# Patient Record
Sex: FEMALE | Race: WHITE | NOT HISPANIC OR LATINO | Employment: OTHER | URBAN - METROPOLITAN AREA
[De-identification: names, ages, dates, MRNs, and addresses within clinical notes are randomized per-mention and may not be internally consistent; named-entity substitution may affect disease eponyms.]

---

## 2017-01-16 ENCOUNTER — OFFICE VISIT (OUTPATIENT)
Dept: FAMILY MEDICINE | Facility: CLINIC | Age: 72
End: 2017-01-16
Payer: COMMERCIAL

## 2017-01-16 VITALS
TEMPERATURE: 98.2 F | SYSTOLIC BLOOD PRESSURE: 136 MMHG | HEART RATE: 66 BPM | WEIGHT: 140 LBS | RESPIRATION RATE: 18 BRPM | DIASTOLIC BLOOD PRESSURE: 77 MMHG | BODY MASS INDEX: 24.02 KG/M2

## 2017-01-16 DIAGNOSIS — R13.14 PHARYNGOESOPHAGEAL DYSPHAGIA: Primary | ICD-10-CM

## 2017-01-16 DIAGNOSIS — R33.9 INCOMPLETE BLADDER EMPTYING: ICD-10-CM

## 2017-01-16 PROCEDURE — 99214 OFFICE O/P EST MOD 30 MIN: CPT | Performed by: FAMILY MEDICINE

## 2017-01-16 NOTE — MR AVS SNAPSHOT
After Visit Summary   1/16/2017    Mariah Jacinto    MRN: [id number]           Patient Information     Date Of Birth          1945        Visit Information        Provider Department      1/16/2017 9:00 AM Eli Sommer MD [facility]        Today's Diagnoses     Pharyngoesophageal dysphagia    -  1     Incomplete bladder emptying           Care Instructions          Thank you for choosing [facility].  You may be receiving a survey in the mail from [facility] regarding your visit today.  Please take a few minutes to complete and return the survey to let us know how we are doing.      Our Clinic hours are:  Mondays    7:20 am - 7 pm  Tues -  Fri  7:20 am - 5 pm    Clinic Phone: [phone]    The clinic lab opens at 7:30 am Mon - Fri and appointments are required.    [facility]. [phone]  Monday-Thursday 8 am - 7pm  Tues/Wed/Fri 8 am - 5:30 pm               Follow-ups after your visit        Additional Services     GASTROENTEROLOGY ADULT REFERRAL +/- PROCEDURE       Your provider has referred you to Gastroenterology Services.    English    Procedure/Referral: PROCEDURE ONLY - UPPER GI ENDOSCOPY (EGD) - Reason for procedure: Dysphagia  FMG: [facility] [place] [phone]   [email or web address]      Please be aware that coverage of these services is subject to the terms and limitations of your health insurance plan.  Call member services at your health plan with any benefit or coverage questions.  Any procedures must be performed at a [facility] facility OR coordinated by your clinic's referral office.    Please bring the following with you to your appointment:    (1) Any X-Rays, CTs or MRIs which have been performed.  Contact the facility where they were done to arrange for  prior to your scheduled appointment.    (2) List of current medications   (3) This referral request   (4) Any documents/labs  given to you for this referral            UROLOGY ADULT REFERRAL       Your provider has referred you to: FM[facility]: [facility] [phone]   [email or web address]    Please be aware that coverage of these services is subject to the terms and limitations of your health insurance plan.  Call member services at your health plan with any benefit or coverage questions.      Please bring the following with you to your appointment:    (1) Any X-Rays, CTs or MRIs which have been performed.  Contact the facility where they were done to arrange for  prior to your scheduled appointment.    (2) List of current medications  (3) This referral request   (4) Any documents/labs given to you for this referral                  Your next 10 appointments already scheduled     May 16, 2017 11:00 AM   Return Visit with Chapito Franco MD   [facility]    [address] [address] [phone]              Who to contact     If you have questions or need follow up information about today's clinic visit or your schedule please contact [facility] directly at [phone].  Normal or non-critical lab and imaging results will be communicated to you by MyChart, letter or phone within 4 business days after the clinic has received the results. If you do not hear from us within 7 days, please contact the clinic through MyChart or phone. If you have a critical or abnormal lab result, we will notify you by phone as soon as possible.  Submit refill requests through [employer] or call your pharmacy and they will forward the refill request to us. Please allow 3 business days for your refill to be completed.          Additional Information About Your Visit        NowSpots Information     Now[employer] lets you send messages to your doctor, view your test results, renew your prescriptions, schedule appointments and  "more. To sign up, go to [email or web address] . Click on \"Log in\" on the left side of the screen, which will take you to the Welcome page. Then click on \"Sign up Now\" on the right side of the page.     You will be asked to enter the access code listed below, as well as some personal information. Please follow the directions to create your username and password.     Your access code is: [email or web address]  Expires: 2017  9:31 AM     Your access code will  in 90 days. If you need help or a new code, please call your [place] clinic or [phone].        Care EveryWhere ID     This is your Care EveryWhere ID. This could be used by other organizations to access your [place] medical records  [id number]        Your Vitals Were     Pulse Temperature Respirations             66 98.2  F (36.8  C) 18          Blood Pressure from Last 3 Encounters:   17 136/77   16 134/82   16 145/78    Weight from Last 3 Encounters:   17 140 lb (63.504 kg)   16 144 lb (65.318 kg)   10/19/16 144 lb 6.4 oz (65.499 kg)              We Performed the Following     GASTROENTEROLOGY ADULT REFERRAL +/- PROCEDURE     UROLOGY ADULT REFERRAL        Primary Care Provider Office Phone # Fax #    Eli Sommer [phone]       [facility] [address] [address] [address]        Thank you!     Thank you for choosing [facility]  for your care. Our goal is always to provide you with excellent care. Hearing back from our patients is one way we can continue to improve our services. Please take a few minutes to complete the written survey that you may receive in the mail after your visit with us. Thank you!             Your Updated Medication List - Protect others around you: Learn how to safely use, store and throw away your medicines at www.disposemymeds.org.          This list is accurate as of: 17  9:31 AM.  Always use your most recent med list.                "    Brand Name Dispense Instructions for use    acetaminophen 325 MG tablet    TYLENOL     Take 325-650 mg by mouth every 6 hours as needed for mild pain       acidophilus Caps      Take 1 tablet by mouth daily       aspirin 81 MG tablet      1 TABLET DAILY       Calcium + D3 600-200 MG-UNIT Tabs      Take 1 tablet by mouth daily       glucosamine-chondroitin 500-400 MG Caps per capsule      Take 1 capsule by mouth daily       melatonin 5 MG Caps     1 capsule    Take 5 mg by mouth At Bedtime       Multi-vitamin Tabs tablet   Generic drug:  multivitamin, therapeutic with minerals      1 TABLET DAILY       OMEGA-3 FISH OIL PO      Take 1 capsule by mouth daily       omeprazole 40 MG capsule    priLOSEC    90 capsule    Take 1 capsule (40 mg) by mouth daily       simvastatin 20 MG tablet    ZOCOR    90 tablet    Take 1 tablet (20 mg) by mouth At Bedtime       STOOL SOFTENER PO      Take  by mouth. 1 daily       VITAMIN C ER PO      Take 1 tablet by mouth daily       VITAMIN D PO      1 DAILY

## 2017-01-16 NOTE — PATIENT INSTRUCTIONS
Thank you for choosing [facility].  You may be receiving a survey in the mail from [facility] regarding your visit today.  Please take a few minutes to complete and return the survey to let us know how we are doing.      Our Clinic hours are:  Mondays    7:20 am - 7 pm  Tues -  Fri  7:20 am - 5 pm    Clinic Phone: [phone]    The clinic lab opens at 7:30 am Mon - Fri and appointments are required.    [facility]. [phone]  Monday-Thursday 8 am - 7pm  Tues/Wed/Fri 8 am - 5:30 pm

## 2017-01-16 NOTE — PROGRESS NOTES
SUBJECTIVE:                                                    Mariah Jacinto is a 71 year old female who presents to clinic today for the following health issues:      GERD/Heartburn     Onset: x 2 mo      Description:     Burning in chest: YES    Intensity: mild, moderate    Progression of Symptoms: worsening    Accompanying Signs & Symptoms:  Does it feel like food gets stuck: YES  Nausea: YES  Vomiting (bloody?): no   Abdominal Pain: no   Black-Tarry stools: no:  Bloody stools: no   History:   Previous ulcers: YES    Precipitating factors:   Caffeine use: no  Alcohol use: no  NSAID/Aspirin use: yes Tobacco use: no  Worse with no particular food or drink.    Alleviating factors:  none         Therapies Tried and outcome:OTC H2 blocker: and prescription H2 blocker:  No relief     She's also on omeprazole        Continues to feel bladder fullness after voiding.  Low level UTI treated a few weeks ago, she didn't feel this helped.  Doesn't feel her bladder is emptying completely.  We talked about urology referral.  She's wondering about a side effect of the simvastatin.  It appears that it can cause interstitial cystitis.  I'd be hesitant to take her off without confirming this with urology.  Likely needs a post void bladder scan, etc.      Problem list and histories reviewed & adjusted, as indicated.  Additional history: as documented      /77 mmHg  Pulse 66  Temp(Src) 98.2  F (36.8  C)  Resp 18  Wt 140 lb (63.504 kg)  EXAM: GENERAL APPEARANCE: Alert, no acute distress  NECK: No adenopathy,masses or thyromegaly  RESP: lungs clear to auscultation   CV: normal rate, regular rhythm, no murmur or gallop  ABDOMEN: soft, no organomegaly, masses or tenderness      ASSESSMENT/PLAN:      ICD-10-CM    1. Pharyngoesophageal dysphagia R13.14 GASTROENTEROLOGY ADULT REFERRAL +/- PROCEDURE   2. Incomplete bladder emptying R33.9 UROLOGY ADULT REFERRAL     Needs upper endoscopy to r/o stricture/mass.   If negative  consider GI referral.    Urology consultation for the incomplete bladder emptying.      Eli Sommer M.D.      Patient Instructions         Thank you for choosing [facility].  You may be receiving a [facility] today.  Please take a few minutes to complete and return the survey to let us know how we are doing.      Our Clinic hours are:  Mondays    7:20 am - 7 pm  Tues -  Fri  7:20 am - 5 pm    Clinic Phone: [phone]    The clinic lab opens at 7:30 am Mon - Fri and appointments are required.    [facility]. [phone]  Monday-Thursday 8 am - 7pm  Tues/Wed/Fri 8 am - 5:30 pm

## 2017-01-16 NOTE — NURSING NOTE
"Initial /77 mmHg  Pulse 66  Temp(Src) 98.2  F (36.8  C)  Resp 18  Wt 140 lb (63.504 kg) Estimated body mass index is 24.02 kg/(m^2) as calculated from the following:    Height as of 12/8/16: 5' 4\" (1.626 m).    Weight as of this encounter: 140 lb (63.504 kg). .    Chief Complaint   Patient presents with     Gastrointestinal Problem     Venita Wolf CMA    "

## 2017-01-30 ENCOUNTER — ANESTHESIA EVENT (OUTPATIENT)
Dept: GASTROENTEROLOGY | Facility: CLINIC | Age: 72
End: 2017-01-30
Payer: COMMERCIAL

## 2017-01-30 NOTE — ANESTHESIA PREPROCEDURE EVALUATION
Anesthesia Evaluation     . Pt has had prior anesthetic.     No history of anesthetic complications     ROS/MED HX    ENT/Pulmonary:  - neg pulmonary ROS     Neurologic:  - neg neurologic ROS     Cardiovascular:     (+) Dyslipidemia, ----. : . . . :. .       METS/Exercise Tolerance:  >4 METS   Hematologic:  - neg hematologic  ROS       Musculoskeletal:  - neg musculoskeletal ROS       GI/Hepatic:     (+) GERD Symptomatic, Other GI/Hepatic dysphagia, nausea      Renal/Genitourinary:  - ROS Renal section negative       Endo:  - neg endo ROS       Psychiatric:     (+) psychiatric history depression      Infectious Disease:  - neg infectious disease ROS       Malignancy:   (+) Malignancy History of Skin          Other: Comment: Trigeminal and intercostal neuralgia              Physical Exam  Normal systems: cardiovascular, pulmonary and dental    Airway   Mallampati: I  TM distance: >3 FB  Neck ROM: full    Dental     Cardiovascular       Pulmonary                     Anesthesia Plan      History & Physical Review  History and physical reviewed and following examination; no interval change.    ASA Status:  3 .    NPO Status:  > 8 hours    Plan for MAC and General with Propofol induction. Reason for MAC:  Chronic cardiopulmonary disease (G9)  PONV prophylaxis:  Ondansetron (or other 5HT-3) and Dexamethasone or Solumedrol       Postoperative Care  Postoperative pain management:  IV analgesics and Oral pain medications.      Consents  Anesthetic plan, risks, benefits and alternatives discussed with:  Patient..                          .

## 2017-01-31 ENCOUNTER — ANESTHESIA (OUTPATIENT)
Dept: GASTROENTEROLOGY | Facility: CLINIC | Age: 72
End: 2017-01-31
Payer: COMMERCIAL

## 2017-01-31 ENCOUNTER — HOSPITAL ENCOUNTER (OUTPATIENT)
Facility: CLINIC | Age: 72
Discharge: HOME | End: 2017-01-31
Attending: SURGERY | Admitting: SURGERY
Payer: COMMERCIAL

## 2017-01-31 ENCOUNTER — SURGERY (OUTPATIENT)
Age: 72
End: 2017-01-31

## 2017-01-31 VITALS
TEMPERATURE: 97.8 F | HEART RATE: 76 BPM | OXYGEN SATURATION: 98 % | BODY MASS INDEX: 23.9 KG/M2 | RESPIRATION RATE: 16 BRPM | SYSTOLIC BLOOD PRESSURE: 141 MMHG | DIASTOLIC BLOOD PRESSURE: 83 MMHG | WEIGHT: 140 LBS | HEIGHT: 64 IN

## 2017-01-31 LAB — UPPER GI ENDOSCOPY: NORMAL

## 2017-01-31 PROCEDURE — 25000125 ZZHC RX 250: Performed by: NURSE ANESTHETIST, CERTIFIED REGISTERED

## 2017-01-31 PROCEDURE — 25800025 ZZH RX 258: Performed by: SURGERY

## 2017-01-31 PROCEDURE — 25000125 ZZHC RX 250: Performed by: SURGERY

## 2017-01-31 PROCEDURE — 25000132 ZZH RX MED GY IP 250 OP 250 PS 637: Performed by: SURGERY

## 2017-01-31 PROCEDURE — 88305 TISSUE EXAM BY PATHOLOGIST: CPT | Mod: 26 | Performed by: SURGERY

## 2017-01-31 PROCEDURE — 88305 TISSUE EXAM BY PATHOLOGIST: CPT | Performed by: SURGERY

## 2017-01-31 PROCEDURE — 43239 EGD BIOPSY SINGLE/MULTIPLE: CPT | Performed by: SURGERY

## 2017-01-31 PROCEDURE — 37000008 ZZH ANESTHESIA TECHNICAL FEE, 1ST 30 MIN: Performed by: SURGERY

## 2017-01-31 RX ORDER — PROPOFOL 10 MG/ML
INJECTION, EMULSION INTRAVENOUS CONTINUOUS PRN
Status: DISCONTINUED | OUTPATIENT
Start: 2017-01-31 | End: 2017-01-31

## 2017-01-31 RX ORDER — LIDOCAINE HYDROCHLORIDE 10 MG/ML
INJECTION, SOLUTION INFILTRATION; PERINEURAL PRN
Status: DISCONTINUED | OUTPATIENT
Start: 2017-01-31 | End: 2017-01-31

## 2017-01-31 RX ORDER — ONDANSETRON 2 MG/ML
4 INJECTION INTRAMUSCULAR; INTRAVENOUS
Status: DISCONTINUED | OUTPATIENT
Start: 2017-01-31 | End: 2017-01-31 | Stop reason: HOSPADM

## 2017-01-31 RX ORDER — SODIUM CHLORIDE, SODIUM LACTATE, POTASSIUM CHLORIDE, CALCIUM CHLORIDE 600; 310; 30; 20 MG/100ML; MG/100ML; MG/100ML; MG/100ML
INJECTION, SOLUTION INTRAVENOUS CONTINUOUS
Status: DISCONTINUED | OUTPATIENT
Start: 2017-01-31 | End: 2017-01-31 | Stop reason: HOSPADM

## 2017-01-31 RX ORDER — LIDOCAINE 40 MG/G
CREAM TOPICAL
Status: DISCONTINUED | OUTPATIENT
Start: 2017-01-31 | End: 2017-01-31 | Stop reason: HOSPADM

## 2017-01-31 RX ORDER — PROPOFOL 10 MG/ML
INJECTION, EMULSION INTRAVENOUS PRN
Status: DISCONTINUED | OUTPATIENT
Start: 2017-01-31 | End: 2017-01-31

## 2017-01-31 RX ADMIN — PROPOFOL 100 MG: 10 INJECTION, EMULSION INTRAVENOUS at 10:22

## 2017-01-31 RX ADMIN — LIDOCAINE HYDROCHLORIDE 1 ML: 10 INJECTION, SOLUTION INFILTRATION; PERINEURAL at 09:59

## 2017-01-31 RX ADMIN — SODIUM CHLORIDE, POTASSIUM CHLORIDE, SODIUM LACTATE AND CALCIUM CHLORIDE: 600; 310; 30; 20 INJECTION, SOLUTION INTRAVENOUS at 09:59

## 2017-01-31 RX ADMIN — BENZOCAINE 3 SPRAY: 220 SPRAY, METERED PERIODONTAL at 10:21

## 2017-01-31 RX ADMIN — PROPOFOL 75 MCG/KG/MIN: 10 INJECTION, EMULSION INTRAVENOUS at 10:25

## 2017-01-31 RX ADMIN — LIDOCAINE HYDROCHLORIDE 20 MG: 10 INJECTION, SOLUTION INFILTRATION; PERINEURAL at 10:22

## 2017-01-31 NOTE — BRIEF OP NOTE
[facility]   Brief Operative Note    Pre-operative diagnosis: pharyngoesophageal dysphagia   Post-operative diagnosis small esophageal diverticulum, otherwise normal   Procedure: Procedure(s):  Gastroscopy - Wound Class: II-Clean Contaminated   Surgeon(s): Surgeon(s) and Role:     * Qasim Bowie MD - Primary   Estimated blood loss: * No values recorded between 1/31/2017 12:00 AM and 1/31/2017 10:30 AM *    Specimens:   ID Type Source Tests Collected by Time Destination   A : for h pylori                     lmk Tissue Stomach, Antrum SURGICAL PATHOLOGY EXAM Qasim Bowie MD 1/31/2017 10:29 AM       Findings: 1. Small diverticulum of esophagus at 30 cm  2.  Esophagus otherwise normal  3.  Stomach unremarkable - biopsied for h.pylori  4.  Duodenum normal

## 2017-01-31 NOTE — H&P
71 year old year old female here for upper endoscopy for dysphagia.        Patient Active Problem List   Diagnosis     Disorder of bone and cartilage     MENOPAUSE --ERT     Enthesopathy of hip region     Sensorineural hearing loss     Right hip pain     Trigeminal Neuralgia right      Hyperlipidemia LDL goal <160     Advanced directives, counseling/discussion     Intercostal neuralgia     Health Care Home     Trochanteric bursitis     Subjective tinnitus, bilateral     Gastroesophageal reflux disease without esophagitis       Past Medical History   Diagnosis Date     OSTEOPENIA      Adhesive capsulitis of shoulder      Right shoulder tendonitis     Displacement of cervical intervertebral disc without myelopathy      MENOPAUSE --ERT      Esophageal reflux      Basal cell carcinoma      Major depression in complete remission (H) 6/22/2009     celexa caused spinning side effect      Squamous cell carcinoma (H)        Past Surgical History   Procedure Laterality Date     Surgical history of -   2009     right retromastoid craniectomy and decompression trigeminal nerve     Hysterectomy, vaginal  1988     Hysterectomy, oophorectomy     Tonsillectomy & adenoidectomy  child     T&A      Hysterectomy, pap no longer indicated       has both ovaries out also      C anesth,lower arm surgery  1999     ulnar nerve decompression - right     Ablate vein varicose radio frequency without phlebectomy multiple stab  3/28/2013     Procedure: ABLATE VEIN VARICOSE RADIO FREQUENCY WITHOUT PHLEBECTOMY MULTIPLE STAB;  Bilateral ablation of varicose veins legs-to ultrasound at 0930  ;  Surgeon: Noam Soliman MD;  Location: [facility]     Colonoscopy  8/20/2013     Procedure: COLONOSCOPY;  Colonoscopy;  Surgeon: Yuliya Nathan MD;  Location: [facility] GI     Esophagoscopy, gastroscopy, duodenoscopy (egd), combined N/A 5/12/2015     Procedure: COMBINED ESOPHAGOSCOPY, GASTROSCOPY, DUODENOSCOPY (EGD), BIOPSY SINGLE OR MULTIPLE;   Surgeon: Yuliya Nathan MD;  Location: [facility] GI       Family History   Problem Relation Age of Onset     Alzheimer Disease Mother       at age 85     OSTEOPOROSIS Mother      Arthritis Mother      Alcohol/Drug Father      Respiratory Father      Eye Disorder Maternal Grandfather      Macular degneration     C.A.D. Brother      Tripple bypass age 57     Cardiovascular Brother      CANCER Brother      leukemia (ALL)     Cardiovascular Brother      Cardiovascular Brother      Neurologic Disorder Son      HEART DISEASE Son        No current outpatient prescriptions on file.    Allergies   Allergen Reactions     Biaxin [Clarithromycin]      per pt       Celebrex [Celecoxib]      per pt     Cipro [Ciprofloxacin]      per pt     Darvocet [Propoxyphene N-Apap]      Fleet Phospho Soda [Sodium Phosphate/Biphosphate]      nausea per pt     Morphine      Neurontin [Gabapentin]      per pt     Nortriptyline      per pt     Percocet [Oxycodone-Acetaminophen]      Serzone [Nefazodone Hydrochloride]      per pt     Tegretol [Carbamazepine]      Vicodin [Acetaminophen]      per pt-dizziness     Vioxx      per pt     Vivactil [Protriptyline Hcl]      per pt     Wellbutrin [Bupropion Hcl]      Zithromax [Azithromycin Dihydrate]        Pt reports that she has never smoked. She has never used smokeless tobacco. She reports that she drinks alcohol. She reports that she does not use illicit drugs.    Exam:    Awake, Alert OX3  Lungs - CTA bilaterally  CV - RRR, no murmurs, distal pulses intact  Abd - soft, non-distended, non-tender, +BS  Extr - No cyanosis or edema    A/P 71 year old year old female in need of upper endoscopy for dysphagia. Risks, benefits, alternatives, and complications were discussed including the possibility of perforation and the patient agreed to proceed.    Qasim Bowie MD

## 2017-01-31 NOTE — ANESTHESIA POSTPROCEDURE EVALUATION
Patient: Mariah Jacinto    COMBINED ESOPHAGOSCOPY, GASTROSCOPY, DUODENOSCOPY (EGD), BIOPSY SINGLE OR MULTIPLE (N/A Esophagus)  Additional InformationProcedure(s):  Gastroscopy - Wound Class: II-Clean Contaminated    Diagnosis:pharyngoesophageal dysphagia  Diagnosis Additional Information: No value filed.    Anesthesia Type:  No value filed.    Note:  Anesthesia Post Evaluation    Patient location during evaluation: Bedside  Patient participation: Able to fully participate in evaluation  Level of consciousness: awake  Pain management: adequate  Airway patency: patent  Cardiovascular status: stable  Respiratory status: nasal cannula  Hydration status: stable  PONV: none     Anesthetic complications: None          Last vitals:  Filed Vitals:    01/31/17 0942   BP: 147/75   Pulse: 70   Temp: 36.6  C (97.8  F)   Resp: 18   SpO2: 98%       Electronically Signed By: VICKY Martinez CRNA  January 31, 2017  10:33 AM

## 2017-01-31 NOTE — ADDENDUM NOTE
Addendum  created 01/31/17 1034 by Tameka Martel APRN CRNA    Modules edited: Anesthesia Review and Sign Navigator Section

## 2017-01-31 NOTE — ANESTHESIA CARE TRANSFER NOTE
Patient: Mariah Jacinto    COMBINED ESOPHAGOSCOPY, GASTROSCOPY, DUODENOSCOPY (EGD) (N/A Esophagus)  Additional InformationProcedure(s):  Gastroscopy - Wound Class: II-Clean Contaminated    Diagnosis: pharyngoesophageal dysphagia  Diagnosis Additional Information: No value filed.    Anesthesia Type:   No value filed.     Note:  Airway :Nasal Cannula  Patient transferred to:Phase II        Vitals: (Last set prior to Anesthesia Care Transfer)              Electronically Signed By: VICKY Martinez CRNA  January 31, 2017  10:33 AM

## 2017-02-01 LAB — COPATH REPORT: NORMAL

## 2017-02-25 ENCOUNTER — TRANSFERRED RECORDS (OUTPATIENT)
Dept: HEALTH INFORMATION MANAGEMENT | Facility: CLINIC | Age: 72
End: 2017-02-25

## 2017-02-27 ENCOUNTER — OFFICE VISIT (OUTPATIENT)
Dept: UROLOGY | Facility: CLINIC | Age: 72
End: 2017-02-27
Payer: COMMERCIAL

## 2017-02-27 VITALS — RESPIRATION RATE: 16 BRPM | HEART RATE: 77 BPM | DIASTOLIC BLOOD PRESSURE: 78 MMHG | SYSTOLIC BLOOD PRESSURE: 133 MMHG

## 2017-02-27 DIAGNOSIS — Z87.440 PERSONAL HISTORY OF URINARY TRACT INFECTION: Primary | ICD-10-CM

## 2017-02-27 LAB
ALBUMIN UR-MCNC: NEGATIVE MG/DL
APPEARANCE UR: CLEAR
BILIRUB UR QL STRIP: NEGATIVE
COLOR UR AUTO: YELLOW
GLUCOSE UR STRIP-MCNC: NEGATIVE MG/DL
HGB UR QL STRIP: NEGATIVE
KETONES UR STRIP-MCNC: NEGATIVE MG/DL
LEUKOCYTE ESTERASE UR QL STRIP: NEGATIVE
NITRATE UR QL: NEGATIVE
PH UR STRIP: 6 PH (ref 5–7)
SP GR UR STRIP: >1.03 (ref 1–1.03)
URN SPEC COLLECT METH UR: NORMAL
UROBILINOGEN UR STRIP-ACNC: 0.2 EU/DL (ref 0.2–1)

## 2017-02-27 PROCEDURE — 87086 URINE CULTURE/COLONY COUNT: CPT | Performed by: UROLOGY

## 2017-02-27 PROCEDURE — 81003 URINALYSIS AUTO W/O SCOPE: CPT | Performed by: UROLOGY

## 2017-02-27 PROCEDURE — 51798 US URINE CAPACITY MEASURE: CPT | Performed by: UROLOGY

## 2017-02-27 PROCEDURE — 99213 OFFICE O/P EST LOW 20 MIN: CPT | Mod: 25 | Performed by: UROLOGY

## 2017-02-27 NOTE — NURSING NOTE
"Chief Complaint   Patient presents with     Consult     Imcomplete Bladder Emptying        Initial /78 (BP Location: Right arm, Patient Position: Chair, Cuff Size: Adult Regular)  Pulse 77  Resp 16 Estimated body mass index is 24.03 kg/(m^2) as calculated from the following:    Height as of 1/31/17: 5' 4\" (1.626 m).    Weight as of 1/31/17: 140 lb (63.5 kg).  BP completed using cuff size: regular      Shalini Ramirez CMA     "

## 2017-02-27 NOTE — NURSING NOTE
Active order to obtain bladder scan? Yes   Name of ordering provider:  Dr Burciaga  Bladder scan preformed post void Yes.  Bladder scan reveled 21ML  Provider notified?  Yes    Shalini Ramirez CMA

## 2017-02-27 NOTE — MR AVS SNAPSHOT
"              After Visit Summary   2/27/2017    Mariah Jacinto    MRN: [id number]           Patient Information     Date Of Birth          1945        Visit Information        Provider Department      2/27/2017 2:00 PM EDDA Burciaga MD [facility]        Today's Diagnoses     Personal history of urinary tract infection    -  1      Care Instructions    Per Physician's instructions          Follow-ups after your visit        Your next 10 appointments already scheduled     May 16, 2017 11:00 AM CDT   Return Visit with Chapito Franco MD   [facility]    [address], [place] [address]   [phone]              Who to contact     If you have questions or need follow up information about today's clinic visit or your schedule please contact [facility] directly at [phone].  Normal or non-critical lab and imaging results will be communicated to you by MyChart, letter or phone within 4 business days after the clinic has received the results. If you do not hear from us within 7 days, please contact the clinic through MyChart or phone. If you have a critical or abnormal lab result, we will notify you by phone as soon as possible.  Submit refill requests through [employer] or call your pharmacy and they will forward the refill request to us. Please allow 3 business days for your refill to be completed.          Additional Information About Your Visit        MyChart Information     [employer] lets you send messages to your doctor, view your test results, renew your prescriptions, schedule appointments and more. To sign up, go to [email or web address] . Click on \"Log in\" on the left side of the screen, which will take you to the Welcome page. Then click on \"Sign up Now\" on the right side of the page.     You will be asked to enter the access code listed below, as well as some personal information. Please follow the directions " to create your username and password.     Your access code is: [email or web address]  Expires: 2017  2:44 PM     Your access code will  in 90 days. If you need help or a new code, please call your [place] clinic or [phone].        Care EveryWhere ID     This is your Care EveryWhere ID. This could be used by other organizations to access your [place] medical records  [id number]        Your Vitals Were     Pulse Respirations                77 16           Blood Pressure from Last 3 Encounters:   17 133/78   17 141/83   17 136/77    Weight from Last 3 Encounters:   17 140 lb (63.5 kg)   17 140 lb (63.5 kg)   16 144 lb (65.3 kg)              We Performed the Following     *UA reflex to Microscopic     MEASURE POST-VOID RESIDUAL URINE/BLADDER CAPACITY, US NON-IMAGING     Urine Culture Aerobic Bacterial        Primary Care Provider Office Phone # Fax #    Eli Sommer [phone]       [facility] [address] [address]        Thank you!     Thank you for choosing [facility]  for your care. Our goal is always to provide you with excellent care. Hearing back from our patients is one way we can continue to improve our services. Please take a few minutes to complete the written survey that you may receive in the mail after your visit with us. Thank you!             Your Updated Medication List - Protect others around you: Learn how to safely use, store and throw away your medicines at www.disposemymeds.org.          This list is accurate as of: 17  2:44 PM.  Always use your most recent med list.                   Brand Name Dispense Instructions for use    acetaminophen 325 MG tablet    TYLENOL     Take 325-650 mg by mouth every 6 hours as needed for mild pain       acidophilus Caps      Take 1 tablet by mouth daily       aspirin 81 MG tablet      1 TABLET DAILY       Calcium + D3 600-200 MG-UNIT Tabs      Take 1  tablet by mouth daily       glucosamine-chondroitin 500-400 MG Caps per capsule      Take 1 capsule by mouth daily       melatonin 5 MG Caps     1 capsule    Take 5 mg by mouth At Bedtime       Multi-vitamin Tabs tablet   Generic drug:  multivitamin, therapeutic with minerals      1 TABLET DAILY       OMEGA-3 FISH OIL PO      Take 1 capsule by mouth daily       omeprazole 40 MG capsule    priLOSEC    90 capsule    Take 1 capsule (40 mg) by mouth daily       simvastatin 20 MG tablet    ZOCOR    90 tablet    Take 1 tablet (20 mg) by mouth At Bedtime       STOOL SOFTENER PO      Take  by mouth. 1 daily       VITAMIN C ER PO      Take 1 tablet by mouth daily       VITAMIN D PO      1 DAILY

## 2017-02-27 NOTE — PROGRESS NOTES
Appointment source: New Patient  Patient name: Mariah Jacinto  Urology Staff: Juan F Burciaga MD    Subjective: This is a 71 year old year old female complaining of a sense of needing to urinate without apparent bladder fullness.    Objective:  Recently was treated for a E coli UTI at the time of the onset of these bladder symptoms. Antibiotic therapy did not relieve her symptoms.    Denies urinary incontinence.    No history of urinary surgery.    No symptoms suggestive of urolithiasis.     Recent exam normal.    Assessment:  Mild symptoms of urgency. History of recent UTI. Possible recurrence or persistence of UTI.    Plan:  Will repeat the urine culture today and get back to her about further follow up.    Doubt significant issue.    Total time 20 minutes, counseling 15 minutes

## 2017-03-01 LAB
BACTERIA SPEC CULT: NORMAL
MICRO REPORT STATUS: NORMAL
SPECIMEN SOURCE: NORMAL

## 2017-03-13 ENCOUNTER — TELEPHONE (OUTPATIENT)
Dept: FAMILY MEDICINE | Facility: CLINIC | Age: 72
End: 2017-03-13

## 2017-03-13 NOTE — TELEPHONE ENCOUNTER
Please advise - thank you.     Pt is not going to take her simvastatin - states this is the second type of medication that she has tried to help with her cholesterol that has made her feel this way.   Feet not getting better - the pain is getting worse   Pain rated at 4 out of 10- the pain comes and goes.   Her feet fall a sleep all the time, sore to the touch   Other parts of her body ache also -back, fingers, hands.       simvastatin (ZOCOR) 20 MG tablet 90 tablet 3 9/26/2016  --   Sig: Take 1 tablet (20 mg) by mouth At Bedtime     Recent Labs   Lab Test  04/04/16   0842  09/08/15   0743  09/09/14   0652   CHOL  182  247*  235*   HDL  60  60  61   LDL  103*  164*  145*   TRIG  94  117  143   CHOLHDLRATIO   --   4.1  3.9        Denies:     allergic reactions like skin rash, itching or hives, swelling of the face, lips, or tongue    confusion    dark yellow or brown urine    depression    fever    loss of memory    redness, blistering, peeling or loosening of the skin, including inside the mouth    trouble passing urine or change in the amount of urine    unusually weak or tired    yellowing of the skin or eyes    constipation    gas    headache    heartburn    indigestion    stomach pain

## 2017-03-13 NOTE — TELEPHONE ENCOUNTER
Notify patient Dr. Sommer is not in today, so Dr. Paulson got her message, and took the simvastatin off her med list.    I noted that the statin did lower her total cholesterol and LDL (bad cholesterol), but she has always had a high HDL (good cholesterol) which is beneficial.  I calculated her risk for heart disease or stroke over the next ten years using the higher cholesterol she was running before starting on the statins, and the risk came out to about 12.8% over ten years, or 1.3% per year. The statins reduce that by about 25%, so taking the meds cut her risk to around 9% over the next decade, and I don't think that small reduction is probably worth her feeling this miserable.  So OK to just stop the statin.    That said, if she does not notice resolution of her aches and pains within two weeks of going off the drug, then the drug may not have been to blame and she should see Dr. Sommer to discuss other possible causes.    Rachel Paulson md

## 2017-03-13 NOTE — TELEPHONE ENCOUNTER
Reason for Call:  Other FYI    Detailed comments: She just wants to let Dr. Sommer know that she is going to stop taking the Simvastatin.  She is having pain in her feet and the pain is traveling around.      Phone Number Patient can be reached at: Home number on file [phone] (home)    Best Time: any    Can we leave a detailed message on this number? YES    Call taken on 3/13/2017 at 2:41 PM by Kristy Grier

## 2017-03-14 NOTE — TELEPHONE ENCOUNTER
I have attempted to contact this patient by phone with the following results: left message to return my call on answering machine.    Angeles Rodriguez RN

## 2017-03-14 NOTE — TELEPHONE ENCOUNTER
Unable to reach any nurse so I gave patient the info from Dr. Paulson and patient is very happy with this.  She states she will give it 2 weeks and then follow up with Dr. Sommer.

## 2017-04-12 ENCOUNTER — TELEPHONE (OUTPATIENT)
Dept: NURSING | Facility: CLINIC | Age: 72
End: 2017-04-12

## 2017-04-12 NOTE — TELEPHONE ENCOUNTER
Call Type: Triage Call    Presenting Problem: Mariah states she has poison ivy on both arms,  from elbow to wrist. Started yesterday.  Triage Note:  Guideline Title: Poison Ivy, Oak, or Sumac Exposure  Recommended Disposition: Provide Home/Self Care  Original Inclination: Wanted to speak with a nurse  Override Disposition:  Intended Action: Follow Selfcare / Homecare  Physician Contacted: No  Itching, burning skin ?  YES  Frail elderly or others with thinning skin and involvement of 10% or more of body  surface area ? NO  Involves eyes, mouth, or genitals. ? NO  Symptoms are not improving or are worsening after 3 days of home treatment ? NO  Involvement of 25% or more of body surface area ? NO  Symptoms have not improved in 5 days or have continued for more than 2 weeks ? NO  Any new OR worsening signs and symptoms of soft tissue infection ? NO  Physician Instructions:  Care Advice: POISON IVY, OAK, AND SUMAC:  * Call provider if you develop  -  an open oozing, painful rash,  - signs of secondary infection (e.g.  tenderness in the rash area, yellowish drainage, or soft yellow scabs),  -  fever of 101.5 F (38.6 C) or greater,  - increasing involvement of  surrounding skin, - or severe discomfort.   * Regardless of temperature,  immunocompromised patients (such as diabetes, HIV/AIDS, chemotherapy, organ  transplant, or chronic steroid use) must be evaluated by provider.  For symptom relief, consider nonprescription antihistamines (such as  Allerest, Allegra, Claritin, Zyrtec, Chlor-Trimetron, Benadryl, etc.) as  directed on label or by pharmacist. Drowsiness may result, especially in  geriatric patients. Non-sedating antihistamines are available without a  prescription.  Change into clean clothes as soon as possible. Launder all clothes you were  wearing before reuse to remove the oil from the plant. Wash anything that  may have the plant oil on its surface with warm, soapy water. If the oil  touches your skin, you  can get the rash.  Itching Relief: - Avoid scratching or rubbing irritated area  may cause further irritation and secondary infection - Take cool showers or  baths several times a day to relieve itching  use non-drying soap. Pat dry with a towel  rubbing can increase irritation. - If cool water alone does not relieve  itching, try adding 1/2 to 1 cup baking soda to bath water - Follow with  application of a bland lotion such as calamine (do not apply to the eyes or  genitals).

## 2017-05-16 ENCOUNTER — TELEPHONE (OUTPATIENT)
Dept: FAMILY MEDICINE | Facility: CLINIC | Age: 72
End: 2017-05-16

## 2017-05-16 ENCOUNTER — OFFICE VISIT (OUTPATIENT)
Dept: DERMATOLOGY | Facility: CLINIC | Age: 72
End: 2017-05-16
Payer: COMMERCIAL

## 2017-05-16 ENCOUNTER — TELEPHONE (OUTPATIENT)
Dept: NURSING | Facility: CLINIC | Age: 72
End: 2017-05-16

## 2017-05-16 VITALS — OXYGEN SATURATION: 99 % | HEART RATE: 64 BPM | DIASTOLIC BLOOD PRESSURE: 71 MMHG | SYSTOLIC BLOOD PRESSURE: 131 MMHG

## 2017-05-16 DIAGNOSIS — N39.0 URINARY TRACT INFECTION WITHOUT HEMATURIA, SITE UNSPECIFIED: ICD-10-CM

## 2017-05-16 DIAGNOSIS — R30.0 DYSURIA: Primary | ICD-10-CM

## 2017-05-16 DIAGNOSIS — Z85.828 HISTORY OF SKIN CANCER: Primary | ICD-10-CM

## 2017-05-16 DIAGNOSIS — L81.4 LENTIGO: ICD-10-CM

## 2017-05-16 DIAGNOSIS — L82.1 SK (SEBORRHEIC KERATOSIS): ICD-10-CM

## 2017-05-16 PROCEDURE — 99213 OFFICE O/P EST LOW 20 MIN: CPT | Performed by: DERMATOLOGY

## 2017-05-16 NOTE — PROGRESS NOTES
Mariah Jacinto is a 71 year old year old female patient here today for f/u hx of non-melanoma skin cancer.  She has no new or concerning skin  lesions today.   .  Patient reports the following modifying factors none.  Associated symptoms: none.  Patient has no other skin complaints today.  Remainder of the HPI, Meds, PMH, Allergies, FH, and SH was reviewed in chart.    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    celexa caused spinning side effect      MENOPAUSE --ERT      OSTEOPENIA      Squamous cell carcinoma (H)        Past Surgical History:   Procedure Laterality Date     ABLATE VEIN VARICOSE RADIO FREQUENCY WITHOUT PHLEBECTOMY MULTIPLE STAB  3/28/2013    Procedure: ABLATE VEIN VARICOSE RADIO FREQUENCY WITHOUT PHLEBECTOMY MULTIPLE STAB;  Bilateral ablation of varicose veins legs-to ultrasound at 0930  ;  Surgeon: Noam Soliman MD;  Location: [facility]      ANESTH,LOWER ARM SURGERY  1999    ulnar nerve decompression - right     COLONOSCOPY  8/20/2013    Procedure: COLONOSCOPY;  Colonoscopy;  Surgeon: Yuliya Nathan MD;  Location: [facility]     ESOPHAGOSCOPY, GASTROSCOPY, DUODENOSCOPY (EGD), COMBINED N/A 5/12/2015    Procedure: COMBINED ESOPHAGOSCOPY, GASTROSCOPY, DUODENOSCOPY (EGD), BIOPSY SINGLE OR MULTIPLE;  Surgeon: Yuliya Nathan MD;  Location: [facility]     ESOPHAGOSCOPY, GASTROSCOPY, DUODENOSCOPY (EGD), COMBINED N/A 1/31/2017    Procedure: COMBINED ESOPHAGOSCOPY, GASTROSCOPY, DUODENOSCOPY (EGD), BIOPSY SINGLE OR MULTIPLE;  Surgeon: Qasim Bowie MD;  Location: [facility]     HYSTERECTOMY, PAP NO LONGER INDICATED      has both ovaries out also      HYSTERECTOMY, VAGINAL  1988    Hysterectomy, oophorectomy      SURGICAL HISTORY OF -       right retromastoid craniectomy and decompression trigeminal nerve     TONSILLECTOMY & ADENOIDECTOMY  child    T&A         Family History   Problem Relation Age of Onset     Alzheimer Disease Mother       at age 85     OSTEOPOROSIS Mother      Arthritis Mother      Alcohol/Drug Father      Respiratory Father      Eye Disorder Maternal Grandfather      Macular degneration     C.A.D. Brother      Tripple bypass age 57     Cardiovascular Brother      CANCER Brother      leukemia (ALL)     Cardiovascular Brother      Cardiovascular Brother      Neurologic Disorder Son      HEART DISEASE Son        Social History     Social History     Marital status:      Spouse name: N/A     Number of children: N/A     Years of education: N/A     Occupational History     Not on file.     Social History Main Topics     Smoking status: Never Smoker     Smokeless tobacco: Never Used     Alcohol use No     Drug use: No     Sexual activity: Yes     Birth control/ protection: Surgical      Comment: complete hysterectomy      Other Topics Concern     Parent/Sibling W/ Cabg, Mi Or Angioplasty Before 65f 55m? No     Social History Narrative       Outpatient Encounter Prescriptions as of 2017   Medication Sig Dispense Refill     Calcium Carb-Cholecalciferol (CALCIUM + D3) 600-200 MG-UNIT TABS Take 1 tablet by mouth daily       Omega-3 Fatty Acids (OMEGA-3 FISH OIL PO) Take 1 capsule by mouth daily       melatonin 5 MG CAPS Take 5 mg by mouth At Bedtime 1 capsule 0     glucosamine-chondroitin 500-400 MG CAPS Take 1 capsule by mouth daily       Lactobacillus (ACIDOPHILUS) CAPS Take 1 tablet by mouth daily       Ascorbic Acid (VITAMIN C ER PO) Take 1 tablet by mouth daily        Docusate Calcium (STOOL SOFTENER PO) Take  by mouth. 1 daily       MULTI-VITAMIN OR TABS 1 TABLET DAILY       ASPIRIN 81 MG OR TABS 1 TABLET DAILY       VITAMIN D OR 1 DAILY       omeprazole (PRILOSEC) 40 MG capsule  Take 1 capsule (40 mg) by mouth daily (Patient not taking: Reported on 5/16/2017) 90 capsule 3     acetaminophen (TYLENOL) 325 MG tablet Take 325-650 mg by mouth every 6 hours as needed for mild pain Reported on 5/16/2017       No facility-administered encounter medications on file as of 5/16/2017.              Review Of Systems  Skin: As above  Eyes: negative  Ears/Nose/Throat: negative  Respiratory: No shortness of breath, dyspnea on exertion, cough, or hemoptysis  Cardiovascular: negative  Gastrointestinal: negative  Genitourinary: negative  Musculoskeletal: negative  Neurologic: negative  Psychiatric: negative  Hematologic/Lymphatic/Immunologic: negative  Endocrine: negative      O:   NAD, WDWN, Alert & Oriented, Mood & Affect wnl, Vitals stable   Here today alone   /71 (BP Location: Left arm, Patient Position: Chair, Cuff Size: Adult Regular)  Pulse 64  SpO2 99%   General appearance normal   Vitals stable   Alert, oriented and in no acute distress      Following lymph nodes palpated: Occipital, Cervical, Supraclavicular no lad   Stuck on papules and brown macules on trunk and ext           The remainder of the full exam was unremarkable; the following areas were examined:  conjunctiva/lids, oral mucosa, neck, peripheral vascular system, abdomen, lymph nodes, digits/nails, eccrine and apocrine glands, scalp/hair, face, neck, chest, abdomen, buttocks, back, RUE, LUE, RLE, LLE       Eyes: Conjunctivae/lids:Normal     ENT: Lips, buccal mucosa, tongue: normal    MSK:Normal    Cardiovascular: peripheral edema none    Pulm: Breathing Normal    Lymph Nodes: No Head and Neck Lymphadenopathy     Neuro/Psych: Orientation:Normal; Mood/Affect:Normal      A/P:  1. Hx of non-melanoma skin cancer, seborrheic keratosis, lentigo  BENIGN LESIONS DISCUSSED WITH PATIENT:  I discussed the specifics of tumor, prognosis, and genetics of benign lesions.  I explained that treatment of these lesions would be purely cosmetic and  not medically neccessary.  I discussed with patient different removal options including excision, cautery and /or laser.      Nature and genetics of benign skin lesions dicussed with patient.  Signs and Symptoms of skin cancer discussed with patient.  ABCDEs of melanoma reviewed with patient.  Patient encouraged to perform monthly skin exams.  UV precautions reviewed with patient.  Skin care regimen reviewed with patient: Eliminate harsh soaps, i.e. Dial, zest, irsih spring; Mild soaps such as Cetaphil or Dove sensitive skin, avoid hot or cold showers, aggressive use of emollients including vanicream, cetaphil or cerave discussed with patient.    Risks of non-melanoma skin cancer discussed with patient   Return to clinic 12 months

## 2017-05-16 NOTE — TELEPHONE ENCOUNTER
Pt states she has urinary urgency, frequency and Pressure.  Refuses appt today or tomorrow, refuses to give urine spec.  Requesting Bactrim like she had 12/15/16.  Informed pt  would see msg in AM.  Advise.  Freddy

## 2017-05-16 NOTE — TELEPHONE ENCOUNTER
"Clinic Action Needed:  Yes, callback  [facility]NA Triage Call  Presenting Problem:    Mariah is having pressure when she has to urinate.  Today Mariah is having \" painful urination and urgency and frequency\".   Mariah is not wanting to come into clinic.  Mariah is requesting to speak with MD Eli Sommer.   [facility] Triage/Urinary Symptoms/disposition is to be seen within 24 hours.  Guideline Used: Urinary Symtoms  Patient Recommendations/Teaching: Seen within 24 hours  Routed to: MAREN Maloney RN/FNA        "

## 2017-05-16 NOTE — MR AVS SNAPSHOT
After Visit Summary   5/16/2017    Mariah Jacinto    MRN: [id number]           Patient Information     Date Of Birth          1945        Visit Information        Provider Department      5/16/2017 11:00 AM Chapito Franco MD [facility]        Today's Diagnoses     History of skin cancer    -  1    Lentigo        SK (seborrheic keratosis)           Follow-ups after your visit        Your next 10 appointments already scheduled     Jun 01, 2017  8:20 AM CDT   Office Visit with Eli Sommer MD   [facility]    [address]  [facility] [id number] [phone]           Bring a current list of meds and any records pertaining to this visit.  For Physicals, please bring immunization records and any forms needing to be filled out.  Please arrive 10 minutes early to complete paperwork.            May 14, 2018 11:00 AM CDT   Return Visit with Chapito Franco MD   [facility] ([facility]    [address] [facility] [id number] [phone]              Who to contact     If you have questions or need follow up information about today's clinic visit or your schedule please contact [facility] directly at [phone].  Normal or non-critical lab and imaging results will be communicated to you by [email or web address], letter or phone within 4 business days after the clinic has received the results. If you do not hear from us within 7 days, please contact the clinic through [email or web address] or phone. If you have a critical or abnormal lab result, we will notify you by phone as soon as possible.  Submit refill requests through Appetise or call your pharmacy and they will forward the refill request to us. Please allow 3 business days for your refill to be completed.          Additional Information About Your Visit        Appetise Information     Appetise lets you send messages to your  "doctor, view your test results, renew your prescriptions, schedule appointments and more. To sign up, go to [email or web address] . Click on \"Log in\" on the left side of the screen, which will take you to the Welcome page. Then click on \"Sign up Now\" on the right side of the page.     You will be asked to enter the access code listed below, as well as some personal information. Please follow the directions to create your username and password.     Your access code is: [email or web address]  Expires: 2017  3:44 PM     Your access code will  in 90 days. If you need help or a new code, please call your [place] clinic or [phone].        Care EveryWhere ID     This is your Care EveryWhere ID. This could be used by other organizations to access your [place] medical records  [id number]        Your Vitals Were     Pulse Pulse Oximetry                64 99%           Blood Pressure from Last 3 Encounters:   17 131/71   17 133/78   17 141/83    Weight from Last 3 Encounters:   17 63.5 kg (140 lb)   17 63.5 kg (140 lb)   16 65.3 kg (144 lb)              Today, you had the following     No orders found for display       Primary Care Provider Office Phone # Fax #    Eli Sommer [phone]       [address] [address]        Thank you!     Thank you for choosing [facility]  for your care. Our goal is always to provide you with excellent care. Hearing back from our patients is one way we can continue to improve our services. Please take a few minutes to complete the written survey that you may receive in the mail after your visit with us. Thank you!             Your Updated Medication List - Protect others around you: Learn how to safely use, store and throw away your medicines at www.disposemymeds.org.          This list is accurate as of: 17 11:23 AM.  Always use your most recent med list.                "    Brand Name Dispense Instructions for use    acetaminophen 325 MG tablet    TYLENOL     Take 325-650 mg by mouth every 6 hours as needed for mild pain Reported on 5/16/2017       acidophilus Caps      Take 1 tablet by mouth daily       aspirin 81 MG tablet      1 TABLET DAILY       Calcium + D3 600-200 MG-UNIT Tabs      Take 1 tablet by mouth daily       glucosamine-chondroitin 500-400 MG Caps per capsule      Take 1 capsule by mouth daily       melatonin 5 MG Caps     1 capsule    Take 5 mg by mouth At Bedtime       Multi-vitamin Tabs tablet   Generic drug:  multivitamin, therapeutic with minerals      1 TABLET DAILY       OMEGA-3 FISH OIL PO      Take 1 capsule by mouth daily       omeprazole 40 MG capsule    priLOSEC    90 capsule    Take 1 capsule (40 mg) by mouth daily       STOOL SOFTENER PO      Take  by mouth. 1 daily       VITAMIN C ER PO      Take 1 tablet by mouth daily       VITAMIN D PO      1 DAILY

## 2017-05-16 NOTE — TELEPHONE ENCOUNTER
"Call Type: Triage Call    Presenting Problem: Mariah is having pressure when she has to  urinate.  Today Mariah is having \" painful urination and urgency  and frequency\".   Mariah is not wanting to come into clinic.  Mariah is requesting to speak with MD Eli Sommer.  [facility]  Triage/Urinary Symptoms/disposition is to be seen within 24 hours.  Triage Note:  Guideline Title: Urinary Symptoms - Female  Recommended Disposition: See Provider within 24 hours  Original Inclination: Wanted to speak with a nurse  Override Disposition:  Intended Action: Call PCP/HCP  Physician Contacted: No  Has one or more urinary tract symptoms AND has not been previously evaluated ?  YES  Blood in urine ? NO  Abnormal vaginal discharge (such as increased quantity, abnormal color,  consistency, odor) ? NO  Flank pain ? NO  New or worsening signs and symptoms that may indicate shock ? NO  Any temperature elevation in a frail elderly, immunocompromised or pregnant person  ? NO  Unbearable abdominal/pelvic pain ? NO  Current or recent urinary tract instrumentation AND urinary tract symptoms OR no  urine flow ? NO  Urinary tract symptoms AND any flank or low back pain ? NO  Urinary tract symptoms AND fever 101.5 F (38.6 C) or higher or vomiting ? NO  No urination for 12 or more hours ? NO  Any other unexpected urinary symptoms following urinary tract or abdominal surgery  within timeframe specified by provider ? NO  Physician Instructions:  Care Advice: Tell provider medical history of renal disease  especially if have only one kidney.  Call provider if you develop flank or low back pain, fever, generally feel  sick.  CAUTIONS  SYMPTOM / CONDITION MANAGEMENT  Call provider if urine is pink, red, smoky or cola colored.  Systemic Inflammatory Response Syndrome (SIRS):   Watch for signs of a  generalized, whole body infection. Occurs within days of a localized  infection, especially of the urinary, GI, respiratory or nervous systems  or after a " traumatic injury or invasive procedure.   - Call  if  symptoms have worsened, such as increasing confusion or unusual drowsiness  cold and clammy skin  no urine output  rapid respiration (>30/min.) or slow respiration (<10/min.)  struggling to breathe.   - Go to the ED immediately for early symptoms of  rapid pulse >90/min. or rapid breathing >20/min. at rest  chills  oral temperature >100.4 F (38 C) or <96.8 F (36 C) when associated with  conditions noted.  Limit carbonated, alcoholic, and caffeinated beverages such as coffee, tea  and soda.  Avoid nonprescription cold and allergy medications that contain  caffeine.  Limit intake of tomatoes, fruit juices (except for unsweetened  cranberry juice), dairy products, spicy foods, sugar, and artificial  sweeteners (aspartame or saccharine).  Stop or decrease smoking.  Reducing  exposure to bladder irritants may help lessen urgency.  Increase intake of fluids. Try to drink 8 oz. (.2 liter) every hour when  awake, unless on restricted fluids for other medical reasons. Include at  least two 8 oz. (.2 liter) glasses of unsweetened cranberry juice each day.  Take sips of fluid or eat ice chips if nauseated or vomiting.  Tell your provider if you are taking warfarin, Coumadin, Pradaxa, Xarelto,  or any other blood thinner and drinking cranberry juice or taking cranberry  capsules.  Analgesic/Antipyretic Advice - NSAIDs: Consider aspirin, ibuprofen,  naproxen or ketoprofen for pain or fever as directed on label or by  pharmacist/provider. PRECAUTIONS: - You should not take this medicine for  more than 10 days unless recommended by your provider. EXCEPTIONS: - Should  not be used if taking blood thinners or have bleeding problems. - Do not  use if have history of sensitivity/allergy to any of these medications  or history of cardiovascular, ulcer, kidney, liver disease or diabetes  unless approved by provider. - Do not exceed recommended dose or  frequency.  Analgesic/Antipyretic Advice - Acetaminophen: Consider acetaminophen as  directed on label or by pharmacist/provider for pain or fever. PRECAUTIONS:  - Use if there is no history of liver disease, alcoholism, or intake of  three or more alcohol drinks per day - Only if approved by provider during  pregnancy or when breastfeeding - Do not exceed recommended dose or  frequency. Do not take more than 3000 milligrams (mg) in 24 hours. Do not  take this medicine for more than 10 days unless recommended by your  provider. - During pregnancy, acetaminophen should not be taken more than 3  consecutive days without telling provider - To make sure you don't take too  much, check other medicines you take to see if they also contain  acetaminophen.

## 2017-05-17 DIAGNOSIS — R82.90 NONSPECIFIC FINDING ON EXAMINATION OF URINE: Primary | ICD-10-CM

## 2017-05-17 DIAGNOSIS — R30.0 DYSURIA: ICD-10-CM

## 2017-05-17 LAB
ALBUMIN UR-MCNC: NEGATIVE MG/DL
APPEARANCE UR: CLEAR
BACTERIA #/AREA URNS HPF: ABNORMAL /HPF
BILIRUB UR QL STRIP: NEGATIVE
COLOR UR AUTO: YELLOW
GLUCOSE UR STRIP-MCNC: NEGATIVE MG/DL
HGB UR QL STRIP: NEGATIVE
KETONES UR STRIP-MCNC: NEGATIVE MG/DL
LEUKOCYTE ESTERASE UR QL STRIP: ABNORMAL
NITRATE UR QL: POSITIVE
PH UR STRIP: 6.5 PH (ref 5–7)
RBC #/AREA URNS AUTO: ABNORMAL /HPF (ref 0–2)
SP GR UR STRIP: 1.01 (ref 1–1.03)
URN SPEC COLLECT METH UR: ABNORMAL
UROBILINOGEN UR STRIP-ACNC: 0.2 EU/DL (ref 0.2–1)
WBC #/AREA URNS AUTO: ABNORMAL /HPF (ref 0–2)

## 2017-05-17 PROCEDURE — 87086 URINE CULTURE/COLONY COUNT: CPT | Performed by: FAMILY MEDICINE

## 2017-05-17 PROCEDURE — 87088 URINE BACTERIA CULTURE: CPT | Performed by: FAMILY MEDICINE

## 2017-05-17 PROCEDURE — 87186 SC STD MICRODIL/AGAR DIL: CPT | Performed by: FAMILY MEDICINE

## 2017-05-17 PROCEDURE — 81001 URINALYSIS AUTO W/SCOPE: CPT | Performed by: FAMILY MEDICINE

## 2017-05-17 RX ORDER — SULFAMETHOXAZOLE/TRIMETHOPRIM 800-160 MG
1 TABLET ORAL 2 TIMES DAILY
Qty: 10 TABLET | Refills: 0 | Status: SHIPPED | OUTPATIENT
Start: 2017-05-17 | End: 2017-06-01

## 2017-05-17 NOTE — TELEPHONE ENCOUNTER
Patient advised and will stop at [place] this afternoon to leave a sample. I placed the orders in epic.    Angeles Rodriguez RN

## 2017-05-17 NOTE — TELEPHONE ENCOUNTER
No, needs to at least leave a sample.    She can leave a sample or she can be seen at another clinic if this isn't convenient.  We need to have a urine culture to make sure we're treating it appropriately.        Eli Sommer M.D.

## 2017-05-20 LAB
BACTERIA SPEC CULT: ABNORMAL
MICRO REPORT STATUS: ABNORMAL
MICROORGANISM SPEC CULT: ABNORMAL
SPECIMEN SOURCE: ABNORMAL

## 2017-06-01 ENCOUNTER — OFFICE VISIT (OUTPATIENT)
Dept: FAMILY MEDICINE | Facility: CLINIC | Age: 72
End: 2017-06-01
Payer: COMMERCIAL

## 2017-06-01 VITALS
BODY MASS INDEX: 22.49 KG/M2 | DIASTOLIC BLOOD PRESSURE: 62 MMHG | WEIGHT: 135 LBS | HEIGHT: 65 IN | SYSTOLIC BLOOD PRESSURE: 132 MMHG | HEART RATE: 54 BPM | RESPIRATION RATE: 12 BRPM | TEMPERATURE: 96.8 F

## 2017-06-01 DIAGNOSIS — E78.5 HYPERLIPIDEMIA LDL GOAL <160: ICD-10-CM

## 2017-06-01 DIAGNOSIS — K59.09 CHRONIC CONSTIPATION: ICD-10-CM

## 2017-06-01 DIAGNOSIS — M77.42 METATARSALGIA OF BOTH FEET: Primary | ICD-10-CM

## 2017-06-01 DIAGNOSIS — M77.41 METATARSALGIA OF BOTH FEET: Primary | ICD-10-CM

## 2017-06-01 DIAGNOSIS — M65.311 TRIGGER FINGER OF RIGHT THUMB: ICD-10-CM

## 2017-06-01 LAB
CHOLEST SERPL-MCNC: 287 MG/DL
HDLC SERPL-MCNC: 68 MG/DL
LDLC SERPL CALC-MCNC: 197 MG/DL
NONHDLC SERPL-MCNC: 219 MG/DL
TRIGL SERPL-MCNC: 109 MG/DL

## 2017-06-01 PROCEDURE — 36415 COLL VENOUS BLD VENIPUNCTURE: CPT | Performed by: FAMILY MEDICINE

## 2017-06-01 PROCEDURE — 99214 OFFICE O/P EST MOD 30 MIN: CPT | Performed by: FAMILY MEDICINE

## 2017-06-01 PROCEDURE — 80061 LIPID PANEL: CPT | Performed by: FAMILY MEDICINE

## 2017-06-01 RX ORDER — SIMVASTATIN 20 MG
20 TABLET ORAL AT BEDTIME
Qty: 90 TABLET | Refills: 3 | Status: SHIPPED | OUTPATIENT
Start: 2017-06-01 | End: 2017-09-27

## 2017-06-01 NOTE — PROGRESS NOTES
"  SUBJECTIVE:                                                    Mariah Jacinto is a 71 year old female who presents to clinic today for the following health issues:    Foot pain   -pain intermittent ; numbness, \"sleeping\" and tingling is constant; Ball of foot into toes;   Ongoing for over a year; pt has seen Neurology in the past- June/July 2016;   Pain is now constant.  This pain is different from the paresthesias she has previously been evaluated for with EMG, lab work, etc.  Now the pain hurts more when she's been standing/walking a lot.      Thumb Pain;    -x1 month/6 weeks; right thumb; no Injury; radiated into hand; weakness in right hand   Seems to trigger at times.  Has been keeping a bandaid on her thumb to keep her from fully bending it.      Pt is fasting this morning - wants to recheck lipids as she's been off the statin for several months, thinking that the statin could be a cause of the polyneuropathy.  This hasn't changed the neuropathy so we decided that if her lipids are high again, going back on the statin will probably be in her best interest.      /62  Pulse 54  Temp 96.8  F (36  C) (Tympanic)  Resp 12  Ht 5' 4.5\" (1.638 m)  Wt 135 lb (61.2 kg)  BMI 22.81 kg/m2  EXAM: GENERAL APPEARANCE: Alert, no acute distress  RESP: lungs clear to auscultation   CV: normal rate, regular rhythm, no murmur or gallop  ABDOMEN: soft, no organomegaly, masses or tenderness  MS: hand exam Normal hand appearance and range of motion, non-tender, triggering of the right thumb  foot exam normal DP and PT pulses, no trophic changes or ulcerative lesions and tender on plantar aspect of the metatarsal heads.     ASSESSMENT/PLAN:      ICD-10-CM    1. 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 Trigger finger of right thumb M65.311 ORTHO  REFERRAL   3. Hyperlipidemia LDL goal <160 E78.5 Lipid panel reflex to direct LDL   4. Chronic constipation K59.09      Patient also complains of the ongoing " "concern of pelvic \"fullness\" and had evaluation with urology that ruled out urinary retention.  No urinary urgency.  Could be due to constipation, she admits that she only has small \"pebble\" like stools.      Discussed with her the cause of this including direct trauma and repetitive trauma or activity such as gripping/grasping.  Treatment consists of avoiding the offending activity.  Using padded gloves for gripping/grasping activity.  Splint finger at night.  If this is not working a corticosteroid injection could be considered. Handout given.      Patient Instructions   Wear shoes in the house.  If you're having ongoing problems with pain in your feet, we'll consider a referral to Dr. Duran at [facility]    I did do a referral to the hand specialist for your trigger thumb    Add Miralax (Polyethyelene Glycol) mix 17 gm (cap to measure) into 8 oz of fluid once a day.  You can titrate to effect.  Goal is a moderate sized formed bowel movement once a day.    Consider doing this three times a day for three days to help with the initial constipation.  Once a day is more of a maintenance dose.      Eli Sommer M.D.              "

## 2017-06-01 NOTE — MR AVS SNAPSHOT
After Visit Summary   6/1/2017    Mariah Jacinto    MRN: [id number]           Patient Information     Date Of Birth          1945        Visit Information        Provider Department      6/1/2017 8:20 AM Eli Sommer MD [facility]        Today's Diagnoses     Metatarsalgia of both feet    -  1    Trigger finger of right thumb        Hyperlipidemia LDL goal <160          Care Instructions    Wear shoes in the house.  If you're having ongoing problems with pain in your feet, we'll consider a referral to Dr. Duran at [facility]    I did do a referral to the hand specialist for your trigger thumb    Add Miralax (Polyethyelene Glycol) mix 17 gm (cap to measure) into 8 oz of fluid once a day.  You can titrate to effect.  Goal is a moderate sized formed bowel movement once a day.    Consider doing this three times a day for three days to help with the initial constipation.  Once a day is more of a maintenance dose.                Follow-ups after your visit        Additional Services     ORTHO  REFERRAL       [facility] is referring you to the Orthopedic  Services at [facility].       The  Representative will assist you in the coordination of your Orthopedic and Musculoskeletal Care as prescribed by your physician.    The  Representative will call you within 1 business day to help schedule your appointment, or you may contact the  Representative at:    All areas ~ [phone]     Type of Referral : Surgical / Specialist - hand      Timeframe requested: Routine    Coverage of these services is subject to the terms and limitations of your health insurance plan.  Please call member services at your health plan with any benefit or coverage questions.      If X-rays, CT or MRI's have been performed, please contact the facility where they were done to arrange for , prior to your  "scheduled appointment.  Please bring this referral request to your appointment and present it to your specialist.                  Your next 10 appointments already scheduled     May 14, 2018 11:00 AM CDT   Return Visit with Chapito Franco MD   [facility]    [address]  [facility] [address]   [phone]              Who to contact     If you have questions or need follow up information about today's clinic visit or your schedule please contact [facility] directly at [phone].  Normal or non-critical lab and imaging results will be communicated to you by [employer], letter or phone within 4 business days after the clinic has received the results. If you do not hear from us within 7 days, please contact the clinic through [employer] or phone. If you have a critical or abnormal lab result, we will notify you by phone as soon as possible.  Submit refill requests through [employer] or call your pharmacy and they will forward the refill request to us. Please allow 3 business days for your refill to be completed.          Additional Information About Your Visit        MyChart Information     [employer] lets you send messages to your doctor, view your test results, renew your prescriptions, schedule appointments and more. To sign up, go to [email or web address] . Click on \"Log in\" on the left side of the screen, which will take you to the Welcome page. Then click on \"Sign up Now\" on the right side of the page.     You will be asked to enter the access code listed below, as well as some personal information. Please follow the directions to create your username and password.     Your access code is: [email or web address]  Expires: 2017  9:01 AM     Your access code will  in 90 days. If you need help or a new code, please call your [facility] or [phone].        Care EveryWhere ID     This is your Care EveryWhere ID. This could be used by " "other organizations to access your [place] medical records  [id number]        Your Vitals Were     Pulse Temperature Respirations Height BMI (Body Mass Index)       54 96.8  F (36  C) (Tympanic) 12 5' 4.5\" (1.638 m) 22.81 kg/m2        Blood Pressure from Last 3 Encounters:   06/01/17 132/62   05/16/17 131/71   02/27/17 133/78    Weight from Last 3 Encounters:   06/01/17 135 lb (61.2 kg)   01/31/17 140 lb (63.5 kg)   01/16/17 140 lb (63.5 kg)              We Performed the Following     Lipid panel reflex to direct LDL     ORTHO  REFERRAL        Primary Care Provider Office Phone # Fax #    Eli Sommer [phone]       [facility] [address] [facility] [address]        Thank you!     Thank you for choosing [facility]  for your care. Our goal is always to provide you with excellent care. Hearing back from our patients is one way we can continue to improve our services. Please take a few minutes to complete the written survey that you may receive in the mail after your visit with us. Thank you!             Your Updated Medication List - Protect others around you: Learn how to safely use, store and throw away your medicines at www.disposemymeds.org.          This list is accurate as of: 6/1/17  9:01 AM.  Always use your most recent med list.                   Brand Name Dispense Instructions for use    acetaminophen 325 MG tablet    TYLENOL     Take 325-650 mg by mouth every 6 hours as needed for mild pain Reported on 5/16/2017       acidophilus Caps      Take 1 tablet by mouth daily       aspirin 81 MG tablet      1 TABLET DAILY       Calcium + D3 600-200 MG-UNIT Tabs      Take 1 tablet by mouth daily       glucosamine-chondroitin 500-400 MG Caps per capsule      Take 1 capsule by mouth daily       melatonin 5 MG Caps     1 capsule    Take 5 mg by mouth At Bedtime       Multi-vitamin Tabs tablet   Generic drug:  multivitamin, therapeutic with minerals    "   1 TABLET DAILY       OMEGA-3 FISH OIL PO      Take 1 capsule by mouth daily       omeprazole 40 MG capsule    priLOSEC    90 capsule    Take 1 capsule (40 mg) by mouth daily       STOOL SOFTENER PO      Take  by mouth. 1 daily       VITAMIN C ER PO      Take 1 tablet by mouth daily       VITAMIN D PO      1 DAILY

## 2017-06-01 NOTE — PATIENT INSTRUCTIONS
Wear shoes in the house.  If you're having ongoing problems with pain in your feet, we'll consider a referral to Dr. Duran at [facility]    I did do a referral to the hand specialist for your trigger thumb    Add Miralax (Polyethyelene Glycol) mix 17 gm (cap to measure) into 8 oz of fluid once a day.  You can titrate to effect.  Goal is a moderate sized formed bowel movement once a day.    Consider doing this three times a day for three days to help with the initial constipation.  Once a day is more of a maintenance dose.

## 2017-06-01 NOTE — NURSING NOTE
"Chief Complaint   Patient presents with     Foot Burn       Initial /62  Pulse 54  Temp 96.8  F (36  C) (Tympanic)  Resp 12  Ht 5' 4.5\" (1.638 m)  Wt 135 lb (61.2 kg)  BMI 22.81 kg/m2 Estimated body mass index is 22.81 kg/(m^2) as calculated from the following:    Height as of this encounter: 5' 4.5\" (1.638 m).    Weight as of this encounter: 135 lb (61.2 kg).  Medication Reconciliation: complete    "

## 2017-06-23 ENCOUNTER — HOSPITAL ENCOUNTER (OUTPATIENT)
Facility: CLINIC | Age: 72
Discharge: HOME | End: 2017-06-23
Attending: ORTHOPAEDIC SURGERY | Admitting: ORTHOPAEDIC SURGERY
Payer: COMMERCIAL

## 2017-06-23 VITALS
SYSTOLIC BLOOD PRESSURE: 142 MMHG | WEIGHT: 135 LBS | RESPIRATION RATE: 16 BRPM | BODY MASS INDEX: 23.05 KG/M2 | OXYGEN SATURATION: 99 % | TEMPERATURE: 97.6 F | HEIGHT: 64 IN | DIASTOLIC BLOOD PRESSURE: 71 MMHG | HEART RATE: 77 BPM

## 2017-06-23 DIAGNOSIS — M65.311 TRIGGER FINGER OF RIGHT THUMB: Primary | ICD-10-CM

## 2017-06-23 PROCEDURE — 27210794 ZZH OR GENERAL SUPPLY STERILE: Performed by: ORTHOPAEDIC SURGERY

## 2017-06-23 PROCEDURE — 40000305 ZZH STATISTIC PRE PROC ASSESS I: Performed by: ORTHOPAEDIC SURGERY

## 2017-06-23 PROCEDURE — 25000125 ZZHC RX 250: Performed by: ORTHOPAEDIC SURGERY

## 2017-06-23 PROCEDURE — 36000052 ZZH SURGERY LEVEL 2 EA 15 ADDTL MIN: Performed by: ORTHOPAEDIC SURGERY

## 2017-06-23 PROCEDURE — 71000027 ZZH RECOVERY PHASE 2 EACH 15 MINS: Performed by: ORTHOPAEDIC SURGERY

## 2017-06-23 PROCEDURE — 36000050 ZZH SURGERY LEVEL 2 1ST 30 MIN: Performed by: ORTHOPAEDIC SURGERY

## 2017-06-23 RX ORDER — LIDOCAINE HYDROCHLORIDE AND EPINEPHRINE 10; 10 MG/ML; UG/ML
INJECTION, SOLUTION INFILTRATION; PERINEURAL PRN
Status: DISCONTINUED | OUTPATIENT
Start: 2017-06-23 | End: 2017-06-23 | Stop reason: HOSPADM

## 2017-06-23 RX ORDER — HYDROCODONE BITARTRATE AND ACETAMINOPHEN 5; 325 MG/1; MG/1
1-2 TABLET ORAL EVERY 4 HOURS PRN
Qty: 10 TABLET | Refills: 0
Start: 2017-06-23 | End: 2017-08-17

## 2017-06-23 NOTE — IP AVS SNAPSHOT
[facility] PreOP/Phase II    [address] [facility] [address]    Phone:  [phone]    Fax:  [phone]                                       After Visit Summary   6/23/2017    Mariah Jacinto    MRN: [id number]           After Visit Summary Signature Page     I have received my discharge instructions, and my questions have been answered. I have discussed any challenges I see with this plan with the nurse or doctor.    ..........................................................................................................................................  Patient/Patient Representative Signature      ..........................................................................................................................................  Patient Representative Print Name and Relationship to Patient    ..................................................               ................................................  Date                                            Time    ..........................................................................................................................................  Reviewed by Signature/Title    ...................................................              ..............................................  Date                                                            Time

## 2017-06-23 NOTE — IP AVS SNAPSHOT
MRN:[id number]                      After Visit Summary   6/23/2017    Mariah Jacinto    MRN: [id number]           Thank you!     Thank you for choosing [facility] for your care. Our goal is always to provide you with excellent care. Hearing back from our patients is one way we can continue to improve our services. Please take a few minutes to complete the written survey that you may receive in the mail after you visit with us. Thank you!        Patient Information     Date Of Birth          1945        About your hospital stay     You were admitted on:  June 23, 2017 You last received care in the:  [facility] PreOP/Phase II    You were discharged on:  June 23, 2017       Who to Call     For medical emergencies, please call 911.  For non-urgent questions about your medical care, please call your primary care provider or clinic, [phone]  For questions related to your surgery, please call your surgery clinic        Attending Provider     Provider Specialty    Jake Viveros MD Hand Surgery       Primary Care Provider Office Phone # Fax #    Eli Sommer [phone]      After Care Instructions      Diet as Tolerated       Return to diet before surgery, unless instructed otherwise.            Discharge Instructions       Review outpatient procedure discharge instructions with patient as directed by Provider            Dressing Change        Remove dressing POD#5.  Keep clean.  No ointments.            Notify Provider       For signs and symptoms of infection: Fever greater than 101, redness, swelling, heat at site, drainage, pus.            Return to clinic       Return to clinic 7-14days.            Shower        Cover dressing if dressing is not going to be changed today            Wound care       Do not immerse wound in water until sutures removed                  Your next 10 appointments already scheduled     May 14, 2018 11:00 AM CDT   [facility] with  Chapito Franco MD   [facility]    [address]  [facility] [address] [phone]              Further instructions from your care team                           Same Day Surgery Discharge Instructions  Special Precautions After Surgery - Adult    1. It is not unusual to feel lightheaded or faint, up to 24 hours after surgery or while taking pain medication.  If you have these symptoms; sit for a few minutes before standing and have someone assist you when getting up.  2. You should rest and relax for the next 24 hours and must have someone stay with you for at least 24 hours after your discharge.  3. DO NOT DRIVE any vehicle or operate mechanical equipment for 24 hours following the end of your surgery.  DO NOT DRIVE while taking narcotic pain medications that have been prescribed by your physician.  If you had a limb operated on, you must be able to use it fully to drive.  4. DO NOT drink alcoholic beverages for 24 hours following surgery or while taking prescription pain medication.  5. Drink clear liquids (apple juice, ginger ale, broth, 7-Up, etc.).  Progress to your regular diet as you feel able.  6. Any questions call your physician and do not make important decisions for 24 hours.  7.     ACTIVITY  ? Up as tolerated  ?      INCISIONAL CARE  ? Do not get wet.  Elevate Elevate Elevate!  ?      Call for an appointment to return to the clinic       Medications:  ? Follow the instructions on the bottle. Start at anytime.  ?   ?      Additional discharge instructions: watch for signs of infection, any redness growing up your arm, any foul drainage coming through the drainage, any fever greater than 101.0        __________________________________________________________________________________________________________________________________  IMPORTANT NUMBERS:    [facility] [place]:  [phone], [phone]  Pharmacy:  [phone]  Same Day  "Surgery:  [phone], Monday - Friday until 8:30 p.m.  Urgent Care:  [phone]  Emergency Room:  [phone]      [facility]:  [phone]                                                                             [facility]:  [phone] option 1  [facility]:  [phone]     [facility] Clinic:  [phone]   Surgery Specialty Clinic:  [phone]   Home Medical Equipment: [phone]  [facility]:  [phone]        Pending Results     No orders found from 2017 to 2017.            Admission Information     Date & Time Provider Department Dept. Phone    2017 Jake Viveros MD [facility] PreOP/Phase [phone]      Your Vitals Were     Blood Pressure Pulse Temperature Respirations Height Weight    142/71 77 97.6  F (36.4  C) (Oral) 16 1.626 m (5' 4\") 61.2 kg (135 lb)    Pulse Oximetry BMI (Body Mass Index)                99% 23.17 kg/m2          MyChart Information     [employer] lets you send messages to your doctor, view your test results, renew your prescriptions, schedule appointments and more. To sign up, go to [email or web address] . Click on \"Log in\" on the left side of the screen, which will take you to the Welcome page. Then click on \"Sign up Now\" on the right side of the page.     You will be asked to enter the access code listed below, as well as some personal information. Please follow the directions to create your username and password.     Your access code is: [email or web address]  Expires: 2017  9:01 AM     Your access code will  in 90 days. If you need help or a new code, please call your [place] clinic or [phone].        Care EveryWhere ID     This is your Care EveryWhere ID. This could be used by other organizations to access your [place] medical records  [id number]        Equal Access to Services     CORNELL DU : chely Syedha, darin rivas " sommer mckeonvamshi staspipe delatorre'aan ah. So [facility] [phone].    ATENCIÓN: Si katie andre, tiene a anderson disposición servicios gratuitos de asistencia lingüística. Jaiden al [phone].    We comply with applicable federal civil rights laws and [place] laws. We do not discriminate on the basis of race, color, national origin, age, disability sex, sexual orientation or gender identity.               Review of your medicines      START taking        Dose / Directions    HYDROcodone-acetaminophen 5-325 MG per tablet   Commonly known as:  NORCO   Used for:  Trigger finger of right thumb        Dose:  1-2 tablet   Take 1-2 tablets by mouth every 4 hours as needed for other (Moderate to Severe Pain)   Quantity:  10 tablet   Refills:  0         CONTINUE these medicines which have NOT CHANGED        Dose / Directions    acetaminophen 325 MG tablet   Commonly known as:  TYLENOL        Dose:  325-650 mg   Take 325-650 mg by mouth every 6 hours as needed for mild pain Reported on 5/16/2017   Refills:  0       acidophilus Caps        Dose:  1 tablet   Take 1 tablet by mouth daily   Refills:  0       aspirin 81 MG tablet        1 TABLET DAILY   Refills:  0       Calcium + D3 600-200 MG-UNIT Tabs        Dose:  1 tablet   Take 1 tablet by mouth daily   Refills:  0       glucosamine-chondroitin 500-400 MG Caps per capsule        Dose:  1 capsule   Take 1 capsule by mouth daily   Refills:  0       melatonin 5 MG Caps        Dose:  5 mg   Take 5 mg by mouth At Bedtime   Quantity:  1 capsule   Refills:  0       Multi-vitamin Tabs tablet   Generic drug:  multivitamin, therapeutic with minerals        1 TABLET DAILY   Refills:  0       OMEGA-3 FISH OIL PO        Dose:  1 capsule   Take 1 capsule by mouth daily   Refills:  0       omeprazole 40 MG capsule   Commonly known as:  priLOSEC   Used for:  Gastroesophageal reflux disease without esophagitis        Dose:  40 mg   Take 1 capsule (40 mg) by mouth daily   Quantity:  90 capsule    Refills:  3       simvastatin 20 MG tablet   Commonly known as:  ZOCOR   Used for:  Hyperlipidemia LDL goal <160        Dose:  20 mg   Take 1 tablet (20 mg) by mouth At Bedtime   Quantity:  90 tablet   Refills:  3       STOOL SOFTENER PO        Take  by mouth. 1 daily   Refills:  0       VITAMIN C ER PO        Dose:  1 tablet   Take 1 tablet by mouth daily   Refills:  0       VITAMIN D PO        1 DAILY   Refills:  0            Where to get your medicines      Some of these will need a paper prescription and others can be bought over the counter. Ask your nurse if you have questions.     You don't need a prescription for these medications     HYDROcodone-acetaminophen 5-325 MG per tablet                Protect others around you: Learn how to safely use, store and throw away your medicines at www.disposemymeds.org.             Medication List: This is a list of all your medications and when to take them. Check marks below indicate your daily home schedule. Keep this list as a reference.      Medications           Morning Afternoon Evening Bedtime As Needed    acetaminophen 325 MG tablet   Commonly known as:  TYLENOL   Take 325-650 mg by mouth every 6 hours as needed for mild pain Reported on 5/16/2017                                acidophilus Caps   Take 1 tablet by mouth daily                                aspirin 81 MG tablet   1 TABLET DAILY                                Calcium + D3 600-200 MG-UNIT Tabs   Take 1 tablet by mouth daily                                glucosamine-chondroitin 500-400 MG Caps per capsule   Take 1 capsule by mouth daily                                HYDROcodone-acetaminophen 5-325 MG per tablet   Commonly known as:  NORCO   Take 1-2 tablets by mouth every 4 hours as needed for other (Moderate to Severe Pain)                                melatonin 5 MG Caps   Take 5 mg by mouth At Bedtime                                Multi-vitamin Tabs tablet   1 TABLET DAILY   Generic drug:   multivitamin, therapeutic with minerals                                OMEGA-3 FISH OIL PO   Take 1 capsule by mouth daily                                omeprazole 40 MG capsule   Commonly known as:  priLOSEC   Take 1 capsule (40 mg) by mouth daily                                simvastatin 20 MG tablet   Commonly known as:  ZOCOR   Take 1 tablet (20 mg) by mouth At Bedtime                                STOOL SOFTENER PO   Take  by mouth. 1 daily                                VITAMIN C ER PO   Take 1 tablet by mouth daily                                VITAMIN D PO   1 DAILY

## 2017-06-23 NOTE — DISCHARGE INSTRUCTIONS
Same Day Surgery Discharge Instructions  Special Precautions After Surgery - Adult    1. It is not unusual to feel lightheaded or faint, up to 24 hours after surgery or while taking pain medication.  If you have these symptoms; sit for a few minutes before standing and have someone assist you when getting up.  2. You should rest and relax for the next 24 hours and must have someone stay with you for at least 24 hours after your discharge.  3. DO NOT DRIVE any vehicle or operate mechanical equipment for 24 hours following the end of your surgery.  DO NOT DRIVE while taking narcotic pain medications that have been prescribed by your physician.  If you had a limb operated on, you must be able to use it fully to drive.  4. DO NOT drink alcoholic beverages for 24 hours following surgery or while taking prescription pain medication.  5. Drink clear liquids (apple juice, ginger ale, broth, 7-Up, etc.).  Progress to your regular diet as you feel able.  6. Any questions call your physician and do not make important decisions for 24 hours.  7.     ACTIVITY  ? Up as tolerated  ?      INCISIONAL CARE  ? Do not get wet.  Elevate Elevate Elevate!  ?      Call for an appointment to return to the clinic       Medications:  ? Follow the instructions on the bottle. Start at anytime.  ?   ?      Additional discharge instructions: watch for signs of infection, any redness growing up your arm, any foul drainage coming through the drainage, any fever greater than 101.0        __________________________________________________________________________________________________________________________________  IMPORTANT NUMBERS:    [facility] Main Number:  [phone], [phone]  Pharmacy:  [phone]  Same Day Surgery:  [phone], Monday - Friday until 8:30 p.m.  Urgent Care:  [phone]  Emergency Room:  [phone]      [facility]:  [phone]                                                                              [facility]:  [phone] option 1  [facility]:  [phone]     [facility]:  [phone]   [facility]:  [phone]   Home Medical Equipment: [phone]  [facility]:  [phone]

## 2017-06-23 NOTE — BRIEF OP NOTE
Orthopedic Brief Operative Note    Pre-operative diagnosis: Right trigger thumb   Post-operative diagnosis: Same   Procedure: Right thumb A1 pulley release   Surgeon: Jake Viveros   Assistant(s): Urmila Padilla PA-C   Anesthesia: Local anesthesia   Estimated blood loss: None   Drains: None   Specimens: None   Implants: (See full op note)   Complications: None   Condition: Stable

## 2017-06-24 NOTE — OP NOTE
DATE OF PROCEDURE:  2017      PREOPERATIVE DIAGNOSIS:  Right trigger thumb.      POSTOPERATIVE DIAGNOSIS:  Right trigger thumb.      PROCEDURE:  Right thumb A1 pulley release.      ANESTHESIA:  Local.      ASSISTANT:  Urmila Padilla PA-C      DESCRIPTION OF PROCEDURE:  Yelena Jacinto was taken to the main operating suite.  Once there, she was transferred over to the operating table in supine position.  Right upper extremity was prepped and draped in the usual sterile fashion.  Local anesthesia was achieved with 1% lidocaine with 1:100,000 epinephrine.  After adequate anesthesia was obtained, a transverse incision was made in the MCP flexion crease of the thumb.  Both neurovascular bundles were carefully protected throughout the course of the case.  The proximal edge of the A1 pulley was identified and it was incised.  It was noted to be markedly thickened however, the tendon itself had a relatively normal appearance.  The division of the pulley was carried out distally to the level of the oblique pulley which was not violated.  Full active range of motion on the part of the patient demonstrated no triggering and full active range of motion within the confines of her passive range of motion.  The wound was irrigated and closed with 4-0 nylon in a horizontal mattress fashion.  Dressing consisting of Adaptic gauze, 4 x 4 fluffs, sterile Webril and an Ace bandage was then applied.  The patient was transferred to the transfer cart, and taken to the recovery room in stable condition, breathing spontaneously.         CORRIE CIFUENTES MD             D: 2017 15:09   T: 2017 20:04   MT: [id number]      Name:     YELENA JACINTO   MRN:      [id number]        Account:        [id number]   :      1945           Procedure Date: 2017      Document: [id number]       cc: [facility]

## 2017-08-17 ENCOUNTER — OFFICE VISIT (OUTPATIENT)
Dept: NEUROLOGY | Facility: CLINIC | Age: 72
End: 2017-08-17
Payer: COMMERCIAL

## 2017-08-17 VITALS
SYSTOLIC BLOOD PRESSURE: 124 MMHG | HEART RATE: 64 BPM | OXYGEN SATURATION: 98 % | BODY MASS INDEX: 23.28 KG/M2 | DIASTOLIC BLOOD PRESSURE: 68 MMHG | WEIGHT: 135.6 LBS

## 2017-08-17 DIAGNOSIS — R52 PAINFUL PARESTHESIA: Primary | ICD-10-CM

## 2017-08-17 DIAGNOSIS — R20.2 PAINFUL PARESTHESIA: Primary | ICD-10-CM

## 2017-08-17 PROCEDURE — 99214 OFFICE O/P EST MOD 30 MIN: CPT | Performed by: PSYCHIATRY & NEUROLOGY

## 2017-08-17 ASSESSMENT — PAIN SCALES - GENERAL: PAINLEVEL: MODERATE PAIN (4)

## 2017-08-17 NOTE — PROGRESS NOTES
ESTABLISHED PATIENT NEUROLOGY NOTE    DATE OF VISIT: 8/17/2017  MRN: [id number]  PATIENT NAME: Mariah Jacinto  YOB: 1945    Chief Complaint   Patient presents with     RECHECK     Paresthesias of feet.     SUBJECTIVE:                                                      HISTORY OF PRESENT ILLNESS:  Mariah is here for follow up regarding paresthesias in the feet. At our previous visit (over one year ago) the patient also complained of similar symptoms in the hands. Work-up for peripheral neuropathy has been negative. We have discussed the possibility of small fiber neuropathy in the past. The patient has previously tried several nerve pain medications and was hesitant about my suggestion we try Lyrica when we met last.     The patient tells me that currently the feet are most bothersome. The feet are numb mid-foot up, and then there is pain that is sharp and she is hypersensitive on the plantar surfaces. She is back on simvastatin because stopping the medication did not change her foot discomfort, which is something that we also discussed in the past. She clarifies for me that she did not have an allergic reaction to the gabapentin, she just didn't tolerate it well.     She does not feel that the symptoms are worse, just constant. No weakness she has occasional numbness in the hands, but no pain as felt in the feet. She denies balance problems.     She wants me to note that she is not interested in doing any additional electromyograms in the future.    CURRENT MEDICATIONS:     Current Outpatient Prescriptions on File Prior to Visit:  simvastatin (ZOCOR) 20 MG tablet Take 1 tablet (20 mg) by mouth At Bedtime   omeprazole (PRILOSEC) 40 MG capsule Take 1 capsule (40 mg) by mouth daily   acetaminophen (TYLENOL) 325 MG tablet Take 325-650 mg by mouth every 6 hours as needed for mild pain Reported on 5/16/2017   Calcium Carb-Cholecalciferol (CALCIUM + D3) 600-200 MG-UNIT TABS Take 1 tablet by mouth daily    Omega-3 Fatty Acids (OMEGA-3 FISH OIL PO) Take 1 capsule by mouth daily   melatonin 5 MG CAPS Take 5 mg by mouth At Bedtime   glucosamine-chondroitin 500-400 MG CAPS Take 1 capsule by mouth daily   Lactobacillus (ACIDOPHILUS) CAPS Take 1 tablet by mouth daily   Ascorbic Acid (VITAMIN C ER PO) Take 1 tablet by mouth daily    MULTI-VITAMIN OR TABS 1 TABLET DAILY   ASPIRIN 81 MG OR TABS 1 TABLET DAILY   VITAMIN D OR 1 DAILY     No current facility-administered medications on file prior to visit.     RECENT DIAGNOSTIC STUDIES:   Labs:   Results for orders placed or performed in visit on 06/01/17   Lipid panel reflex to direct LDL   Result Value Ref Range    Cholesterol 287 (H) <200 mg/dL    Triglycerides 109 <150 mg/dL    HDL Cholesterol 68 >49 mg/dL    LDL Cholesterol Calculated 197 (H) <100 mg/dL    Non HDL Cholesterol 219 (H) <130 mg/dL       REVIEW OF SYSTEMS:                                                      Some pain around scar from TN surgery and Rt hip pain. Otherwise 10-point review of systems is negative except as mentioned above .     EXAM:                                                      Physical Exam:   Vitals: /68 (BP Location: Right arm, Patient Position: Chair, Cuff Size: Adult Regular)  Pulse 64  Wt 135 lb 9.6 oz (61.5 kg)  SpO2 98%  BMI 23.28 kg/m2  BMI= Body mass index is 23.28 kg/(m^2).  ENERAL: NAD.   Neurologic:  MENTAL STATUS: Alert, attentive. Speech is fluent. Normal comprehension. Normal concentration. Adequate fund of knowledge.    CRANIAL NERVES: Visual fields intact to confrontation. Pupils equally, round and reactive to light. Facial sensation and movement normal. EOM full. Hearing intact to conversation. Trapezius strength intact. Palate moves symmetrically. Tongue midline.  MOTOR: 5/5 in proximal and distal muscle groups of upper and lower extremities. Tone and bulk normal.    DTRs: Intact and symmetric. Ankle jerks absent. Babinski down-going bilaterally.     SENSATION: Normal light touch and pinprick in hands/UEs. Slight hypersensitivity in distal half of each foot (Plantar). Intact proprioception. Vibration: Slightly diminished at both ankles.    COORDINATION: Normal finger nose finger. Knee heel shin normal.  STATION AND GAIT: Romberg negative. Tandem normal.  CV: RRR. S1, S2.    NECK: No bruits.       ASSESSMENT and PLAN:                                                      Assessment and Plan:    ICD-10-CM    1. Painful paresthesia R20.2 ketamine, KETALAR, 5%-gabapentin, NEURONTIN, 8%-lidocaine 2.5% 5%-8% GEL topical PLO cream       Ms. Jacinto is a pleasant 72 yo woman with history of Rt trigeminal neuralgia (s/p surgery), HLD and painful paresthesias in the feet. Her peripheral neuropathy work-up was normal last year. She is not interested in retesting. Stopping her statin did not provide symptom relief. We again discussed symptomatic treatments for possible small fiber neuropathy. Patient would prefer a topical instead of an oral medication, so I will prescribe one. We also discussed alternative treatments such as acupuncture and soothing essential oils.    Patient Instructions:  Gabapentin compound cream.   Return to clinic in 3 months.     Total Time: 25 minutes were spent with the patient. More than 50% of the time spent on counseling (as described above in Assessment and Plan) /coordinating the care.    Oralia Arias MD  Neurology

## 2017-08-17 NOTE — NURSING NOTE
"Chief Complaint   Patient presents with     RECHECK     Paresthesias of feet.       Initial /68 (BP Location: Right arm, Patient Position: Chair, Cuff Size: Adult Regular)  Pulse 64  Wt 135 lb 9.6 oz (61.5 kg)  SpO2 98%  BMI 23.28 kg/m2 Estimated body mass index is 23.28 kg/(m^2) as calculated from the following:    Height as of 6/23/17: 5' 4\" (1.626 m).    Weight as of this encounter: 135 lb 9.6 oz (61.5 kg).  Medication Reconciliation: complete    Patient prefers to be contacted: [phone] and letter  Okay to leave detailed message on voicemail: yes    Liana MATIAS    "

## 2017-08-17 NOTE — MR AVS SNAPSHOT
"              After Visit Summary   8/17/2017    Mariah Jacinto    MRN: [id number]           Patient Information     Date Of Birth          1945        Visit Information        Provider Department      8/17/2017 8:45 AM Oralia Arias MD [facility]        Today's Diagnoses     Painful paresthesia    -  1      Care Instructions    Plan:    Gabapentin compound cream.   Return to clinic in 3 months.           Follow-ups after your visit        Follow-up notes from your care team     Return in about 3 months (around 11/17/2017).      Your next 10 appointments already scheduled     May 14, 2018 11:00 AM CDT   Return Visit with Chapito Franco MD   [facility] ([facility]    [address] [address] [phone]              Who to contact     If you have questions or need follow up information about today's clinic visit or your schedule please contact [facility] directly at [phone].  Normal or non-critical lab and imaging results will be communicated to you by "[employer]"hart, letter or phone within 4 business days after the clinic has received the results. If you do not hear from us within 7 days, please contact the clinic through "[employer]"hart or phone. If you have a critical or abnormal lab result, we will notify you by phone as soon as possible.  Submit refill requests through [employer] or call your pharmacy and they will forward the refill request to us. Please allow 3 business days for your refill to be completed.          Additional Information About Your Visit        "[employer]"hart Information     [employer] lets you send messages to your doctor, view your test results, renew your prescriptions, schedule appointments and more. To sign up, go to [email or web address] . Click on \"Log in\" on the left side of the screen, which will take you to the Welcome page. Then click on \"Sign up Now\" on the right side of the page.     You will be " asked to enter the access code listed below, as well as some personal information. Please follow the directions to create your username and password.     Your access code is: [email or web address]  Expires: 2017  9:01 AM     Your access code will  in 90 days. If you need help or a new code, please call your [place] clinic or [phone].        Care EveryWhere ID     This is your Care EveryWhere ID. This could be used by other organizations to access your [place] medical records  [id number]        Your Vitals Were     Pulse Pulse Oximetry BMI (Body Mass Index)             64 98% 23.28 kg/m2          Blood Pressure from Last 3 Encounters:   17 124/68   17 (P) 132/60   17 132/62    Weight from Last 3 Encounters:   17 135 lb 9.6 oz (61.5 kg)   17 135 lb (61.2 kg)   17 135 lb (61.2 kg)              Today, you had the following     No orders found for display         Today's Medication Changes          These changes are accurate as of: 17  9:25 AM.  If you have any questions, ask your nurse or doctor.               Start taking these medicines.        Dose/Directions    ketamine (KETALAR) 5%-gabapentin (NEURONTIN) 8%-lidocaine 2.5% 5%-8% Gel topical PLO cream   Used for:  Painful paresthesia   Started by:  Oralia Arias MD        Dose:  1 g   Apply 1 g topically 2 times daily   Quantity:  30 g   Refills:  2            Where to get your medicines      Some of these will need a paper prescription and others can be bought over the counter.  Ask your nurse if you have questions.     Bring a paper prescription for each of these medications     ketamine (KETALAR) 5%-gabapentin (NEURONTIN) 8%-lidocaine 2.5% 5%-8% Gel topical PLO cream                Primary Care Provider Office Phone # Fax #    Eli Sommer [phone] [address] [address] [address]        Equal Access to Services     [facility] ANA LAURA AH: chely Syed  isaiah isidorotalat amarodarin maurer. So [facility] [phone].    ATENCIÓN: Si katie andre, tiene a anderson disposición servicios gratuitos de asistencia lingüística. Jaiden al [phone].    We comply with applicable federal civil rights laws and [place] laws. We do not discriminate on the basis of race, color, national origin, age, disability sex, sexual orientation or gender identity.            Thank you!     Thank you for choosing [facility]  for your care. Our goal is always to provide you with excellent care. Hearing back from our patients is one way we can continue to improve our services. Please take a few minutes to complete the written survey that you may receive in the mail after your visit with us. Thank you!             Your Updated Medication List - Protect others around you: Learn how to safely use, store and throw away your medicines at www.disposemymeds.org.          This list is accurate as of: 8/17/17  9:25 AM.  Always use your most recent med list.                   Brand Name Dispense Instructions for use Diagnosis    acetaminophen 325 MG tablet    TYLENOL     Take 325-650 mg by mouth every 6 hours as needed for mild pain Reported on 5/16/2017        acidophilus Caps      Take 1 tablet by mouth daily        aspirin 81 MG tablet      1 TABLET DAILY        Calcium + D3 600-200 MG-UNIT Tabs      Take 1 tablet by mouth daily        glucosamine-chondroitin 500-400 MG Caps per capsule      Take 1 capsule by mouth daily        ketamine (KETALAR) 5%-gabapentin (NEURONTIN) 8%-lidocaine 2.5% 5%-8% Gel topical PLO cream     30 g    Apply 1 g topically 2 times daily    Painful paresthesia       melatonin 5 MG Caps     1 capsule    Take 5 mg by mouth At Bedtime        Multi-vitamin Tabs tablet   Generic drug:  multivitamin, therapeutic with minerals      1 TABLET DAILY        OMEGA-3 FISH OIL PO      Take 1 capsule by mouth daily        omeprazole 40 MG  capsule    priLOSEC    90 capsule    Take 1 capsule (40 mg) by mouth daily    Gastroesophageal reflux disease without esophagitis       simvastatin 20 MG tablet    ZOCOR    90 tablet    Take 1 tablet (20 mg) by mouth At Bedtime    Hyperlipidemia LDL goal <160       VITAMIN C ER PO      Take 1 tablet by mouth daily        VITAMIN D PO      1 DAILY

## 2017-08-18 ENCOUNTER — TELEPHONE (OUTPATIENT)
Dept: NEUROLOGY | Facility: CLINIC | Age: 72
End: 2017-08-18

## 2017-08-18 NOTE — TELEPHONE ENCOUNTER
Reason for Call:  Other prescription    Detailed comments: [facility] calling stating they received rx for compound that has gabapentin in it. The pt is allergic to gabapentin, would like to know if you'd like to try something else     Phone Number Patient can be reached at: Other phone number:  [phone]  compounding pharmacy     Best Time: any     Can we leave a detailed message on this number? YES    Call taken on 8/18/2017 at 9:06 AM by Cindy Paula

## 2017-08-18 NOTE — TELEPHONE ENCOUNTER
The patient is not allergic, but rather did not like the systemic gabapentin and is willing to try the cream.   As mentioned in my note:  She clarifies for me that she did not have an allergic reaction to the gabapentin, she just didn't tolerate it well.     Thanks,  CY

## 2017-08-24 ENCOUNTER — TELEPHONE (OUTPATIENT)
Dept: NEUROLOGY | Facility: CLINIC | Age: 72
End: 2017-08-24

## 2017-08-24 DIAGNOSIS — R52 PAINFUL PARESTHESIA: Primary | ICD-10-CM

## 2017-08-24 DIAGNOSIS — R20.2 PAINFUL PARESTHESIA: Primary | ICD-10-CM

## 2017-08-24 NOTE — LETTER
[facility]  [address] [address]  [phone]        October 2, 2017        Medical Management  [facility]  Re: Hilary Jacinto  Policy Number: [id number]  Case ID: [id number]        Dear Appeal Representative,    I would like to request an appeal for ketamine/ lidocaine/ gabapentin/ salt stable cream for Ms. Vazquez. The patient has significant neuropathic pain of her feet, which likely represents small fiber neuropathy.      Ms. Vazquez has a history of intolerance to oral gabapentin.  She has an allergy to carbamazepine, nortriptyline and had dizziness with Tegretol.  Given these reactions, the patient is resistant to try more oral medications, such as Lyrica.  I would agree with this hesitance on her part.  This topical compound is the best alternative for her.      Thank you for your reconsideration.  We will be waiting for your response.          Oralia Arias MD  Neurology

## 2017-08-24 NOTE — LETTER
[facility]  [address] [address]  [phone]        September 15, 2016        Medical Management  [facility]  Re: Kiko Jacinto  Policy Number: [id number]  Case ID: [id number]        Dear Appeal Representative,    I would like to request an appeal for ketamine/ lidocaine/ gabapentin/ salt stable cream for Ms. Vazquez. The patient has significant neuropathic pain of her feet, which likely represents small fiber neuropathy.      Ms. Vazquez has a history of intolerance to oral gabapentin.  She has an allergy to carbamazepine, nortriptyline and had dizziness with Tegretol.  Given these reactions, the patient is resistant to try more oral medications, such as Lyrica.  I would agree with this hesitance on her part.  This topical compound is the best alternative for her.      Thank you for your reconsideration.  We will be waiting for your response.      Oralia Arias MD  Neurology

## 2017-08-24 NOTE — TELEPHONE ENCOUNTER
Forms faxed to [employer] [phone].  Transmission confirmed via Right Fax.  Will await decision.

## 2017-08-24 NOTE — TELEPHONE ENCOUNTER
A prior authorization is needed for the following medication prescribed.  Please complete a prior authorization with the information included below.    Medication:Ketamine 5% Lido 2.5% Edwige 8% PLO Crm (Compound)    Ketamine HCL Powd [address]-55  Lidocaine HCL Powd [address]-38  Gabapentin Powd [address]-96  Salt Stable LS Advanced Cream [address]-66      RX #: [id number]  Reason for Rejection: Product not covered-plan/benefit Exclusion. Compounds not covered     Pharmacy Insurance plan:[employer]   BIN #:[id number]   ID #:[id number]  PCN #:  Phone #:[phone] or [phone]       Pharmacy NPI:[id number]      Please advise the pharmacy when the prior authorization is approved or denied.     Thank you for your time.     [facility]  [phone]

## 2017-08-29 NOTE — TELEPHONE ENCOUNTER
Appeal letter cued up.  The reason for the denial was that it's listed on their Excluded Drugs of the Chapter 6 CMS Prescription Drug Manual, so it may be unlikely that we can win an appeal.

## 2017-08-30 NOTE — TELEPHONE ENCOUNTER
Appeal letter and documentation mailed to [facility] Appeals.

## 2017-09-05 RX ORDER — CAPSAICIN 0.75 MG/G
CREAM TOPICAL 3 TIMES DAILY
Qty: 56 G | Refills: 3 | Status: SHIPPED | OUTPATIENT
Start: 2017-09-05 | End: 2017-09-27

## 2017-09-05 NOTE — TELEPHONE ENCOUNTER
Left detailed message on patient's identified voicemail, per her request (at last Neurology visit)-- that the gabapentin cream was denied and capsaicin cream has been called to [facility] in [place].  Liana SAMUEL-CMA

## 2017-09-05 NOTE — TELEPHONE ENCOUNTER
Patient prefers topical. Should we try capsaicin cream? I imagine this would be cheaper and thus more easily approved by insurance.   Order is in.    CY

## 2017-09-05 NOTE — TELEPHONE ENCOUNTER
Received notice from [facility] that the appeal for the compounded gabapentin cream has been denied.      Dr. Arias, do you have an alternative you'd like to suggest for her?

## 2017-09-22 ENCOUNTER — HOSPITAL ENCOUNTER (OUTPATIENT)
Dept: MAMMOGRAPHY | Facility: CLINIC | Age: 72
Discharge: HOME | End: 2017-09-22
Attending: FAMILY MEDICINE | Admitting: FAMILY MEDICINE
Payer: COMMERCIAL

## 2017-09-22 DIAGNOSIS — Z12.31 VISIT FOR SCREENING MAMMOGRAM: ICD-10-CM

## 2017-09-22 PROCEDURE — G0202 SCR MAMMO BI INCL CAD: HCPCS

## 2017-09-27 ENCOUNTER — OFFICE VISIT (OUTPATIENT)
Dept: FAMILY MEDICINE | Facility: CLINIC | Age: 72
End: 2017-09-27
Payer: COMMERCIAL

## 2017-09-27 VITALS
RESPIRATION RATE: 18 BRPM | HEIGHT: 64 IN | BODY MASS INDEX: 23.39 KG/M2 | SYSTOLIC BLOOD PRESSURE: 117 MMHG | DIASTOLIC BLOOD PRESSURE: 66 MMHG | HEART RATE: 67 BPM | WEIGHT: 137 LBS

## 2017-09-27 DIAGNOSIS — Z23 NEED FOR PROPHYLACTIC VACCINATION AND INOCULATION AGAINST INFLUENZA: ICD-10-CM

## 2017-09-27 DIAGNOSIS — R92.30 DENSE BREAST TISSUE ON MAMMOGRAM: ICD-10-CM

## 2017-09-27 DIAGNOSIS — Z00.00 MEDICARE ANNUAL WELLNESS VISIT, SUBSEQUENT: Primary | ICD-10-CM

## 2017-09-27 DIAGNOSIS — K21.9 GASTROESOPHAGEAL REFLUX DISEASE WITHOUT ESOPHAGITIS: ICD-10-CM

## 2017-09-27 DIAGNOSIS — E78.5 HYPERLIPIDEMIA LDL GOAL <160: ICD-10-CM

## 2017-09-27 PROCEDURE — 90662 IIV NO PRSV INCREASED AG IM: CPT | Performed by: FAMILY MEDICINE

## 2017-09-27 PROCEDURE — 99397 PER PM REEVAL EST PAT 65+ YR: CPT | Mod: 25 | Performed by: FAMILY MEDICINE

## 2017-09-27 PROCEDURE — G0008 ADMIN INFLUENZA VIRUS VAC: HCPCS | Performed by: FAMILY MEDICINE

## 2017-09-27 RX ORDER — OMEPRAZOLE 40 MG/1
40 CAPSULE, DELAYED RELEASE ORAL DAILY
Qty: 90 CAPSULE | Refills: 3 | Status: SHIPPED | OUTPATIENT
Start: 2017-09-27 | End: 2018-09-05

## 2017-09-27 RX ORDER — SIMVASTATIN 20 MG
20 TABLET ORAL AT BEDTIME
Qty: 90 TABLET | Refills: 3 | Status: SHIPPED | OUTPATIENT
Start: 2017-09-27 | End: 2018-09-05

## 2017-09-27 NOTE — PATIENT INSTRUCTIONS
Thank you for choosing [facility]  You may be receiving a survey in the mail from Rob Franco regarding your visit today.  Please take a few minutes to complete and return the survey to let us know how we are doing.      Our Clinic hours are:  Mondays    7:20 am - 7 pm  Tues - Fri  7:20 am - 5 pm    Clinic Phone: [phone]    The clinic lab opens at 7:30 am Mon - Fri and appointments are required.    [facility]. [phone]  Monday-Thursday 8 am - 7pm  Tues/Wed/Fri 8 am - 5:30 pm         Preventive Health Recommendations    Female Ages 65 +    Yearly exam:     See your health care provider every year in order to  o Review health changes.   o Discuss preventive care.    o Review your medicines if your doctor has prescribed any.      You no longer need a yearly Pap test unless you've had an abnormal Pap test in the past 10 years. If you have vaginal symptoms, such as bleeding or discharge, be sure to talk with your provider about a Pap test.      Every 1 to 2 years, have a mammogram.  If you are over 69, talk with your health care provider about whether or not you want to continue having screening mammograms.      Every 10 years, have a colonoscopy. Or, have a yearly FIT test (stool test). These exams will check for colon cancer.       Have a cholesterol test every 5 years, or more often if your doctor advises it.       Have a diabetes test (fasting glucose) every three years. If you are at risk for diabetes, you should have this test more often.       At age 65, have a bone density scan (DEXA) to check for osteoporosis (brittle bone disease).    Shots:    Get a flu shot each year.    Get a tetanus shot every 10 years.    Talk to your doctor about your pneumonia vaccines. There are now two you should receive - Pneumovax (PPSV 23) and Prevnar (PCV 13).    Talk to your doctor about the shingles vaccine.    Talk to your doctor about the hepatitis B vaccine.    Nutrition:     Eat  at least 5 servings of fruits and vegetables each day.      Eat whole-grain bread, whole-wheat pasta and brown rice instead of white grains and rice.      Talk to your provider about Calcium and Vitamin D.     Lifestyle    Exercise at least 150 minutes a week (30 minutes a day, 5 days a week). This will help you control your weight and prevent disease.      Limit alcohol to one drink per day.      No smoking.       Wear sunscreen to prevent skin cancer.       See your dentist twice a year for an exam and cleaning.      See your eye doctor every 1 to 2 years to screen for conditions such as glaucoma, macular degeneration and cataracts.

## 2017-09-27 NOTE — PROGRESS NOTES
SUBJECTIVE:   Mariah Jacinto is a 71 year old female who presents for Preventive Visit.      Are you in the first 12 months of your Medicare Part B coverage?  No    Healthy Habits:    Do you get at least three servings of calcium containing foods daily (dairy, green leafy vegetables, etc.)? yes    Amount of exercise or daily activities, outside of work: 3-4 day(s) per week    Problems taking medications regularly No    Medication side effects: No    Have you had an eye exam in the past two years? yes    Do you see a dentist twice per year? yes    Do you have sleep apnea, excessive snoring or daytime drowsiness?no    COGNITIVE SCREEN  1) Repeat 3 items (Banana, Sunrise, Chair)    2) Clock draw: NORMAL  3) 3 item recall: Recalls 3 objects  Results: 3 items recalled: COGNITIVE IMPAIRMENT LESS LIKELY    Mini-CogTM Copyright S Iraida. Licensed by the author for use in [facility] Lettuce Eat; reprinted with permission (baron@[facility]). All rights reserved.                Hyperlipidemia Follow-Up      Rate your low fat/cholesterol diet?: good    Taking statin?  Yes, no muscle aches from statin    Other lipid medications/supplements?:  none          Reviewed and updated as needed this visit by clinical staffTobacco  Med Hx  Surg Hx  Fam Hx  Soc Hx        Reviewed and updated as needed this visit by Provider        Social History   Substance Use Topics     Smoking status: Never Smoker     Smokeless tobacco: Never Used     Alcohol use No       The patient does not drink >3 drinks per day nor >7 drinks per week.    Today's PHQ-2 Score:   PHQ-2 ( 1999 Pfizer) 9/27/2017 1/16/2017   Q1: Little interest or pleasure in doing things 0 0   Q2: Feeling down, depressed or hopeless 0 0   PHQ-2 Score 0 0         Do you feel safe in your environment - Yes    Do you have a Health Care Directive?: Yes: Advance Directive has been received and scanned.    Current providers sharing in care for this patient include: Patient Care  "Team:  Eli Sommer MD as PCP - General (Family Practice)  Iliana Cisse, RN as Nurse Coordinator (Neurosurgery)      Hearing impairment: Yes, wears hearing aids    Ability to successfully perform activities of daily living: Yes, no assistance needed     Fall risk:         Home safety:  none identified      The following health maintenance items are reviewed in Epic and correct as of today:Health Maintenance   Topic Date Due     INFLUENZA VACCINE (SYSTEM ASSIGNED)  09/01/2017     FALL RISK ASSESSMENT  09/26/2017     ADVANCE DIRECTIVE PLANNING Q5 YRS  10/04/2017     TETANUS IMMUNIZATION (SYSTEM ASSIGNED)  10/11/2017     MAMMO SCREEN Q2 YR (SYSTEM ASSIGNED)  09/22/2019     LIPID SCREEN Q5 YR FEMALE (SYSTEM ASSIGNED)  06/01/2022     COLON CANCER SCREEN (SYSTEM ASSIGNED)  08/20/2023     DEXA SCAN SCREENING (SYSTEM ASSIGNED)  Completed     PNEUMOCOCCAL  Completed     Labs reviewed in EPIC  BP Readings from Last 3 Encounters:   09/27/17 117/66   08/17/17 124/68   06/23/17 (P) 132/60    Wt Readings from Last 3 Encounters:   09/27/17 137 lb (62.1 kg)   08/17/17 135 lb 9.6 oz (61.5 kg)   06/23/17 135 lb (61.2 kg)               Pneumonia Vaccine: UTD  Mammogram Screening: UTD    ROS:  Constitutional, HEENT, cardiovascular, pulmonary, gi and gu systems are negative, except as otherwise noted.      OBJECTIVE:   Resp 18  Ht 5' 4\" (1.626 m)  Wt 137 lb (62.1 kg)  Breastfeeding? No  BMI 23.52 kg/m2 Estimated body mass index is 23.52 kg/(m^2) as calculated from the following:    Height as of this encounter: 5' 4\" (1.626 m).    Weight as of this encounter: 137 lb (62.1 kg).     EXAM:   GENERAL: healthy, alert and no distress  EYES: Eyes grossly normal to inspection, PERRL and conjunctivae and sclerae normal  HENT: ear canals and TM's normal, nose and mouth without ulcers or lesions  NECK: no adenopathy, no asymmetry, masses, or scars and thyroid normal to palpation  RESP: lungs clear to auscultation - no rales, rhonchi or " "wheezes  CV: regular rate and rhythm, normal S1 S2, no S3 or S4, no murmur, click or rub, no peripheral edema and peripheral pulses strong  ABDOMEN: soft, nontender, no hepatosplenomegaly, no masses and bowel sounds normal  MS: no gross musculoskeletal defects noted, no edema  SKIN: no suspicious lesions or rashes  NEURO: Normal strength and tone, mentation intact and speech normal  PSYCH: mentation appears normal, affect normal/bright    ASSESSMENT / PLAN:   1. Medicare annual wellness visit, subsequent       2. Need for prophylactic vaccination and inoculation against influenza     - FLU VACCINE, INCREASED ANTIGEN, PRESV FREE, AGE 65+ [07239]  - Vaccine Administration, Initial [96871]    3. Hyperlipidemia LDL goal <160     - simvastatin (ZOCOR) 20 MG tablet; Take 1 tablet (20 mg) by mouth At Bedtime  Dispense: 90 tablet; Refill: 3    4. Gastroesophageal reflux disease without esophagitis     - omeprazole (PRILOSEC) 40 MG capsule; Take 1 capsule (40 mg) by mouth daily  Dispense: 90 capsule; Refill: 3    5. Dense breast tissue on mammogram  Would be a good idea to do 3D imaging next year    6.  Constipation - using 1/2 dose miralax a few days a week and very pleased with this.      End of Life Planning:  Patient currently has an advanced directive: No.  I have verified the patient's ablity to prepare an advanced directive/make health care decisions.  Literature was provided to assist patient in preparing an advanced directive.    COUNSELING:  Reviewed preventive health counseling, as reflected in patient instructions       Regular exercise       Healthy diet/nutrition        Estimated body mass index is 23.52 kg/(m^2) as calculated from the following:    Height as of this encounter: 5' 4\" (1.626 m).    Weight as of this encounter: 137 lb (62.1 kg).     reports that she has never smoked. She has never used smokeless tobacco.        Appropriate preventive services were discussed with this patient, including applicable " screening as appropriate for cardiovascular disease, diabetes, osteopenia/osteoporosis, and glaucoma.  As appropriate for age/gender, discussed screening for colorectal cancer, prostate cancer, breast cancer, and cervical cancer. Checklist reviewing preventive services available has been given to the patient.    Reviewed patients plan of care and provided an AVS. The Basic Care Plan (routine screening as documented in Health Maintenance) for Mariah meets the Care Plan requirement. This Care Plan has been established and reviewed with the Patient.    Counseling Resources:  ATP IV Guidelines  Pooled Cohorts Equation Calculator  Breast Cancer Risk Calculator  FRAX Risk Assessment  ICSI Preventive Guidelines  Dietary Guidelines for Americans, 2010  [employer]'s MyPlate  ASA Prophylaxis  Lung CA Screening    Eli Sommer MD  [facility] Influenza Immunization Documentation    1.  Is the person to be vaccinated sick today?   No    2. Does the person to be vaccinated have an allergy to a component   of the vaccine?   No    3. Has the person to be vaccinated ever had a serious reaction   to influenza vaccine in the past?   No    4. Has the person to be vaccinated ever had Guillain-Barré syndrome?   No    Form completed by Sherron Freedman MA

## 2017-09-27 NOTE — NURSING NOTE
"Chief Complaint   Patient presents with     Wellness Visit       Initial /66  Pulse 67  Resp 18  Ht 5' 4\" (1.626 m)  Wt 137 lb (62.1 kg)  Breastfeeding? No  BMI 23.52 kg/m2 Estimated body mass index is 23.52 kg/(m^2) as calculated from the following:    Height as of this encounter: 5' 4\" (1.626 m).    Weight as of this encounter: 137 lb (62.1 kg).  Medication Reconciliation: complete    "

## 2017-09-27 NOTE — MR AVS SNAPSHOT
After Visit Summary   9/27/2017    Mariah Jacinto    MRN: [id number]           Patient Information     Date Of Birth          1945        Visit Information        Provider Department      9/27/2017 8:40 AM Eli Sommer MD [facility]        Today's Diagnoses     Medicare annual wellness visit, subsequent    -  1    Need for prophylactic vaccination and inoculation against influenza        Hyperlipidemia LDL goal <160        Gastroesophageal reflux disease without esophagitis        Dense breast tissue on mammogram          Care Instructions          Thank you for choosing [facility].  You may be receiving a survey in the mail from Rob Franco regarding your visit today.  Please take a few minutes to complete and return the survey to let us know how we are doing.      Our Clinic hours are:  Mondays    7:20 am - 7 pm  Tues -  Fri  7:20 am - 5 pm    Clinic Phone: [phone]    The clinic lab opens at 7:30 am Mon - Fri and appointments are required.    [facility] [place]  Ph. [phone]  Monday-Thursday 8 am - 7pm  Tues/Wed/Fri 8 am - 5:30 pm         Preventive Health Recommendations    Female Ages 65 +    Yearly exam:     See your health care provider every year in order to  o Review health changes.   o Discuss preventive care.    o Review your medicines if your doctor has prescribed any.      You no longer need a yearly Pap test unless you've had an abnormal Pap test in the past 10 years. If you have vaginal symptoms, such as bleeding or discharge, be sure to talk with your provider about a Pap test.      Every 1 to 2 years, have a mammogram.  If you are over 69, talk with your health care provider about whether or not you want to continue having screening mammograms.      Every 10 years, have a colonoscopy. Or, have a yearly FIT test (stool test). These exams will check for colon cancer.       Have a cholesterol test every 5 years, or more often if your  doctor advises it.       Have a diabetes test (fasting glucose) every three years. If you are at risk for diabetes, you should have this test more often.       At age 65, have a bone density scan (DEXA) to check for osteoporosis (brittle bone disease).    Shots:    Get a flu shot each year.    Get a tetanus shot every 10 years.    Talk to your doctor about your pneumonia vaccines. There are now two you should receive - Pneumovax (PPSV 23) and Prevnar (PCV 13).    Talk to your doctor about the shingles vaccine.    Talk to your doctor about the hepatitis B vaccine.    Nutrition:     Eat at least 5 servings of fruits and vegetables each day.      Eat whole-grain bread, whole-wheat pasta and brown rice instead of white grains and rice.      Talk to your provider about Calcium and Vitamin D.     Lifestyle    Exercise at least 150 minutes a week (30 minutes a day, 5 days a week). This will help you control your weight and prevent disease.      Limit alcohol to one drink per day.      No smoking.       Wear sunscreen to prevent skin cancer.       See your dentist twice a year for an exam and cleaning.      See your eye doctor every 1 to 2 years to screen for conditions such as glaucoma, macular degeneration and cataracts.          Follow-ups after your visit        Your next 10 appointments already scheduled     Nov 14, 2017  8:45 AM CST   [facility]    [address] [id number] [phone]            May 14, 2018 11:00 AM CDT   [facility]    [address] [id number] [phone]              Who to contact     If you have questions or need follow up information about today's clinic visit or your schedule please contact [facility] directly at [phone].  Normal or non-critical lab and  "imaging results will be communicated to you by MyChart, letter or phone within 4 business days after the clinic has received the results. If you do not hear from us within 7 days, please contact the clinic through [employer] or phone. If you have a critical or abnormal lab result, we will notify you by phone as soon as possible.  Submit refill requests through [employer] or call your pharmacy and they will forward the refill request to us. Please allow 3 business days for your refill to be completed.          Additional Information About Your Visit        [employer] Information     [employer] lets you send messages to your doctor, view your test results, renew your prescriptions, schedule appointments and more. To sign up, go to [email or web address] . Click on \"Log in\" on the left side of the screen, which will take you to the Welcome page. Then click on \"Sign up Now\" on the right side of the page.     You will be asked to enter the access code listed below, as well as some personal information. Please follow the directions to create your username and password.     Your access code is: [email or web address]  Expires: 2017  9:05 AM     Your access code will  in 90 days. If you need help or a new code, please call your [place] clinic or [phone].        Care EveryWhere ID     This is your Care EveryWhere ID. This could be used by other organizations to access your [place] medical records  [id number]        Your Vitals Were     Pulse Respirations Height Breastfeeding? BMI (Body Mass Index)       67 18 5' 4\" (1.626 m) No 23.52 kg/m2        Blood Pressure from Last 3 Encounters:   17 117/66   17 124/68   17 (P) 132/60    Weight from Last 3 Encounters:   17 137 lb (62.1 kg)   17 135 lb 9.6 oz (61.5 kg)   17 135 lb (61.2 kg)              We Performed the Following     FLU VACCINE, INCREASED ANTIGEN, PRESV FREE, AGE 65+ [39009]     Vaccine Administration, Initial [42013]        "   Today's Medication Changes          These changes are accurate as of: 9/27/17  9:05 AM.  If you have any questions, ask your nurse or doctor.               Stop taking these medicines if you haven't already. Please contact your care team if you have questions.     capsaicin 0.075 % cream   Commonly known as:  ZOSTRIX   Stopped by:  Eli Sommer MD           ketamine (KETALAR) 5%-gabapentin (NEURONTIN) 8%-lidocaine 2.5% 5%-8% Gel topical PLO cream   Stopped by:  Eli Sommer MD                Where to get your medicines      These medications were sent to [id number] - [place], [place] - [address]   [address] [facility] [address]     Phone:  [phone]     omeprazole 40 MG capsule    simvastatin 20 MG tablet                Primary Care Provider Office Phone # Fax #    Eli Sommer [phone] [address] [facility] [address]        Equal Access to Services     [facility]: Hadii aad ku hadasho Soomaali, waaxda luqadaha, qaybta kaalmada adeegyada, waxay idiin hayaan adeeg kharash la'efra . So [facility] [phone].    ATENCIÓN: Si habla español, tiene a anderson disposición servicios gratuitos de asistencia lingüística. Llame al [phone].    We comply with applicable federal civil rights laws and [place] laws. We do not discriminate on the basis of race, color, national origin, age, disability sex, sexual orientation or gender identity.            Thank you!     Thank you for choosing [facility]  for your care. Our goal is always to provide you with excellent care. Hearing back from our patients is one way we can continue to improve our services. Please take a few minutes to complete the written survey that you may receive in the mail after your visit with us. Thank you!             Your Updated Medication List - Protect others around you: Learn how to safely use, store and throw away your medicines at www.disposemymeds.org.           This list is accurate as of: 9/27/17  9:05 AM.  Always use your most recent med list.                   Brand Name Dispense Instructions for use Diagnosis    acidophilus Caps      Take 1 tablet by mouth daily        aspirin 81 MG tablet      1 TABLET DAILY        Calcium + D3 600-200 MG-UNIT Tabs      Take 1 tablet by mouth daily        glucosamine-chondroitin 500-400 MG Caps per capsule      Take 1 capsule by mouth daily        melatonin 5 MG Caps     1 capsule    Take 5 mg by mouth At Bedtime        Multi-vitamin Tabs tablet   Generic drug:  multivitamin, therapeutic with minerals      1 TABLET DAILY        OMEGA-3 FISH OIL PO      Take 1 capsule by mouth daily        omeprazole 40 MG capsule    priLOSEC    90 capsule    Take 1 capsule (40 mg) by mouth daily    Gastroesophageal reflux disease without esophagitis       simvastatin 20 MG tablet    ZOCOR    90 tablet    Take 1 tablet (20 mg) by mouth At Bedtime    Hyperlipidemia LDL goal <160       VITAMIN C ER PO      Take 1 tablet by mouth daily        VITAMIN D PO      1 DAILY

## 2017-10-02 NOTE — TELEPHONE ENCOUNTER
Received information from [facility] that appeal was incorrectly submitted (they told me previously it was denied).  Will resubmit letter and records with new form and see if we can get it appealed.    Dr. Arias, I've cued up another letter with correct date.  Please print and sign.

## 2018-01-25 ENCOUNTER — OFFICE VISIT (OUTPATIENT)
Dept: FAMILY MEDICINE | Facility: CLINIC | Age: 73
End: 2018-01-25
Payer: COMMERCIAL

## 2018-01-25 ENCOUNTER — NURSE TRIAGE (OUTPATIENT)
Dept: NURSING | Facility: CLINIC | Age: 73
End: 2018-01-25

## 2018-01-25 VITALS
BODY MASS INDEX: 23.18 KG/M2 | SYSTOLIC BLOOD PRESSURE: 139 MMHG | HEIGHT: 64 IN | RESPIRATION RATE: 18 BRPM | WEIGHT: 135.8 LBS | DIASTOLIC BLOOD PRESSURE: 62 MMHG | TEMPERATURE: 97.6 F | HEART RATE: 67 BPM | OXYGEN SATURATION: 99 %

## 2018-01-25 DIAGNOSIS — K64.5 THROMBOSED EXTERNAL HEMORRHOIDS: Primary | ICD-10-CM

## 2018-01-25 PROCEDURE — 99213 OFFICE O/P EST LOW 20 MIN: CPT | Performed by: FAMILY MEDICINE

## 2018-01-25 NOTE — MR AVS SNAPSHOT
"              After Visit Summary   1/25/2018    Mariah Jacinto    MRN: [id number]           Patient Information     Date Of Birth          1945        Visit Information        Provider Department      1/25/2018 9:00 AM Eli Sommer MD [facility]        Today's Diagnoses     Thrombosed external hemorrhoids    -  1       Follow-ups after your visit        Your next 10 appointments already scheduled     May 14, 2018 11:00 AM CDT   Return Visit with Chapito Franco MD   [facility]    [address] [facility] [address]   [phone]              Who to contact     If you have questions or need follow up information about today's clinic visit or your schedule please contact [facility] directly at [phone].  Normal or non-critical lab and imaging results will be communicated to you by MyChart, letter or phone within 4 business days after the clinic has received the results. If you do not hear from us within 7 days, please contact the clinic through MyChart or phone. If you have a critical or abnormal lab result, we will notify you by phone as soon as possible.  Submit refill requests through [employer] or call your pharmacy and they will forward the refill request to us. Please allow 3 business days for your refill to be completed.          Additional Information About Your Visit        MyChart Information     [employer] lets you send messages to your doctor, view your test results, renew your prescriptions, schedule appointments and more. To sign up, go to [email or web address] . Click on \"Log in\" on the left side of the screen, which will take you to the Welcome page. Then click on \"Sign up Now\" on the right side of the page.     You will be asked to enter the access code listed below, as well as some personal information. Please follow the directions to create your username and password.     Your access code " "is: [email or web address]  Expires: 2018  9:24 AM     Your access code will  in 90 days. If you need help or a new code, please call your [place] clinic or [phone].        Care EveryWhere ID     This is your Care EveryWhere ID. This could be used by other organizations to access your [place] medical records  [id number]        Your Vitals Were     Pulse Temperature Respirations Height Pulse Oximetry Breastfeeding?    67 97.6  F (36.4  C) (Tympanic) 18 5' 3.75\" (1.619 m) 99% No    BMI (Body Mass Index)                   23.49 kg/m2            Blood Pressure from Last 3 Encounters:   18 146/62   17 117/66   17 124/68    Weight from Last 3 Encounters:   18 135 lb 12.8 oz (61.6 kg)   17 137 lb (62.1 kg)   17 135 lb 9.6 oz (61.5 kg)              Today, you had the following     No orders found for display         Today's Medication Changes          These changes are accurate as of 18  9:24 AM.  If you have any questions, ask your nurse or doctor.               Start taking these medicines.        Dose/Directions    hydrocortisone 2.5 % cream   Commonly known as:  ANUSOL-HC   Used for:  Thrombosed external hemorrhoids   Started by:  Eli Sommer MD        Place rectally 2 times daily   Quantity:  30 g   Refills:  1            Where to get your medicines      These medications were sent to [place] [address] [place] [address] [address]     Phone:  [phone]     hydrocortisone 2.5 % cream                Primary Care Provider Office Phone # Fax #    Eli Sommer [phone] [address] [facility] [address]        Equal Access to Services     [facility] ANA LAURA AH: Edd natarajano Sobennie, waaxda luqadaha, qaybta kaalmada adeegyada, waxay sommer zaidi. So [facility] [phone].    ATENCIÓN: Si habla español, tiene a anderson disposición servicios gratuitos de asistencia " lingüística. Jaiden al [phone].    We comply with applicable federal civil rights laws and [place] laws. We do not discriminate on the basis of race, color, national origin, age, disability, sex, sexual orientation, or gender identity.            Thank you!     Thank you for choosing [facility]  for your care. Our goal is always to provide you with excellent care. Hearing back from our patients is one way we can continue to improve our services. Please take a few minutes to complete the written survey that you may receive in the mail after your visit with us. Thank you!             Your Updated Medication List - Protect others around you: Learn how to safely use, store and throw away your medicines at www.disposemymeds.org.          This list is accurate as of 1/25/18  9:24 AM.  Always use your most recent med list.                   Brand Name Dispense Instructions for use Diagnosis    acidophilus Caps      Take 1 tablet by mouth daily        aspirin 81 MG tablet      1 TABLET DAILY        Calcium + D3 600-200 MG-UNIT Tabs      Take 1 tablet by mouth daily        glucosamine-chondroitin 500-400 MG Caps per capsule      Take 1 capsule by mouth daily        hydrocortisone 2.5 % cream    ANUSOL-HC    30 g    Place rectally 2 times daily    Thrombosed external hemorrhoids       melatonin 5 MG Caps     1 capsule    Take 5 mg by mouth At Bedtime        Multi-vitamin Tabs tablet   Generic drug:  multivitamin, therapeutic with minerals      1 TABLET DAILY        OMEGA-3 FISH OIL PO      Take 1 capsule by mouth daily        omeprazole 40 MG capsule    priLOSEC    90 capsule    Take 1 capsule (40 mg) by mouth daily    Gastroesophageal reflux disease without esophagitis       simvastatin 20 MG tablet    ZOCOR    90 tablet    Take 1 tablet (20 mg) by mouth At Bedtime    Hyperlipidemia LDL goal <160       VITAMIN C ER PO      Take 1 tablet by mouth daily        VITAMIN D PO      1 DAILY

## 2018-01-25 NOTE — PROGRESS NOTES
"  SUBJECTIVE:   Mariah Jacinto is a 72 year old female who presents to clinic today for the following health issues:      Hemorrhoids       Duration: 3 days    Description:   Pain: YES  Itching: no     Accompanying signs and symptoms:   Blood in stool: no   Changes in stool pattern: no     History (similar episodes/previous evaluation): years ago Hemorrhoidectomy    Precipitating or alleviating factors: None    Therapies tried and outcome: preparation H and witch hazel pads has not helped      /62 (BP Location: Right arm, Patient Position: Chair, Cuff Size: Adult Regular)  Pulse 67  Temp 97.6  F (36.4  C) (Tympanic)  Resp 18  Ht 5' 3.75\" (1.619 m)  Wt 135 lb 12.8 oz (61.6 kg)  SpO2 99%  Breastfeeding? No  BMI 23.49 kg/m2  RECTAL:  Two thrombosed external hemorrhoids noted. Larger 1 cm (not acutely thrombosed) at 6-8 oclock and a 0.4 cm hemorrhoid at 5 oclock.    ASSESSMENT/PLAN:      ICD-10-CM    1. Thrombosed external hemorrhoids K64.5 hydrocortisone (ANUSOL-HC) 2.5 % cream     Handout on hemorrhoids given.  Neither is acutely thrombosed or amenable to evacuation today.  Recommend prevention and symptomatic treatment. anusol ordered, patient agrees to and understands plan.    Eli Sommer M.D.        "

## 2018-01-25 NOTE — NURSING NOTE
"Chief Complaint   Patient presents with     Rectal Problem       Initial Breastfeeding? No Estimated body mass index is 23.52 kg/(m^2) as calculated from the following:    Height as of 9/27/17: 5' 4\" (1.626 m).    Weight as of 9/27/17: 137 lb (62.1 kg).  Medication Reconciliation: complete    Health Maintenance that is potentially due pending provider review:  NONE    n/a    Is there anyone who you would like to be able to receive your results? No  If yes have patient fill out MIRANDA    "

## 2018-04-16 ENCOUNTER — OFFICE VISIT (OUTPATIENT)
Dept: FAMILY MEDICINE | Facility: CLINIC | Age: 73
End: 2018-04-16
Payer: COMMERCIAL

## 2018-04-16 ENCOUNTER — NURSE TRIAGE (OUTPATIENT)
Dept: NURSING | Facility: CLINIC | Age: 73
End: 2018-04-16

## 2018-04-16 VITALS
RESPIRATION RATE: 18 BRPM | HEART RATE: 68 BPM | DIASTOLIC BLOOD PRESSURE: 70 MMHG | WEIGHT: 133 LBS | SYSTOLIC BLOOD PRESSURE: 126 MMHG | HEIGHT: 64 IN | BODY MASS INDEX: 22.71 KG/M2 | TEMPERATURE: 97.8 F

## 2018-04-16 DIAGNOSIS — R68.89 COLD INTOLERANCE: ICD-10-CM

## 2018-04-16 DIAGNOSIS — R30.0 DYSURIA: ICD-10-CM

## 2018-04-16 DIAGNOSIS — N39.0 URINARY TRACT INFECTION, ACUTE: Primary | ICD-10-CM

## 2018-04-16 LAB
ALBUMIN UR-MCNC: NEGATIVE MG/DL
APPEARANCE UR: CLEAR
BILIRUB UR QL STRIP: NEGATIVE
COLOR UR AUTO: YELLOW
GLUCOSE UR STRIP-MCNC: NEGATIVE MG/DL
HGB UR QL STRIP: NEGATIVE
KETONES UR STRIP-MCNC: NEGATIVE MG/DL
LEUKOCYTE ESTERASE UR QL STRIP: ABNORMAL
NITRATE UR QL: NEGATIVE
PH UR STRIP: 7 PH (ref 5–7)
RBC #/AREA URNS AUTO: NORMAL /HPF
SOURCE: ABNORMAL
SP GR UR STRIP: 1.01 (ref 1–1.03)
TSH SERPL DL<=0.005 MIU/L-ACNC: 0.71 MU/L (ref 0.4–4)
UROBILINOGEN UR STRIP-ACNC: 0.2 EU/DL (ref 0.2–1)
WBC #/AREA URNS AUTO: NORMAL /HPF

## 2018-04-16 PROCEDURE — 87186 SC STD MICRODIL/AGAR DIL: CPT | Performed by: FAMILY MEDICINE

## 2018-04-16 PROCEDURE — 36415 COLL VENOUS BLD VENIPUNCTURE: CPT | Performed by: FAMILY MEDICINE

## 2018-04-16 PROCEDURE — 87088 URINE BACTERIA CULTURE: CPT | Performed by: FAMILY MEDICINE

## 2018-04-16 PROCEDURE — 99213 OFFICE O/P EST LOW 20 MIN: CPT | Performed by: FAMILY MEDICINE

## 2018-04-16 PROCEDURE — 87086 URINE CULTURE/COLONY COUNT: CPT | Performed by: FAMILY MEDICINE

## 2018-04-16 PROCEDURE — 84443 ASSAY THYROID STIM HORMONE: CPT | Performed by: FAMILY MEDICINE

## 2018-04-16 PROCEDURE — 81001 URINALYSIS AUTO W/SCOPE: CPT | Performed by: FAMILY MEDICINE

## 2018-04-16 RX ORDER — SULFAMETHOXAZOLE/TRIMETHOPRIM 800-160 MG
1 TABLET ORAL 2 TIMES DAILY
Qty: 6 TABLET | Refills: 0 | Status: SHIPPED | OUTPATIENT
Start: 2018-04-16 | End: 2018-04-19

## 2018-04-16 ASSESSMENT — PAIN SCALES - GENERAL: PAINLEVEL: MILD PAIN (2)

## 2018-04-16 NOTE — MR AVS SNAPSHOT
After Visit Summary   4/16/2018    Mariah Jacinto    MRN: [id number]           Patient Information     Date Of Birth          1945        Visit Information        Provider Department      4/16/2018 11:00 AM Eli Sommer MD [facility]        Today's Diagnoses     Urinary tract infection, acute    -  1    Dysuria        Cold intolerance          Care Instructions          Thank you for choosing [facility].  You may be receiving a survey in the mail from [facility] regarding your visit today.  Please take a few minutes to complete and return the survey to let us know how we are doing.      Our Clinic hours are:  Mondays    7:20 am - 7 pm  Tues -  Fri  7:20 am - 5 pm    Clinic Phone: [phone]    The clinic lab opens at 7:30 am Mon - Fri and appointments are required.    [facility]  Ph. [phone]  Monday-Thursday 8 am - 7pm  Tues/Wed/Fri 8 am - 5:30 pm                 Follow-ups after your visit        Your next 10 appointments already scheduled     May 14, 2018 11:00 AM CDT   Return Visit with Chapito Frnaco MD   [facility]    [address] [facility] [address]   [phone]              Who to contact     If you have questions or need follow up information about today's clinic visit or your schedule please contact [facility] directly at [phone].  Normal or non-critical lab and imaging results will be communicated to you by MyChart, letter or phone within 4 business days after the clinic has received the results. If you do not hear from us within 7 days, please contact the clinic through MyChart or phone. If you have a critical or abnormal lab result, we will notify you by phone as soon as possible.  Submit refill requests through [employer] or call your pharmacy and they will forward the refill request to us. Please allow 3 business days for your refill to be  "completed.          Additional Information About Your Visit        [employer] Information     [employer] lets you send messages to your doctor, view your test results, renew your prescriptions, schedule appointments and more. To sign up, go to [email or web address] . Click on \"Log in\" on the left side of the screen, which will take you to the Welcome page. Then click on \"Sign up Now\" on the right side of the page.     You will be asked to enter the access code listed below, as well as some personal information. Please follow the directions to create your username and password.     Your access code is: [email or web address]  Expires: 2018 10:24 AM     Your access code will  in 90 days. If you need help or a new code, please call your [place] clinic or [phone].        Care EveryWhere ID     This is your Care EveryWhere ID. This could be used by other organizations to access your [place] medical records  [id number]        Your Vitals Were     Pulse Temperature Respirations Height Breastfeeding? BMI (Body Mass Index)    68 97.8  F (36.6  C) (Tympanic) 18 5' 3.75\" (1.619 m) No 23.01 kg/m2       Blood Pressure from Last 3 Encounters:   18 126/70   18 139/62   17 117/66    Weight from Last 3 Encounters:   18 133 lb (60.3 kg)   18 135 lb 12.8 oz (61.6 kg)   17 137 lb (62.1 kg)              We Performed the Following     *UA reflex to Microscopic and Culture ([place] and [facility] (except [place] and [place])     TSH with free T4 reflex     Urine Culture Aerobic Bacterial     Urine Microscopic          Today's Medication Changes          These changes are accurate as of 18 11:08 AM.  If you have any questions, ask your nurse or doctor.               Start taking these medicines.        Dose/Directions    sulfamethoxazole-trimethoprim 800-160 MG per tablet   Commonly known as:  BACTRIM DS/SEPTRA DS   Used for:  Urinary tract infection, acute   Started by:  Eli Sommer " MD [facility]        Dose:  1 tablet   Take 1 tablet by mouth 2 times daily for 3 days   Quantity:  6 tablet   Refills:  0            Where to get your medicines      These medications were sent to [facility] [place], [place] - [address] [facility]  [address] [address] [facility] [address]     Phone:  [phone]     sulfamethoxazole-trimethoprim 800-160 MG per tablet                Primary Care Provider Office Phone # Fax #    Eli Sommer [phone] [address]  [facility] [address]        Equal Access to Services     [facility]: Hadii aad ku hadasho Soomaali, waaxda luqadaha, qaybta kaalmada adeegyada, darin kaba . So [facility] [phone].    ATENCIÓN: Si habla español, tiene a anderson disposición servicios gratuitos de asistencia lingüística. Llame al [phone].    We comply with applicable federal civil rights laws and [place] laws. We do not discriminate on the basis of race, color, national origin, age, disability, sex, sexual orientation, or gender identity.            Thank you!     Thank you for choosing [facility]  for your care. Our goal is always to provide you with excellent care. Hearing back from our patients is one way we can continue to improve our services. Please take a few minutes to complete the written survey that you may receive in the mail after your visit with us. Thank you!             Your Updated Medication List - Protect others around you: Learn how to safely use, store and throw away your medicines at www.disposemymeds.org.          This list is accurate as of 4/16/18 11:08 AM.  Always use your most recent med list.                   Brand Name Dispense Instructions for use Diagnosis    acidophilus Caps      Take 1 tablet by mouth daily        aspirin 81 MG tablet      1 TABLET DAILY        Calcium + D3 600-200 MG-UNIT Tabs      Take 1 tablet by mouth daily         glucosamine-chondroitin 500-400 MG Caps per capsule      Take 1 capsule by mouth daily        hydrocortisone 2.5 % cream    ANUSOL-HC    30 g    Place rectally 2 times daily    Thrombosed external hemorrhoids       melatonin 5 MG Caps     1 capsule    Take 5 mg by mouth At Bedtime        Multi-vitamin Tabs tablet   Generic drug:  multivitamin, therapeutic with minerals      1 TABLET DAILY        OMEGA-3 FISH OIL PO      Take 1 capsule by mouth daily        omeprazole 40 MG capsule    priLOSEC    90 capsule    Take 1 capsule (40 mg) by mouth daily    Gastroesophageal reflux disease without esophagitis       simvastatin 20 MG tablet    ZOCOR    90 tablet    Take 1 tablet (20 mg) by mouth At Bedtime    Hyperlipidemia LDL goal <160       sulfamethoxazole-trimethoprim 800-160 MG per tablet    BACTRIM DS/SEPTRA DS    6 tablet    Take 1 tablet by mouth 2 times daily for 3 days    Urinary tract infection, acute       VITAMIN C ER PO      Take 1 tablet by mouth daily        VITAMIN D PO      1 DAILY

## 2018-04-16 NOTE — PROGRESS NOTES
"  SUBJECTIVE:   Mariah Jacinto is a 72 year old female who presents to clinic today for the following health issues:      URINARY TRACT SYMPTOMS  Onset: 4 days    Description:   Painful urination (Dysuria): YES  Blood in urine (Hematuria): no   Delay in urine (Hesitency): YES    Intensity: mild    Progression of Symptoms:  worsening    Accompanying Signs & Symptoms:  Fever/chills: YES- chills  Flank pain no  Nausea and vomiting: no   Any vaginal symptoms: vaginal odor  Abdominal/Pelvic Pain: no     History:   History of frequent UTI's: YES  History of kidney stones: no   Sexually Active: no   Possibility of pregnancy: No    Precipitating factors:       Therapies Tried and outcome: none    Feels like all the other times she's had a UTI.      Patient also wondering if she can have her TSH checked due to cold intolerance.  Denies other s/sx of hypothyroidism.      /70  Pulse 68  Temp 97.8  F (36.6  C) (Tympanic)  Resp 18  Ht 5' 3.75\" (1.619 m)  Wt 133 lb (60.3 kg)  Breastfeeding? No  BMI 23.01 kg/m2  EXAM: GENERAL APPEARANCE: Alert, no acute distress  RESP: lungs clear to auscultation   CV: normal rate, regular rhythm, no murmur or gallop  ABDOMEN: soft, no organomegaly, masses or tenderness, no CVAT    ASSESSMENT/PLAN:      ICD-10-CM    1. Urinary tract infection, acute N39.0 sulfamethoxazole-trimethoprim (BACTRIM DS/SEPTRA DS) 800-160 MG per tablet   2. Dysuria R30.0 *UA reflex to Microscopic and Culture ([place] and [facility] (except [place] and [place])     Urine Microscopic     Urine Culture Aerobic Bacterial   3. Cold intolerance R68.89 TSH with free T4 reflex     Given the classic symptoms, will have her do bactrim DS 1 tab twice daily x 3 days.  Culture pending.  If symptoms persist and culture is negative, may have her check back for  exam (she deferred today).     Check TSH to r/o hypothyroidism.    Eli Sommer M.D.      Patient Instructions         Thank you for choosing " [facility].  You may be receiving a survey in the mail from [employer] regarding your visit today.  Please take a few minutes to complete and return the survey to let us know how we are doing.      Our Clinic hours are:  Mondays    7:20 am - 7 pm  Tues -  Fri  7:20 am - 5 pm    Clinic Phone: [phone]    The clinic lab opens at 7:30 am Mon - Fri and appointments are required.    [facility]. [phone]  Monday-Thursday 8 am - 7pm  Tues/Wed/Fri 8 am - 5:30 pm

## 2018-04-16 NOTE — NURSING NOTE
"Chief Complaint   Patient presents with     Urinary Problem       Initial /70  Pulse 68  Temp 97.8  F (36.6  C) (Tympanic)  Resp 18  Ht 5' 3.75\" (1.619 m)  Wt 133 lb (60.3 kg)  Breastfeeding? No  BMI 23.01 kg/m2 Estimated body mass index is 23.01 kg/(m^2) as calculated from the following:    Height as of this encounter: 5' 3.75\" (1.619 m).    Weight as of this encounter: 133 lb (60.3 kg).  Medication Reconciliation: complete  "

## 2018-04-16 NOTE — PATIENT INSTRUCTIONS
Thank you for choosing [facility].  You may be receiving a survey in the mail from [facility] regarding your visit today.  Please take a few minutes to complete and return the survey to let us know how we are doing.      Our Clinic hours are:  Mondays    7:20 am - 7 pm  Tues -  Fri  7:20 am - 5 pm    Clinic Phone: [phone]    The clinic lab opens at 7:30 am Mon - Fri and appointments are required.    [facility]. [phone]  Monday-Thursday 8 am - 7pm  Tues/Wed/Fri 8 am - 5:30 pm

## 2018-04-17 LAB
BACTERIA SPEC CULT: ABNORMAL
Lab: ABNORMAL
SPECIMEN SOURCE: ABNORMAL

## 2018-05-14 ENCOUNTER — TELEPHONE (OUTPATIENT)
Dept: DERMATOLOGY | Facility: CLINIC | Age: 73
End: 2018-05-14

## 2018-05-14 ENCOUNTER — OFFICE VISIT (OUTPATIENT)
Dept: DERMATOLOGY | Facility: CLINIC | Age: 73
End: 2018-05-14
Payer: COMMERCIAL

## 2018-05-14 VITALS — SYSTOLIC BLOOD PRESSURE: 118 MMHG | DIASTOLIC BLOOD PRESSURE: 81 MMHG | OXYGEN SATURATION: 98 % | HEART RATE: 74 BPM

## 2018-05-14 DIAGNOSIS — C44.719 BASAL CELL CARCINOMA OF LEFT THIGH: Primary | ICD-10-CM

## 2018-05-14 DIAGNOSIS — L81.4 LENTIGO: ICD-10-CM

## 2018-05-14 DIAGNOSIS — Z85.828 HISTORY OF SKIN CANCER: ICD-10-CM

## 2018-05-14 DIAGNOSIS — L82.1 SK (SEBORRHEIC KERATOSIS): ICD-10-CM

## 2018-05-14 PROCEDURE — 99213 OFFICE O/P EST LOW 20 MIN: CPT | Mod: 25 | Performed by: DERMATOLOGY

## 2018-05-14 PROCEDURE — 11100 CL FROZEN SECTION FIRST SPEC: CPT | Performed by: DERMATOLOGY

## 2018-05-14 PROCEDURE — 88331 PATH CONSLTJ SURG 1 BLK 1SPC: CPT | Performed by: DERMATOLOGY

## 2018-05-14 NOTE — TELEPHONE ENCOUNTER
----- Message from Chapito Franco MD sent at 5/14/2018 12:15 PM CDT -----  L thigh basal cell carcinoma schedule excision

## 2018-05-14 NOTE — PROGRESS NOTES
Mariah Jacinto is a 72 year old year old female patient here today for f/u hx of non-melanoma skin cancer.  She notes neew spot on left leg.   .  Patient states this has been present for ?.  Patient reports the following symptoms:  none .  Patient reports the following previous treatments none.  Patient reports the following modifying factors none.  Associated symptoms: none.  Patient has no other skin complaints today.  Remainder of the HPI, Meds, PMH, Allergies, FH, and SH was reviewed in chart.    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    celexa caused spinning side effect      MENOPAUSE --ERT      OSTEOPENIA      Squamous cell carcinoma        Past Surgical History:   Procedure Laterality Date     ABLATE VEIN VARICOSE RADIO FREQUENCY WITHOUT PHLEBECTOMY MULTIPLE STAB  3/28/2013    Procedure: ABLATE VEIN VARICOSE RADIO FREQUENCY WITHOUT PHLEBECTOMY MULTIPLE STAB;  Bilateral ablation of varicose veins legs-to ultrasound at 0930  ;  Surgeon: Noam Soliman MD;  Location: [facility] ANESTH,LOWER ARM SURGERY  1999    ulnar nerve decompression - right     COLONOSCOPY  8/20/2013    Procedure: COLONOSCOPY;  Colonoscopy;  Surgeon: Yuliya Nathan MD;  Location: [facility]     ESOPHAGOSCOPY, GASTROSCOPY, DUODENOSCOPY (EGD), COMBINED N/A 5/12/2015    Procedure: COMBINED ESOPHAGOSCOPY, GASTROSCOPY, DUODENOSCOPY (EGD), BIOPSY SINGLE OR MULTIPLE;  Surgeon: Yuliya Nathan MD;  Location: [facility]     ESOPHAGOSCOPY, GASTROSCOPY, DUODENOSCOPY (EGD), COMBINED N/A 1/31/2017    Procedure: COMBINED ESOPHAGOSCOPY, GASTROSCOPY, DUODENOSCOPY (EGD), BIOPSY SINGLE OR MULTIPLE;  Surgeon: Qasim Bowie MD;  Location: [facility]      HYSTERECTOMY, PAP NO LONGER INDICATED      has both ovaries out also      HYSTERECTOMY, VAGINAL      Hysterectomy, oophorectomy     RELEASE TRIGGER FINGER Right 2017    Procedure: RELEASE TRIGGER FINGER;  Right Thumb A1 Pulley Release;  Surgeon: Jake Viveros MD;  Location: [facility]     SURGICAL HISTORY OF -       right retromastoid craniectomy and decompression trigeminal nerve     TONSILLECTOMY & ADENOIDECTOMY  child    T&A         Family History   Problem Relation Age of Onset     Alzheimer Disease Mother       at age 85     OSTEOPOROSIS Mother      Arthritis Mother      Alcohol/Drug Father      Respiratory Father      Eye Disorder Maternal Grandfather      Macular degneration     C.A.D. Brother      Tripple bypass age 57     Cardiovascular Brother      CANCER Brother      leukemia (ALL)     Cardiovascular Brother      Cardiovascular Brother      Neurologic Disorder Son      HEART DISEASE Son        Social History     Social History     Marital status:      Spouse name: N/A     Number of children: N/A     Years of education: N/A     Occupational History     Not on file.     Social History Main Topics     Smoking status: Never Smoker     Smokeless tobacco: Never Used     Alcohol use No     Drug use: No     Sexual activity: Yes     Birth control/ protection: Surgical      Comment: complete hysterectomy      Other Topics Concern     Parent/Sibling W/ Cabg, Mi Or Angioplasty Before 65f 55m? No     Social History Narrative       Outpatient Encounter Prescriptions as of 2018   Medication Sig Dispense Refill     Ascorbic Acid (VITAMIN C ER PO) Take 1 tablet by mouth daily        ASPIRIN 81 MG OR TABS 1 TABLET DAILY       Calcium Carb-Cholecalciferol (CALCIUM + D3) 600-200 MG-UNIT TABS Take 1 tablet by mouth daily       glucosamine-chondroitin 500-400 MG CAPS Take 1 capsule by mouth daily       hydrocortisone (ANUSOL-HC) 2.5 % cream Place rectally 2 times daily 30 g 1     Lactobacillus  (ACIDOPHILUS) CAPS Take 1 tablet by mouth daily       melatonin 5 MG CAPS Take 5 mg by mouth At Bedtime 1 capsule 0     MULTI-VITAMIN OR TABS 1 TABLET DAILY       Omega-3 Fatty Acids (OMEGA-3 FISH OIL PO) Take 1 capsule by mouth daily       omeprazole (PRILOSEC) 40 MG capsule Take 1 capsule (40 mg) by mouth daily 90 capsule 3     simvastatin (ZOCOR) 20 MG tablet Take 1 tablet (20 mg) by mouth At Bedtime 90 tablet 3     VITAMIN D OR 1 DAILY       No facility-administered encounter medications on file as of 5/14/2018.              Review Of Systems  Skin: As above  Eyes: negative  Ears/Nose/Throat: negative  Respiratory: No shortness of breath, dyspnea on exertion, cough, or hemoptysis  Cardiovascular: negative  Gastrointestinal: negative  Genitourinary: negative  Musculoskeletal: negative  Neurologic: negative  Psychiatric: negative  Hematologic/Lymphatic/Immunologic: negative  Endocrine: negative      O:   NAD, WDWN, Alert & Oriented, Mood & Affect wnl, Vitals stable   Here today alone   /81  Pulse 74  SpO2 98%   General appearance normal   Vitals stable   Alert, oriented and in no acute distress      Following lymph nodes palpated: Occipital, Cervical, Supraclavicular no lad   Stuck on papules and brown macules on trunk and ext    L thigh 9mm pink pearly papule         The remainder of the full exam was unremarkable; the following areas were examined:  conjunctiva/lids, oral mucosa, neck, peripheral vascular system, abdomen, lymph nodes, digits/nails, eccrine and apocrine glands, scalp/hair, face, neck, chest, abdomen, buttocks, back, RUE, LUE, RLE, LLE       Eyes: Conjunctivae/lids:Normal     ENT: Lips, buccal mucosa, tongue: normal    MSK:Normal    Cardiovascular: peripheral edema none    Pulm: Breathing Normal    Lymph Nodes: No Head and Neck Lymphadenopathy     Neuro/Psych: Orientation:Normal; Mood/Affect:Normal      MICRO:   L thigh:Orthokeratosis of epidermis with a proliferation of nests of basaloid  cells, with peripheral palisading and a haphazard arrangement in the center extending into the dermis, forming nodules.  The tumor cells have hyperchromatic nuclei. Poor cytoplasm and intercellular bridging.    A/P:  1. L thigh ro basal cell carcinoma   TANGENTIAL BIOPSY IN HOUSE:  After consent, anesthesia with LEC and prep, tangential excision performed and dx above confirmed with frozen section histology.  No complications and routine wound care.  Patient told result basal cell carcinoma schedule    2. Seborrheic keratosis, letnigo, hx of non-melanoma skin cancer   .      BENIGN LESIONS DISCUSSED WITH PATIENT:  I discussed the specifics of tumor, prognosis, and genetics of benign lesions.  I explained that treatment of these lesions would be purely cosmetic and not medically neccessary.  I discussed with patient different removal options including excision, cautery and /or laser.      Nature and genetics of benign skin lesions dicussed with patient.  Signs and Symptoms of skin cancer discussed with patient.  Patient to follow up with Primary Care provider regarding elevated blood pressure.  Patient encouraged to perform monthly skin exams.  UV precautions reviewed with patient.  Patient to follow up with Primary Care provider regarding elevated blood pressure.  Skin care regimen reviewed with patient: Eliminate harsh soaps, i.e. Dial, zest, irsih spring; Mild soaps such as Cetaphil or Dove sensitive skin, avoid hot or cold showers, aggressive use of emollients including vanicream, cetaphil or cerave discussed with patient.    Risks of non-melanoma skin cancer discussed with patient   Return to clinic 6 months and excision

## 2018-05-14 NOTE — LETTER
[facility]  [address] [facility] [address]  Phone: [phone]     May 14, 2018     Mariah Jacinto  [address] [address]            Dear Mariah:    You are scheduled for Mohs Surgery on Wednesday June 6 th at 7:45 am.    Please check in at Dermatology Clinic.   (2nd Floor, last  Clinic on right up staircase or elevator -past OB/GYN clinic)    You don't need to arrive more than 5-10 minutes prior to your appointment time.     Be sure to eat a good breakfast and bathe and wash your hair prior to Surgery.    If you are taking any anti-coagulants that are prescribed by your Doctor (such as Coumadin/warfarin, Plavix, Aspirin, Ibuprofen), please continue taking them.     However, If you are taking anti-coagulants over the counter without  a Doctor's order for a Medical condition, please discontinue them 10 days prior to Surgery.      Please wear loose comfortable clothing as it could possibly be 4-6 hours until your surgery is completed depending upon how many layers of tissue need to be removed.     Wi-fi access is available.     Sincerely,     Chapito Franco MD/Angelic Plascencia RN

## 2018-05-14 NOTE — PATIENT INSTRUCTIONS
WOUND CARE INSTRUCTIONS   FOR CRYOSURGERY   This area treated with liquid nitrogen will form a blister. You do not need to bandage the area until after the blister forms and breaks (which may be a few days). When the blister breaks, begin daily dressing changes as follows:   1) Clean and dry the area with tap water using clean Q-tip or sterile gauze pad.   2) Apply Polysporin ointment or Bacitracin ointment over entire wound. Do NOT use Neosporin ointment.   3) Cover the wound with a band-aid or sterile non-stick gauze pad and micropore paper tape.   REPEAT THESE INSTRUCTIONS AT LEAST ONCE A DAY UNTIL THE WOUND HAS COMPLETELY HEALED.   It is an old wives tale that a wound heals better when it is exposed to air and allowed to dry out. The wound will heal faster with a better cosmetic result if it is kept moist with ointment and covered with a bandage.   Do not let the wound dry out.   IMPORTANT INFORMATION ON REVERSE SIDE   Supplies Needed:   *Cotton tipped applicators (Q-tips)   *Polysporin ointment or Bacitracin ointment (NOT NEOSPORIN)   *Band-aids, or non stick gauze pads and micropore paper tape   PATIENT INFORMATION   During the healing process you will notice a number of changes. All wounds develop a small halo of redness surrounding the wound. This means healing is occurring. Severe itching with extensive redness usually indicates sensitivity to the ointment or bandage tape used to dress the wound. You should call our office if this develops.   Swelling and/or discoloration around your surgical site is common, particularly when performed around the eye.   All wounds normally drain. The larger the wound the more drainage there will be. After 7-10 days, you will notice the wound beginning to shrink and new skin will begin to grow. The wound is healed when you can see skin has formed over the entire area. A healed wound has a healthy, shiny look to the surface and is red to dark pink in color to normalize.  Wounds may take approximately 4-6 weeks to heal. Larger wounds may take 6-8 weeks. After the wound is healed you may discontinue dressing changes.   You may experience a sensation of tightness as your wound heals. This is normal and will gradually subside.   Your healed wound may be sensitive to temperature changes. This sensitivity improves with time, but if you re having a lot of discomfort, try to avoid temperature extremes.   Patients frequently experience itching after their wound appears to have healed because of the continue healing under the skin. Plain Vaseline will help relieve the itching.           Wound Care Instructions     FOR SUPERFICIAL WOUNDS     [facility] [phone]    [facility] [phone]                       AFTER 24 HOURS YOU SHOULD REMOVE THE BANDAGE AND BEGIN DAILY DRESSING CHANGES AS FOLLOWS:     1) Remove Dressing.     2) Clean and dry the area with tap water using a Q-tip or sterile gauze pad.     3) Apply Vaseline, Aquaphor, Polysporin ointment or Bacitracin ointment over entire wound.  Do NOT use Neosporin ointment.     4) Cover the wound with a band-aid, or a sterile non-stick gauze pad and micropore paper tape      REPEAT THESE INSTRUCTIONS AT LEAST ONCE A DAY UNTIL THE WOUND HAS COMPLETELY HEALED.    It is an old wives tale that a wound heals better when it is exposed to air and allowed to dry out. The wound will heal faster with a better cosmetic result if it is kept moist with ointment and covered with a bandage.    **Do not let the wound dry out.**      Supplies Needed:      *Cotton tipped applicators (Q-tips)    *Polysporin Ointment or Bacitracin Ointment (NOT NEOSPORIN)    *Band-aids or non-stick gauze pads and micropore paper tape.      PATIENT INFORMATION:    During the healing process you will notice a number of changes. All wounds develop a small halo of redness surrounding the wound.  This means healing is occurring. Severe itching with  extensive redness usually indicates sensitivity to the ointment or bandage tape used to dress the wound.  You should call our office if this develops.      Swelling  and/or discoloration around your surgical site is common, particularly when performed around the eye.    All wounds normally drain.  The larger the wound the more drainage there will be.  After 7-10 days, you will notice the wound beginning to shrink and new skin will begin to grow.  The wound is healed when you can see skin has formed over the entire area.  A healed wound has a healthy, shiny look to the surface and is red to dark pink in color to normalize.  Wounds may take approximately 4-6 weeks to heal.  Larger wounds may take 6-8 weeks.  After the wound is healed you may discontinue dressing changes.    You may experience a sensation of tightness as your wound heals. This is normal and will gradually subside.    Your healed wound may be sensitive to temperature changes. This sensitivity improves with time, but if you re having a lot of discomfort, try to avoid temperature extremes.    Patients frequently experience itching after their wound appears to have healed because of the continue healing under the skin.  Plain Vaseline will help relieve the itching.        POSSIBLE COMPLICATIONS    BLEEDIN. Leave the bandage in place.  2. Use tightly rolled up gauze or a cloth to apply direct pressure over the bandage for 30  minutes.  3. Reapply pressure for an additional 30 minutes if necessary  4. Use additional gauze and tape to maintain pressure once the bleeding has stopped.

## 2018-05-14 NOTE — LETTER
5/14/2018         RE: Mariah Jacinto  [address] [address] [address]        Dear Colleague,    Thank you for referring your patient, Mariah Jacinto, to the [facility]. Please see a copy of my visit note below.    Mariah Jacinto is a 72 year old year old female patient here today for f/u hx of non-melanoma skin cancer.  She notes neew spot on left leg.   .  Patient states this has been present for ?.  Patient reports the following symptoms:  none .  Patient reports the following previous treatments none.  Patient reports the following modifying factors none.  Associated symptoms: none.  Patient has no other skin complaints today.  Remainder of the HPI, Meds, PMH, Allergies, FH, and SH was reviewed in chart.    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    celexa caused spinning side effect      MENOPAUSE --ERT      OSTEOPENIA      Squamous cell carcinoma        Past Surgical History:   Procedure Laterality Date     ABLATE VEIN VARICOSE RADIO FREQUENCY WITHOUT PHLEBECTOMY MULTIPLE STAB  3/28/2013    Procedure: ABLATE VEIN VARICOSE RADIO FREQUENCY WITHOUT PHLEBECTOMY MULTIPLE STAB;  Bilateral ablation of varicose veins legs-to ultrasound at 0930  ;  Surgeon: Noam Soliman MD;  Location: [facility]     C ANESTH,LOWER ARM SURGERY  1999    ulnar nerve decompression - right     COLONOSCOPY  8/20/2013    Procedure: COLONOSCOPY;  Colonoscopy;  Surgeon: Yuliya Nathan MD;  Location: [facility]     ESOPHAGOSCOPY, GASTROSCOPY, DUODENOSCOPY (EGD), COMBINED N/A 5/12/2015    Procedure: COMBINED ESOPHAGOSCOPY, GASTROSCOPY, DUODENOSCOPY (EGD), BIOPSY SINGLE OR MULTIPLE;  Surgeon: Yuliya Nathan MD;   Location: [facility]     ESOPHAGOSCOPY, GASTROSCOPY, DUODENOSCOPY (EGD), COMBINED N/A 2017    Procedure: COMBINED ESOPHAGOSCOPY, GASTROSCOPY, DUODENOSCOPY (EGD), BIOPSY SINGLE OR MULTIPLE;  Surgeon: Qasim Bowie MD;  Location: [facility]     HYSTERECTOMY, PAP NO LONGER INDICATED      has both ovaries out also      HYSTERECTOMY, VAGINAL      Hysterectomy, oophorectomy     RELEASE TRIGGER FINGER Right 2017    Procedure: RELEASE TRIGGER FINGER;  Right Thumb A1 Pulley Release;  Surgeon: Jake Viveros MD;  Location: [facility]     SURGICAL HISTORY OF -       right retromastoid craniectomy and decompression trigeminal nerve     TONSILLECTOMY & ADENOIDECTOMY  child    T&A         Family History   Problem Relation Age of Onset     Alzheimer Disease Mother       at age 85     OSTEOPOROSIS Mother      Arthritis Mother      Alcohol/Drug Father      Respiratory Father      Eye Disorder Maternal Grandfather      Macular degneration     C.A.D. Brother      Tripple bypass age 57     Cardiovascular Brother      CANCER Brother      leukemia (ALL)     Cardiovascular Brother      Cardiovascular Brother      Neurologic Disorder Son      HEART DISEASE Son        Social History     Social History     Marital status:      Spouse name: N/A     Number of children: N/A     Years of education: N/A     Occupational History     Not on file.     Social History Main Topics     Smoking status: Never Smoker     Smokeless tobacco: Never Used     Alcohol use No     Drug use: No     Sexual activity: Yes     Birth control/ protection: Surgical      Comment: complete hysterectomy      Other Topics Concern     Parent/Sibling W/ Cabg, Mi Or Angioplasty Before 65f 55m? No     Social History Narrative       Outpatient Encounter Prescriptions as of 2018   Medication Sig Dispense Refill     Ascorbic Acid (VITAMIN C ER PO) Take 1 tablet by mouth daily        ASPIRIN 81 MG OR TABS 1 TABLET DAILY       Calcium  Carb-Cholecalciferol (CALCIUM + D3) 600-200 MG-UNIT TABS Take 1 tablet by mouth daily       glucosamine-chondroitin 500-400 MG CAPS Take 1 capsule by mouth daily       hydrocortisone (ANUSOL-HC) 2.5 % cream Place rectally 2 times daily 30 g 1     Lactobacillus (ACIDOPHILUS) CAPS Take 1 tablet by mouth daily       melatonin 5 MG CAPS Take 5 mg by mouth At Bedtime 1 capsule 0     MULTI-VITAMIN OR TABS 1 TABLET DAILY       Omega-3 Fatty Acids (OMEGA-3 FISH OIL PO) Take 1 capsule by mouth daily       omeprazole (PRILOSEC) 40 MG capsule Take 1 capsule (40 mg) by mouth daily 90 capsule 3     simvastatin (ZOCOR) 20 MG tablet Take 1 tablet (20 mg) by mouth At Bedtime 90 tablet 3     VITAMIN D OR 1 DAILY       No facility-administered encounter medications on file as of 5/14/2018.              Review Of Systems  Skin: As above  Eyes: negative  Ears/Nose/Throat: negative  Respiratory: No shortness of breath, dyspnea on exertion, cough, or hemoptysis  Cardiovascular: negative  Gastrointestinal: negative  Genitourinary: negative  Musculoskeletal: negative  Neurologic: negative  Psychiatric: negative  Hematologic/Lymphatic/Immunologic: negative  Endocrine: negative      O:   NAD, WDWN, Alert & Oriented, Mood & Affect wnl, Vitals stable   Here today alone   /81  Pulse 74  SpO2 98%   General appearance normal   Vitals stable   Alert, oriented and in no acute distress      Following lymph nodes palpated: Occipital, Cervical, Supraclavicular no lad   Stuck on papules and brown macules on trunk and ext    L thigh 9mm pink pearly papule         The remainder of the full exam was unremarkable; the following areas were examined:  conjunctiva/lids, oral mucosa, neck, peripheral vascular system, abdomen, lymph nodes, digits/nails, eccrine and apocrine glands, scalp/hair, face, neck, chest, abdomen, buttocks, back, RUE, LUE, RLE, LLE       Eyes: Conjunctivae/lids:Normal     ENT: Lips, buccal mucosa, tongue:  normal    MSK:Normal    Cardiovascular: peripheral edema none    Pulm: Breathing Normal    Lymph Nodes: No Head and Neck Lymphadenopathy     Neuro/Psych: Orientation:Normal; Mood/Affect:Normal      MICRO:   L thigh:Orthokeratosis of epidermis with a proliferation of nests of basaloid cells, with peripheral palisading and a haphazard arrangement in the center extending into the dermis, forming nodules.  The tumor cells have hyperchromatic nuclei. Poor cytoplasm and intercellular bridging.    A/P:  1. L thigh ro basal cell carcinoma   TANGENTIAL BIOPSY IN HOUSE:  After consent, anesthesia with LEC and prep, tangential excision performed and dx above confirmed with frozen section histology.  No complications and routine wound care.  Patient told result basal cell carcinoma schedule    2. Seborrheic keratosis, letnigo, hx of non-melanoma skin cancer   .      BENIGN LESIONS DISCUSSED WITH PATIENT:  I discussed the specifics of tumor, prognosis, and genetics of benign lesions.  I explained that treatment of these lesions would be purely cosmetic and not medically neccessary.  I discussed with patient different removal options including excision, cautery and /or laser.      Nature and genetics of benign skin lesions dicussed with patient.  Signs and Symptoms of skin cancer discussed with patient.  Patient to follow up with Primary Care provider regarding elevated blood pressure.  Patient encouraged to perform monthly skin exams.  UV precautions reviewed with patient.  Patient to follow up with Primary Care provider regarding elevated blood pressure.  Skin care regimen reviewed with patient: Eliminate harsh soaps, i.e. Dial, zest, irsih spring; Mild soaps such as Cetaphil or Dove sensitive skin, avoid hot or cold showers, aggressive use of emollients including vanicream, cetaphil or cerave discussed with patient.    Risks of non-melanoma skin cancer discussed with patient   Return to clinic 6 months and excision        Again, thank you for allowing me to participate in the care of your patient.        Sincerely,        Chapito Franco MD

## 2018-05-15 NOTE — TELEPHONE ENCOUNTER
Patient notified. Patient verbalized understanding. Scheduled for [facility] Surgery. Mohs Pre-op letter sent.   Angelic Plascencia RN

## 2018-05-30 ENCOUNTER — OFFICE VISIT (OUTPATIENT)
Dept: DERMATOLOGY | Facility: CLINIC | Age: 73
End: 2018-05-30
Payer: COMMERCIAL

## 2018-05-30 VITALS — DIASTOLIC BLOOD PRESSURE: 70 MMHG | HEART RATE: 66 BPM | SYSTOLIC BLOOD PRESSURE: 137 MMHG | OXYGEN SATURATION: 99 %

## 2018-05-30 DIAGNOSIS — C44.719 BASAL CELL CARCINOMA OF LEFT THIGH: Primary | ICD-10-CM

## 2018-05-30 PROCEDURE — 11602 EXC TR-EXT MAL+MARG 1.1-2 CM: CPT | Performed by: DERMATOLOGY

## 2018-05-30 PROCEDURE — 88331 PATH CONSLTJ SURG 1 BLK 1SPC: CPT | Performed by: DERMATOLOGY

## 2018-05-30 NOTE — PROGRESS NOTES
Mariah Jacinto is a 72 year old year old female patient here today for evaluation and managment of basal cell carcinoma on left thigh.  Patient reports the following modifying factors none.  Associated symptoms: none.  Patient has no other skin complaints today.  Remainder of the HPI, Meds, PMH, Allergies, FH, and SH was reviewed in chart.      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    celexa caused spinning side effect      MENOPAUSE --ERT      OSTEOPENIA      Squamous cell carcinoma        Past Surgical History:   Procedure Laterality Date     ABLATE VEIN VARICOSE RADIO FREQUENCY WITHOUT PHLEBECTOMY MULTIPLE STAB  3/28/2013    Procedure: ABLATE VEIN VARICOSE RADIO FREQUENCY WITHOUT PHLEBECTOMY MULTIPLE STAB;  Bilateral ablation of varicose veins legs-to ultrasound at 0930  ;  Surgeon: Noam Soliman MD;  Location: [facility]     C ANESTH,LOWER ARM SURGERY  1999    ulnar nerve decompression - right     COLONOSCOPY  8/20/2013    Procedure: COLONOSCOPY;  Colonoscopy;  Surgeon: Yuliya Nathan MD;  Location: [facility]     ESOPHAGOSCOPY, GASTROSCOPY, DUODENOSCOPY (EGD), COMBINED N/A 5/12/2015    Procedure: COMBINED ESOPHAGOSCOPY, GASTROSCOPY, DUODENOSCOPY (EGD), BIOPSY SINGLE OR MULTIPLE;  Surgeon: Yuliya Nathan MD;  Location: [facility]     ESOPHAGOSCOPY, GASTROSCOPY, DUODENOSCOPY (EGD), COMBINED N/A 1/31/2017    Procedure: COMBINED ESOPHAGOSCOPY, GASTROSCOPY, DUODENOSCOPY (EGD), BIOPSY SINGLE OR MULTIPLE;  Surgeon: Qasim Bowie MD;  Location: [facility]     HYSTERECTOMY, PAP NO LONGER INDICATED      has both ovaries out also      HYSTERECTOMY, VAGINAL  1988    Hysterectomy, oophorectomy     RELEASE TRIGGER FINGER Right 6/23/2017    Procedure: RELEASE TRIGGER  FINGER;  Right Thumb A1 Pulley Release;  Surgeon: Jake Viveros MD;  Location: [facility]     SURGICAL HISTORY OF -       right retromastoid craniectomy and decompression trigeminal nerve     TONSILLECTOMY & ADENOIDECTOMY  child    T&A         Family History   Problem Relation Age of Onset     Alzheimer Disease Mother       at age 85     OSTEOPOROSIS Mother      Arthritis Mother      Alcohol/Drug Father      Respiratory Father      Eye Disorder Maternal Grandfather      Macular degneration     C.A.D. Brother      Tripple bypass age 57     Cardiovascular Brother      CANCER Brother      leukemia (ALL)     Cardiovascular Brother      Cardiovascular Brother      Neurologic Disorder Son      HEART DISEASE Son      Melanoma No family hx of      Skin Cancer No family hx of        Social History     Social History     Marital status:      Spouse name: N/A     Number of children: N/A     Years of education: N/A     Occupational History     Not on file.     Social History Main Topics     Smoking status: Never Smoker     Smokeless tobacco: Never Used     Alcohol use No     Drug use: No     Sexual activity: Yes     Birth control/ protection: Surgical      Comment: complete hysterectomy      Other Topics Concern     Parent/Sibling W/ Cabg, Mi Or Angioplasty Before 65f 55m? No     Social History Narrative       Outpatient Encounter Prescriptions as of 2018   Medication Sig Dispense Refill     Ascorbic Acid (VITAMIN C ER PO) Take 1 tablet by mouth daily        ASPIRIN 81 MG OR TABS 1 TABLET DAILY       Calcium Carb-Cholecalciferol (CALCIUM + D3) 600-200 MG-UNIT TABS Take 1 tablet by mouth daily       glucosamine-chondroitin 500-400 MG CAPS Take 1 capsule by mouth daily       hydrocortisone (ANUSOL-HC) 2.5 % cream Place rectally 2 times daily 30 g 1     Lactobacillus (ACIDOPHILUS) CAPS Take 1 tablet by mouth daily       melatonin 5 MG CAPS Take 5 mg by mouth At Bedtime 1 capsule 0     MULTI-VITAMIN OR TABS  1 TABLET DAILY       Omega-3 Fatty Acids (OMEGA-3 FISH OIL PO) Take 1 capsule by mouth daily       omeprazole (PRILOSEC) 40 MG capsule Take 1 capsule (40 mg) by mouth daily 90 capsule 3     simvastatin (ZOCOR) 20 MG tablet Take 1 tablet (20 mg) by mouth At Bedtime 90 tablet 3     VITAMIN D OR 1 DAILY       No facility-administered encounter medications on file as of 5/30/2018.              Review Of Systems  Skin: As above  Eyes: negative  Ears/Nose/Throat: negative  Respiratory: No shortness of breath, dyspnea on exertion, cough, or hemoptysis  Cardiovascular: negative  Gastrointestinal: negative  Genitourinary: negative  Musculoskeletal: negative  Neurologic: negative  Psychiatric: negative  Hematologic/Lymphatic/Immunologic: negative  Endocrine: negative      O:   NAD, WDWN, Alert & Oriented, Mood & Affect wnl, Vitals stable   Here today alone   /70  Pulse 66  SpO2 99%   General appearance normal   Vitals stable   Alert, oriented and in no acute distress     L thigh 9mm red papule      Eyes: Conjunctivae/lids:Normal     ENT: Lips, buccal mucosa, tongue: normal    MSK:Normal    Cardiovascular: peripheral edema none    Pulm: Breathing Normal    Neuro/Psych: Orientation:Normal; Mood/Affect:Normal      MICRO:   L thigh: Unremarkable epidermis with parallel bundles of cellular collagen within the superficial dermis.  No concerning areas for malignancy.     A/P:  1. L thigh basal cell carcinoma   EXCISION OF BASAL CELL CARCINOMA, Margins confirmed with FROZEN SECTIONS AND Second intent: After thorough discussion of PGACAC, consent obtained, anesthesia and prep, the margins of the lesion were identified and an elliptical incision was made encompassing the lesion with 4mm margin. The incisions were made through the skin and down to and including the superficial dermis.  The lesion was removed en bloc and submitted for frozen section pathologic review. Clear margins obtained (1.5cm).    REPAIR SECOND INTENT: We  discussed the options for wound management in full with the patient including risks/benefits/possible outcomes. Decision made to allow the wound to heal by second intention. EBL minimal; complications none; wound care routine.  The patient was discharged in good condition and will return in one month or prn for wound evaluation.         BENIGN LESIONS DISCUSSED WITH PATIENT:  I discussed the specifics of tumor, prognosis, and genetics of benign lesions.  I explained that treatment of these lesions would be purely cosmetic and not medically neccessary.  I discussed with patient different removal options including excision, cautery and /or laser.      Nature and genetics of benign skin lesions dicussed with patient.  Signs and Symptoms of skin cancer discussed with patient.  Patient encouraged to perform monthly skin exams.  UV precautions reviewed with patient.  Patient to follow up with Primary Care provider regarding elevated blood pressure.  Skin care regimen reviewed with patient: Eliminate harsh soaps, i.e. Dial, zest, irsih spring; Mild soaps such as Cetaphil or Dove sensitive skin, avoid hot or cold showers, aggressive use of emollients including vanicream, cetaphil or cerave discussed with patient.    Risks of non-melanoma skin cancer discussed with patient   Return to clinic 6 months

## 2018-05-30 NOTE — NURSING NOTE
"Initial /70  Pulse 66  SpO2 99% Estimated body mass index is 23.01 kg/(m^2) as calculated from the following:    Height as of 4/16/18: 1.619 m (5' 3.75\").    Weight as of 4/16/18: 60.3 kg (133 lb). .    Kristy Castillo LPN    "

## 2018-05-30 NOTE — NURSING NOTE
Surgical Office Location :   [facility]  [address] [place] [address]

## 2018-05-30 NOTE — PATIENT INSTRUCTIONS
Open Wound Care     for left thigh      ? No strenuous activity for 48 hours    ? Take Tylenol as needed for discomfort.                                                .         ? Do not drink alcoholic beverages for 48 hours.    ? Keep the pressure bandage in place for 24 hours. If the bandage becomes blood tinged or loose, reinforce it with gauze and tape.        (Refer to the reverse side of this page for management of bleeding).    ? Remove bandage in 24 hours and begin wound care as follows:     1. Clean area with tap water using a Q tip or gauze pad, (shower / bathe normally)  2. Dry wound with Q tip or gauze pad  3. Apply Aquaphor, Vaseline, Polysporin or Bacitracin Ointment with a Q tip    Do NOT use Neosporin Ointment *  4. Cover the wound with a band-aid or nonstick gauze pad and paper tape.  5. Repeat wound care once a day until wound is completely healed.    It is an old wives tale that a wound heals better when it is exposed to air and allowed to dry out. The wound will heal faster with a better cosmetic result if it is kept moist with ointment and covered with a bandage.  Do not let the wound dry out.      Supplies Needed:                Qtips or gauze pads                Polysporin or Bacitracin Ointment                Bandaids or nonstick gauze pads and paper tape    Wound care kits and brown paper tape are available for purchase at   the pharmacy.    BLEEDIN. Use tightly rolled up gauze or cloth to apply direct pressure over the bandage for 20   minutes.  2. Reapply pressure for an additional 20 minutes if necessary  3. Call the office or go to the nearest emergency room if pressure fails to stop the bleeding.  4. Use additional gauze and tape to maintain pressure once the bleeding has stopped.  5. Begin wound care 24 hours after surgery as directed.                  WOUND HEALING    1. One week after surgery a pink / red halo will form around the outside of the wound.   This is new  skin.  2. The center of the wound will appear yellowish white and produce some drainage.  3. The pink halo will slowly migrate in toward the center of the wound until the wound is covered with new shiny pink skin.  4. There will be no more drainage when the wound is completely healed.  5. It will take six months to one year for the redness to fade.  6. The scar may be itchy, tight and sensitive to extreme temperatures for a year after the surgery.  7. Massaging the area several times a day for several minutes after the wound is completely healed will help the scar soften and normalize faster. Begin massage only after healing is complete.      In case of emergency call: Dr Franco: [phone]     [facility]: [phone]    [facility]: [phone]

## 2018-05-30 NOTE — LETTER
5/30/2018         RE: Mariah Jacinto  [address] [address] [id number]        Dear Colleague,    Thank you for referring your patient, Mariah Jacinto, to the [facility]. Please see a copy of my visit note below.    Mariah Jacinto is a 72 year old year old female patient here today for evaluation and managment of basal cell carcinoma on left thigh.  Patient reports the following modifying factors none.  Associated symptoms: none.  Patient has no other skin complaints today.  Remainder of the HPI, Meds, PMH, Allergies, FH, and SH was reviewed in chart.      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    celexa caused spinning side effect      MENOPAUSE --ERT      OSTEOPENIA      Squamous cell carcinoma        Past Surgical History:   Procedure Laterality Date     ABLATE VEIN VARICOSE RADIO FREQUENCY WITHOUT PHLEBECTOMY MULTIPLE STAB  3/28/2013    Procedure: ABLATE VEIN VARICOSE RADIO FREQUENCY WITHOUT PHLEBECTOMY MULTIPLE STAB;  Bilateral ablation of varicose veins legs-to ultrasound at 0930  ;  Surgeon: Noam Soliman MD;  Location: [facility]      ANESTH,LOWER ARM SURGERY  1999    ulnar nerve decompression - right     COLONOSCOPY  8/20/2013    Procedure: COLONOSCOPY;  Colonoscopy;  Surgeon: Yuliya Nathan MD;  Location: [facility]     ESOPHAGOSCOPY, GASTROSCOPY, DUODENOSCOPY (EGD), COMBINED N/A 5/12/2015    Procedure: COMBINED ESOPHAGOSCOPY, GASTROSCOPY, DUODENOSCOPY (EGD), BIOPSY SINGLE OR MULTIPLE;  Surgeon: Yuliya Nathan MD;  Location: [facility]     ESOPHAGOSCOPY, GASTROSCOPY, DUODENOSCOPY (EGD), COMBINED N/A 1/31/2017    Procedure: COMBINED ESOPHAGOSCOPY, GASTROSCOPY, DUODENOSCOPY (EGD), BIOPSY SINGLE OR MULTIPLE;  Surgeon:  Qasim Bowie MD;  Location: [facility]     HYSTERECTOMY, PAP NO LONGER INDICATED      has both ovaries out also      HYSTERECTOMY, VAGINAL      Hysterectomy, oophorectomy     RELEASE TRIGGER FINGER Right 2017    Procedure: RELEASE TRIGGER FINGER;  Right Thumb A1 Pulley Release;  Surgeon: Jake Viveros MD;  Location: [facility]     SURGICAL HISTORY OF -       right retromastoid craniectomy and decompression trigeminal nerve     TONSILLECTOMY & ADENOIDECTOMY  child    T&A         Family History   Problem Relation Age of Onset     Alzheimer Disease Mother       at age 85     OSTEOPOROSIS Mother      Arthritis Mother      Alcohol/Drug Father      Respiratory Father      Eye Disorder Maternal Grandfather      Macular degneration     C.A.D. Brother      Tripple bypass age 57     Cardiovascular Brother      CANCER Brother      leukemia (ALL)     Cardiovascular Brother      Cardiovascular Brother      Neurologic Disorder Son      HEART DISEASE Son      Melanoma No family hx of      Skin Cancer No family hx of        Social History     Social History     Marital status:      Spouse name: N/A     Number of children: N/A     Years of education: N/A     Occupational History     Not on file.     Social History Main Topics     Smoking status: Never Smoker     Smokeless tobacco: Never Used     Alcohol use No     Drug use: No     Sexual activity: Yes     Birth control/ protection: Surgical      Comment: complete hysterectomy      Other Topics Concern     Parent/Sibling W/ Cabg, Mi Or Angioplasty Before 65f 55m? No     Social History Narrative       Outpatient Encounter Prescriptions as of 2018   Medication Sig Dispense Refill     Ascorbic Acid (VITAMIN C ER PO) Take 1 tablet by mouth daily        ASPIRIN 81 MG OR TABS 1 TABLET DAILY       Calcium Carb-Cholecalciferol (CALCIUM + D3) 600-200 MG-UNIT TABS Take 1 tablet by mouth daily       glucosamine-chondroitin 500-400 MG CAPS Take 1 capsule by mouth  daily       hydrocortisone (ANUSOL-HC) 2.5 % cream Place rectally 2 times daily 30 g 1     Lactobacillus (ACIDOPHILUS) CAPS Take 1 tablet by mouth daily       melatonin 5 MG CAPS Take 5 mg by mouth At Bedtime 1 capsule 0     MULTI-VITAMIN OR TABS 1 TABLET DAILY       Omega-3 Fatty Acids (OMEGA-3 FISH OIL PO) Take 1 capsule by mouth daily       omeprazole (PRILOSEC) 40 MG capsule Take 1 capsule (40 mg) by mouth daily 90 capsule 3     simvastatin (ZOCOR) 20 MG tablet Take 1 tablet (20 mg) by mouth At Bedtime 90 tablet 3     VITAMIN D OR 1 DAILY       No facility-administered encounter medications on file as of 5/30/2018.              Review Of Systems  Skin: As above  Eyes: negative  Ears/Nose/Throat: negative  Respiratory: No shortness of breath, dyspnea on exertion, cough, or hemoptysis  Cardiovascular: negative  Gastrointestinal: negative  Genitourinary: negative  Musculoskeletal: negative  Neurologic: negative  Psychiatric: negative  Hematologic/Lymphatic/Immunologic: negative  Endocrine: negative      O:   NAD, WDWN, Alert & Oriented, Mood & Affect wnl, Vitals stable   Here today alone   /70  Pulse 66  SpO2 99%   General appearance normal   Vitals stable   Alert, oriented and in no acute distress     L thigh 9mm red papule      Eyes: Conjunctivae/lids:Normal     ENT: Lips, buccal mucosa, tongue: normal    MSK:Normal    Cardiovascular: peripheral edema none    Pulm: Breathing Normal    Neuro/Psych: Orientation:Normal; Mood/Affect:Normal      MICRO:   L thigh: Unremarkable epidermis with parallel bundles of cellular collagen within the superficial dermis.  No concerning areas for malignancy.     A/P:  1. L thigh basal cell carcinoma   EXCISION OF BASAL CELL CARCINOMA, Margins confirmed with FROZEN SECTIONS AND Second intent: After thorough discussion of PGACAC, consent obtained, anesthesia and prep, the margins of the lesion were identified and an elliptical incision was made encompassing the lesion with  4mm margin. The incisions were made through the skin and down to and including the superficial dermis.  The lesion was removed en bloc and submitted for frozen section pathologic review. Clear margins obtained (1.5cm).    REPAIR SECOND INTENT: We discussed the options for wound management in full with the patient including risks/benefits/possible outcomes. Decision made to allow the wound to heal by second intention. EBL minimal; complications none; wound care routine.  The patient was discharged in good condition and will return in one month or prn for wound evaluation.         BENIGN LESIONS DISCUSSED WITH PATIENT:  I discussed the specifics of tumor, prognosis, and genetics of benign lesions.  I explained that treatment of these lesions would be purely cosmetic and not medically neccessary.  I discussed with patient different removal options including excision, cautery and /or laser.      Nature and genetics of benign skin lesions dicussed with patient.  Signs and Symptoms of skin cancer discussed with patient.  Patient encouraged to perform monthly skin exams.  UV precautions reviewed with patient.  Patient to follow up with Primary Care provider regarding elevated blood pressure.  Skin care regimen reviewed with patient: Eliminate harsh soaps, i.e. Dial, zest, irsih spring; Mild soaps such as Cetaphil or Dove sensitive skin, avoid hot or cold showers, aggressive use of emollients including vanicream, cetaphil or cerave discussed with patient.    Risks of non-melanoma skin cancer discussed with patient   Return to clinic 6 months      Again, thank you for allowing me to participate in the care of your patient.        Sincerely,        Chapito Franco MD

## 2018-05-30 NOTE — MR AVS SNAPSHOT
After Visit Summary   2018    Mariah Jacinto    MRN: [id number]           Patient Information     Date Of Birth          1945        Visit Information        Provider Department      2018 8:30 AM Chapito Franco MD [facility]        Today's Diagnoses     Basal cell carcinoma of left thigh    -  1      Care Instructions    Open Wound Care     for left thigh      ? No strenuous activity for 48 hours    ? Take Tylenol as needed for discomfort.                                                .         ? Do not drink alcoholic beverages for 48 hours.    ? Keep the pressure bandage in place for 24 hours. If the bandage becomes blood tinged or loose, reinforce it with gauze and tape.        (Refer to the reverse side of this page for management of bleeding).    ? Remove bandage in 24 hours and begin wound care as follows:     1. Clean area with tap water using a Q tip or gauze pad, (shower / bathe normally)  2. Dry wound with Q tip or gauze pad  3. Apply Aquaphor, Vaseline, Polysporin or Bacitracin Ointment with a Q tip    Do NOT use Neosporin Ointment *  4. Cover the wound with a band-aid or nonstick gauze pad and paper tape.  5. Repeat wound care once a day until wound is completely healed.    It is an old wives tale that a wound heals better when it is exposed to air and allowed to dry out. The wound will heal faster with a better cosmetic result if it is kept moist with ointment and covered with a bandage.  Do not let the wound dry out.      Supplies Needed:                Qtips or gauze pads                Polysporin or Bacitracin Ointment                Bandaids or nonstick gauze pads and paper tape    Wound care kits and brown paper tape are available for purchase at   the pharmacy.    BLEEDIN. Use tightly rolled up gauze or cloth to apply direct pressure over the bandage for 20   minutes.  2. Reapply pressure for an additional 20 minutes if  necessary  3. Call the office or go to the nearest emergency room if pressure fails to stop the bleeding.  4. Use additional gauze and tape to maintain pressure once the bleeding has stopped.  5. Begin wound care 24 hours after surgery as directed.                  WOUND HEALING    1. One week after surgery a pink / red halo will form around the outside of the wound.   This is new skin.  2. The center of the wound will appear yellowish white and produce some drainage.  3. The pink halo will slowly migrate in toward the center of the wound until the wound is covered with new shiny pink skin.  4. There will be no more drainage when the wound is completely healed.  5. It will take six months to one year for the redness to fade.  6. The scar may be itchy, tight and sensitive to extreme temperatures for a year after the surgery.  7. Massaging the area several times a day for several minutes after the wound is completely healed will help the scar soften and normalize faster. Begin massage only after healing is complete.      In case of emergency call: Dr Franco: [phone]     [facility]: [phone]    [facility]: [phone]              Follow-ups after your visit        Who to contact     If you have questions or need follow up information about today's clinic visit or your schedule please contact [facility] directly at [phone].  Normal or non-critical lab and imaging results will be communicated to you by MyChart, letter or phone within 4 business days after the clinic has received the results. If you do not hear from us within 7 days, please contact the clinic through MyChart or phone. If you have a critical or abnormal lab result, we will notify you by phone as soon as possible.  Submit refill requests through [employer] or call your pharmacy and they will forward the refill request to us. Please allow 3 business days for your refill to be completed.          Additional  "Information About Your Visit        MyChart Information     [employer] lets you send messages to your doctor, view your test results, renew your prescriptions, schedule appointments and more. To sign up, go to [email or web address] . Click on \"Log in\" on the left side of the screen, which will take you to the Welcome page. Then click on \"Sign up Now\" on the right side of the page.     You will be asked to enter the access code listed below, as well as some personal information. Please follow the directions to create your username and password.     Your access code is: [email or web address]  Expires: 2018 11:12 AM     Your access code will  in 90 days. If you need help or a new code, please call your [place] clinic or [phone].        Care EveryWhere ID     This is your Care EveryWhere ID. This could be used by other organizations to access your [place] medical records  [id number]        Your Vitals Were     Pulse Pulse Oximetry                66 99%           Blood Pressure from Last 3 Encounters:   18 137/70   18 118/81   18 126/70    Weight from Last 3 Encounters:   18 60.3 kg (133 lb)   18 61.6 kg (135 lb 12.8 oz)   17 62.1 kg (137 lb)              We Performed the Following     53772 - EXC MALIG SKIN LESION TRUNK/ARM/LEG 1.1-2.0 CM     CL FROZEN SECTION FIRST SPEC        Primary Care Provider Office Phone # Fax #    Eli Sommer [phone] [address] [facility] [address]        Equal Access to Services     ROSS DU : Hadapollo Gallagher, chely colon, qaybta darin gaitan. So [facility] [phone].    ATENCIÓN: Si habla español, tiene a anderson disposición servicios gratuitos de asistencia lingüística. Jaiden al [phone].    We comply with applicable federal civil rights laws and [place] laws. We do not discriminate on the basis of race, color, national origin, age, " disability, sex, sexual orientation, or gender identity.            Thank you!     Thank you for choosing [facility]  for your care. Our goal is always to provide you with excellent care. Hearing back from our patients is one way we can continue to improve our services. Please take a few minutes to complete the written survey that you may receive in the mail after your visit with us. Thank you!             Your Updated Medication List - Protect others around you: Learn how to safely use, store and throw away your medicines at www.disposemymeds.org.          This list is accurate as of 5/30/18  9:45 AM.  Always use your most recent med list.                   Brand Name Dispense Instructions for use Diagnosis    acidophilus Caps      Take 1 tablet by mouth daily        aspirin 81 MG tablet      1 TABLET DAILY        Calcium + D3 600-200 MG-UNIT Tabs      Take 1 tablet by mouth daily        glucosamine-chondroitin 500-400 MG Caps per capsule      Take 1 capsule by mouth daily        hydrocortisone 2.5 % cream    ANUSOL-HC    30 g    Place rectally 2 times daily    Thrombosed external hemorrhoids       melatonin 5 MG Caps     1 capsule    Take 5 mg by mouth At Bedtime        Multi-vitamin Tabs tablet   Generic drug:  multivitamin, therapeutic with minerals      1 TABLET DAILY        OMEGA-3 FISH OIL PO      Take 1 capsule by mouth daily        omeprazole 40 MG capsule    priLOSEC    90 capsule    Take 1 capsule (40 mg) by mouth daily    Gastroesophageal reflux disease without esophagitis       simvastatin 20 MG tablet    ZOCOR    90 tablet    Take 1 tablet (20 mg) by mouth At Bedtime    Hyperlipidemia LDL goal <160       VITAMIN C ER PO      Take 1 tablet by mouth daily        VITAMIN D PO      1 DAILY

## 2018-09-04 ENCOUNTER — HOSPITAL ENCOUNTER (OUTPATIENT)
Dept: MAMMOGRAPHY | Facility: CLINIC | Age: 73
Discharge: HOME | End: 2018-09-04
Attending: FAMILY MEDICINE | Admitting: FAMILY MEDICINE
Payer: COMMERCIAL

## 2018-09-04 DIAGNOSIS — Z12.31 VISIT FOR SCREENING MAMMOGRAM: ICD-10-CM

## 2018-09-04 PROCEDURE — 77063 BREAST TOMOSYNTHESIS BI: CPT

## 2018-09-05 ENCOUNTER — OFFICE VISIT (OUTPATIENT)
Dept: FAMILY MEDICINE | Facility: CLINIC | Age: 73
End: 2018-09-05
Payer: COMMERCIAL

## 2018-09-05 ENCOUNTER — RADIANT APPOINTMENT (OUTPATIENT)
Dept: GENERAL RADIOLOGY | Facility: CLINIC | Age: 73
End: 2018-09-05
Attending: FAMILY MEDICINE
Payer: COMMERCIAL

## 2018-09-05 VITALS
OXYGEN SATURATION: 99 % | HEART RATE: 69 BPM | BODY MASS INDEX: 23.56 KG/M2 | RESPIRATION RATE: 18 BRPM | HEIGHT: 64 IN | SYSTOLIC BLOOD PRESSURE: 122 MMHG | DIASTOLIC BLOOD PRESSURE: 70 MMHG | WEIGHT: 138 LBS | TEMPERATURE: 97.4 F

## 2018-09-05 DIAGNOSIS — Z00.00 MEDICARE ANNUAL WELLNESS VISIT, SUBSEQUENT: Primary | ICD-10-CM

## 2018-09-05 DIAGNOSIS — K21.9 GASTROESOPHAGEAL REFLUX DISEASE WITHOUT ESOPHAGITIS: ICD-10-CM

## 2018-09-05 DIAGNOSIS — M25.562 ACUTE PAIN OF LEFT KNEE: ICD-10-CM

## 2018-09-05 DIAGNOSIS — E78.5 HYPERLIPIDEMIA LDL GOAL <160: ICD-10-CM

## 2018-09-05 LAB
ANION GAP SERPL CALCULATED.3IONS-SCNC: 6 MMOL/L (ref 3–14)
BUN SERPL-MCNC: 21 MG/DL (ref 7–30)
CALCIUM SERPL-MCNC: 8.7 MG/DL (ref 8.5–10.1)
CHLORIDE SERPL-SCNC: 105 MMOL/L (ref 94–109)
CHOLEST SERPL-MCNC: 180 MG/DL
CO2 SERPL-SCNC: 27 MMOL/L (ref 20–32)
CREAT SERPL-MCNC: 0.71 MG/DL (ref 0.52–1.04)
GFR SERPL CREATININE-BSD FRML MDRD: 80 ML/MIN/1.7M2
GLUCOSE SERPL-MCNC: 86 MG/DL (ref 70–99)
HDLC SERPL-MCNC: 56 MG/DL
LDLC SERPL CALC-MCNC: 93 MG/DL
NONHDLC SERPL-MCNC: 124 MG/DL
POTASSIUM SERPL-SCNC: 4.7 MMOL/L (ref 3.4–5.3)
SODIUM SERPL-SCNC: 138 MMOL/L (ref 133–144)
TRIGL SERPL-MCNC: 154 MG/DL

## 2018-09-05 PROCEDURE — 80061 LIPID PANEL: CPT | Performed by: FAMILY MEDICINE

## 2018-09-05 PROCEDURE — 99213 OFFICE O/P EST LOW 20 MIN: CPT | Mod: 25 | Performed by: FAMILY MEDICINE

## 2018-09-05 PROCEDURE — G0439 PPPS, SUBSEQ VISIT: HCPCS | Performed by: FAMILY MEDICINE

## 2018-09-05 PROCEDURE — 80048 BASIC METABOLIC PNL TOTAL CA: CPT | Performed by: FAMILY MEDICINE

## 2018-09-05 PROCEDURE — 36415 COLL VENOUS BLD VENIPUNCTURE: CPT | Performed by: FAMILY MEDICINE

## 2018-09-05 PROCEDURE — 73560 X-RAY EXAM OF KNEE 1 OR 2: CPT | Mod: LT

## 2018-09-05 RX ORDER — OMEPRAZOLE 40 MG/1
40 CAPSULE, DELAYED RELEASE ORAL DAILY
Qty: 90 CAPSULE | Refills: 3 | Status: SHIPPED | OUTPATIENT
Start: 2018-09-05 | End: 2019-09-14

## 2018-09-05 RX ORDER — SIMVASTATIN 20 MG
20 TABLET ORAL AT BEDTIME
Qty: 90 TABLET | Refills: 3 | Status: SHIPPED | OUTPATIENT
Start: 2018-09-05 | End: 2019-09-13

## 2018-09-05 ASSESSMENT — PAIN SCALES - GENERAL: PAINLEVEL: MODERATE PAIN (4)

## 2018-09-05 NOTE — LETTER
[facility]  [address]  [facility] [address]  Phone: [phone]      9/5/2018     Mariah Jacinto  [address] [facility] [address]      Dear Mariah:        Thank you for allowing me to participate in your care. Your recent test results were reviewed and listed below.      Your results are provided below for your review  Results for orders placed or performed in visit on 09/05/18   XR Knee Standing Left 2 Views    Narrative    KNEE STANDING LEFT TWO VIEW   9/5/2018 9:37 AM     HISTORY: Knee pain, no injury. Acute pain of left knee.    COMPARISON: 9/3/2015 x-ray.      Impression    IMPRESSION: Joint spaces appear well preserved. No evidence of acute  fracture or subluxation. No evidence of joint effusion. No significant  change.   Basic metabolic panel   Result Value Ref Range    Sodium 138 133 - 144 mmol/L    Potassium 4.7 3.4 - 5.3 mmol/L    Chloride 105 94 - 109 mmol/L    Carbon Dioxide 27 20 - 32 mmol/L    Anion Gap 6 3 - 14 mmol/L    Glucose 86 70 - 99 mg/dL    Urea Nitrogen 21 7 - 30 mg/dL    Creatinine 0.71 0.52 - 1.04 mg/dL    GFR Estimate 80 >60 mL/min/1.7m2    GFR Estimate If Black >90 >60 mL/min/1.7m2    Calcium 8.7 8.5 - 10.1 mg/dL   Lipid panel reflex to direct LDL Non-fasting   Result Value Ref Range    Cholesterol 180 <200 mg/dL    Triglycerides 154 (H) <150 mg/dL    HDL Cholesterol 56 >49 mg/dL    LDL Cholesterol Calculated 93 <100 mg/dL    Non HDL Cholesterol 124 <130 mg/dL                 Thank you for choosing [place]. As a result, please continue with the treatment plan discussed in the office. Return as discussed or sooner if symptoms worsen or fail to improve.     If you have any further questions or concerns, please do not hesitate to contact us.      Sincerely,        Dr. Eli Sommer

## 2018-09-05 NOTE — MR AVS SNAPSHOT
After Visit Summary   9/5/2018    Mariah Jacinto    MRN: [id number]           Patient Information     Date Of Birth          1945        Visit Information        Provider Department      9/5/2018 9:20 AM Eli Sommer MD [facility]        Today's Diagnoses     Medicare annual wellness visit, subsequent    -  1    Hyperlipidemia LDL goal <160        Gastroesophageal reflux disease without esophagitis        Acute pain of left knee          Care Instructions          Thank you for choosing [facility].  You may be receiving a survey in the mail from Rob Franco regarding your visit today.  Please take a few minutes to complete and return the survey to let us know how we are doing.      Our Clinic hours are:  Mondays    7:20 am - 7 pm  Tues -  Fri  7:20 am - 5 pm    Clinic Phone: [phone]    The clinic lab opens at 7:30 am Mon - Fri and appointments are required.    [facility]  Ph. [phone]  Monday  8 am - 7pm  Tues - Fri 8 am - 5:30 pm           Preventive Health Recommendations    Female Ages 65 +    Yearly exam:     See your health care provider every year in order to  o Review health changes.   o Discuss preventive care.    o Review your medicines if your doctor has prescribed any.      You no longer need a yearly Pap test unless you've had an abnormal Pap test in the past 10 years. If you have vaginal symptoms, such as bleeding or discharge, be sure to talk with your provider about a Pap test.      Every 1 to 2 years, have a mammogram.  If you are over 69, talk with your health care provider about whether or not you want to continue having screening mammograms.      Every 10 years, have a colonoscopy. Or, have a yearly FIT test (stool test). These exams will check for colon cancer.       Have a cholesterol test every 5 years, or more often if your doctor advises it.       Have a diabetes test (fasting glucose) every three years. If you are  at risk for diabetes, you should have this test more often.       At age 65, have a bone density scan (DEXA) to check for osteoporosis (brittle bone disease).    Shots:    Get a flu shot each year.    Get a tetanus shot every 10 years.    Talk to your doctor about your pneumonia vaccines. There are now two you should receive - Pneumovax (PPSV 23) and Prevnar (PCV 13).    Talk to your pharmacist about the shingles vaccine.    Talk to your doctor about the hepatitis B vaccine.    Nutrition:     Eat at least 5 servings of fruits and vegetables each day.      Eat whole-grain bread, whole-wheat pasta and brown rice instead of white grains and rice.      Get adequate Calcium and Vitamin D.     Lifestyle    Exercise at least 150 minutes a week (30 minutes a day, 5 days a week). This will help you control your weight and prevent disease.      Limit alcohol to one drink per day.      No smoking.       Wear sunscreen to prevent skin cancer.       See your dentist twice a year for an exam and cleaning.      See your eye doctor every 1 to 2 years to screen for conditions such as glaucoma, macular degeneration and cataracts.          Follow-ups after your visit        Your next 10 appointments already scheduled     Nov 28, 2018 11:00 AM CST   Return Visit with Chapito Franco MD   [facility] ([facility]    [address] [facility] [address] [phone]              Who to contact     If you have questions or need follow up information about today's clinic visit or your schedule please contact [facility] directly at [phone].  Normal or non-critical lab and imaging results will be communicated to you by MyChart, letter or phone within 4 business days after the clinic has received the results. If you do not hear from us within 7 days, please contact the clinic through MyChart or phone. If you have a critical or abnormal lab result, we will notify you by  "phone as soon as possible.  Submit refill requests through [employer] or call your pharmacy and they will forward the refill request to us. Please allow 3 business days for your refill to be completed.          Additional Information About Your Visit        Care EveryWhere ID     This is your Care EveryWhere ID. This could be used by other organizations to access your [place] medical records  [id number]        Your Vitals Were     Pulse Temperature Respirations Height Pulse Oximetry Breastfeeding?    69 97.4  F (36.3  C) (Tympanic) 18 5' 3.75\" (1.619 m) 99% No    BMI (Body Mass Index)                   23.87 kg/m2            Blood Pressure from Last 3 Encounters:   09/05/18 122/70   05/30/18 137/70   05/14/18 118/81    Weight from Last 3 Encounters:   09/05/18 138 lb (62.6 kg)   04/16/18 133 lb (60.3 kg)   01/25/18 135 lb 12.8 oz (61.6 kg)              We Performed the Following     Basic metabolic panel     Lipid panel reflex to direct LDL Fasting     XR Knee Standing Left 2 Views          Where to get your medicines      These medications were sent to [place] [address] [place] [address] [address]     Phone:  [phone]     omeprazole 40 MG capsule    simvastatin 20 MG tablet          Primary Care Provider Office Phone # Fax #    Eli Sommer [phone] [address] [facility] [place] [facility] [address]        Equal Access to Services     [facility]: Hadii shai ku hadasho Soomaali, waaxda luqadaha, qaybta kaalmada adeegyada, darin zaidi. So [facility] [phone].    ATENCIÓN: Si habla español, tiene a anderson disposición servicios gratuitos de asistencia lingüística. Llame al [phone].    We comply with applicable federal civil rights laws and [place] laws. We do not discriminate on the basis of race, color, national origin, age, disability, sex, sexual orientation, or gender identity.            Thank you!     " Thank you for choosing [facility]  for your care. Our goal is always to provide you with excellent care. Hearing back from our patients is one way we can continue to improve our services. Please take a few minutes to complete the written survey that you may receive in the mail after your visit with us. Thank you!             Your Updated Medication List - Protect others around you: Learn how to safely use, store and throw away your medicines at www.disposemymeds.org.          This list is accurate as of 9/5/18  9:45 AM.  Always use your most recent med list.                   Brand Name Dispense Instructions for use Diagnosis    acidophilus Caps      Take 1 tablet by mouth daily        aspirin 81 MG tablet      1 TABLET DAILY        Calcium + D3 600-200 MG-UNIT Tabs      Take 1 tablet by mouth daily        glucosamine-chondroitin 500-400 MG Caps per capsule      Take 1 capsule by mouth daily        melatonin 5 MG Caps     1 capsule    Take 5 mg by mouth At Bedtime        Multi-vitamin Tabs tablet   Generic drug:  multivitamin, therapeutic with minerals      1 TABLET DAILY        OMEGA-3 FISH OIL PO      Take 1 capsule by mouth daily        omeprazole 40 MG capsule    priLOSEC    90 capsule    Take 1 capsule (40 mg) by mouth daily    Gastroesophageal reflux disease without esophagitis       simvastatin 20 MG tablet    ZOCOR    90 tablet    Take 1 tablet (20 mg) by mouth At Bedtime    Hyperlipidemia LDL goal <160       VITAMIN C ER PO      Take 1 tablet by mouth daily        VITAMIN D PO      1 DAILY

## 2018-09-05 NOTE — PATIENT INSTRUCTIONS
Thank you for choosing [facility].  You may be receiving a survey in the mail from Rob Franco regarding your visit today.  Please take a few minutes to complete and return the survey to let us know how we are doing.      Our Clinic hours are:  Mondays    7:20 am - 7 pm  Tues - Fri  7:20 am - 5 pm    Clinic Phone: [phone]    The clinic lab opens at 7:30 am Mon - Fri and appointments are required.    [facility]. [phone]  Monday  8 am - 7pm  Tues - Fri 8 am - 5:30 pm           Preventive Health Recommendations    Female Ages 65 +    Yearly exam:     See your health care provider every year in order to  o Review health changes.   o Discuss preventive care.    o Review your medicines if your doctor has prescribed any.      You no longer need a yearly Pap test unless you've had an abnormal Pap test in the past 10 years. If you have vaginal symptoms, such as bleeding or discharge, be sure to talk with your provider about a Pap test.      Every 1 to 2 years, have a mammogram.  If you are over 69, talk with your health care provider about whether or not you want to continue having screening mammograms.      Every 10 years, have a colonoscopy. Or, have a yearly FIT test (stool test). These exams will check for colon cancer.       Have a cholesterol test every 5 years, or more often if your doctor advises it.       Have a diabetes test (fasting glucose) every three years. If you are at risk for diabetes, you should have this test more often.       At age 65, have a bone density scan (DEXA) to check for osteoporosis (brittle bone disease).    Shots:    Get a flu shot each year.    Get a tetanus shot every 10 years.    Talk to your doctor about your pneumonia vaccines. There are now two you should receive - Pneumovax (PPSV 23) and Prevnar (PCV 13).    Talk to your pharmacist about the shingles vaccine.    Talk to your doctor about the hepatitis B vaccine.    Nutrition:     Eat at  least 5 servings of fruits and vegetables each day.      Eat whole-grain bread, whole-wheat pasta and brown rice instead of white grains and rice.      Get adequate Calcium and Vitamin D.     Lifestyle    Exercise at least 150 minutes a week (30 minutes a day, 5 days a week). This will help you control your weight and prevent disease.      Limit alcohol to one drink per day.      No smoking.       Wear sunscreen to prevent skin cancer.       See your dentist twice a year for an exam and cleaning.      See your eye doctor every 1 to 2 years to screen for conditions such as glaucoma, macular degeneration and cataracts.

## 2018-09-05 NOTE — PROGRESS NOTES
SUBJECTIVE:   Mariah Jacinto is a 72 year old female who presents for Preventive Visit.      Are you in the first 12 months of your Medicare Part B coverage?  No    Healthy Habits:    Do you get at least three servings of calcium containing foods daily (dairy, green leafy vegetables, etc.)? yes    Amount of exercise or daily activities, outside of work: 3-4 day(s) per week    Problems taking medications regularly No    Medication side effects: No    Have you had an eye exam in the past two years? yes    Do you see a dentist twice per year? yes    Do you have sleep apnea, excessive snoring or daytime drowsiness?no      Ability to successfully perform activities of daily living: Yes, no assistance needed    Home safety:  none identified     Hearing impairment: Yes, wears hearing aids    Fall risk:  Fallen 2 or more times in the past year?: No  Any fall with injury in the past year?: No        COGNITIVE SCREEN  1) Repeat 3 items (Leader, Season, Table)    2) Clock draw: NORMAL  3) 3 item recall: Recalls 3 objects  Results: 3 items recalled: COGNITIVE IMPAIRMENT LESS LIKELY    Mini-CogTM Copyright GARY Khoury. Licensed by the author for use in [facility]; reprinted with permission (baron@[facility]). All rights reserved.              Musculoskeletal problem/pain      Duration: 3 weeks    Description  Location: Bilateral leg pain off and on.  Right leg is the lower lateral aspect.  Left is the knee pain - that pain started first.  No injury that she is aware of.      Intensity:  moderate    Accompanying signs and symptoms: none    History  Previous similar problem: no   Previous evaluation:  none    Precipitating or alleviating factors:  Trauma or overuse: no   Aggravating factors include: walking    Therapies tried and outcome: nothing      Reviewed and updated as needed this visit by clinical staff  Tobacco  Allergies  Meds         Reviewed and updated as needed this visit by Provider        Social  "History   Substance Use Topics     Smoking status: Never Smoker     Smokeless tobacco: Never Used     Alcohol use No       If you drink alcohol do you typically have >3 drinks per day or >7 drinks per week? No                        Today's PHQ-2 Score:   PHQ-2 ( 1999 [employer]) 9/5/2018 4/16/2018   Q1: Little interest or pleasure in doing things 0 0   Q2: Feeling down, depressed or hopeless 0 0   PHQ-2 Score 0 0       Do you feel safe in your environment - Yes    Do you have a Health Care Directive?: Yes: Advance Directive has been received and scanned.    Current providers sharing in care for this patient include:   Patient Care Team:  Eli Sommer MD as PCP - General (Family Practice)    The following health maintenance items are reviewed in Epic and correct as of today:  Health Maintenance   Topic Date Due     ADVANCE DIRECTIVE PLANNING Q5 YRS  10/04/2017     TETANUS IMMUNIZATION (SYSTEM ASSIGNED)  10/11/2017     INFLUENZA VACCINE (1) 09/01/2018     FALL RISK ASSESSMENT  09/27/2018     PHQ-2 Q1 YR  04/16/2019     MAMMO SCREEN Q2 YR (SYSTEM ASSIGNED)  09/04/2020     LIPID SCREEN Q5 YR FEMALE (SYSTEM ASSIGNED)  06/01/2022     COLON CANCER SCREEN (SYSTEM ASSIGNED)  08/20/2023     DEXA SCAN SCREENING (SYSTEM ASSIGNED)  Completed     PNEUMOCOCCAL  Completed     Labs reviewed in EPIC  BP Readings from Last 3 Encounters:   09/05/18 122/70   05/30/18 137/70   05/14/18 118/81    Wt Readings from Last 3 Encounters:   09/05/18 138 lb (62.6 kg)   04/16/18 133 lb (60.3 kg)   01/25/18 135 lb 12.8 oz (61.6 kg)             ROS:  Constitutional, HEENT, cardiovascular, pulmonary, gi and gu systems are negative, except as otherwise noted.    OBJECTIVE:   /70  Pulse 69  Temp 97.4  F (36.3  C) (Tympanic)  Resp 18  Ht 5' 3.75\" (1.619 m)  Wt 138 lb (62.6 kg)  SpO2 99%  Breastfeeding? No  BMI 23.87 kg/m2 Estimated body mass index is 23.87 kg/(m^2) as calculated from the following:    Height as of this encounter: 5' 3.75\" " (1.619 m).    Weight as of this encounter: 138 lb (62.6 kg).  EXAM:   GENERAL: healthy, alert and no distress  EYES: Eyes grossly normal to inspection, PERRL and conjunctivae and sclerae normal  HENT: ear canals and TM's normal, nose and mouth without ulcers or lesions  NECK: no adenopathy, no asymmetry, masses, or scars and thyroid normal to palpation  RESP: lungs clear to auscultation - no rales, rhonchi or wheezes  BREAST: normal without masses, tenderness or nipple discharge and no palpable axillary masses or adenopathy  CV: regular rate and rhythm, normal S1 S2, no S3 or S4, no murmur, click or rub, no peripheral edema and peripheral pulses strong  ABDOMEN: soft, nontender, no hepatosplenomegaly, no masses and bowel sounds normal  MS: no gross musculoskeletal defects noted, no edema  MS: LLE exam shows normal strength and muscle mass, ROM of all joints is normal and mild crepitus, negative McMurrays/Lachmans  SKIN: no suspicious lesions or rashes  NEURO: Normal strength and tone, mentation intact and speech normal  PSYCH: mentation appears normal, affect normal/bright    Diagnostic Test Results:    Xray left knee:  Decreased joint space, no significant osteophytes    ASSESSMENT / PLAN:   1. Medicare annual wellness visit, subsequent       2. Hyperlipidemia LDL goal <160     - simvastatin (ZOCOR) 20 MG tablet; Take 1 tablet (20 mg) by mouth At Bedtime  Dispense: 90 tablet; Refill: 3  - Basic metabolic panel  - Lipid panel reflex to direct LDL Non-fasting    3. Gastroesophageal reflux disease without esophagitis     - omeprazole (PRILOSEC) 40 MG capsule; Take 1 capsule (40 mg) by mouth daily  Dispense: 90 capsule; Refill: 3    4. Acute pain of left knee  Suggested knee sleeve.  If continues to be a problem, recommend [facility] evaluation or PT  - XR Knee Standing Left 2 Views    End of Life Planning:  Patient currently has an advanced directive: No.  I have verified the patient's ablity to  "prepare an advanced directive/make health care decisions.  Literature was provided to assist patient in preparing an advanced directive.    COUNSELING:  Reviewed preventive health counseling, as reflected in patient instructions       Regular exercise       Healthy diet/nutrition    BP Readings from Last 1 Encounters:   09/05/18 122/70     Estimated body mass index is 23.87 kg/(m^2) as calculated from the following:    Height as of this encounter: 5' 3.75\" (1.619 m).    Weight as of this encounter: 138 lb (62.6 kg).           reports that she has never smoked. She has never used smokeless tobacco.      Appropriate preventive services were discussed with this patient, including applicable screening as appropriate for cardiovascular disease, diabetes, osteopenia/osteoporosis, and glaucoma.  As appropriate for age/gender, discussed screening for colorectal cancer, prostate cancer, breast cancer, and cervical cancer. Checklist reviewing preventive services available has been given to the patient.    Reviewed patients plan of care and provided an AVS. The Basic Care Plan (routine screening as documented in Health Maintenance) for Mariah meets the Care Plan requirement. This Care Plan has been established and reviewed with the Patient.    Counseling Resources:  ATP IV Guidelines  Pooled Cohorts Equation Calculator  Breast Cancer Risk Calculator  FRAX Risk Assessment  ICSI Preventive Guidelines  Dietary Guidelines for Americans, 2010  USDA's MyPlate  ASA Prophylaxis  Lung CA Screening    Eli Sommer MD  [facility]  "

## 2018-10-02 ENCOUNTER — TRANSFERRED RECORDS (OUTPATIENT)
Dept: HEALTH INFORMATION MANAGEMENT | Facility: CLINIC | Age: 73
End: 2018-10-02

## 2018-11-28 ENCOUNTER — TELEPHONE (OUTPATIENT)
Dept: DERMATOLOGY | Facility: CLINIC | Age: 73
End: 2018-11-28

## 2018-11-28 ENCOUNTER — OFFICE VISIT (OUTPATIENT)
Dept: DERMATOLOGY | Facility: CLINIC | Age: 73
End: 2018-11-28
Payer: COMMERCIAL

## 2018-11-28 VITALS — HEART RATE: 63 BPM | SYSTOLIC BLOOD PRESSURE: 147 MMHG | DIASTOLIC BLOOD PRESSURE: 75 MMHG | OXYGEN SATURATION: 99 %

## 2018-11-28 DIAGNOSIS — L82.1 SK (SEBORRHEIC KERATOSIS): ICD-10-CM

## 2018-11-28 DIAGNOSIS — D18.00 ANGIOMA: ICD-10-CM

## 2018-11-28 DIAGNOSIS — L81.4 LENTIGO: ICD-10-CM

## 2018-11-28 DIAGNOSIS — Z85.828 HISTORY OF SKIN CANCER: ICD-10-CM

## 2018-11-28 DIAGNOSIS — D23.9 DERMAL NEVUS: ICD-10-CM

## 2018-11-28 DIAGNOSIS — C44.41 BASAL CELL CARCINOMA OF RIGHT SIDE OF NECK: Primary | ICD-10-CM

## 2018-11-28 PROCEDURE — 88331 PATH CONSLTJ SURG 1 BLK 1SPC: CPT | Performed by: DERMATOLOGY

## 2018-11-28 PROCEDURE — 11100 HC BIOPSY SKIN/SUBQ/MUC MEM, SINGLE LESION: CPT | Performed by: DERMATOLOGY

## 2018-11-28 PROCEDURE — 99214 OFFICE O/P EST MOD 30 MIN: CPT | Mod: 25 | Performed by: DERMATOLOGY

## 2018-11-28 NOTE — LETTER
[facility]  [address] [address]  Phone: [phone]    November 28, 2018    Mariah Jacinto                                                                                                             [address] [address] [address]            Dear Ms. Jacinto,    You are scheduled for Mohs Surgery on January 8th at 7:45 am    Please check in at Dermatology Clinic.   (2nd Floor, last  Clinic on right up staircase or elevator -past OB/GYN clinic)    You don't need to arrive more than 5-10 minutes prior to your appointment time.     Be sure to eat a good breakfast and bathe and wash your hair prior to Surgery.    If you are taking any anti-coagulants that are prescribed by your Doctor (such as Coumadin/warfarin, Plavix, Aspirin, Ibuprofen), please continue taking them.     However, If you are taking anti-coagulants over the counter without  a Doctor's order for a Medical condition, please discontinue them 10 days prior to Surgery.      Please wear loose comfortable clothing as it could possibly be 4-6 hours until your surgery is completed depending upon how many layers of tissue need to be removed.     Wi-fi access is available.     Thank you,      Chapito Franco MD/ Angelic Plascencia RN

## 2018-11-28 NOTE — MR AVS SNAPSHOT
After Visit Summary   11/28/2018    Mariah Jacinto    MRN: [id number]           Patient Information     Date Of Birth          1945        Visit Information        Provider Department      11/28/2018 11:00 AM Chapito Franco MD [facility]        Care Instructions          Wound Care Instructions     FOR SUPERFICIAL WOUNDS     [facility] [phone]    [facility] [phone]                       AFTER 24 HOURS YOU SHOULD REMOVE THE BANDAGE AND BEGIN DAILY DRESSING CHANGES AS FOLLOWS:     1) Remove Dressing.     2) Clean and dry the area with tap water using a Q-tip or sterile gauze pad.     3) Apply Vaseline, Aquaphor, Polysporin ointment or Bacitracin ointment over entire wound.  Do NOT use Neosporin ointment.     4) Cover the wound with a band-aid, or a sterile non-stick gauze pad and micropore paper tape      REPEAT THESE INSTRUCTIONS AT LEAST ONCE A DAY UNTIL THE WOUND HAS COMPLETELY HEALED.    It is an old wives tale that a wound heals better when it is exposed to air and allowed to dry out. The wound will heal faster with a better cosmetic result if it is kept moist with ointment and covered with a bandage.    **Do not let the wound dry out.**      Supplies Needed:      *Cotton tipped applicators (Q-tips)    *Polysporin Ointment or Bacitracin Ointment (NOT NEOSPORIN)    *Band-aids or non-stick gauze pads and micropore paper tape.      PATIENT INFORMATION:    During the healing process you will notice a number of changes. All wounds develop a small halo of redness surrounding the wound.  This means healing is occurring. Severe itching with extensive redness usually indicates sensitivity to the ointment or bandage tape used to dress the wound.  You should call our office if this develops.      Swelling  and/or discoloration around your surgical site is common, particularly when performed around the eye.    All wounds normally drain.  The  larger the wound the more drainage there will be.  After 7-10 days, you will notice the wound beginning to shrink and new skin will begin to grow.  The wound is healed when you can see skin has formed over the entire area.  A healed wound has a healthy, shiny look to the surface and is red to dark pink in color to normalize.  Wounds may take approximately 4-6 weeks to heal.  Larger wounds may take 6-8 weeks.  After the wound is healed you may discontinue dressing changes.    You may experience a sensation of tightness as your wound heals. This is normal and will gradually subside.    Your healed wound may be sensitive to temperature changes. This sensitivity improves with time, but if you re having a lot of discomfort, try to avoid temperature extremes.    Patients frequently experience itching after their wound appears to have healed because of the continue healing under the skin.  Plain Vaseline will help relieve the itching.        POSSIBLE COMPLICATIONS    BLEEDIN. Leave the bandage in place.  2. Use tightly rolled up gauze or a cloth to apply direct pressure over the bandage for 30  minutes.  3. Reapply pressure for an additional 30 minutes if necessary  4. Use additional gauze and tape to maintain pressure once the bleeding has stopped.            Follow-ups after your visit        Who to contact     If you have questions or need follow up information about today's clinic visit or your schedule please contact [facility] directly at [phone].  Normal or non-critical lab and imaging results will be communicated to you by [employer], letter or phone within 4 business days after the clinic has received the results. If you do not hear from us within 7 days, please contact the clinic through MyChart or phone. If you have a critical or abnormal lab result, we will notify you by phone as soon as possible.  Submit refill requests through [employer] or call your pharmacy and they will forward the  refill request to us. Please allow 3 business days for your refill to be completed.          Additional Information About Your Visit        Care EveryWhere ID     This is your Care EveryWhere ID. This could be used by other organizations to access your [facility] medical records  [id number]        Your Vitals Were     Pulse Pulse Oximetry                63 99%           Blood Pressure from Last 3 Encounters:   11/28/18 147/75   09/05/18 122/70   05/30/18 137/70    Weight from Last 3 Encounters:   09/05/18 62.6 kg (138 lb)   04/16/18 60.3 kg (133 lb)   01/25/18 61.6 kg (135 lb 12.8 oz)              Today, you had the following     No orders found for display       Primary Care Provider Office Phone # Fax #    Eli Sommer [phone] [address] [facility] [address]        Equal Access to Services     [facility]: Hadii shai castaneda hadasho Sobennie, waaxda luqadaha, qaybta kaalmada adevamshiyada, darin kaba . So [facility] [phone].    ATENCIÓN: Si habla español, tiene a anderson disposición servicios gratuitos de asistencia lingüística. Jaiden al [phone].    We comply with applicable federal civil rights laws and [place] laws. We do not discriminate on the basis of race, color, national origin, age, disability, sex, sexual orientation, or gender identity.            Thank you!     Thank you for choosing [facility]  for your care. Our goal is always to provide you with excellent care. Hearing back from our patients is one way we can continue to improve our services. Please take a few minutes to complete the written survey that you may receive in the mail after your visit with us. Thank you!             Your Updated Medication List - Protect others around you: Learn how to safely use, store and throw away your medicines at www.disposemymeds.org.          This list is accurate as of 11/28/18 11:25 AM.  Always use your most recent med list.                    Brand Name Dispense Instructions for use Diagnosis    acidophilus Caps      Take 1 tablet by mouth daily        aspirin 81 MG tablet    ASA     1 TABLET DAILY        Calcium + D3 600-200 MG-UNIT Tabs      Take 1 tablet by mouth daily        glucosamine-chondroitin 500-400 MG Caps per capsule      Take 1 capsule by mouth daily        melatonin 5 MG Caps     1 capsule    Take 5 mg by mouth At Bedtime        Multi-vitamin tablet   Generic drug:  multivitamin w/minerals      1 TABLET DAILY        OMEGA-3 FISH OIL PO      Take 1 capsule by mouth daily        omeprazole 40 MG DR capsule    priLOSEC    90 capsule    Take 1 capsule (40 mg) by mouth daily    Gastroesophageal reflux disease without esophagitis       simvastatin 20 MG tablet    ZOCOR    90 tablet    Take 1 tablet (20 mg) by mouth At Bedtime    Hyperlipidemia LDL goal <160       VITAMIN C ER PO      Take 1 tablet by mouth daily        VITAMIN D PO      1 DAILY

## 2018-11-28 NOTE — NURSING NOTE
"Chief Complaint   Patient presents with     Skin Check       Initial /75 (BP Location: Left arm, Patient Position: Chair, Cuff Size: Adult Regular)  Pulse 63  SpO2 99% Estimated body mass index is 23.87 kg/(m^2) as calculated from the following:    Height as of 9/5/18: 1.619 m (5' 3.75\").    Weight as of 9/5/18: 62.6 kg (138 lb).  Medications and allergies reviewed.    Steff RODRIGUEZ, CRISTIN    "

## 2018-11-28 NOTE — PROGRESS NOTES
Mariah Jacinto is a 73 year old year old female patient here today for f/u hx of non-melanoma skin cancer.  She notes spot on back and neck today.   .  Patient states this has been present for a while.  Patient reports the following symptoms:  new.  Patient reports the following previous treatments none.  Patient reports the following modifying factors none.  Associated symptoms: none.  Patient has no other skin complaints today.  Remainder of the HPI, Meds, PMH, Allergies, FH, and SH was reviewed in chart.      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    celexa caused spinning side effect      MENOPAUSE --ERT      OSTEOPENIA      Squamous cell carcinoma        Past Surgical History:   Procedure Laterality Date     ABLATE VEIN VARICOSE RADIO FREQUENCY WITHOUT PHLEBECTOMY MULTIPLE STAB  3/28/2013    Procedure: ABLATE VEIN VARICOSE RADIO FREQUENCY WITHOUT PHLEBECTOMY MULTIPLE STAB;  Bilateral ablation of varicose veins legs-to ultrasound at 0930  ;  Surgeon: Noam Soliman MD;  Location: [facility]      ANESTH,LOWER ARM SURGERY  1999    ulnar nerve decompression - right     COLONOSCOPY  8/20/2013    Procedure: COLONOSCOPY;  Colonoscopy;  Surgeon: Yuliya Nathan MD;  Location: [facility]     ESOPHAGOSCOPY, GASTROSCOPY, DUODENOSCOPY (EGD), COMBINED N/A 5/12/2015    Procedure: COMBINED ESOPHAGOSCOPY, GASTROSCOPY, DUODENOSCOPY (EGD), BIOPSY SINGLE OR MULTIPLE;  Surgeon: Yuliya Nathan MD;  Location: [facility]     ESOPHAGOSCOPY, GASTROSCOPY, DUODENOSCOPY (EGD), COMBINED N/A 1/31/2017    Procedure: COMBINED ESOPHAGOSCOPY, GASTROSCOPY, DUODENOSCOPY (EGD), BIOPSY SINGLE OR MULTIPLE;  Surgeon: Qasim Bowie MD;  Location: [facility]     HYSTERECTOMY, PAP NO LONGER  INDICATED      has both ovaries out also      HYSTERECTOMY, VAGINAL      Hysterectomy, oophorectomy     RELEASE TRIGGER FINGER Right 2017    Procedure: RELEASE TRIGGER FINGER;  Right Thumb A1 Pulley Release;  Surgeon: Jake Viveros MD;  Location: [facility]     SURGICAL HISTORY OF -       right retromastoid craniectomy and decompression trigeminal nerve     TONSILLECTOMY & ADENOIDECTOMY  child    T&A         Family History   Problem Relation Age of Onset     Alzheimer Disease Mother       at age 85     Osteoporosis Mother      Arthritis Mother      Alcohol/Drug Father      Respiratory Father      Eye Disorder Maternal Grandfather      Macular degneration     C.A.D. Brother      Tripple bypass age 57     Cardiovascular Brother      Cancer Brother      leukemia (ALL)     Cardiovascular Brother      Cardiovascular Brother      Neurologic Disorder Son      Heart Disease Son      Melanoma No family hx of      Skin Cancer No family hx of        Social History     Social History     Marital status:      Spouse name: N/A     Number of children: N/A     Years of education: N/A     Occupational History     Not on file.     Social History Main Topics     Smoking status: Never Smoker     Smokeless tobacco: Never Used     Alcohol use No     Drug use: No     Sexual activity: Yes     Birth control/ protection: Surgical      Comment: complete hysterectomy      Other Topics Concern     Parent/Sibling W/ Cabg, Mi Or Angioplasty Before 65f 55m? No     Social History Narrative       Outpatient Encounter Prescriptions as of 2018   Medication Sig Dispense Refill     Ascorbic Acid (VITAMIN C ER PO) Take 1 tablet by mouth daily        ASPIRIN 81 MG OR TABS 1 TABLET DAILY       Calcium Carb-Cholecalciferol (CALCIUM + D3) 600-200 MG-UNIT TABS Take 1 tablet by mouth daily       glucosamine-chondroitin 500-400 MG CAPS Take 1 capsule by mouth daily       Lactobacillus (ACIDOPHILUS) CAPS Take 1 tablet by mouth  daily       melatonin 5 MG CAPS Take 5 mg by mouth At Bedtime 1 capsule 0     MULTI-VITAMIN OR TABS 1 TABLET DAILY       Omega-3 Fatty Acids (OMEGA-3 FISH OIL PO) Take 1 capsule by mouth daily       omeprazole (PRILOSEC) 40 MG capsule Take 1 capsule (40 mg) by mouth daily 90 capsule 3     simvastatin (ZOCOR) 20 MG tablet Take 1 tablet (20 mg) by mouth At Bedtime 90 tablet 3     VITAMIN D OR 1 DAILY       No facility-administered encounter medications on file as of 11/28/2018.              Review Of Systems  Skin: As above  Eyes: negative  Ears/Nose/Throat: negative  Respiratory: No shortness of breath, dyspnea on exertion, cough, or hemoptysis  Cardiovascular: negative  Gastrointestinal: negative  Genitourinary: negative  Musculoskeletal: negative  Neurologic: negative  Psychiatric: negative  Hematologic/Lymphatic/Immunologic: negative  Endocrine: negative      O:   NAD, WDWN, Alert & Oriented, Mood & Affect wnl, Vitals stable   Here today alone   /75 (BP Location: Left arm, Patient Position: Chair, Cuff Size: Adult Regular)  Pulse 63  SpO2 99%   General appearance normal   Vitals stable   Alert, oriented and in no acute distress      Following lymph nodes palpated: Occipital, Cervical, Supraclavicular no lad   L back inflamed seborrheic keratosis    R Infrauairuclar neck 6mm pink pearly papule      Stuck on papules and brown macules on trunk and ext   Red papules on trunk  Flesh colored papules on trunk     The remainder of the full exam was unremarkable; the following areas were examined:  conjunctiva/lids, oral mucosa, neck, peripheral vascular system, abdomen, lymph nodes, digits/nails, eccrine and apocrine glands, scalp/hair, face, neck, chest, abdomen, buttocks, back, RUE, LUE, RLE, LLE       Eyes: Conjunctivae/lids:Normal     ENT: Lips, buccal mucosa, tongue: normal    MSK:Normal    Cardiovascular: peripheral edema none    Pulm: Breathing Normal    Lymph Nodes: No Head and Neck Lymphadenopathy      Neuro/Psych: Orientation:Normal; Mood/Affect:Normal      MICRO:   R IA neck:Orthokeratosis of epidermis with a proliferation of nests of basaloid cells, with peripheral palisading and a haphazard arrangement in the center extending into the dermis, forming nodules.  The tumor cells have hyperchromatic nuclei. Poor cytoplasm and intercellular bridging.    A/P:  1. Seborrheic keratosis, lentigo, angioma, dermal nevus, hx of non-melanoma skin cancer   2. R IA neck r/o basal cell carcinoma   TANGENTIAL BIOPSY IN HOUSE:  After consent, anesthesia with LEC and prep, tangential excision performed and dx above confirmed with frozen section histology.  No complications and routine wound care.  Patient told result basal cell carcinoma schedule excision .      BENIGN LESIONS DISCUSSED WITH PATIENT:  I discussed the specifics of tumor, prognosis, and genetics of benign lesions.  I explained that treatment of these lesions would be purely cosmetic and not medically neccessary.  I discussed with patient different removal options including excision, cautery and /or laser.      Nature and genetics of benign skin lesions dicussed with patient.  Signs and Symptoms of skin cancer discussed with patient.  ABCDEs of melanoma reviewed with patient.  Patient encouraged to perform monthly skin exams.  UV precautions reviewed with patient.  Patient to follow up with Primary Care provider regarding elevated blood pressure.  Skin care regimen reviewed with patient: Eliminate harsh soaps, i.e. Dial, zest, irsih spring; Mild soaps such as Cetaphil or Dove sensitive skin, avoid hot or cold showers, aggressive use of emollients including vanicream, cetaphil or cerave discussed with patient.    Risks of non-melanoma skin cancer discussed with patient   Return to clinic next appt for basal cell carcinoma

## 2018-11-28 NOTE — LETTER
11/28/2018         RE: Mariah Jacinto  [address] [facility] [id number]        Dear Colleague,    Thank you for referring your patient, Mariah Jacinto, to the [facility]. Please see a copy of my visit note below.    Mariah Jacinto is a 73 year old year old female patient here today for f/u hx of non-melanoma skin cancer.  She notes spot on back and neck today.   .  Patient states this has been present for a while.  Patient reports the following symptoms:  new.  Patient reports the following previous treatments none.  Patient reports the following modifying factors none.  Associated symptoms: none.  Patient has no other skin complaints today.  Remainder of the HPI, Meds, PMH, Allergies, FH, and SH was reviewed in chart.      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    celexa caused spinning side effect      MENOPAUSE --ERT      OSTEOPENIA      Squamous cell carcinoma        Past Surgical History:   Procedure Laterality Date     ABLATE VEIN VARICOSE RADIO FREQUENCY WITHOUT PHLEBECTOMY MULTIPLE STAB  3/28/2013    Procedure: ABLATE VEIN VARICOSE RADIO FREQUENCY WITHOUT PHLEBECTOMY MULTIPLE STAB;  Bilateral ablation of varicose veins legs-to ultrasound at 0930  ;  Surgeon: Noam Soliman MD;  Location: [facility]     C ANESTH,LOWER ARM SURGERY  1999    ulnar nerve decompression - right     COLONOSCOPY  8/20/2013    Procedure: COLONOSCOPY;  Colonoscopy;  Surgeon: Yuliya Nathan MD;  Location: [facility]     ESOPHAGOSCOPY, GASTROSCOPY, DUODENOSCOPY (EGD), COMBINED N/A 5/12/2015    Procedure: COMBINED ESOPHAGOSCOPY, GASTROSCOPY, DUODENOSCOPY (EGD), BIOPSY SINGLE OR MULTIPLE;  Surgeon: Yuliya Nathan MD;  Location: [facility]      ESOPHAGOSCOPY, GASTROSCOPY, DUODENOSCOPY (EGD), COMBINED N/A 2017    Procedure: COMBINED ESOPHAGOSCOPY, GASTROSCOPY, DUODENOSCOPY (EGD), BIOPSY SINGLE OR MULTIPLE;  Surgeon: Qasim Bowie MD;  Location: [facility]     HYSTERECTOMY, PAP NO LONGER INDICATED      has both ovaries out also      HYSTERECTOMY, VAGINAL      Hysterectomy, oophorectomy     RELEASE TRIGGER FINGER Right 2017    Procedure: RELEASE TRIGGER FINGER;  Right Thumb A1 Pulley Release;  Surgeon: Jake Viveros MD;  Location: [facility]     SURGICAL HISTORY OF -   2009    right retromastoid craniectomy and decompression trigeminal nerve     TONSILLECTOMY & ADENOIDECTOMY  child    T&A         Family History   Problem Relation Age of Onset     Alzheimer Disease Mother       at age 85     Osteoporosis Mother      Arthritis Mother      Alcohol/Drug Father      Respiratory Father      Eye Disorder Maternal Grandfather      Macular degneration     C.A.D. Brother      Tripple bypass age 57     Cardiovascular Brother      Cancer Brother      leukemia (ALL)     Cardiovascular Brother      Cardiovascular Brother      Neurologic Disorder Son      Heart Disease Son      Melanoma No family hx of      Skin Cancer No family hx of        Social History     Social History     Marital status:      Spouse name: N/A     Number of children: N/A     Years of education: N/A     Occupational History     Not on file.     Social History Main Topics     Smoking status: Never Smoker     Smokeless tobacco: Never Used     Alcohol use No     Drug use: No     Sexual activity: Yes     Birth control/ protection: Surgical      Comment: complete hysterectomy      Other Topics Concern     Parent/Sibling W/ Cabg, Mi Or Angioplasty Before 65f 55m? No     Social History Narrative       Outpatient Encounter Prescriptions as of 2018   Medication Sig Dispense Refill     Ascorbic Acid (VITAMIN C ER PO) Take 1 tablet by mouth daily        ASPIRIN 81 MG OR TABS 1  TABLET DAILY       Calcium Carb-Cholecalciferol (CALCIUM + D3) 600-200 MG-UNIT TABS Take 1 tablet by mouth daily       glucosamine-chondroitin 500-400 MG CAPS Take 1 capsule by mouth daily       Lactobacillus (ACIDOPHILUS) CAPS Take 1 tablet by mouth daily       melatonin 5 MG CAPS Take 5 mg by mouth At Bedtime 1 capsule 0     MULTI-VITAMIN OR TABS 1 TABLET DAILY       Omega-3 Fatty Acids (OMEGA-3 FISH OIL PO) Take 1 capsule by mouth daily       omeprazole (PRILOSEC) 40 MG capsule Take 1 capsule (40 mg) by mouth daily 90 capsule 3     simvastatin (ZOCOR) 20 MG tablet Take 1 tablet (20 mg) by mouth At Bedtime 90 tablet 3     VITAMIN D OR 1 DAILY       No facility-administered encounter medications on file as of 11/28/2018.              Review Of Systems  Skin: As above  Eyes: negative  Ears/Nose/Throat: negative  Respiratory: No shortness of breath, dyspnea on exertion, cough, or hemoptysis  Cardiovascular: negative  Gastrointestinal: negative  Genitourinary: negative  Musculoskeletal: negative  Neurologic: negative  Psychiatric: negative  Hematologic/Lymphatic/Immunologic: negative  Endocrine: negative      O:   NAD, WDWN, Alert & Oriented, Mood & Affect wnl, Vitals stable   Here today alone   /75 (BP Location: Left arm, Patient Position: Chair, Cuff Size: Adult Regular)  Pulse 63  SpO2 99%   General appearance normal   Vitals stable   Alert, oriented and in no acute distress      Following lymph nodes palpated: Occipital, Cervical, Supraclavicular no lad   L back inflamed seborrheic keratosis    R Infrauairuclar neck 6mm pink pearly papule      Stuck on papules and brown macules on trunk and ext   Red papules on trunk  Flesh colored papules on trunk     The remainder of the full exam was unremarkable; the following areas were examined:  conjunctiva/lids, oral mucosa, neck, peripheral vascular system, abdomen, lymph nodes, digits/nails, eccrine and apocrine glands, scalp/hair, face, neck, chest, abdomen,  buttocks, back, RUE, LUE, RLE, LLE       Eyes: Conjunctivae/lids:Normal     ENT: Lips, buccal mucosa, tongue: normal    MSK:Normal    Cardiovascular: peripheral edema none    Pulm: Breathing Normal    Lymph Nodes: No Head and Neck Lymphadenopathy     Neuro/Psych: Orientation:Normal; Mood/Affect:Normal      MICRO:   R IA neck:Orthokeratosis of epidermis with a proliferation of nests of basaloid cells, with peripheral palisading and a haphazard arrangement in the center extending into the dermis, forming nodules.  The tumor cells have hyperchromatic nuclei. Poor cytoplasm and intercellular bridging.    A/P:  1. Seborrheic keratosis, lentigo, angioma, dermal nevus, hx of non-melanoma skin cancer   2. R IA neck r/o basal cell carcinoma   TANGENTIAL BIOPSY IN HOUSE:  After consent, anesthesia with LEC and prep, tangential excision performed and dx above confirmed with frozen section histology.  No complications and routine wound care.  Patient told result basal cell carcinoma schedule excision .      BENIGN LESIONS DISCUSSED WITH PATIENT:  I discussed the specifics of tumor, prognosis, and genetics of benign lesions.  I explained that treatment of these lesions would be purely cosmetic and not medically neccessary.  I discussed with patient different removal options including excision, cautery and /or laser.      Nature and genetics of benign skin lesions dicussed with patient.  Signs and Symptoms of skin cancer discussed with patient.  ABCDEs of melanoma reviewed with patient.  Patient encouraged to perform monthly skin exams.  UV precautions reviewed with patient.  Patient to follow up with Primary Care provider regarding elevated blood pressure.  Skin care regimen reviewed with patient: Eliminate harsh soaps, i.e. Dial, zest, irsih spring; Mild soaps such as Cetaphil or Dove sensitive skin, avoid hot or cold showers, aggressive use of emollients including vanicream, cetaphil or cerave discussed with patient.    Risks  of non-melanoma skin cancer discussed with patient   Return to clinic next appt for basal cell carcinoma           Again, thank you for allowing me to participate in the care of your patient.        Sincerely,        Chapito Franco MD

## 2018-11-28 NOTE — TELEPHONE ENCOUNTER
----- Message from Chapito Franco MD sent at 11/28/2018 11:39 AM CST -----  R Infra auriuclar neck basal cell carcinoma schedule excision

## 2018-11-28 NOTE — TELEPHONE ENCOUNTER
Message left to return call. Needs one Mohs surgery appt. Pre op letter drafted.     Angelic Plascencia RN

## 2018-11-28 NOTE — PATIENT INSTRUCTIONS
Wound Care Instructions     FOR SUPERFICIAL WOUNDS     [facility] [phone]    [facility] [phone]                       AFTER 24 HOURS YOU SHOULD REMOVE THE BANDAGE AND BEGIN DAILY DRESSING CHANGES AS FOLLOWS:     1) Remove Dressing.     2) Clean and dry the area with tap water using a Q-tip or sterile gauze pad.     3) Apply Vaseline, Aquaphor, Polysporin ointment or Bacitracin ointment over entire wound.  Do NOT use Neosporin ointment.     4) Cover the wound with a band-aid, or a sterile non-stick gauze pad and micropore paper tape      REPEAT THESE INSTRUCTIONS AT LEAST ONCE A DAY UNTIL THE WOUND HAS COMPLETELY HEALED.    It is an old wives tale that a wound heals better when it is exposed to air and allowed to dry out. The wound will heal faster with a better cosmetic result if it is kept moist with ointment and covered with a bandage.    **Do not let the wound dry out.**      Supplies Needed:      *Cotton tipped applicators (Q-tips)    *Polysporin Ointment or Bacitracin Ointment (NOT NEOSPORIN)    *Band-aids or non-stick gauze pads and micropore paper tape.      PATIENT INFORMATION:    During the healing process you will notice a number of changes. All wounds develop a small halo of redness surrounding the wound.  This means healing is occurring. Severe itching with extensive redness usually indicates sensitivity to the ointment or bandage tape used to dress the wound.  You should call our office if this develops.      Swelling  and/or discoloration around your surgical site is common, particularly when performed around the eye.    All wounds normally drain.  The larger the wound the more drainage there will be.  After 7-10 days, you will notice the wound beginning to shrink and new skin will begin to grow.  The wound is healed when you can see skin has formed over the entire area.  A healed wound has a healthy, shiny look to the surface and is red to dark pink in color  to normalize.  Wounds may take approximately 4-6 weeks to heal.  Larger wounds may take 6-8 weeks.  After the wound is healed you may discontinue dressing changes.    You may experience a sensation of tightness as your wound heals. This is normal and will gradually subside.    Your healed wound may be sensitive to temperature changes. This sensitivity improves with time, but if you re having a lot of discomfort, try to avoid temperature extremes.    Patients frequently experience itching after their wound appears to have healed because of the continue healing under the skin.  Plain Vaseline will help relieve the itching.        POSSIBLE COMPLICATIONS    BLEEDIN. Leave the bandage in place.  2. Use tightly rolled up gauze or a cloth to apply direct pressure over the bandage for 30  minutes.  3. Reapply pressure for an additional 30 minutes if necessary  4. Use additional gauze and tape to maintain pressure once the bleeding has stopped.

## 2018-11-28 NOTE — TELEPHONE ENCOUNTER
Spoke to pt and reviewed results. Pt verbalized understanding.  Appt made and pt added to wait list. Letter and packet mailed.  Stacia HAWKINS RN BSN PHN  [facility]

## 2018-11-30 ENCOUNTER — OFFICE VISIT (OUTPATIENT)
Dept: FAMILY MEDICINE | Facility: CLINIC | Age: 73
End: 2018-11-30
Payer: COMMERCIAL

## 2018-11-30 VITALS
BODY MASS INDEX: 23.9 KG/M2 | HEART RATE: 65 BPM | TEMPERATURE: 98.4 F | WEIGHT: 140 LBS | DIASTOLIC BLOOD PRESSURE: 74 MMHG | SYSTOLIC BLOOD PRESSURE: 124 MMHG | OXYGEN SATURATION: 98 % | RESPIRATION RATE: 12 BRPM | HEIGHT: 64 IN

## 2018-11-30 DIAGNOSIS — N39.0 URINARY TRACT INFECTION WITHOUT HEMATURIA, SITE UNSPECIFIED: Primary | ICD-10-CM

## 2018-11-30 LAB

## 2018-11-30 PROCEDURE — 81001 URINALYSIS AUTO W/SCOPE: CPT | Performed by: NURSE PRACTITIONER

## 2018-11-30 PROCEDURE — 99213 OFFICE O/P EST LOW 20 MIN: CPT | Performed by: NURSE PRACTITIONER

## 2018-11-30 PROCEDURE — 87088 URINE BACTERIA CULTURE: CPT | Performed by: NURSE PRACTITIONER

## 2018-11-30 PROCEDURE — 87186 SC STD MICRODIL/AGAR DIL: CPT | Performed by: NURSE PRACTITIONER

## 2018-11-30 PROCEDURE — 87086 URINE CULTURE/COLONY COUNT: CPT | Performed by: NURSE PRACTITIONER

## 2018-11-30 RX ORDER — SULFAMETHOXAZOLE/TRIMETHOPRIM 800-160 MG
1 TABLET ORAL 2 TIMES DAILY
Qty: 10 TABLET | Refills: 0 | Status: SHIPPED | OUTPATIENT
Start: 2018-11-30 | End: 2019-03-08

## 2018-11-30 ASSESSMENT — PAIN SCALES - GENERAL: PAINLEVEL: MODERATE PAIN (4)

## 2018-11-30 NOTE — PROGRESS NOTES
SUBJECTIVE:   Mariah Jacinto is a 73 year old female who presents to clinic today for the following health issues:      URINARY TRACT SYMPTOMS  Onset: 2 days    Description:   Painful urination (Dysuria): YES  Blood in urine (Hematuria): no   Delay in urine (Hesitency): YES    Intensity: moderate    Progression of Symptoms:  worsening and constant    Accompanying Signs & Symptoms:  Fever/chills: no   Flank pain YES  Nausea and vomiting: no   Any vaginal symptoms:  vaginal odor  Abdominal/Pelvic Pain: no     History:   History of frequent UTI's: YES  History of kidney stones: no   Sexually Active: no   Possibility of pregnancy: No    Precipitating factors:   none    Therapies Tried and outcome: Increase fluid intake        Problem list and histories reviewed & adjusted, as indicated.  Further history obtained, clarified or corrected by provider:    Blood pressure was recently elevated 2 days ago when she was at the dermatology clinic.  On recheck today, she is in desired range.  BP Readings from Last 6 Encounters:   11/30/18 124/74   11/28/18 147/75   09/05/18 122/70   05/30/18 137/70   05/14/18 118/81   04/16/18 126/70         Patient Active Problem List   Diagnosis     Disorder of bone and cartilage     MENOPAUSE --ERT     Enthesopathy of hip region     Sensorineural hearing loss     Right hip pain     Trigeminal Neuralgia right      Hyperlipidemia LDL goal <160     Advanced directives, counseling/discussion     Intercostal neuralgia     Health Care Home     Trochanteric bursitis     Subjective tinnitus, bilateral     Gastroesophageal reflux disease without esophagitis     Chronic constipation     Dense breast tissue on mammogram     Past Surgical History:   Procedure Laterality Date     ABLATE VEIN VARICOSE RADIO FREQUENCY WITHOUT PHLEBECTOMY MULTIPLE STAB  3/28/2013    Procedure: ABLATE VEIN VARICOSE RADIO FREQUENCY WITHOUT PHLEBECTOMY MULTIPLE STAB;  Bilateral ablation of varicose veins legs-to ultrasound at  0930  ;  Surgeon: Noam Soliman MD;  [facility]: [facility] ANESTH,LOWER ARM SURGERY      ulnar nerve decompression - right     COLONOSCOPY  2013    Procedure: COLONOSCOPY;  Colonoscopy;  Surgeon: Yuliya Nathan MD;  Location: [facility]     ESOPHAGOSCOPY, GASTROSCOPY, DUODENOSCOPY (EGD), COMBINED N/A 2015    Procedure: COMBINED ESOPHAGOSCOPY, GASTROSCOPY, DUODENOSCOPY (EGD), BIOPSY SINGLE OR MULTIPLE;  Surgeon: Yuliya Nathan MD;  Location: [facility]     ESOPHAGOSCOPY, GASTROSCOPY, DUODENOSCOPY (EGD), COMBINED N/A 2017    Procedure: COMBINED ESOPHAGOSCOPY, GASTROSCOPY, DUODENOSCOPY (EGD), BIOPSY SINGLE OR MULTIPLE;  Surgeon: Qasim Bowie MD;  Location: [facility]     HYSTERECTOMY, PAP NO LONGER INDICATED      has both ovaries out also      HYSTERECTOMY, VAGINAL  1988    Hysterectomy, oophorectomy     RELEASE TRIGGER FINGER Right 2017    Procedure: RELEASE TRIGGER FINGER;  Right Thumb A1 Pulley Release;  Surgeon: Jake Viveros MD;  Location: [facility]     SURGICAL HISTORY OF -       right retromastoid craniectomy and decompression trigeminal nerve     TONSILLECTOMY & ADENOIDECTOMY  child    T&A        Social History   Substance Use Topics     Smoking status: Never Smoker     Smokeless tobacco: Never Used     Alcohol use No     Family History   Problem Relation Age of Onset     Alzheimer Disease Mother       at age 85     Osteoporosis Mother      Arthritis Mother      Alcohol/Drug Father      Respiratory Father      Eye Disorder Maternal Grandfather      Macular degneration     C.A.D. Brother      Tripple bypass age 57     Cardiovascular Brother      Cancer Brother      leukemia (ALL)     Cardiovascular Brother      Cardiovascular Brother      Neurologic Disorder Son      Heart Disease Son      Melanoma No family hx of      Skin Cancer No family hx of            Reviewed and updated as needed this visit by clinical staff  Tobacco  Allergies  Meds  Problems  " Med Hx  Surg Hx  Fam Hx  Soc Hx        Reviewed and updated as needed this visit by Provider  Allergies  Meds  Problems         ROS:  Constitutional, HEENT, cardiovascular, pulmonary, gi and gu systems are negative, except as otherwise noted.    OBJECTIVE:     /74 (BP Location: Right arm, Patient Position: Chair, Cuff Size: Adult Large)  Pulse 65  Temp 98.4  F (36.9  C) (Tympanic)  Resp 12  Ht 5' 3.75\" (1.619 m)  Wt 140 lb (63.5 kg)  SpO2 98%  Breastfeeding? No  BMI 24.22 kg/m2  Body mass index is 24.22 kg/(m^2).  GENERAL: healthy, alert and no distress    Diagnostic Test Results:  Results for orders placed or performed in visit on 11/30/18   *UA reflex to Microscopic and Culture ([place] [facility] (except [place] and [place])   Result Value Ref Range    Color Urine Yellow     Appearance Urine Clear     Glucose Urine Negative NEG^Negative mg/dL    Bilirubin Urine Negative NEG^Negative    Ketones Urine Negative NEG^Negative mg/dL    Specific Gravity Urine 1.010 1.003 - 1.035    Blood Urine Small (A) NEG^Negative    pH Urine 5.5 5.0 - 7.0 pH    Protein Albumin Urine Negative NEG^Negative mg/dL    Urobilinogen Urine 0.2 0.2 - 1.0 EU/dL    Nitrite Urine Negative NEG^Negative    Leukocyte Esterase Urine Moderate (A) NEG^Negative    Source Midstream Urine    Urine Microscopic   Result Value Ref Range    WBC Urine 10-25 (A) OTO5^0 - 5 /HPF    RBC Urine 2-5 (A) OTO2^O - 2 /HPF    Squamous Epithelial /LPF Urine Few FEW^Few /LPF    Bacteria Urine Few (A) NEG^Negative /HPF   Urine Culture Aerobic Bacterial   Result Value Ref Range    Specimen Description Midstream Urine     Special Requests Specimen received in preservative     Culture Micro 10,000 to 50,000 colonies/mL  Escherichia coli   (A)        Susceptibility    Escherichia coli - LISBETH     AMPICILLIN 4 Sensitive ug/mL     CEFAZOLIN* <=4 Sensitive ug/mL      * Cefazolin LISBETH breakpoints are for the treatment of uncomplicated urinary tract " infections.  For the treatment of systemic infections, please contact the laboratory for additional testing.     CEFOXITIN 16 Intermediate ug/mL     CEFTAZIDIME <=1 Sensitive ug/mL     CEFTRIAXONE <=1 Sensitive ug/mL     CIPROFLOXACIN <=0.25 Sensitive ug/mL     GENTAMICIN <=1 Sensitive ug/mL     LEVOFLOXACIN <=0.12 Sensitive ug/mL     NITROFURANTOIN <=16 Sensitive ug/mL     TOBRAMYCIN <=1 Sensitive ug/mL     Trimethoprim/Sulfa <=1/19 Sensitive ug/mL     AMPICILLIN/SULBACTAM 4 Sensitive ug/mL     Piperacillin/Tazo <=4 Sensitive ug/mL     CEFEPIME <=1 Sensitive ug/mL       ASSESSMENT/PLAN:     ASSESSMENT:  1. Urinary tract infection without hematuria, site unspecified        PLAN:  Orders Placed This Encounter     *UA reflex to Microscopic and Culture ([place] and [facility] (except [place] and [place])     Urine Microscopic     sulfamethoxazole-trimethoprim (BACTRIM DS/SEPTRA DS) 800-160 MG tablet       Patient Instructions   Check your blood pressure twice in the next week or so.    Complete full course of antibiotics even if you start to feel better        Urinary Tract Infections in Women    Urinary tract infections (UTIs) are most often caused by bacteria. These bacteria enter the urinary tract. The bacteria may come from outside the body. Or they may travel from the skin outside the rectum or vagina into the urethra. Female anatomy makes it easy for bacteria from the bowel to enter a woman s urinary tract, which is the most common source of UTI. This means women develop UTIs more often than men. Pain in or around the urinary tract is a common UTI symptom. But the only way to know for sure if you have a UTI for the healthcare provider to test your urine. The two tests that may be done are the urinalysis and urine culture.  Types of UTIs    Cystitis. A bladder infection (cystitis) is the most common UTI in women. You may have urgent or frequent urination. You may also have pain, burning when you urinate,  and bloody urine.    Urethritis. This is an inflamed urethra, which is the tube that carries urine from the bladder to outside the body. You may have lower stomach or back pain. You may also have urgent or frequent urination.    Pyelonephritis. This is a kidney infection. If not treated, it can be serious and damage your kidneys. In severe cases, you may need to stay in the hospital. You may have a fever and lower back pain.  Medicines to treat a UTI  Most UTIs are treated with antibiotics. These kill the bacteria. The length of time you need to take them depends on the type of infection. It may be as short as 3 days. If you have repeated UTIs, you may need a low-dose antibiotic for several months. Take antibiotics exactly as directed. Don t stop taking them until all of the medicine is gone. If you stop taking the antibiotic too soon, the infection may not go away. You may also develop a resistance to the antibiotic. This can make it much harder to treat.  Lifestyle changes to treat and prevent UTIs  The lifestyle changes below will help get rid of your UTI. They may also help prevent future UTIs.    Drink plenty of fluids. This includes water, juice, or other caffeine-free drinks. Fluids help flush bacteria out of your body.    Empty your bladder. Always empty your bladder when you feel the urge to urinate. And always urinate before going to sleep. Urine that stays in your bladder can lead to infection. Try to urinate before and after sex as well.    Practice good personal hygiene. Wipe yourself from front to back after using the toilet. This helps keep bacteria from getting into the urethra.    Use condoms during sex. These help prevent UTIs caused by sexually transmitted bacteria. Also don't use spermicides during sex. These can increase the risk for UTIs. Choose other forms of birth control instead. For women who tend to get UTIs after sex, a low-dose of a preventive antibiotic may be used. Be sure to discuss  this option with your healthcare provider.    Follow up with your healthcare provider as directed. He or she may test to make sure the infection has cleared. If needed, more treatment may be started.  Date Last Reviewed: 1/1/2017 2000-2018 [employer]. [address] [place] [address]. All rights reserved. This information is not intended as a substitute for professional medical care. Always follow your healthcare professional's instructions.        Patient agrees with plan of care as outlined. Call or return to the clinic with any worsening of symptoms or no resolution. Medication side effects reviewed.    Lucia Fowler, KIKO, APRN [facility]

## 2018-11-30 NOTE — PATIENT INSTRUCTIONS
Check your blood pressure twice in the next week or so.    Complete full course of antibiotics even if you start to feel better        Urinary Tract Infections in Women    Urinary tract infections (UTIs) are most often caused by bacteria. These bacteria enter the urinary tract. The bacteria may come from outside the body. Or they may travel from the skin outside the rectum or vagina into the urethra. Female anatomy makes it easy for bacteria from the bowel to enter a woman s urinary tract, which is the most common source of UTI. This means women develop UTIs more often than men. Pain in or around the urinary tract is a common UTI symptom. But the only way to know for sure if you have a UTI for the healthcare provider to test your urine. The two tests that may be done are the urinalysis and urine culture.  Types of UTIs    Cystitis. A bladder infection (cystitis) is the most common UTI in women. You may have urgent or frequent urination. You may also have pain, burning when you urinate, and bloody urine.    Urethritis. This is an inflamed urethra, which is the tube that carries urine from the bladder to outside the body. You may have lower stomach or back pain. You may also have urgent or frequent urination.    Pyelonephritis. This is a kidney infection. If not treated, it can be serious and damage your kidneys. In severe cases, you may need to stay in the hospital. You may have a fever and lower back pain.  Medicines to treat a UTI  Most UTIs are treated with antibiotics. These kill the bacteria. The length of time you need to take them depends on the type of infection. It may be as short as 3 days. If you have repeated UTIs, you may need a low-dose antibiotic for several months. Take antibiotics exactly as directed. Don t stop taking them until all of the medicine is gone. If you stop taking the antibiotic too soon, the infection may not go away. You may also develop a resistance to the antibiotic. This can make it  much harder to treat.  Lifestyle changes to treat and prevent UTIs  The lifestyle changes below will help get rid of your UTI. They may also help prevent future UTIs.    Drink plenty of fluids. This includes water, juice, or other caffeine-free drinks. Fluids help flush bacteria out of your body.    Empty your bladder. Always empty your bladder when you feel the urge to urinate. And always urinate before going to sleep. Urine that stays in your bladder can lead to infection. Try to urinate before and after sex as well.    Practice good personal hygiene. Wipe yourself from front to back after using the toilet. This helps keep bacteria from getting into the urethra.    Use condoms during sex. These help prevent UTIs caused by sexually transmitted bacteria. Also don't use spermicides during sex. These can increase the risk for UTIs. Choose other forms of birth control instead. For women who tend to get UTIs after sex, a low-dose of a preventive antibiotic may be used. Be sure to discuss this option with your healthcare provider.    Follow up with your healthcare provider as directed. He or she may test to make sure the infection has cleared. If needed, more treatment may be started.  Date Last Reviewed: 1/1/2017 2000-2018 [employer]. [address] [place] [address], [place] [address]. All rights reserved. This information is not intended as a substitute for professional medical care. Always follow your healthcare professional's instructions.

## 2018-11-30 NOTE — MR AVS SNAPSHOT
After Visit Summary   11/30/2018    Mariah Jacinto    MRN: [id number]           Patient Information     Date Of Birth          1945        Visit Information        Provider Department      11/30/2018 7:20 AM Lucia Fowler APRN [facility]        Today's Diagnoses     Urinary frequency    -  1    Nonspecific finding on examination of urine        Urinary tract infection without hematuria, site unspecified          Care Instructions    Check your blood pressure twice in the next week or so.    Complete full course of antibiotics even if you start to feel better        Urinary Tract Infections in Women    Urinary tract infections (UTIs) are most often caused by bacteria. These bacteria enter the urinary tract. The bacteria may come from outside the body. Or they may travel from the skin outside the rectum or vagina into the urethra. Female anatomy makes it easy for bacteria from the bowel to enter a woman s urinary tract, which is the most common source of UTI. This means women develop UTIs more often than men. Pain in or around the urinary tract is a common UTI symptom. But the only way to know for sure if you have a UTI for the healthcare provider to test your urine. The two tests that may be done are the urinalysis and urine culture.  Types of UTIs    Cystitis. A bladder infection (cystitis) is the most common UTI in women. You may have urgent or frequent urination. You may also have pain, burning when you urinate, and bloody urine.    Urethritis. This is an inflamed urethra, which is the tube that carries urine from the bladder to outside the body. You may have lower stomach or back pain. You may also have urgent or frequent urination.    Pyelonephritis. This is a kidney infection. If not treated, it can be serious and damage your kidneys. In severe cases, you may need to stay in the hospital. You may have a fever and lower back pain.  Medicines to treat a UTI  Most  UTIs are treated with antibiotics. These kill the bacteria. The length of time you need to take them depends on the type of infection. It may be as short as 3 days. If you have repeated UTIs, you may need a low-dose antibiotic for several months. Take antibiotics exactly as directed. Don t stop taking them until all of the medicine is gone. If you stop taking the antibiotic too soon, the infection may not go away. You may also develop a resistance to the antibiotic. This can make it much harder to treat.  Lifestyle changes to treat and prevent UTIs  The lifestyle changes below will help get rid of your UTI. They may also help prevent future UTIs.    Drink plenty of fluids. This includes water, juice, or other caffeine-free drinks. Fluids help flush bacteria out of your body.    Empty your bladder. Always empty your bladder when you feel the urge to urinate. And always urinate before going to sleep. Urine that stays in your bladder can lead to infection. Try to urinate before and after sex as well.    Practice good personal hygiene. Wipe yourself from front to back after using the toilet. This helps keep bacteria from getting into the urethra.    Use condoms during sex. These help prevent UTIs caused by sexually transmitted bacteria. Also don't use spermicides during sex. These can increase the risk for UTIs. Choose other forms of birth control instead. For women who tend to get UTIs after sex, a low-dose of a preventive antibiotic may be used. Be sure to discuss this option with your healthcare provider.    Follow up with your healthcare provider as directed. He or she may test to make sure the infection has cleared. If needed, more treatment may be started.  Date Last Reviewed: 1/1/2017 2000-2018 [employer]. [address] [place] [address], [place] [address]. All rights reserved. This information is not intended as a substitute for professional medical care. Always follow your healthcare professional's  "instructions.                Follow-ups after your visit        Follow-up notes from your care team     Return in about 3 days (around 12/3/2018), or if symptoms worsen or fail to improve.      Your next 10 appointments already scheduled     Jan 08, 2019  7:45 AM CST   [facility] with Chapito Franco MD   [facility] ([facility]    [address] [place]  [facility] [place] [address]   [phone]              Who to contact     If you have questions or need follow up information about today's clinic visit or your schedule please contact [facility] directly at [phone].  Normal or non-critical lab and imaging results will be communicated to you by MyChart, letter or phone within 4 business days after the clinic has received the results. If you do not hear from us within 7 days, please contact the clinic through MyChart or phone. If you have a critical or abnormal lab result, we will notify you by phone as soon as possible.  Submit refill requests through [employer] or call your pharmacy and they will forward the refill request to us. Please allow 3 business days for your refill to be completed.          Additional Information About Your Visit        Care EveryWhere ID     This is your Care EveryWhere ID. This could be used by other organizations to access your [place] medical records  [id number]        Your Vitals Were     Pulse Temperature Respirations Height Pulse Oximetry Breastfeeding?    65 98.4  F (36.9  C) (Tympanic) 12 5' 3.75\" (1.619 m) 98% No    BMI (Body Mass Index)                   24.22 kg/m2            Blood Pressure from Last 3 Encounters:   11/30/18 124/74   11/28/18 147/75   09/05/18 122/70    Weight from Last 3 Encounters:   11/30/18 140 lb (63.5 kg)   09/05/18 138 lb (62.6 kg)   04/16/18 133 lb (60.3 kg)              We Performed the Following     *UA reflex to Microscopic and Culture ([place] and [facility] (except [place] and [place])     " Urine Culture Aerobic Bacterial     Urine Microscopic          Today's Medication Changes          These changes are accurate as of 11/30/18  7:54 AM.  If you have any questions, ask your nurse or doctor.               Start taking these medicines.        Dose/Directions    sulfamethoxazole-trimethoprim 800-160 MG tablet   Commonly known as:  BACTRIM DS/SEPTRA DS   Used for:  Urinary tract infection without hematuria, site unspecified   Started by:  Lucia Fowler APRN CNP        Dose:  1 tablet   Take 1 tablet by mouth 2 times daily for 5 days   Quantity:  10 tablet   Refills:  0            Where to get your medicines      These medications were sent to [facility] [place] [address]  [address] [facility] [address]     Phone:  [phone]     sulfamethoxazole-trimethoprim 800-160 MG tablet                Primary Care Provider Office Phone # Fax #    Eli Sommer [phone] [address]  [facility] [address]        Equal Access to Services     [facility]: Hadii shai ku hadasho Soomaali, waaxda luqadaha, qaybta kaalmada adeegyada, waxay idiin hayefra kaba . So [facility] [phone].    ATENCIÓN: Si habla español, tiene a anderson disposición servicios gratuitos de asistencia lingüística. Llame al [phone].    We comply with applicable federal civil rights laws and [place] laws. We do not discriminate on the basis of race, color, national origin, age, disability, sex, sexual orientation, or gender identity.            Thank you!     Thank you for choosing [facility]  for your care. Our goal is always to provide you with excellent care. Hearing back from our patients is one way we can continue to improve our services. Please take a few minutes to complete the written survey that you may receive in the mail after your visit with us. Thank you!             Your Updated Medication List -  Protect others around you: Learn how to safely use, store and throw away your medicines at www.disposemymeds.org.          This list is accurate as of 11/30/18  7:54 AM.  Always use your most recent med list.                   Brand Name Dispense Instructions for use Diagnosis    acidophilus Caps      Take 1 tablet by mouth daily        aspirin 81 MG tablet    ASA     1 TABLET DAILY        Calcium + D3 600-200 MG-UNIT Tabs      Take 1 tablet by mouth daily        glucosamine-chondroitin 500-400 MG Caps per capsule      Take 1 capsule by mouth daily        melatonin 5 MG Caps     1 capsule    Take 5 mg by mouth At Bedtime        Multi-vitamin tablet   Generic drug:  multivitamin w/minerals      1 TABLET DAILY        OMEGA-3 FISH OIL PO      Take 1 capsule by mouth daily        omeprazole 40 MG DR capsule    priLOSEC    90 capsule    Take 1 capsule (40 mg) by mouth daily    Gastroesophageal reflux disease without esophagitis       simvastatin 20 MG tablet    ZOCOR    90 tablet    Take 1 tablet (20 mg) by mouth At Bedtime    Hyperlipidemia LDL goal <160       sulfamethoxazole-trimethoprim 800-160 MG tablet    BACTRIM DS/SEPTRA DS    10 tablet    Take 1 tablet by mouth 2 times daily for 5 days    Urinary tract infection without hematuria, site unspecified       VITAMIN C ER PO      Take 1 tablet by mouth daily        VITAMIN D PO      1 DAILY

## 2018-12-02 LAB
BACTERIA SPEC CULT: ABNORMAL
Lab: ABNORMAL
SPECIMEN SOURCE: ABNORMAL

## 2018-12-04 ENCOUNTER — TELEPHONE (OUTPATIENT)
Dept: FAMILY MEDICINE | Facility: CLINIC | Age: 73
End: 2018-12-04

## 2018-12-04 NOTE — TELEPHONE ENCOUNTER
S-(situation): Patient complains of pressure.    B-(background): Patient saw chintan 11/30    A-(assessment): Pressure bend over and sit, pressure in the vaginal.  Patient states the painful urination.  Patient frequent urination but has been drinking more. Patient denies any blood in urine.  Patient has some back pain more on little more on the left side.  Patient just noticed it today.  Patient denies fever.  Patient denies any nausea or vomiting. Patient has one more dose left of the antibiotics.        R-(recommendations): Advised patient to continue to push fluids. Patient was advised if back pain or fever develops to be seen in UC/ER.  Will send to provider to review and advise.    Thank you  Penelope WALLER RN       Pharmacy- [facility].

## 2018-12-04 NOTE — TELEPHONE ENCOUNTER
Reason for call:  Patient reporting a symptom    Symptom or request: Pt was just given her UC results - She saw LIZ Fowler 11/30 and was put on 5 days of antibiotics.  Pt continues to have pain after urinating.  Please call patient and advise.      Duration (how long have symptoms been present): ongoing    Have you been treated for this before? Yes    Additional comments:     Phone Number patient can be reached at:  Home number on file [phone] (home)    Best Time:  any    Can we leave a detailed message on this number:  YES    Call taken on 12/4/2018 at 1:29 PM by Judith Lynn

## 2018-12-05 NOTE — TELEPHONE ENCOUNTER
Spoke to patient and advised she should be seen again as she is reporting Pressure is till present. She was offered follow up appt tomorrow, but cannot come in tomorrow, so scheduled Friday 12-7-18 for follow up. Angelic Plascencia RN

## 2018-12-05 NOTE — TELEPHONE ENCOUNTER
Pt calling to find out the plan - She states that she expected a call back yesterday?  Please call patient and advise.

## 2018-12-05 NOTE — TELEPHONE ENCOUNTER
Urine culture shows 10-50,000 colonies of e coli.   The bacteria should be covered by the Bactrim (antibiotic she is taking).      I would recommend if symptoms persist that she be seen again.      If there is vaginal pressure it's possible something else is going on (pelvic prolapse, etc). Doesn't appear that a vaginal exam was done when she was seen.    Eli Sommer M.D.

## 2018-12-07 ENCOUNTER — OFFICE VISIT (OUTPATIENT)
Dept: FAMILY MEDICINE | Facility: CLINIC | Age: 73
End: 2018-12-07
Payer: COMMERCIAL

## 2018-12-07 ENCOUNTER — TELEPHONE (OUTPATIENT)
Dept: FAMILY MEDICINE | Facility: CLINIC | Age: 73
End: 2018-12-07

## 2018-12-07 VITALS
HEART RATE: 70 BPM | DIASTOLIC BLOOD PRESSURE: 64 MMHG | WEIGHT: 137 LBS | TEMPERATURE: 97.4 F | BODY MASS INDEX: 23.39 KG/M2 | OXYGEN SATURATION: 95 % | RESPIRATION RATE: 18 BRPM | SYSTOLIC BLOOD PRESSURE: 114 MMHG | HEIGHT: 64 IN

## 2018-12-07 DIAGNOSIS — R30.0 DYSURIA: ICD-10-CM

## 2018-12-07 DIAGNOSIS — N36.2 URETHRAL CARUNCLE: Primary | ICD-10-CM

## 2018-12-07 LAB
ALBUMIN UR-MCNC: NEGATIVE MG/DL
APPEARANCE UR: CLEAR
BILIRUB UR QL STRIP: NEGATIVE
COLOR UR AUTO: YELLOW
GLUCOSE UR STRIP-MCNC: NEGATIVE MG/DL
HGB UR QL STRIP: NEGATIVE
KETONES UR STRIP-MCNC: NEGATIVE MG/DL
LEUKOCYTE ESTERASE UR QL STRIP: NEGATIVE
NITRATE UR QL: NEGATIVE
NON-SQ EPI CELLS #/AREA URNS LPF: NORMAL /LPF
PH UR STRIP: 6.5 PH (ref 5–7)
RBC #/AREA URNS AUTO: NORMAL /HPF
SOURCE: NORMAL
SP GR UR STRIP: 1.01 (ref 1–1.03)
UROBILINOGEN UR STRIP-ACNC: 0.2 EU/DL (ref 0.2–1)
WBC #/AREA URNS AUTO: NORMAL /HPF

## 2018-12-07 PROCEDURE — 99214 OFFICE O/P EST MOD 30 MIN: CPT | Performed by: FAMILY MEDICINE

## 2018-12-07 PROCEDURE — 81001 URINALYSIS AUTO W/SCOPE: CPT | Performed by: FAMILY MEDICINE

## 2018-12-07 RX ORDER — ESTRADIOL 0.1 MG/G
CREAM VAGINAL
Qty: 42.5 G | Refills: 1 | Status: SHIPPED | OUTPATIENT
Start: 2018-12-07 | End: 2019-09-23

## 2018-12-07 ASSESSMENT — PAIN SCALES - GENERAL: PAINLEVEL: SEVERE PAIN (7)

## 2018-12-07 NOTE — PROGRESS NOTES
"  SUBJECTIVE:   Mariah Jacinto is a 73 year old female who presents to clinic today for the following health issues:      Vaginal Symptoms  Onset: 1 week+    Description:  Vaginal Discharge: unable to describe   Itching (Pruritis): no   Burning sensation:  YES  Odor: YES    Accompanying Signs & Symptoms:  Pain with Urination: YES  Abdominal Pain: no   Fever: no     History:   Sexually active: no   New Partner: no   Possibility of Pregnancy:  No    Precipitating factors:   Recent Antibiotic Use: YES    Alleviating factors:      Therapies Tried and outcome: juice, water     Some dark ?blood vaginal discharge    /64  Pulse 70  Temp 97.4  F (36.3  C) (Tympanic)  Resp 18  Ht 5' 3.75\" (1.619 m)  Wt 137 lb (62.1 kg)  SpO2 95%  Breastfeeding? No  BMI 23.7 kg/m2  EXAM: GENERAL APPEARANCE: Alert, no acute distress   (female): dry vaginal vault, fleshy urethral caruncle noted with some friability.   '  Results for orders placed or performed in visit on 12/07/18   UA with Microscopic reflex to Culture   Result Value Ref Range    Color Urine Yellow     Appearance Urine Clear     Glucose Urine Negative NEG^Negative mg/dL    Bilirubin Urine Negative NEG^Negative    Ketones Urine Negative NEG^Negative mg/dL    Specific Gravity Urine 1.010 1.003 - 1.035    pH Urine 6.5 5.0 - 7.0 pH    Protein Albumin Urine Negative NEG^Negative mg/dL    Urobilinogen Urine 0.2 0.2 - 1.0 EU/dL    Nitrite Urine Negative NEG^Negative    Blood Urine Negative NEG^Negative    Leukocyte Esterase Urine Negative NEG^Negative    Source Midstream Urine     WBC Urine 0 - 5 OTO5^0 - 5 /HPF    RBC Urine O - 2 OTO2^O - 2 /HPF    Squamous Epithelial /LPF Urine Few FEW^Few /LPF         ASSESSMENT/PLAN:      ICD-10-CM    1. 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 Dysuria R30.0 UA with " Microscopic reflex to Culture     No evidence of ongoing UTI. Suspect symptoms are due to the urethral caruncle.  Needs vaginal estrogen cream to treat.   Patient understands.     Eli Sommer M.D.      Patient Instructions   Vaginal estrogen - small amount on your finger- wipe into your vagina/urethral area nightly at bedtime for 2 weeks and then twice a week.   Recheck in 2 months      Urethral caruncles are benign fleshy outgrowths at the urethral meatus, which are thought to be an eversion of the distal urethral urothelium. They are common lesions of the female urethra and occur primarily in postmenopausal women.  ?Urethral caruncles are thought to result from prolapse of a distal segment of urethral mucosa      Thank you for choosing [facility].  You may be receiving a survey in the mail from [facility] [employer] regarding your visit today.  Please take a few minutes to complete and return the survey to let us know how we are doing.      Our Clinic hours are:  Mondays    7:20 am - 7 pm  Tues -  Fri  7:20 am - 5 pm    Clinic Phone: [phone]    The clinic lab opens at 7:30 am Mon - Fri and appointments are required.    [facility]. [phone]  Monday  8 am - 7pm  Tues - Fri 8 am - 5:30 pm

## 2018-12-07 NOTE — MR AVS SNAPSHOT
After Visit Summary   12/7/2018    Mariah Jacinto    MRN: [id number]           Patient Information     Date Of Birth          1945        Visit Information        Provider Department      12/7/2018 9:00 AM Eli Sommer MD [facility]        Today's Diagnoses     Urethral caruncle    -  1    Dysuria          Care Instructions    Vaginal estrogen - small amount on your finger- wipe into your vagina/urethral area nightly at bedtime for 2 weeks and then twice a week.   Recheck in 2 months      Urethral caruncles are benign fleshy outgrowths at the urethral meatus, which are thought to be an eversion of the distal urethral urothelium. They are common lesions of the female urethra and occur primarily in postmenopausal women.  ?Urethral caruncles are thought to result from prolapse of a distal segment of urethral mucosa      Thank you for choosing [facility].  You may be receiving a survey in the mail from [facility] your visit today.  Please take a few minutes to complete and return the survey to let us know how we are doing.      Our Clinic hours are:  Mondays    7:20 am - 7 pm  Tues -  Fri  7:20 am - 5 pm    Clinic Phone: [phone]    The clinic lab opens at 7:30 am Mon - Fri and appointments are required.    [facility]  Ph. [phone]  Monday  8 am - 7pm  Tues - Fri 8 am - 5:30 pm                 Follow-ups after your visit        Follow-up notes from your care team     Return in about 2 months (around 2/7/2019) for recheck of urethral caruncle.      Your next 10 appointments already scheduled     Jan 08, 2019  7:45 AM CST   MOHS with Chapito Franco MD   [facility]    [address] [address]   [phone]              Who to contact     If you have questions or need follow up information about today's clinic visit or your schedule please contact [facility]  "[place] directly at [phone].  Normal or non-critical lab and imaging results will be communicated to you by MyChart, letter or phone within 4 business days after the clinic has received the results. If you do not hear from us within 7 days, please contact the clinic through MyChart or phone. If you have a critical or abnormal lab result, we will notify you by phone as soon as possible.  Submit refill requests through [employer] or call your pharmacy and they will forward the refill request to us. Please allow 3 business days for your refill to be completed.          Additional Information About Your Visit        Care EveryWhere ID     This is your Care EveryWhere ID. This could be used by other organizations to access your [place] medical records  [id number]        Your Vitals Were     Pulse Temperature Respirations Height Pulse Oximetry Breastfeeding?    70 97.4  F (36.3  C) (Tympanic) 18 5' 3.75\" (1.619 m) 95% No    BMI (Body Mass Index)                   23.7 kg/m2            Blood Pressure from Last 3 Encounters:   12/07/18 114/64   11/30/18 124/74   11/28/18 147/75    Weight from Last 3 Encounters:   12/07/18 137 lb (62.1 kg)   11/30/18 140 lb (63.5 kg)   09/05/18 138 lb (62.6 kg)              We Performed the Following     UA with Microscopic reflex to Culture          Today's Medication Changes          These changes are accurate as of 12/7/18  9:43 AM.  If you have any questions, ask your nurse or doctor.               Start taking these medicines.        Dose/Directions    conjugated estrogens 0.625 MG/GM vaginal cream   Commonly known as:  PREMARIN   Used for:  Urethral caruncle   Started by:  Eli Sommer MD        0.3 mg vaginally nightly for 2 weeks, then twice weekly   Quantity:  30 g   Refills:  12            Where to get your medicines      These medications were sent to [place] [address] [place] [address] [facility] [address]     Phone:  " [phone]     conjugated estrogens 0.625 MG/GM vaginal cream                Primary Care Provider Office Phone # Fax #    Eli Sommer [phone] [address] [facility] [address]        Equal Access to Services     CORNELL ANA LAURA : Hadii shai castaneda delgadoo Soangyali, waaxda luqadaha, qaybta kaalmada adeegyada, darin kimmyin hayaan ester foster laHortensiaefra zaidi. So [facility] [phone].    ATENCIÓN: Si habla español, tiene a anderson disposición servicios gratuitos de asistencia lingüística. Llame al [phone].    We comply with applicable federal civil rights laws and [place] laws. We do not discriminate on the basis of race, color, national origin, age, disability, sex, sexual orientation, or gender identity.            Thank you!     Thank you for choosing [facility]  for your care. Our goal is always to provide you with excellent care. Hearing back from our patients is one way we can continue to improve our services. Please take a few minutes to complete the written survey that you may receive in the mail after your visit with us. Thank you!             Your Updated Medication List - Protect others around you: Learn how to safely use, store and throw away your medicines at www.disposemymeds.org.          This list is accurate as of 12/7/18  9:43 AM.  Always use your most recent med list.                   Brand Name Dispense Instructions for use Diagnosis    acidophilus Caps      Take 1 tablet by mouth daily        aspirin 81 MG tablet    ASA     1 TABLET DAILY        Calcium + D3 600-200 MG-UNIT Tabs      Take 1 tablet by mouth daily        conjugated estrogens 0.625 MG/GM vaginal cream    PREMARIN    30 g    0.3 mg vaginally nightly for 2 weeks, then twice weekly    Urethral caruncle       glucosamine-chondroitin 500-400 MG Caps per capsule      Take 1 capsule by mouth daily        melatonin 5 MG Caps     1 capsule    Take 5 mg by mouth At Bedtime        Multi-vitamin tablet    Generic drug:  multivitamin w/minerals      1 TABLET DAILY        OMEGA-3 FISH OIL PO      Take 1 capsule by mouth daily        omeprazole 40 MG DR capsule    priLOSEC    90 capsule    Take 1 capsule (40 mg) by mouth daily    Gastroesophageal reflux disease without esophagitis       simvastatin 20 MG tablet    ZOCOR    90 tablet    Take 1 tablet (20 mg) by mouth At Bedtime    Hyperlipidemia LDL goal <160       VITAMIN C ER PO      Take 1 tablet by mouth daily        VITAMIN D PO      1 DAILY

## 2018-12-07 NOTE — PATIENT INSTRUCTIONS
Vaginal estrogen - small amount on your finger- wipe into your vagina/urethral area nightly at bedtime for 2 weeks and then twice a week.   Recheck in 2 months      Urethral caruncles are benign fleshy outgrowths at the urethral meatus, which are thought to be an eversion of the distal urethral urothelium. They are common lesions of the female urethra and occur primarily in postmenopausal women.  ?Urethral caruncles are thought to result from prolapse of a distal segment of urethral mucosa      Thank you for choosing [facility].  You may be receiving a survey in the mail from [facility] regarding your visit today.  Please take a few minutes to complete and return the survey to let us know how we are doing.      Our Clinic hours are:  Mondays    7:20 am - 7 pm  Tues -  Fri  7:20 am - 5 pm    Clinic Phone: [phone]    The clinic lab opens at 7:30 am Mon - Fri and appointments are required.    [facility]. [phone]  Monday  8 am - 7pm  Tues - Fri 8 am - 5:30 pm

## 2018-12-19 ENCOUNTER — OFFICE VISIT (OUTPATIENT)
Dept: DERMATOLOGY | Facility: CLINIC | Age: 73
End: 2018-12-19
Payer: COMMERCIAL

## 2018-12-19 VITALS — DIASTOLIC BLOOD PRESSURE: 79 MMHG | HEART RATE: 73 BPM | OXYGEN SATURATION: 98 % | SYSTOLIC BLOOD PRESSURE: 149 MMHG

## 2018-12-19 DIAGNOSIS — C44.41 BASAL CELL CARCINOMA (BCC) OF NECK: Primary | ICD-10-CM

## 2018-12-19 PROCEDURE — 17311 MOHS 1 STAGE H/N/HF/G: CPT | Performed by: DERMATOLOGY

## 2018-12-19 PROCEDURE — 13132 CMPLX RPR F/C/C/M/N/AX/G/H/F: CPT | Mod: 51 | Performed by: DERMATOLOGY

## 2018-12-19 NOTE — PATIENT INSTRUCTIONS
Sutured Wound Care     [facility]: [phone]    [facility]: [phone]    Right cheek/neck      ? No strenuous activity for 48 hours. Resume moderate activity in 48 hours. No heavy exercising until you are seen for follow up in one week.     ? Take Tylenol as needed for discomfort.                         ? Do not drink alcoholic beverages for 48 hours.     ? Keep the pressure bandage in place for 24 hours. If the bandage becomes blood tinged or loose, reinforce it with gauze and tape.        (Refer to the reverse side of this page for management of bleeding).    ? Remove pressure bandage in 24 hours     ? Leave the flat bandage in place until your follow up appointment.    ? Keep the bandage dry. Wash around it carefully.    ? If the tape becomes soiled or starts to come off, reinforce it with additional paper tape.    ? Do not smoke for 3 weeks; smoking is detrimental to wound healing.    ? It is normal to have swelling and bruising around the surgical site. The bruising will fade in approximately 10-14 days. Elevate the area to reduce swelling.    ? Numbness, itchiness and sensitivity to temperature changes can occur after surgery and may take up to 18 months to normalize.      POSSIBLE COMPLICATIONS    BLEEDIN. Leave the bandage in place.  2. Use tightly rolled up gauze or a cloth to apply direct pressure over the bandage for 20   minutes.  3. Reapply pressure for an additional 20 minutes if necessary  4. Call the office or go to the nearest emergency room if pressure fails to stop the bleeding.  5. Use additional gauze and tape to maintain pressure once the bleeding has stopped.        PAIN:    1. Post operative pain should slowly get better, never worse.  2. A severe increase in pain may indicate a problem. Call the office if this occurs.    In case of emergency phone:Dr Franco [phone]

## 2018-12-19 NOTE — NURSING NOTE
Chief Complaint   Patient presents with     Derm Problem     mohs       Vitals:    12/19/18 0756   BP: 149/79   Pulse: 73   SpO2: 98%     Wt Readings from Last 1 Encounters:   12/07/18 62.1 kg (137 lb)       Lori Chan LPN.................12/19/2018

## 2018-12-19 NOTE — PROGRESS NOTES
Mariah Jacinto is a 73 year old year old female patient here today for evaluation and managment of basal cell carcinoma on right ia neck. Patient reports the following modifying factors none.  Associated symptoms: none.  Patient has no other skin complaints today.  Remainder of the HPI, Meds, PMH, Allergies, FH, and SH was reviewed in chart.      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    celexa caused spinning side effect      MENOPAUSE --ERT      OSTEOPENIA      Squamous cell carcinoma        Past Surgical History:   Procedure Laterality Date     ABLATE VEIN VARICOSE RADIO FREQUENCY WITHOUT PHLEBECTOMY MULTIPLE STAB  3/28/2013    Procedure: ABLATE VEIN VARICOSE RADIO FREQUENCY WITHOUT PHLEBECTOMY MULTIPLE STAB;  Bilateral ablation of varicose veins legs-to ultrasound at 0930  ;  Surgeon: Noam Soliman MD;  Location: [facility]      ANESTH,LOWER ARM SURGERY  1999    ulnar nerve decompression - right     COLONOSCOPY  8/20/2013    Procedure: COLONOSCOPY;  Colonoscopy;  Surgeon: Yuliya Nathan MD;  Location: [facility]     ESOPHAGOSCOPY, GASTROSCOPY, DUODENOSCOPY (EGD), COMBINED N/A 5/12/2015    Procedure: COMBINED ESOPHAGOSCOPY, GASTROSCOPY, DUODENOSCOPY (EGD), BIOPSY SINGLE OR MULTIPLE;  Surgeon: Yuliya Nathan MD;  Location: [facility]     ESOPHAGOSCOPY, GASTROSCOPY, DUODENOSCOPY (EGD), COMBINED N/A 1/31/2017    Procedure: COMBINED ESOPHAGOSCOPY, GASTROSCOPY, DUODENOSCOPY (EGD), BIOPSY SINGLE OR MULTIPLE;  Surgeon: Qasim Bowie MD;  Location: [facility]     HYSTERECTOMY, PAP NO LONGER INDICATED      has both ovaries out also      HYSTERECTOMY, VAGINAL  1988    Hysterectomy, oophorectomy     RELEASE TRIGGER FINGER Right 6/23/2017    Procedure: RELEASE TRIGGER  FINGER;  Right Thumb A1 Pulley Release;  Surgeon: Jake Viveros MD;  Location: [facility] OR     SURGICAL HISTORY OF -       right retromastoid craniectomy and decompression trigeminal nerve     TONSILLECTOMY & ADENOIDECTOMY  child    T&A         Family History   Problem Relation Age of Onset     Alzheimer Disease Mother          at age 85     Osteoporosis Mother      Arthritis Mother      Alcohol/Drug Father      Respiratory Father      Eye Disorder Maternal Grandfather         Macular degneration     C.A.D. Brother         Tripple bypass age 57     Cardiovascular Brother      Cancer Brother         leukemia (ALL)     Cardiovascular Brother      Cardiovascular Brother      Neurologic Disorder Son      Heart Disease Son      Melanoma No family hx of      Skin Cancer No family hx of        Social History     Socioeconomic History     Marital status:      Spouse name: Not on file     Number of children: Not on file     Years of education: Not on file     Highest education level: Not on file   Social Needs     Financial resource strain: Not on file     Food insecurity - worry: Not on file     Food insecurity - inability: Not on file     Transportation needs - medical: Not on file     Transportation needs - non-medical: Not on file   Occupational History     Not on file   Tobacco Use     Smoking status: Never Smoker     Smokeless tobacco: Never Used   Substance and Sexual Activity     Alcohol use: No     Drug use: No     Sexual activity: Yes     Birth control/protection: Surgical     Comment: complete hysterectomy    Other Topics Concern     Parent/sibling w/ CABG, MI or angioplasty before 65F 55M? No   Social History Narrative     Not on file       Outpatient Encounter Medications as of 2018   Medication Sig Dispense Refill     Ascorbic Acid (VITAMIN C ER PO) Take 1 tablet by mouth daily        ASPIRIN 81 MG OR TABS 1 TABLET DAILY       Calcium Carb-Cholecalciferol (CALCIUM + D3) 600-200 MG-UNIT  TABS Take 1 tablet by mouth daily       estradiol (ESTRACE) 0.1 MG/GM vaginal cream Use 0.3 g (small amount) nightly for 2 weeks, then twice weekly 42.5 g 1     glucosamine-chondroitin 500-400 MG CAPS Take 1 capsule by mouth daily       Lactobacillus (ACIDOPHILUS) CAPS Take 1 tablet by mouth daily       melatonin 5 MG CAPS Take 5 mg by mouth At Bedtime 1 capsule 0     MULTI-VITAMIN OR TABS 1 TABLET DAILY       Omega-3 Fatty Acids (OMEGA-3 FISH OIL PO) Take 1 capsule by mouth daily       omeprazole (PRILOSEC) 40 MG capsule Take 1 capsule (40 mg) by mouth daily 90 capsule 3     simvastatin (ZOCOR) 20 MG tablet Take 1 tablet (20 mg) by mouth At Bedtime 90 tablet 3     VITAMIN D OR 1 DAILY       No facility-administered encounter medications on file as of 12/19/2018.              Review Of Systems  Skin: As above  Eyes: negative  Ears/Nose/Throat: negative  Respiratory: No shortness of breath, dyspnea on exertion, cough, or hemoptysis  Cardiovascular: negative  Gastrointestinal: negative  Genitourinary: negative  Musculoskeletal: negative  Neurologic: negative  Psychiatric: negative  Hematologic/Lymphatic/Immunologic: negative  Endocrine: negative      O:   NAD, WDWN, Alert & Oriented, Mood & Affect wnl, Vitals stable   Here today alone   /79   Pulse 73   SpO2 98%    General appearance normal   Vitals stable   Alert, oriented and in no acute distress     R IA neck 6mm pink pearly papule   Eyes: Conjunctivae/lids:Normal     ENT: Lips, buccal mucosa, tongue: normal    MSK:Normal    Cardiovascular: peripheral edema none    Pulm: Breathing Normal    Lymph Nodes: No Head and Neck Lymphadenopathy     Neuro/Psych: Orientation:Normal; Mood/Affect:Normal      A/P:  1. R IA neck basal cell carcinoma   MOHS:   Location    After PGACAC discussed with patient, decision for Mohs surgery was made. Indication for Mohs was Location. Patient confirmed the site with Dr. Franco.  After anesthesia with LEC, the tumor was excised  using standard Mohs technique in 1 stages(s).  CLEAR MARGINS OBTAINED and Final defect size was 1 cm.       REPAIR COMPLEX: Because of the tightness of the surrounding skin and Because of the size and full thickness nature of the defect, a complex closure was planned. After LEC anesthesia and prep, Burow's triangles were excised in the relaxed skin tension lines. The wound edges were widely undermined by dissection in the subcutaneous plane until adequate tissue mobility was obtained. Hemostasis was obtained. The wound edges were closed in a layered fashion using Vicryl and Fast Absorbing Plain Gut sutures. Postoperative length was 3.9 cm.   EBL minimal; complications none; wound care routine.  The patient was discharged in good condition and will return in one week for wound evaluation.  BENIGN LESIONS DISCUSSED WITH PATIENT:  I discussed the specifics of tumor, prognosis, and genetics of benign lesions.  I explained that treatment of these lesions would be purely cosmetic and not medically neccessary.  I discussed with patient different removal options including excision, cautery and /or laser.      Nature and genetics of benign skin lesions dicussed with patient.  Signs and Symptoms of skin cancer discussed with patient.  ABCDEs of melanoma reviewed with patient.  Patient encouraged to perform monthly skin exams.  UV precautions reviewed with patient.  Patient to follow up with Primary Care provider regarding elevated blood pressure.  Skin care regimen reviewed with patient: Eliminate harsh soaps, i.e. Dial, zest, irsih spring; Mild soaps such as Cetaphil or Dove sensitive skin, avoid hot or cold showers, aggressive use of emollients including vanicream, cetaphil or cerave discussed with patient.    Risks of non-melanoma skin cancer discussed with patient   Return to clinic 6 months

## 2018-12-19 NOTE — LETTER
12/19/2018         RE: Mariah Jacinto  [address] [facility] [address]        Dear Colleague,    Thank you for referring your patient, Mariah Jacinto, to the [facility]. Please see a copy of my visit note below.    Mariah Jacinto is a 73 year old year old female patient here today for evaluation and managment of basal cell carcinoma on right ia neck. Patient reports the following modifying factors none.  Associated symptoms: none.  Patient has no other skin complaints today.  Remainder of the HPI, Meds, PMH, Allergies, FH, and SH was reviewed in chart.      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    celexa caused spinning side effect      MENOPAUSE --ERT      OSTEOPENIA      Squamous cell carcinoma        Past Surgical History:   Procedure Laterality Date     ABLATE VEIN VARICOSE RADIO FREQUENCY WITHOUT PHLEBECTOMY MULTIPLE STAB  3/28/2013    Procedure: ABLATE VEIN VARICOSE RADIO FREQUENCY WITHOUT PHLEBECTOMY MULTIPLE STAB;  Bilateral ablation of varicose veins legs-to ultrasound at 0930  ;  Surgeon: Noam Soliman MD;  Location: [facility] OR      ANESTH,LOWER ARM SURGERY  1999    ulnar nerve decompression - right     COLONOSCOPY  8/20/2013    Procedure: COLONOSCOPY;  Colonoscopy;  Surgeon: Yuliya Nathan MD;  Location: [facility] GI     ESOPHAGOSCOPY, GASTROSCOPY, DUODENOSCOPY (EGD), COMBINED N/A 5/12/2015    Procedure: COMBINED ESOPHAGOSCOPY, GASTROSCOPY, DUODENOSCOPY (EGD), BIOPSY SINGLE OR MULTIPLE;  Surgeon: Yuliya Nathan MD;  Location: [facility]     ESOPHAGOSCOPY, GASTROSCOPY, DUODENOSCOPY (EGD), COMBINED N/A 1/31/2017    Procedure: COMBINED ESOPHAGOSCOPY, GASTROSCOPY, DUODENOSCOPY (EGD), BIOPSY SINGLE OR MULTIPLE;   Surgeon: Qasmi Bowie MD;  Location: [facility]     HYSTERECTOMY, PAP NO LONGER INDICATED      has both ovaries out also      HYSTERECTOMY, VAGINAL      Hysterectomy, oophorectomy     RELEASE TRIGGER FINGER Right 2017    Procedure: RELEASE TRIGGER FINGER;  Right Thumb A1 Pulley Release;  Surgeon: Jake Viveros MD;  Location: [facility] OR     SURGICAL HISTORY OF -       right retromastoid craniectomy and decompression trigeminal nerve     TONSILLECTOMY & ADENOIDECTOMY  child    T&A         Family History   Problem Relation Age of Onset     Alzheimer Disease Mother          at age 85     Osteoporosis Mother      Arthritis Mother      Alcohol/Drug Father      Respiratory Father      Eye Disorder Maternal Grandfather         Macular degneration     C.A.D. Brother         Tripple bypass age 57     Cardiovascular Brother      Cancer Brother         leukemia (ALL)     Cardiovascular Brother      Cardiovascular Brother      Neurologic Disorder Son      Heart Disease Son      Melanoma No family hx of      Skin Cancer No family hx of        Social History     Socioeconomic History     Marital status:      Spouse name: Not on file     Number of children: Not on file     Years of education: Not on file     Highest education level: Not on file   Social Needs     Financial resource strain: Not on file     Food insecurity - worry: Not on file     Food insecurity - inability: Not on file     Transportation needs - medical: Not on file     Transportation needs - non-medical: Not on file   Occupational History     Not on file   Tobacco Use     Smoking status: Never Smoker     Smokeless tobacco: Never Used   Substance and Sexual Activity     Alcohol use: No     Drug use: No     Sexual activity: Yes     Birth control/protection: Surgical     Comment: complete hysterectomy    Other Topics Concern     Parent/sibling w/ CABG, MI or angioplasty before 65F 55M? No   Social History Narrative     Not on file        Outpatient Encounter Medications as of 12/19/2018   Medication Sig Dispense Refill     Ascorbic Acid (VITAMIN C ER PO) Take 1 tablet by mouth daily        ASPIRIN 81 MG OR TABS 1 TABLET DAILY       Calcium Carb-Cholecalciferol (CALCIUM + D3) 600-200 MG-UNIT TABS Take 1 tablet by mouth daily       estradiol (ESTRACE) 0.1 MG/GM vaginal cream Use 0.3 g (small amount) nightly for 2 weeks, then twice weekly 42.5 g 1     glucosamine-chondroitin 500-400 MG CAPS Take 1 capsule by mouth daily       Lactobacillus (ACIDOPHILUS) CAPS Take 1 tablet by mouth daily       melatonin 5 MG CAPS Take 5 mg by mouth At Bedtime 1 capsule 0     MULTI-VITAMIN OR TABS 1 TABLET DAILY       Omega-3 Fatty Acids (OMEGA-3 FISH OIL PO) Take 1 capsule by mouth daily       omeprazole (PRILOSEC) 40 MG capsule Take 1 capsule (40 mg) by mouth daily 90 capsule 3     simvastatin (ZOCOR) 20 MG tablet Take 1 tablet (20 mg) by mouth At Bedtime 90 tablet 3     VITAMIN D OR 1 DAILY       No facility-administered encounter medications on file as of 12/19/2018.              Review Of Systems  Skin: As above  Eyes: negative  Ears/Nose/Throat: negative  Respiratory: No shortness of breath, dyspnea on exertion, cough, or hemoptysis  Cardiovascular: negative  Gastrointestinal: negative  Genitourinary: negative  Musculoskeletal: negative  Neurologic: negative  Psychiatric: negative  Hematologic/Lymphatic/Immunologic: negative  Endocrine: negative      O:   NAD, WDWN, Alert & Oriented, Mood & Affect wnl, Vitals stable   Here today alone   /79   Pulse 73   SpO2 98%    General appearance normal   Vitals stable   Alert, oriented and in no acute distress     R IA neck 6mm pink pearly papule   Eyes: Conjunctivae/lids:Normal     ENT: Lips, buccal mucosa, tongue: normal    MSK:Normal    Cardiovascular: peripheral edema none    Pulm: Breathing Normal    Lymph Nodes: No Head and Neck Lymphadenopathy     Neuro/Psych: Orientation:Normal;  Mood/Affect:Normal      A/P:  1. R IA neck basal cell carcinoma   MOHS:   Location    After PGACAC discussed with patient, decision for Mohs surgery was made. Indication for Mohs was Location. Patient confirmed the site with Dr. Franco.  After anesthesia with LEC, the tumor was excised using standard Mohs technique in 1 stages(s).  CLEAR MARGINS OBTAINED and Final defect size was 1 cm.       REPAIR COMPLEX: Because of the tightness of the surrounding skin and Because of the size and full thickness nature of the defect, a complex closure was planned. After LEC anesthesia and prep, Burow's triangles were excised in the relaxed skin tension lines. The wound edges were widely undermined by dissection in the subcutaneous plane until adequate tissue mobility was obtained. Hemostasis was obtained. The wound edges were closed in a layered fashion using Vicryl and Fast Absorbing Plain Gut sutures. Postoperative length was 3.9 cm.   EBL minimal; complications none; wound care routine.  The patient was discharged in good condition and will return in one week for wound evaluation.  BENIGN LESIONS DISCUSSED WITH PATIENT:  I discussed the specifics of tumor, prognosis, and genetics of benign lesions.  I explained that treatment of these lesions would be purely cosmetic and not medically neccessary.  I discussed with patient different removal options including excision, cautery and /or laser.      Nature and genetics of benign skin lesions dicussed with patient.  Signs and Symptoms of skin cancer discussed with patient.  ABCDEs of melanoma reviewed with patient.  Patient encouraged to perform monthly skin exams.  UV precautions reviewed with patient.  Patient to follow up with Primary Care provider regarding elevated blood pressure.  Skin care regimen reviewed with patient: Eliminate harsh soaps, i.e. Dial, zest, irsih spring; Mild soaps such as Cetaphil or Dove sensitive skin, avoid hot or cold showers, aggressive use of  emollients including vanicream, cetaphil or cerave discussed with patient.    Risks of non-melanoma skin cancer discussed with patient   Return to clinic 6 months    Again, thank you for allowing me to participate in the care of your patient.        Sincerely,        Chapito Franco MD

## 2018-12-26 ENCOUNTER — ALLIED HEALTH/NURSE VISIT (OUTPATIENT)
Dept: DERMATOLOGY | Facility: CLINIC | Age: 73
End: 2018-12-26
Payer: COMMERCIAL

## 2018-12-26 DIAGNOSIS — Z48.01 ENCOUNTER FOR CHANGE OR REMOVAL OF SURGICAL WOUND DRESSING: Primary | ICD-10-CM

## 2018-12-26 PROCEDURE — 99207 ZZC NO CHARGE NURSE ONLY: CPT

## 2018-12-26 NOTE — PROGRESS NOTES
Pt returned to clinic for post surgery 1 week follow up bandage change. Pt has no complaints, denies pain. Bandage removed from right side of neck, area cleansed with normal saline. Site is healing and wound edges approximating well. Reapplied new steri strips and paper tape.    Advised to watch for signs/sx of infection; spreading redness, drainage, odor, fever. Call or report promptly to clinic. Pt given written instructions and informed to rtc as needed. Patient verbalized understanding.     Kristy Castillo LPN

## 2018-12-26 NOTE — PATIENT INSTRUCTIONS
WOUND CARE INSTRUCTIONS  for  ONE WEEK AFTER SURGERY  Right side of neck          1) Leave flat bandage on your skin for one week after today s bandage change.  2) In one week when you remove the bandage, you may resume your regular skin care routine, including washing with mild soap and water, applying moisturizer, make-up and sunscreen.    3) If there are any open or bleeding areas at the incision/graft site you should begin to cover the area with a bandage daily as follows:    1) Clean and dry the area with plain tap water using a Q-tip or sterile gauze pad.  2) Apply Polysporin or Bacitracin ointment to the open area.  3) Cover the wound with a band-aid or a sterile non-stick gauze pad and micropore paper tape.         SIGNS OF INFECTION  - If you notice any of these signs of infection, call your doctor right away: expanding redness around the wound.  - Yellow or greenish-colored pus or cloudy wound drainage.    - Red streaking spreading from the wound.  - Increased swelling, tenderness, or pain around the wound.   - Fever.    Please remember that yellow and clear drainage from a wound can be normal and related to normal wound healing.  Isolated drainage from a wound without a combination of the above features does not indicate infection.       *Once the bandages are removed, the scar will be red and firm (especially in the lip/chin area). This is normal and will fade in time. It might take 6-12 months for this to happen.     *Massaging the area will help the scar soften and fade quicker. Begin to massage the area one month after the bandages have been removed. To massage apply pressure directly and firmly over the scar with the fingertips and move in a circular motion. Massage the area for a few minutes several times a day. Continue to massage the site for several months.    *Approximately 6-8 weeks after surgery it is not uncommon to see the formation of  tender pimple-like  bump along the scar. This is  normal. As the scar continues to mature and the stitches underneath the skin begin to dissolve, this might occur. Do not pick or squeeze, this will resolve on it s own. Should one break open producing a small amount of drainage, apply Polysporin or Bacitracin ointment a few times a day until the wound is completely healed.    *Numbness in the surgical area is expected. It might take 12-18 months for the feeling to return to normal. During this time sensations of itchiness, tingling and occasional sharp pains might be noted. These feelings are normal and will subside once the nerves have completely healed.         IN CASE OF EMERGENCY: Dr Franco [phone]     If you were seen in [place] call: [phone]    If you were seen in [place] call: [phone]

## 2019-01-07 ENCOUNTER — OFFICE VISIT (OUTPATIENT)
Dept: FAMILY MEDICINE | Facility: CLINIC | Age: 74
End: 2019-01-07
Payer: COMMERCIAL

## 2019-01-07 VITALS
WEIGHT: 138 LBS | HEART RATE: 74 BPM | RESPIRATION RATE: 18 BRPM | DIASTOLIC BLOOD PRESSURE: 60 MMHG | BODY MASS INDEX: 23.56 KG/M2 | SYSTOLIC BLOOD PRESSURE: 118 MMHG | TEMPERATURE: 97.2 F | HEIGHT: 64 IN | OXYGEN SATURATION: 98 %

## 2019-01-07 DIAGNOSIS — R22.31 MASS OF FINGER OF RIGHT HAND: Primary | ICD-10-CM

## 2019-01-07 PROCEDURE — 99213 OFFICE O/P EST LOW 20 MIN: CPT | Performed by: FAMILY MEDICINE

## 2019-01-07 ASSESSMENT — MIFFLIN-ST. JEOR: SCORE: 1106.99

## 2019-01-07 ASSESSMENT — PAIN SCALES - GENERAL: PAINLEVEL: MILD PAIN (2)

## 2019-01-07 NOTE — PROGRESS NOTES
"  SUBJECTIVE:   Mariah Jacinto is a 73 year old female who presents to clinic today for the following health issues:  Chief Complaint   Patient presents with     Finger     Patient noticed a bump on right ring finger for the last 2 weeks that she is now bumping it which will trigger pain. Patient rates pain at a 2/10 currently and 10/10 when bumped. Patient has no swelling, redness.              Musculoskeletal problem/pain      Duration: 1 week    Description  Location: right ring finger DIP joint    Intensity:  moderate    Accompanying signs and symptoms: none    History  Previous similar problem: no   Previous evaluation:  none    Precipitating or alleviating factors:  Trauma or overuse: no   Aggravating factors include: if she bumps it it really hurts    Therapies tried and outcome: nothing      Problem list and histories reviewed & adjusted, as indicated.  Additional history: as documented       /60   Pulse 74   Temp 97.2  F (36.2  C) (Tympanic)   Resp 18   Ht 1.619 m (5' 3.75\")   Wt 62.6 kg (138 lb)   SpO2 98%   BMI 23.87 kg/m    EXAM: GENERAL APPEARANCE ADULT: Alert, no acute distress  MS: palmar aspect of the right ring finger over the DIP joint there is da 3 mm tender nodule (?ganglion cyst) no triggering    ASSESSMENT/PLAN:      ICD-10-CM    1. Mass of finger of right hand R22.31 ORTHO  REFERRAL     Suspect ganglion but b/c it is tender/symptomatic, will refer to Dr. Cai for definitive treatment.    Eli Sommer M.D.      Patient Instructions         Thank you for choosing [facility].  You may be receiving a survey in the mail from [facility] regarding your visit today.  Please take a few minutes to complete and return the survey to let us know how we are doing.      Our Clinic hours are:  Mondays    7:20 am - 7 pm  Tues -  Fri  7:20 am - 5 pm    Clinic Phone: [phone]    The clinic lab opens at 7:30 am Mon - Fri and appointments are required.    [facility] " [facility]. [phone]  Monday  8 am - 7pm  Tues - Fri 8 am - 5:30 pm

## 2019-01-07 NOTE — PATIENT INSTRUCTIONS
Thank you for choosing [facility].  You may be receiving a survey in the mail from [facility] regarding your visit today.  Please take a few minutes to complete and return the survey to let us know how we are doing.      Our Clinic hours are:  Mondays    7:20 am - 7 pm  Tues - Fri  7:20 am - 5 pm    Clinic Phone: [phone]    The clinic lab opens at 7:30 am Mon - Fri and appointments are required.    [facility]. [phone]  Monday  8 am - 7pm  Tues - Fri 8 am - 5:30 pm

## 2019-01-15 ENCOUNTER — TRANSFERRED RECORDS (OUTPATIENT)
Dept: HEALTH INFORMATION MANAGEMENT | Facility: CLINIC | Age: 74
End: 2019-01-15

## 2019-01-22 ENCOUNTER — HOSPITAL ENCOUNTER (OUTPATIENT)
Dept: MRI IMAGING | Facility: CLINIC | Age: 74
Discharge: HOME | End: 2019-01-22
Attending: FAMILY MEDICINE | Admitting: FAMILY MEDICINE
Payer: COMMERCIAL

## 2019-01-22 DIAGNOSIS — M79.646 FINGER PAIN: ICD-10-CM

## 2019-01-22 PROCEDURE — 73221 MRI JOINT UPR EXTREM W/O DYE: CPT | Mod: RT

## 2019-02-05 ENCOUNTER — NURSE TRIAGE (OUTPATIENT)
Dept: NURSING | Facility: CLINIC | Age: 74
End: 2019-02-05
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
## 2019-02-12 ENCOUNTER — TELEPHONE (OUTPATIENT)
Dept: FAMILY MEDICINE | Facility: CLINIC | Age: 74
End: 2019-02-12

## 2019-02-12 NOTE — TELEPHONE ENCOUNTER
Reason for Call:  Other call back    Detailed comments: Patient had to reschedule her appointment to March 8.  She wants to know what she is supposed to do about her Estradiol cream routine?  Same as she has been doing ?  or stop?    Phone Number Patient can be reached at: Home number on file [phone] (home)    Best Time: any    Can we leave a detailed message on this number? YES    Call taken on 2/12/2019 at 8:11 AM by Rachel Biggs

## 2019-02-12 NOTE — TELEPHONE ENCOUNTER
Please review message below.  OV notes from 12/7/18:    Patient Instructions   Vaginal estrogen - small amount on your finger- wipe into your vagina/urethral area nightly at bedtime for 2 weeks and then twice a week.   Recheck in 2 months     Please review and advise.    Thank you    Penelope WALLER RN

## 2019-02-19 ENCOUNTER — HOSPITAL ENCOUNTER (OUTPATIENT)
Facility: CLINIC | Age: 74
Discharge: HOME | End: 2019-02-19
Attending: ORTHOPAEDIC SURGERY | Admitting: ORTHOPAEDIC SURGERY
Payer: COMMERCIAL

## 2019-02-19 VITALS
WEIGHT: 135 LBS | OXYGEN SATURATION: 96 % | TEMPERATURE: 98 F | HEIGHT: 64 IN | SYSTOLIC BLOOD PRESSURE: 159 MMHG | DIASTOLIC BLOOD PRESSURE: 69 MMHG | HEART RATE: 82 BPM | BODY MASS INDEX: 23.05 KG/M2 | RESPIRATION RATE: 16 BRPM

## 2019-02-19 DIAGNOSIS — R22.31 MASS OF FINGER OF RIGHT HAND: Primary | ICD-10-CM

## 2019-02-19 PROCEDURE — 36000050 ZZH SURGERY LEVEL 2 1ST 30 MIN: Performed by: ORTHOPAEDIC SURGERY

## 2019-02-19 PROCEDURE — 25000132 ZZH RX MED GY IP 250 OP 250 PS 637: Performed by: PHYSICIAN ASSISTANT

## 2019-02-19 PROCEDURE — 25000125 ZZHC RX 250: Performed by: ORTHOPAEDIC SURGERY

## 2019-02-19 PROCEDURE — 88305 TISSUE EXAM BY PATHOLOGIST: CPT | Mod: 26 | Performed by: ORTHOPAEDIC SURGERY

## 2019-02-19 PROCEDURE — 25000128 H RX IP 250 OP 636: Performed by: ORTHOPAEDIC SURGERY

## 2019-02-19 PROCEDURE — 40000305 ZZH STATISTIC PRE PROC ASSESS I: Performed by: ORTHOPAEDIC SURGERY

## 2019-02-19 PROCEDURE — 71000027 ZZH RECOVERY PHASE 2 EACH 15 MINS: Performed by: ORTHOPAEDIC SURGERY

## 2019-02-19 PROCEDURE — 88305 TISSUE EXAM BY PATHOLOGIST: CPT | Performed by: ORTHOPAEDIC SURGERY

## 2019-02-19 PROCEDURE — 27210794 ZZH OR GENERAL SUPPLY STERILE: Performed by: ORTHOPAEDIC SURGERY

## 2019-02-19 RX ORDER — LIDOCAINE HYDROCHLORIDE 10 MG/ML
INJECTION, SOLUTION INFILTRATION; PERINEURAL PRN
Status: DISCONTINUED | OUTPATIENT
Start: 2019-02-19 | End: 2019-02-19 | Stop reason: HOSPADM

## 2019-02-19 RX ORDER — CEPHALEXIN 500 MG/1
1000 CAPSULE ORAL
Status: COMPLETED | OUTPATIENT
Start: 2019-02-19 | End: 2019-02-19

## 2019-02-19 RX ORDER — BUPIVACAINE HYDROCHLORIDE 5 MG/ML
INJECTION, SOLUTION PERINEURAL PRN
Status: DISCONTINUED | OUTPATIENT
Start: 2019-02-19 | End: 2019-02-19 | Stop reason: HOSPADM

## 2019-02-19 RX ORDER — ACETAMINOPHEN 325 MG/1
650 TABLET ORAL
Status: DISCONTINUED | OUTPATIENT
Start: 2019-02-19 | End: 2019-02-19 | Stop reason: HOSPADM

## 2019-02-19 RX ADMIN — CEPHALEXIN 1000 MG: 500 CAPSULE ORAL at 14:49

## 2019-02-19 ASSESSMENT — MIFFLIN-ST. JEOR: SCORE: 1097.36

## 2019-02-19 NOTE — DISCHARGE INSTRUCTIONS
Same Day Surgery Discharge Instructions  Special Precautions After Surgery - Adult    1. It is not unusual to feel lightheaded or faint, up to 24 hours after surgery or while taking pain medication.  If you have these symptoms; sit for a few minutes before standing and have someone assist you when getting up.  2. You should rest and relax for the next 24 hours and must have someone stay with you for at least 24 hours after your discharge.  3. DO NOT DRIVE any vehicle or operate mechanical equipment for 24 hours following the end of your surgery.  DO NOT DRIVE while taking narcotic pain medications that have been prescribed by your physician.  If you had a limb operated on, you must be able to use it fully to drive.  4. DO NOT drink alcoholic beverages for 24 hours following surgery or while taking prescription pain medication.  5. Drink clear liquids (apple juice, ginger ale, broth, 7-Up, etc.).  Progress to your regular diet as you feel able.  6. Any questions call your physician and do not make important decisions for 24 hours.      [facility]:  [phone]     Same Day Surgery [phone], Monday thru Friday 6am-9pm.    Break through Bleeding  As instructed per Surgeon or Nurse.    Post Op Infection  Be alert for signs of infection: redness, swelling, heat, drainage of pus, and/or elevated temperature.  Contact your surgeon if these occur.    Nausea   If post op nausea occurs, at first rest your stomach for a few hours by eating nothing solid and sipping only clear liquids.  Call your Surgeon if nausea does not resolve in 24 hours.

## 2019-02-19 NOTE — OP NOTE
Preoperative diagnosis: Right ring finger subcutaneous mass      Postoperative diagnosis: Right ring finger subcutaneous mass, probable hemangioma    Procedure: Excision right ring finger subcutaneous mass    Anesthesia: Local    Surgeon: Jake Gainest.: Klarissa Padilla PA-C    Procedure: Patient was taken to the main operating suite. Once there she was transferred over to the operating table in the supine position. A digital block was given with a 50:50 mixture of 1% lidocaine plain and half percent Marcaine plain. The finger was exsanguinated with a Ray-Dona sponge and a Penrose drain was used as tourniquet.  An oblique incision was made overlying the mass which was essentially at the level of the DIP flexion crease.  The mass easily emanated into the wound and was consistent with a hemangioma. The feeder vessels were cauterized and it was excised in its entirety measuring approximately 4 mm in diameter. The wound is irrigated and closed with 4-0 nylon in horizontal mattress fashion. A dressing consisting of Xeroform gauze 2 x 2's and loosely applied Darin was applied. The Penrose was removed prior to application of the dressing. Loosely applied Coban was applied over the top. The patient was transferred to the transfer cart and taken to the recovery room in stable condition and breathing spontaneously.

## 2019-02-21 LAB — COPATH REPORT: NORMAL

## 2019-02-26 ENCOUNTER — TRANSFERRED RECORDS (OUTPATIENT)
Dept: HEALTH INFORMATION MANAGEMENT | Facility: CLINIC | Age: 74
End: 2019-02-26

## 2019-03-08 ENCOUNTER — OFFICE VISIT (OUTPATIENT)
Dept: FAMILY MEDICINE | Facility: CLINIC | Age: 74
End: 2019-03-08
Payer: COMMERCIAL

## 2019-03-08 VITALS
OXYGEN SATURATION: 96 % | HEIGHT: 64 IN | HEART RATE: 79 BPM | RESPIRATION RATE: 18 BRPM | DIASTOLIC BLOOD PRESSURE: 68 MMHG | SYSTOLIC BLOOD PRESSURE: 120 MMHG | TEMPERATURE: 97.8 F | BODY MASS INDEX: 23.39 KG/M2 | WEIGHT: 137 LBS

## 2019-03-08 DIAGNOSIS — N36.2 URETHRAL CARUNCLE: Primary | ICD-10-CM

## 2019-03-08 PROCEDURE — 99213 OFFICE O/P EST LOW 20 MIN: CPT | Performed by: FAMILY MEDICINE

## 2019-03-08 RX ORDER — ACETAMINOPHEN 325 MG/1
325-650 TABLET ORAL 2 TIMES DAILY
COMMUNITY
End: 2020-09-23

## 2019-03-08 ASSESSMENT — MIFFLIN-ST. JEOR: SCORE: 1106.43

## 2019-03-08 ASSESSMENT — PAIN SCALES - GENERAL: PAINLEVEL: NO PAIN (0)

## 2019-03-08 NOTE — PATIENT INSTRUCTIONS
Our Clinic hours are:  Mondays    7:20 am - 7 pm  Tues -  Fri  7:20 am - 5 pm    Clinic Phone: [phone]    The clinic lab opens at 7:30 am Mon - Fri and appointments are required.    [facility]. [phone]  Monday  8 am - 7pm  Tues - Fri 8 am - 5:30 pm

## 2019-03-08 NOTE — PROGRESS NOTES
"  SUBJECTIVE:   Mariah Jacinto is a 73 year old female who presents to clinic today for the following health issues:      Chief Complaint   Patient presents with     Vaginal Problem     Patient is here to have growth on urethra checked. Patient noticed a colorless odor discharge from vagina. Patient has no additional vaginal complaints or UA symptoms besides the odor.      Circulation Problems     Patient notices she is always cold through out her body. Patient has no purple/blue coloration on body.     SUBJECTIVE:  Mariah Jacinto, a 73 year old female scheduled an appointment to discuss the following issues:  Urethral caruncle     Here for recheck on the urethral caruncle.   No symptoms, overall doing well.     Medical, social, surgical, and family histories reviewed.    ROS:  5 point ROS negative except as noted above in HPI, including Gen., Resp., CV, GI &  system review.  No claudication    OBJECTIVE:  /68   Pulse 79   Temp 97.8  F (36.6  C) (Tympanic)   Resp 18   Ht 1.626 m (5' 4\")   Wt 62.1 kg (137 lb)   SpO2 96%   BMI 23.52 kg/m    EXAM:  GENERAL APPEARANCE ADULT: Alert, no acute distress  CV: normal rate, regular rhythm, no murmur or gallop   (female): vagina normal, smaller urethral caruncle without firability    ASSESSMENT/PLAN:    (N36.2) Urethral caruncle  (primary encounter diagnosis)  Comment: has improved over the past three months  Plan: continue vaginal estrogen.     Cold intolerance- TSH has been checked and normal. Pulses full.  Doubt circulatory.    Eli Sommer M.D.      Patient Instructions       Our Clinic hours are:  Mondays    7:20 am - 7 pm  Tues -  Fri  7:20 am - 5 pm    Clinic Phone: [phone]    The clinic lab opens at 7:30 am Mon - Fri and appointments are required.    [facility]. [phone]  Monday  8 am - 7pm  Tues - Fri 8 am - 5:30 pm                     "

## 2019-03-14 NOTE — H&P
[facility]  [facility]  [address]  [facility] [address]  [phone]  Dept: [phone]    OPHTHALMOLOGY PRE-OPERATIVE  HISTORY AND PHYSICAL    DATE OF H/P:  3/13/2019    DATE OF SURGERY:  2019  PROCEDURE:  Procedure(s):  Cataract Removal with Implant, Left Eye  LENS IMPLANT:  [id number] +20.0  REFRACTIVE GOAL:  PL Sph  SURGEON:  Chapito Phillips MD    ANESTHESIA:  TOPICAL / MAC    OR CASE REQUIREMENTS:    DEMOGRAPHICS:  Demographic Information on Mariah Jacinto:    Mariah Jacinto  Gender: female  : 1945  [address] [facility] [address] [phone] (home)     Medical Record: [id number]  Social Security Number: [id number]  Pharmacy: [facility] [address] [place] [address]  Primary Care Provider: Eli Sommer    Parent's names are: Data Unavailable (mother) and Data Unavailable (father).    Insurance: Payor: [employer] / Plan: [employer] MEDICARE / Product Type: HMO /     OCULAR HISTORY:  Cataract, each eye.    HISTORIES:  Past Medical History:   Diagnosis Date     Adhesive capsulitis of shoulder     Right shoulder tendonitis     Basal cell carcinoma      Displacement of cervical intervertebral disc without myelopathy      Esophageal reflux      Major depression in complete remission (H) 2009    celexa caused spinning side effect      MENOPAUSE --ERT      OSTEOPENIA      Squamous cell carcinoma        Past Surgical History:   Procedure Laterality Date     ABLATE VEIN VARICOSE RADIO FREQUENCY WITHOUT PHLEBECTOMY MULTIPLE STAB  3/28/2013    Procedure: ABLATE VEIN VARICOSE RADIO FREQUENCY WITHOUT PHLEBECTOMY MULTIPLE STAB;  Bilateral ablation of varicose veins legs-to ultrasound at 0930  ;  Surgeon: Noam Soliman MD;  Location: [facility]     C ANESTH,LOWER ARM SURGERY      ulnar nerve decompression - right     COLONOSCOPY  2013    Procedure: COLONOSCOPY;  Colonoscopy;  Surgeon: Yuliya Nathan MD;  Location: [facility]      ESOPHAGOSCOPY, GASTROSCOPY, DUODENOSCOPY (EGD), COMBINED N/A 2015    Procedure: COMBINED ESOPHAGOSCOPY, GASTROSCOPY, DUODENOSCOPY (EGD), BIOPSY SINGLE OR MULTIPLE;  Surgeon: Yuliya Nathan MD;  Location: [facility]     ESOPHAGOSCOPY, GASTROSCOPY, DUODENOSCOPY (EGD), COMBINED N/A 2017    Procedure: COMBINED ESOPHAGOSCOPY, GASTROSCOPY, DUODENOSCOPY (EGD), BIOPSY SINGLE OR MULTIPLE;  Surgeon: Qasim Bowie MD;  Location: [facility]     EXCISE MASS FINGER Right 2019    Procedure: Excision Right Ring Finger Volar Middle Phalanx Mass;  Surgeon: Jake Viveros MD;  Location: [facility]     HYSTERECTOMY, PAP NO LONGER INDICATED      has both ovaries out also      HYSTERECTOMY, VAGINAL  1988    Hysterectomy, oophorectomy     RELEASE TRIGGER FINGER Right 2017    Procedure: RELEASE TRIGGER FINGER;  Right Thumb A1 Pulley Release;  Surgeon: Jake Viveros MD;  Location: [facility]     SURGICAL HISTORY OF -       right retromastoid craniectomy and decompression trigeminal nerve     TONSILLECTOMY & ADENOIDECTOMY  child    T&A        Family History   Problem Relation Age of Onset     Alzheimer Disease Mother          at age 85     Osteoporosis Mother      Arthritis Mother      Alcohol/Drug Father      Respiratory Father      Eye Disorder Maternal Grandfather         Macular degneration     C.A.D. Brother         Tripple bypass age 57     Cardiovascular Brother      Cancer Brother         leukemia (ALL)     Cardiovascular Brother      Cardiovascular Brother      Neurologic Disorder Son      Heart Disease Son      Melanoma No family hx of      Skin Cancer No family hx of        Social History     Tobacco Use     Smoking status: Never Smoker     Smokeless tobacco: Never Used   Substance Use Topics     Alcohol use: No       MEDICATIONS:  No current facility-administered medications for this encounter.      Current Outpatient Medications   Medication Sig     acetaminophen (TYLENOL) 325 MG tablet Take  325-650 mg by mouth 2 times daily     Ascorbic Acid (VITAMIN C ER PO) Take 1 tablet by mouth daily      ASPIRIN 81 MG OR TABS 1 TABLET DAILY     Calcium Carb-Cholecalciferol (CALCIUM + D3) 600-200 MG-UNIT TABS Take 1 tablet by mouth daily     estradiol (ESTRACE) 0.1 MG/GM vaginal cream Use 0.3 g (small amount) nightly for 2 weeks, then twice weekly     glucosamine-chondroitin 500-400 MG CAPS Take 1 capsule by mouth daily     Lactobacillus (ACIDOPHILUS) CAPS Take 1 tablet by mouth daily     melatonin 5 MG CAPS Take 5 mg by mouth At Bedtime     MULTI-VITAMIN OR TABS 1 TABLET DAILY     Omega-3 Fatty Acids (OMEGA-3 FISH OIL PO) Take 1 capsule by mouth daily     omeprazole (PRILOSEC) 40 MG capsule Take 1 capsule (40 mg) by mouth daily     simvastatin (ZOCOR) 20 MG tablet Take 1 tablet (20 mg) by mouth At Bedtime     VITAMIN D OR 1 DAILY       ALLERGIES:     Allergies   Allergen Reactions     Biaxin [Clarithromycin]      per pt       Celebrex [Celecoxib]      per pt     Cipro [Ciprofloxacin]      per pt     Darvocet [Propoxyphene N-Apap]      Fleet Phospho Soda [Sodium Phosphate/Biphosphate]      nausea per pt     Morphine      Neurontin [Gabapentin]      per pt     Nortriptyline      per pt     Percocet [Oxycodone-Acetaminophen]      Serzone [Nefazodone Hydrochloride]      per pt     Tegretol [Carbamazepine]      Vicodin [Acetaminophen]      per pt-dizziness     Vioxx      per pt     Vivactil [Protriptyline Hcl]      per pt     Wellbutrin [Bupropion Hcl]      Zithromax [Azithromycin Dihydrate]        PERTINENT SYSTEMS REVIEW:    1. No - Do you have a history of heart attack, stroke, stent, bypass or surgery on an artery in the head, neck, heart or legs?  2. No - Do you ever have any pain or discomfort in your chest?  3. No - Do you have a history of  Heart Failure?  4. No - Are you troubled by shortness of breath when walking: On the level, up a slight hill or at night?  5. No - Do you currently have a cold, bronchitis  or other respiratory infection?  6. No - Do you have a cough, shortness of breath or wheezing?  7. No - Do you sometimes get pains in the calves of your legs when you walk?  8. No - Do you or anyone in your family have previous history of blood clots?  9. No - Do you or does anyone in your family have a serious bleeding problem such as prolonged bleeding following surgeries or cuts?  10. No - Have you ever had problems with anemia or been told to take iron pills?  11. No - Have you had any abnormal blood loss such as black, tarry or bloody stools, or abnormal vaginal bleeding?  12. No - Have you ever had a blood transfusion?  13. No - Have you or any of your relatives ever had problems with anesthesia?  14. No - Do you have sleep apnea, excessive snoring or daytime drowsiness?  15. No - Do you have any prosthetic heart valves?  16. No - Do you have prosthetic joints?    EXAMINATION:  Vitals were reviewed                     Vison:  Va, right - 20/30, left - 20/30;   BAT, right - 20/80, left - 20/80;  HEENT:  Cataract, otherwise unremarkable.  LUNGS:  Clear  CV:  Regular rate and rhythm without murmur  ABD:  Soft and nontender  NEURO:  Alert and nonfocal    IMPRESSION:  Patient cleared for ophthalmic surgery.  Low risk with monitored, light sedation.  I have assessed the patient's DVT risk, and no additional orders necessary.    PLAN:  Procedure(s):  Cataract Removal with Implant, Left Eye      Chapito Phillips MD

## 2019-03-15 ENCOUNTER — ANESTHESIA EVENT (OUTPATIENT)
Dept: SURGERY | Facility: CLINIC | Age: 74
End: 2019-03-15
Payer: COMMERCIAL

## 2019-03-15 NOTE — ANESTHESIA PREPROCEDURE EVALUATION
Anesthesia Pre-Procedure Evaluation    Patient: Mariah Jacinto   MRN: [id number] : 1945          Preoperative Diagnosis: cataract    Procedure(s):  Cataract Removal with Implant    Past Medical History:   Diagnosis Date     Adhesive capsulitis of shoulder     Right shoulder tendonitis     Basal cell carcinoma      Displacement of cervical intervertebral disc without myelopathy      Esophageal reflux      Major depression in complete remission (H) 2009    celexa caused spinning side effect      MENOPAUSE --ERT      OSTEOPENIA      Squamous cell carcinoma      Past Surgical History:   Procedure Laterality Date     ABLATE VEIN VARICOSE RADIO FREQUENCY WITHOUT PHLEBECTOMY MULTIPLE STAB  3/28/2013    Procedure: ABLATE VEIN VARICOSE RADIO FREQUENCY WITHOUT PHLEBECTOMY MULTIPLE STAB;  Bilateral ablation of varicose veins legs-to ultrasound at 0930  ;  Surgeon: Noam Soliman MD;  Location: [facility]     C ANESTH,LOWER ARM SURGERY      ulnar nerve decompression - right     COLONOSCOPY  2013    Procedure: COLONOSCOPY;  Colonoscopy;  Surgeon: Yuliya Nathan MD;  Location: [facility]     ESOPHAGOSCOPY, GASTROSCOPY, DUODENOSCOPY (EGD), COMBINED N/A 2015    Procedure: COMBINED ESOPHAGOSCOPY, GASTROSCOPY, DUODENOSCOPY (EGD), BIOPSY SINGLE OR MULTIPLE;  Surgeon: Yuliya Nathan MD;  Location: [facility]     ESOPHAGOSCOPY, GASTROSCOPY, DUODENOSCOPY (EGD), COMBINED N/A 2017    Procedure: COMBINED ESOPHAGOSCOPY, GASTROSCOPY, DUODENOSCOPY (EGD), BIOPSY SINGLE OR MULTIPLE;  Surgeon: Qasim Bowie MD;  Location: [facility]     EXCISE MASS FINGER Right 2019    Procedure: Excision Right Ring Finger Volar Middle Phalanx Mass;  Surgeon: Jake Viveros MD;  Location: [facility]     HYSTERECTOMY, PAP NO LONGER INDICATED      has both ovaries out also      HYSTERECTOMY, VAGINAL  1988    Hysterectomy, oophorectomy     RELEASE TRIGGER FINGER Right 2017    Procedure: RELEASE TRIGGER  FINGER;  Right Thumb A1 Pulley Release;  Surgeon: Jake Viveros MD;  Location: [facility]     SURGICAL HISTORY OF -   2009    right retromastoid craniectomy and decompression trigeminal nerve     TONSILLECTOMY & ADENOIDECTOMY  child    T&A        Anesthesia Evaluation     . Pt has had prior anesthetic. Type: General and MAC           ROS/MED HX    ENT/Pulmonary:     (+)other ENT- Hearing loss, , . .    Neurologic: Comment: tinnitus    (+)other neuro Trigeminal neuralgia - right    Cardiovascular:     (+) Dyslipidemia, ----. : . . . :. . Previous cardiac testing Echodate:2-28-06bywdvpo:LVH, EF 60 - 65%, diastolic dysfunctionStress Testdate:1-18-12 results:nlECG reviewed date:3-21-13 results:Sinus Rhythm -Short OK syndrome   Wolfgang = 112  BORDERLINE RHYTHM date: results:          METS/Exercise Tolerance:  >4 METS   Hematologic:  - neg hematologic  ROS       Musculoskeletal: Comment: Right adhesive capsulitis  Trochanteric bursitis  Hip enthesopahy  intercostal neurlagia  Hip pain  (+) , , other musculoskeletal- Osteopenia; Cervical disc displacement      GI/Hepatic: Comment: Constipation      (+) GERD      (-) liver disease   Renal/Genitourinary:  - ROS Renal section negative       Endo:  - neg endo ROS       Psychiatric:     (+) psychiatric history depression      Infectious Disease:  - neg infectious disease ROS       Malignancy:   (+) Malignancy History of Skin          Other: Comment: Left cataract                         Physical Exam  Normal systems: cardiovascular, pulmonary and dental    Airway   Mallampati: II    Dental     Cardiovascular       Pulmonary             Lab Results   Component Value Date    WBC 5.8 05/06/2015    HGB 14.4 05/06/2015    HCT 42.9 05/06/2015     05/06/2015    SED 8 05/02/2016     09/05/2018    POTASSIUM 4.7 09/05/2018    CHLORIDE 105 09/05/2018    CO2 27 09/05/2018    BUN 21 09/05/2018    CR 0.71 09/05/2018    GLC 86 09/05/2018    KARELY 8.7 09/05/2018    PHOS 3.7 01/11/2012  "   MAG 2.5 (H) 03/09/2010    ALBUMIN 3.8 05/06/2015    PROTTOTAL 6.9 05/06/2015    ALT 27 05/06/2015    AST 17 05/06/2015    ALKPHOS 84 05/06/2015    BILITOTAL 0.4 05/06/2015    LIPASE 114 02/12/2012    AMYLASE 70 12/23/2011    PTT 27 08/03/2009    INR 0.98 08/03/2009    TSH 0.71 04/16/2018       Preop Vitals  BP Readings from Last 3 Encounters:   03/08/19 120/68   02/19/19 159/69   01/07/19 118/60    Pulse Readings from Last 3 Encounters:   03/08/19 79   02/19/19 82   01/07/19 74      Resp Readings from Last 3 Encounters:   03/08/19 18   02/19/19 16   01/07/19 18    SpO2 Readings from Last 3 Encounters:   03/08/19 96%   02/19/19 96%   01/07/19 98%      Temp Readings from Last 1 Encounters:   03/08/19 36.6  C (97.8  F) (Tympanic)    Ht Readings from Last 1 Encounters:   03/08/19 1.626 m (5' 4\")      Wt Readings from Last 1 Encounters:   03/08/19 62.1 kg (137 lb)    Estimated body mass index is 23.52 kg/m  as calculated from the following:    Height as of 3/8/19: 1.626 m (5' 4\").    Weight as of 3/8/19: 62.1 kg (137 lb).       Anesthesia Plan      History & Physical Review  History and physical reviewed and following examination; no interval change.    ASA Status:  3 .    NPO Status:  > 6 hours    Plan for MAC Reason for MAC:  Deep or markedly invasive procedure (G8)         Postoperative Care      Consents  Anesthetic plan, risks, benefits and alternatives discussed with:  Patient..                 Marianne Zheng, APRN CRNA  "

## 2019-03-18 ENCOUNTER — ANESTHESIA (OUTPATIENT)
Dept: SURGERY | Facility: CLINIC | Age: 74
End: 2019-03-18
Payer: COMMERCIAL

## 2019-03-18 ENCOUNTER — HOSPITAL ENCOUNTER (OUTPATIENT)
Facility: CLINIC | Age: 74
Discharge: HOME | End: 2019-03-18
Attending: OPHTHALMOLOGY | Admitting: OPHTHALMOLOGY
Payer: COMMERCIAL

## 2019-03-18 VITALS
HEART RATE: 62 BPM | BODY MASS INDEX: 23.39 KG/M2 | RESPIRATION RATE: 16 BRPM | DIASTOLIC BLOOD PRESSURE: 69 MMHG | WEIGHT: 137 LBS | SYSTOLIC BLOOD PRESSURE: 142 MMHG | OXYGEN SATURATION: 98 % | TEMPERATURE: 97.7 F | HEIGHT: 64 IN

## 2019-03-18 PROCEDURE — 25800030 ZZH RX IP 258 OP 636: Performed by: NURSE ANESTHETIST, CERTIFIED REGISTERED

## 2019-03-18 PROCEDURE — V2632 POST CHMBR INTRAOCULAR LENS: HCPCS | Performed by: OPHTHALMOLOGY

## 2019-03-18 PROCEDURE — 25000128 H RX IP 250 OP 636: Performed by: NURSE ANESTHETIST, CERTIFIED REGISTERED

## 2019-03-18 PROCEDURE — 36000025 ZZH CATARACT SURGICAL PACKAGE: Performed by: OPHTHALMOLOGY

## 2019-03-18 PROCEDURE — 37000012 ZZH ANESTHESIA CATARACT PACKAGE: Performed by: OPHTHALMOLOGY

## 2019-03-18 PROCEDURE — 71000022 ZZH RECOVERY CATRACT PACKAGE: Performed by: OPHTHALMOLOGY

## 2019-03-18 PROCEDURE — 25000128 H RX IP 250 OP 636: Performed by: OPHTHALMOLOGY

## 2019-03-18 PROCEDURE — 25000125 ZZHC RX 250: Performed by: OPHTHALMOLOGY

## 2019-03-18 PROCEDURE — 25000125 ZZHC RX 250: Performed by: NURSE ANESTHETIST, CERTIFIED REGISTERED

## 2019-03-18 DEVICE — EYE IMP IOL AMO PCL TECNIS ZCB00 20.0: Type: IMPLANTABLE DEVICE | Site: EYE | Status: FUNCTIONAL

## 2019-03-18 RX ORDER — SODIUM CHLORIDE, SODIUM LACTATE, POTASSIUM CHLORIDE, CALCIUM CHLORIDE 600; 310; 30; 20 MG/100ML; MG/100ML; MG/100ML; MG/100ML
INJECTION, SOLUTION INTRAVENOUS CONTINUOUS
Status: CANCELLED | OUTPATIENT
Start: 2019-03-18

## 2019-03-18 RX ORDER — ONDANSETRON 4 MG/1
4 TABLET, ORALLY DISINTEGRATING ORAL EVERY 30 MIN PRN
Status: CANCELLED | OUTPATIENT
Start: 2019-03-18

## 2019-03-18 RX ORDER — PHENYLEPHRINE HYDROCHLORIDE 25 MG/ML
1 SOLUTION/ DROPS OPHTHALMIC
Status: COMPLETED | OUTPATIENT
Start: 2019-03-18 | End: 2019-03-18

## 2019-03-18 RX ORDER — DEXAMETHASONE SODIUM PHOSPHATE 4 MG/ML
4 INJECTION, SOLUTION INTRA-ARTICULAR; INTRALESIONAL; INTRAMUSCULAR; INTRAVENOUS; SOFT TISSUE EVERY 10 MIN PRN
Status: CANCELLED | OUTPATIENT
Start: 2019-03-18

## 2019-03-18 RX ORDER — NALOXONE HYDROCHLORIDE 0.4 MG/ML
.1-.4 INJECTION, SOLUTION INTRAMUSCULAR; INTRAVENOUS; SUBCUTANEOUS
Status: CANCELLED | OUTPATIENT
Start: 2019-03-18 | End: 2019-03-19

## 2019-03-18 RX ORDER — CYCLOPENTOLATE HYDROCHLORIDE 10 MG/ML
1 SOLUTION/ DROPS OPHTHALMIC
Status: COMPLETED | OUTPATIENT
Start: 2019-03-18 | End: 2019-03-18

## 2019-03-18 RX ORDER — SODIUM CHLORIDE, SODIUM LACTATE, POTASSIUM CHLORIDE, CALCIUM CHLORIDE 600; 310; 30; 20 MG/100ML; MG/100ML; MG/100ML; MG/100ML
INJECTION, SOLUTION INTRAVENOUS CONTINUOUS
Status: DISCONTINUED | OUTPATIENT
Start: 2019-03-18 | End: 2019-03-18 | Stop reason: HOSPADM

## 2019-03-18 RX ORDER — ONDANSETRON 2 MG/ML
4 INJECTION INTRAMUSCULAR; INTRAVENOUS EVERY 30 MIN PRN
Status: CANCELLED | OUTPATIENT
Start: 2019-03-18

## 2019-03-18 RX ORDER — MEPERIDINE HYDROCHLORIDE 25 MG/ML
12.5 INJECTION INTRAMUSCULAR; INTRAVENOUS; SUBCUTANEOUS
Status: CANCELLED | OUTPATIENT
Start: 2019-03-18

## 2019-03-18 RX ORDER — TROPICAMIDE 10 MG/ML
1 SOLUTION/ DROPS OPHTHALMIC
Status: COMPLETED | OUTPATIENT
Start: 2019-03-18 | End: 2019-03-18

## 2019-03-18 RX ORDER — LABETALOL 20 MG/4 ML (5 MG/ML) INTRAVENOUS SYRINGE
10
Status: CANCELLED | OUTPATIENT
Start: 2019-03-18

## 2019-03-18 RX ORDER — ALBUTEROL SULFATE 0.83 MG/ML
2.5 SOLUTION RESPIRATORY (INHALATION) EVERY 4 HOURS PRN
Status: CANCELLED | OUTPATIENT
Start: 2019-03-18

## 2019-03-18 RX ORDER — TETRACAINE HYDROCHLORIDE 5 MG/ML
SOLUTION OPHTHALMIC PRN
Status: DISCONTINUED | OUTPATIENT
Start: 2019-03-18 | End: 2019-03-18 | Stop reason: HOSPADM

## 2019-03-18 RX ORDER — FENTANYL CITRATE 50 UG/ML
25-50 INJECTION, SOLUTION INTRAMUSCULAR; INTRAVENOUS
Status: CANCELLED | OUTPATIENT
Start: 2019-03-18

## 2019-03-18 RX ORDER — LIDOCAINE 40 MG/G
CREAM TOPICAL
Status: DISCONTINUED | OUTPATIENT
Start: 2019-03-18 | End: 2019-03-18 | Stop reason: HOSPADM

## 2019-03-18 RX ADMIN — MIDAZOLAM 0.5 MG: 1 INJECTION INTRAMUSCULAR; INTRAVENOUS at 10:16

## 2019-03-18 RX ADMIN — TROPICAMIDE 1 DROP: 10 SOLUTION/ DROPS OPHTHALMIC at 08:54

## 2019-03-18 RX ADMIN — TROPICAMIDE 1 DROP: 10 SOLUTION/ DROPS OPHTHALMIC at 09:06

## 2019-03-18 RX ADMIN — CYCLOPENTOLATE HYDROCHLORIDE 1 DROP: 10 SOLUTION/ DROPS OPHTHALMIC at 09:19

## 2019-03-18 RX ADMIN — TROPICAMIDE 1 DROP: 10 SOLUTION/ DROPS OPHTHALMIC at 09:19

## 2019-03-18 RX ADMIN — SODIUM CHLORIDE, POTASSIUM CHLORIDE, SODIUM LACTATE AND CALCIUM CHLORIDE: 600; 310; 30; 20 INJECTION, SOLUTION INTRAVENOUS at 08:54

## 2019-03-18 RX ADMIN — PHENYLEPHRINE HYDROCHLORIDE 1 DROP: 25 SOLUTION/ DROPS OPHTHALMIC at 09:06

## 2019-03-18 RX ADMIN — LIDOCAINE HYDROCHLORIDE 1 ML: 10 INJECTION, SOLUTION EPIDURAL; INFILTRATION; INTRACAUDAL; PERINEURAL at 09:07

## 2019-03-18 RX ADMIN — PHENYLEPHRINE HYDROCHLORIDE 1 DROP: 25 SOLUTION/ DROPS OPHTHALMIC at 08:54

## 2019-03-18 RX ADMIN — MIDAZOLAM 0.5 MG: 1 INJECTION INTRAMUSCULAR; INTRAVENOUS at 10:18

## 2019-03-18 RX ADMIN — PHENYLEPHRINE HYDROCHLORIDE 1 DROP: 25 SOLUTION/ DROPS OPHTHALMIC at 09:19

## 2019-03-18 RX ADMIN — MIDAZOLAM 1 MG: 1 INJECTION INTRAMUSCULAR; INTRAVENOUS at 10:13

## 2019-03-18 RX ADMIN — CYCLOPENTOLATE HYDROCHLORIDE 1 DROP: 10 SOLUTION/ DROPS OPHTHALMIC at 09:06

## 2019-03-18 RX ADMIN — CYCLOPENTOLATE HYDROCHLORIDE 1 DROP: 10 SOLUTION/ DROPS OPHTHALMIC at 08:53

## 2019-03-18 ASSESSMENT — MIFFLIN-ST. JEOR: SCORE: 1106.43

## 2019-03-18 NOTE — ANESTHESIA CARE TRANSFER NOTE
Patient: Mariah Jacinto    Procedure(s):  Cataract Removal with Implant    Diagnosis: cataract  Diagnosis Additional Information: No value filed.    Anesthesia Type:   MAC     Note:  Airway :Room Air  Patient transferred to:Phase II  Handoff Report: Identifed the Patient, Identified the Reponsible Provider, Reviewed the pertinent medical history, Discussed the surgical course, Reviewed Intra-OP anesthesia mangement and issues during anesthesia, Set expectations for post-procedure period and Allowed opportunity for questions and acknowledgement of understanding      Vitals: (Last set prior to Anesthesia Care Transfer)    CRNA VITALS  3/18/2019 1002 - 3/18/2019 1032      3/18/2019             Pulse:  70    SpO2:  98 %                Electronically Signed By: Chapito Dobson CRNA, APRN CRNA  March 18, 2019  10:32 AM

## 2019-03-18 NOTE — OP NOTE
CATARACT OPERATIVE NOTE    PATIENT: Mariah Jacinto  DATE OF SURGERY: 3/18/2019  PREOPERATIVE DIAGNOSIS:  Senile Nuclear Cataract, Left eye  POSTOPERATIVE DIAGNOSIS:  Senile Nuclear Cataract, Left eye  OPERATIVE PROCEDURE:  Phacoemulsification and placement of intraocular lens  SURGEON:  Chapito Phillips MD  ANESTHESIA:  Topical / MAC  EBL:  None  SPECIMENS:  None  COMPLICATIONS:  None    PROCEDURE:  The patient was brought to the operating room at [facility].  The left eye was prepped and draped in the usual fashion for cataract surgery.  A wire lid speculum was inserted.  A super sharp blade was used to make a paracentesis at the 5 O'clock position.  The super sharp blade was used to make a partial thickness temporal groove, which was 3 mm in length.  0.8 mL of non-preserved epi-Shugarcaine was injected into the anterior chamber.  Viscoelastic was used to inflate the anterior chamber through a cannula.  A 2.5 mm microkeratome was used to make a temporal clear corneal incision in a two-plane fashion.  A cystotome needle and forceps were used to make a capsulorrhexis.  Hydrodissection and hydrodelineation were performed with Balance Salt Solution.  The lens was then phacoemulsified and removed without complications.  The cortical material was removed with bimanual irrigation and aspiration.  The capsular bag was filled with viscoelastic.  A posterior chamber intraocular lens, preselected and recorded, was folded and inserted into the capsular bag.  The viscoelastic was removed with the irrigation and aspiration tip.  Balanced Salt Solution with Vigamox, 150mg/0.1mL, was used to refill the anterior chamber.  The wounds were checked for water tightness and required no suture.  The wire lid speculum was removed.  The patient's left eye was cleaned and a drop of each post-operative drop was placed, followed by a snow shield.  The patient tolerated the procedure well, and there were no  complications.      Chapito Phillips MD

## 2019-03-18 NOTE — DISCHARGE INSTRUCTIONS
"                    Same Day Surgery Discharge Instructions  Special Precautions After Surgery - Adult    1. It is not unusual to feel lightheaded or faint, up to 24 hours after surgery or while taking pain medication.  If you have these symptoms; sit for a few minutes before standing and have someone assist you when getting up.  2. You should rest and relax for the next 24 hours and must have someone stay with you for at least 24 hours after your discharge.  3. DO NOT DRIVE any vehicle or operate mechanical equipment for 24 hours following the end of your surgery.  DO NOT DRIVE while taking narcotic pain medications that have been prescribed by your physician.  If you had a limb operated on, you must be able to use it fully to drive.  4. DO NOT drink alcoholic beverages for 24 hours following surgery or while taking prescription pain medication.  5. Drink clear liquids (apple juice, ginger ale, broth, 7-Up, etc.).  Progress to your regular diet as you feel able.  6. Any questions call your physician and do not make important decisions for 24 hours.    ACTIVITY  ? { :5361364}     INCISIONAL CARE  ? { :4313793}     Call for an appointment to return to the clinic { :8559934}    Medications:  ? { :9070206::\"Follow the instructions on the bottle.\"}     Additional discharge instructions: { :6707088::\"None\"}  __________________________________________________________________________________________________________________________________  IMPORTANT NUMBERS:    [facility] Main Number:  [phone], [phone]  Pharmacy:  [phone]  Same Day Surgery:  [phone], Monday - Friday until 8:30 p.m.  Urgent Care:  [phone]  Emergency Room:  [phone]      [facility]:  [phone]                                                                             [facility]:  [phone] option 1  [facility]:  [phone]     [facility]:  [phone]   [facility]:  " [phone]   Home Medical Equipment: [phone]  [facility]:  [phone]

## 2019-03-18 NOTE — ANESTHESIA POSTPROCEDURE EVALUATION
Patient: Mariah Jacinto    Procedure(s):  Cataract Removal with Implant    Diagnosis:cataract  Diagnosis Additional Information: No value filed.    Anesthesia Type:  MAC    Note:  Anesthesia Post Evaluation    Patient location during evaluation: Phase 2 and Bedside  Patient participation: Able to fully participate in evaluation  Level of consciousness: awake  Pain management: adequate  Airway patency: patent  Cardiovascular status: acceptable  Respiratory status: acceptable  Hydration status: acceptable  PONV: none     Anesthetic complications: None          Last vitals:  Vitals:    03/18/19 0829   BP: 158/74   Pulse: 67   Resp: 18   Temp: 36.5  C (97.7  F)   SpO2: 99%         Electronically Signed By: Chapito Dobson CRNA, APRN CRNA  March 18, 2019  10:33 AM

## 2019-03-19 ENCOUNTER — TELEPHONE (OUTPATIENT)
Dept: FAMILY MEDICINE | Facility: CLINIC | Age: 74
End: 2019-03-19

## 2019-03-19 NOTE — TELEPHONE ENCOUNTER
Reason for call:  Patient reporting a symptom    Symptom or request: rt leg pain/cramps was in heal and is now behind the knee    Duration (how long have symptoms been present): off and on since last OV 3/8    Have you been treated for this before? No    Additional comments: difficult doing steps, in and out of car    Phone Number patient can be reached at:  Home number on file [phone] (home)    Best Time:  any    Can we leave a detailed message on this number:  YES    Call taken on 3/19/2019 at 2:05 PM by Azalia Siddiqui

## 2019-03-21 ENCOUNTER — HOSPITAL ENCOUNTER (OUTPATIENT)
Dept: ULTRASOUND IMAGING | Facility: CLINIC | Age: 74
Discharge: HOME | End: 2019-03-21
Attending: FAMILY MEDICINE | Admitting: FAMILY MEDICINE
Payer: COMMERCIAL

## 2019-03-21 ENCOUNTER — OFFICE VISIT (OUTPATIENT)
Dept: FAMILY MEDICINE | Facility: CLINIC | Age: 74
End: 2019-03-21
Payer: COMMERCIAL

## 2019-03-21 VITALS
HEART RATE: 68 BPM | TEMPERATURE: 97.1 F | OXYGEN SATURATION: 98 % | RESPIRATION RATE: 16 BRPM | BODY MASS INDEX: 23.29 KG/M2 | HEIGHT: 64 IN | DIASTOLIC BLOOD PRESSURE: 64 MMHG | SYSTOLIC BLOOD PRESSURE: 128 MMHG | WEIGHT: 136.4 LBS

## 2019-03-21 DIAGNOSIS — M79.661 RIGHT CALF PAIN: Primary | ICD-10-CM

## 2019-03-21 DIAGNOSIS — M79.661 RIGHT CALF PAIN: ICD-10-CM

## 2019-03-21 DIAGNOSIS — Z71.89 ADVANCED DIRECTIVES, COUNSELING/DISCUSSION: ICD-10-CM

## 2019-03-21 PROCEDURE — 93971 EXTREMITY STUDY: CPT | Mod: RT

## 2019-03-21 PROCEDURE — 99213 OFFICE O/P EST LOW 20 MIN: CPT | Performed by: FAMILY MEDICINE

## 2019-03-21 ASSESSMENT — MIFFLIN-ST. JEOR: SCORE: 1103.71

## 2019-03-21 ASSESSMENT — PAIN SCALES - GENERAL: PAINLEVEL: MODERATE PAIN (4)

## 2019-03-21 NOTE — PROGRESS NOTES
SUBJECTIVE:   Mariah Jacinto is a 73 year old female who presents to clinic today for the following health issues:      Problem:   Chief Complaint   Patient presents with     Musculoskeletal Problem     right leg pain/cramping  x 3 weeks.     ADDITIONAL HPI: 73 year old female here for above issue.      ROS: 10 point review of systems negative except as per HPI.    PAST MEDICAL HISTORY:  Past Medical History:   Diagnosis Date     Adhesive capsulitis of shoulder     Right shoulder tendonitis     Basal cell carcinoma      Displacement of cervical intervertebral disc without myelopathy      Esophageal reflux      Major depression in complete remission (H) 2009    celexa caused spinning side effect      MENOPAUSE --ERT      OSTEOPENIA      Squamous cell carcinoma         ACTIVE MEDICAL PROBLEMS:  Patient Active Problem List   Diagnosis     Disorder of bone and cartilage     Enthesopathy of hip region     Sensorineural hearing loss     Right hip pain     Trigeminal Neuralgia right      Hyperlipidemia LDL goal <160     Advanced directives, counseling/discussion     Intercostal neuralgia     Health Care Home     Trochanteric bursitis     Subjective tinnitus, bilateral     Gastroesophageal reflux disease without esophagitis     Chronic constipation     Dense breast tissue on mammogram        FAMILY HISTORY:  Family History   Problem Relation Age of Onset     Alzheimer Disease Mother          at age 85     Osteoporosis Mother      Arthritis Mother      Alcohol/Drug Father      Respiratory Father      Eye Disorder Maternal Grandfather         Macular degneration     C.A.D. Brother         Tripple bypass age 57     Cardiovascular Brother      Cancer Brother         leukemia (ALL)     Cardiovascular Brother      Cardiovascular Brother      Neurologic Disorder Son      Heart Disease Son      Melanoma No family hx of      Skin Cancer No family hx of        SOCIAL HISTORY:  Social History     Socioeconomic History      Marital status:      Spouse name: Not on file     Number of children: Not on file     Years of education: Not on file     Highest education level: Not on file   Occupational History     Not on file   Social Needs     Financial resource strain: Not on file     Food insecurity:     Worry: Not on file     Inability: Not on file     Transportation needs:     Medical: Not on file     Non-medical: Not on file   Tobacco Use     Smoking status: Never Smoker     Smokeless tobacco: Never Used   Substance and Sexual Activity     Alcohol use: No     Drug use: No     Sexual activity: Yes     Birth control/protection: Surgical     Comment: complete hysterectomy 1988   Lifestyle     Physical activity:     Days per week: Not on file     Minutes per session: Not on file     Stress: Not on file   Relationships     Social connections:     Talks on phone: Not on file     Gets together: Not on file     Attends Yazidism service: Not on file     Active member of club or organization: Not on file     Attends meetings of clubs or organizations: Not on file     Relationship status: Not on file     Intimate partner violence:     Fear of current or ex partner: Not on file     Emotionally abused: Not on file     Physically abused: Not on file     Forced sexual activity: Not on file   Other Topics Concern     Parent/sibling w/ CABG, MI or angioplasty before 65F 55M? No   Social History Narrative     Not on file       MEDICATIONS:  Current Outpatient Medications   Medication Sig Dispense Refill     acetaminophen (TYLENOL) 325 MG tablet Take 325-650 mg by mouth 2 times daily       Ascorbic Acid (VITAMIN C ER PO) Take 1 tablet by mouth daily        ASPIRIN 81 MG OR TABS 1 TABLET DAILY       Calcium Carb-Cholecalciferol (CALCIUM + D3) 600-200 MG-UNIT TABS Take 1 tablet by mouth daily       estradiol (ESTRACE) 0.1 MG/GM vaginal cream Use 0.3 g (small amount) nightly for 2 weeks, then twice weekly 42.5 g 1     glucosamine-chondroitin 500-400  "MG CAPS Take 1 capsule by mouth daily       Lactobacillus (ACIDOPHILUS) CAPS Take 1 tablet by mouth daily       melatonin 5 MG CAPS Take 5 mg by mouth At Bedtime 1 capsule 0     MULTI-VITAMIN OR TABS 1 TABLET DAILY       Omega-3 Fatty Acids (OMEGA-3 FISH OIL PO) Take 1 capsule by mouth daily       omeprazole (PRILOSEC) 40 MG capsule Take 1 capsule (40 mg) by mouth daily 90 capsule 3     simvastatin (ZOCOR) 20 MG tablet Take 1 tablet (20 mg) by mouth At Bedtime 90 tablet 3     VITAMIN D OR 1 DAILY         ALLERGIES:     Allergies   Allergen Reactions     Biaxin [Clarithromycin]      per pt       Celebrex [Celecoxib]      per pt     Cipro [Ciprofloxacin]      per pt     Darvocet [Propoxyphene N-Apap]      Fleet Phospho Soda [Sodium Phosphate/Biphosphate]      nausea per pt     Morphine      Neurontin [Gabapentin]      per pt     Nortriptyline      per pt     Percocet [Oxycodone-Acetaminophen]      Serzone [Nefazodone Hydrochloride]      per pt     Tegretol [Carbamazepine]      Vicodin [Acetaminophen]      per pt-dizziness     Vioxx      per pt     Vivactil [Protriptyline Hcl]      per pt     Wellbutrin [Bupropion Hcl]      Zithromax [Azithromycin Dihydrate]        Problem list, Medication list, Allergies, and Medical/Social/Surgical histories reviewed in EPIC and updated as appropriate.    OBJECTIVE:                                                    VITALS: /64   Pulse 68   Temp 97.1  F (36.2  C) (Oral)   Resp 16   Ht 1.626 m (5' 4\")   Wt 61.9 kg (136 lb 6.4 oz)   SpO2 98%   Breastfeeding? No   BMI 23.41 kg/m   Body mass index is 23.41 kg/m .  GENERAL: Pleasant, well appearing female.  MUSCULOSKELETAL: warmth and tenderness to palpation right calf. No erythema. No edema. positive monalisa's sign. No palpable cords no collateral varicosities.    ASSESSMENT/PLAN:                                                    1. Right calf pain  Some features concerning for DVT. Further work-up and management as dictated " by results. Given age, D-dimer unlikely to be helpful.  - US Lower Extremity Venous Duplex Right; Future    2. Advanced directives, counseling/discussion

## 2019-03-21 NOTE — PATIENT INSTRUCTIONS
Our Clinic hours are:  Mondays    7:20 am - 7 pm  Tues -  Fri  7:20 am - 5 pm    Clinic Phone: [phone]    The clinic lab opens at 7:30 am Mon - Fri and appointments are required.    [facility]. [phone]  Monday  8 am - 7pm  Tues - Fri 8 am - 5:30 pm

## 2019-03-26 ENCOUNTER — TRANSFERRED RECORDS (OUTPATIENT)
Dept: HEALTH INFORMATION MANAGEMENT | Facility: CLINIC | Age: 74
End: 2019-03-26

## 2019-04-05 ENCOUNTER — ANCILLARY PROCEDURE (OUTPATIENT)
Dept: GENERAL RADIOLOGY | Facility: CLINIC | Age: 74
End: 2019-04-05
Attending: FAMILY MEDICINE
Payer: COMMERCIAL

## 2019-04-05 ENCOUNTER — OFFICE VISIT (OUTPATIENT)
Dept: FAMILY MEDICINE | Facility: CLINIC | Age: 74
End: 2019-04-05
Payer: COMMERCIAL

## 2019-04-05 VITALS
BODY MASS INDEX: 23.22 KG/M2 | RESPIRATION RATE: 18 BRPM | WEIGHT: 136 LBS | HEART RATE: 66 BPM | DIASTOLIC BLOOD PRESSURE: 68 MMHG | OXYGEN SATURATION: 98 % | SYSTOLIC BLOOD PRESSURE: 132 MMHG | HEIGHT: 64 IN | TEMPERATURE: 98 F

## 2019-04-05 DIAGNOSIS — M79.604 PAIN OF RIGHT LOWER EXTREMITY: Primary | ICD-10-CM

## 2019-04-05 PROCEDURE — 72170 X-RAY EXAM OF PELVIS: CPT | Mod: FY

## 2019-04-05 PROCEDURE — 73560 X-RAY EXAM OF KNEE 1 OR 2: CPT | Mod: RT

## 2019-04-05 PROCEDURE — 99214 OFFICE O/P EST MOD 30 MIN: CPT | Performed by: FAMILY MEDICINE

## 2019-04-05 ASSESSMENT — PAIN SCALES - GENERAL: PAINLEVEL: MODERATE PAIN (4)

## 2019-04-05 ASSESSMENT — MIFFLIN-ST. JEOR: SCORE: 1101.89

## 2019-04-05 NOTE — PROGRESS NOTES
"  SUBJECTIVE:   Mariah Jacinto is a 73 year old female who presents to clinic today for the following health issues:      Chief Complaint   Patient presents with     Musculoskeletal Problem     Patient mentioned at last visit in 3/21/19 and completed a Ultrasound and no clot was found and patient feels like leg is getting progressively worse and the pain will move through-out lower leg. Patient was told to take ASA which was of help. Patient rates pain currently at a 4/10. Patient has no swelling, but some hot to touch areas.      Had US to r/o DVT a few weeks ago, that was negative. She states that getting in an out of the car causes significant discomfort and she has to \"help her leg in and drag her leg out\". She denies weakness however or significant hip or knee pain.  Pain seems to be located more on the lateral aspect of the lower leg but will sometimes encompass her ankle.      Medical, social, surgical, and family histories reviewed.    ROS:  5 point ROS negative except as noted above in HPI, including Gen., Resp., CV, GI &  system review.    OBJECTIVE:  /68   Pulse 66   Temp 98  F (36.7  C) (Tympanic)   Resp 18   Ht 1.626 m (5' 4\")   Wt 61.7 kg (136 lb)   SpO2 98%   BMI 23.34 kg/m     EXAM:  GENERAL APPEARANCE ADULT: Alert, no acute distress  MS: hip exam normal appearance, tenderness over greater trochanter, range of motion internal rotation is mildly decreased, external rotation is normal  knee exam normal knee exam, no swelling, tenderness, effusion or instability; ligaments intact, full ROM.  leg tender to palpation over proximal fibula and tibial plateau.  Lateral lower leg is also tender to palpation.   ankle exam: normal appearance, no instability with drawer test or rocking the mortice    Xray hip: mild arthritis  Xray knee: good joint space, mildly arthritic    ASSESSMENT/PLAN:    (M79.604) Pain of right lower extremity  (primary encounter diagnosis)  Comment: uncertain etiology- " seems to be more soft tissue but not fitting a particular pattern.  With the pain from distal thigh to foot/ankle, I considered lumbar radicular pain but without back pain this seems unlikely and isn't high on my differential.  Would like her to see [facility] to see if there is additional imaging they would recommend or if we should just have her work with PT initially.   Plan: XR Knee Standing Right 2 Views, XR Pelvis 1/2         Views, ORTHO  REFERRAL                 Patient Instructions       Our Clinic hours are:  Mondays    7:20 am - 7 pm  Tues -  Fri  7:20 am - 5 pm    Clinic Phone: [phone]    The clinic lab opens at 7:30 am Mon - Fri and appointments are required.    [facility] [place]  Ph. [phone]  Monday  8 am - 7pm  Tues - Fri 8 am - 5:30 pm

## 2019-04-05 NOTE — PATIENT INSTRUCTIONS
Our Clinic hours are:  Mondays    7:20 am - 7 pm  Tues -  Fri  7:20 am - 5 pm    Clinic Phone: [phone]    The clinic lab opens at 7:30 am Mon - Fri and appointments are required.    [facility]. [phone]  Monday  8 am - 7pm  Tues - Fri 8 am - 5:30 pm

## 2019-04-07 NOTE — H&P
[facility]  [facility]  [address]  [facility] [address]  [phone]  Dept: [phone]    OPHTHALMOLOGY PRE-OPERATIVE  HISTORY AND PHYSICAL    DATE OF H/P:  2019    DATE OF SURGERY:  April 10, 2019  PROCEDURE:  Procedure(s):  Cataract Removal with Implant, Right Eye  LENS IMPLANT:  [id number] +20.0  REFRACTIVE GOAL:  PL Sph  SURGEON:  Chapito Phillips MD    ANESTHESIA:  TOPICAL / MAC    OR CASE REQUIREMENTS:    DEMOGRAPHICS:  Demographic Information on Mariah Jacinto:    Mariah Jacinto  Gender: female  : 1945  [address] [place] [address]  [phone] (home)     Medical Record: [id number]  Social Security Number: [id number]  Pharmacy: [facility] [address] [place] [address]  Primary Care Provider: Eli Sommer    Parent's names are: Data Unavailable (mother) and Data Unavailable (father).    Insurance: Payor: [employer] / Plan: [employer] MEDICARE / Product Type: HMO /     OCULAR HISTORY:  Cataracts, s/p IOL left eye.    HISTORIES:  Past Medical History:   Diagnosis Date     Adhesive capsulitis of shoulder     Right shoulder tendonitis     Basal cell carcinoma      Displacement of cervical intervertebral disc without myelopathy      Esophageal reflux      Major depression in complete remission (H) 2009    celexa caused spinning side effect      MENOPAUSE --ERT      OSTEOPENIA      Squamous cell carcinoma        Past Surgical History:   Procedure Laterality Date     ABLATE VEIN VARICOSE RADIO FREQUENCY WITHOUT PHLEBECTOMY MULTIPLE STAB  3/28/2013    Procedure: ABLATE VEIN VARICOSE RADIO FREQUENCY WITHOUT PHLEBECTOMY MULTIPLE STAB;  Bilateral ablation of varicose veins legs-to ultrasound at 0930  ;  Surgeon: Noam Soliman MD;  Location: [facility]      ANESTH,LOWER ARM SURGERY      ulnar nerve decompression - right     COLONOSCOPY  2013    Procedure: COLONOSCOPY;  Colonoscopy;  Surgeon: Yuliya Nathan MD;  Location:  [facility]     ESOPHAGOSCOPY, GASTROSCOPY, DUODENOSCOPY (EGD), COMBINED N/A 2015    Procedure: COMBINED ESOPHAGOSCOPY, GASTROSCOPY, DUODENOSCOPY (EGD), BIOPSY SINGLE OR MULTIPLE;  Surgeon: Yuliya Nathan MD;  Location: [facility]     ESOPHAGOSCOPY, GASTROSCOPY, DUODENOSCOPY (EGD), COMBINED N/A 2017    Procedure: COMBINED ESOPHAGOSCOPY, GASTROSCOPY, DUODENOSCOPY (EGD), BIOPSY SINGLE OR MULTIPLE;  Surgeon: Qasim Bowie MD;  Location: [facility]     EXCISE MASS FINGER Right 2019    Procedure: Excision Right Ring Finger Volar Middle Phalanx Mass;  Surgeon: Jake Viveros MD;  Location: [facility]     HYSTERECTOMY, PAP NO LONGER INDICATED      has both ovaries out also      HYSTERECTOMY, VAGINAL  1988    Hysterectomy, oophorectomy     PHACOEMULSIFICATION WITH STANDARD INTRAOCULAR LENS IMPLANT Left 3/18/2019    Procedure: Cataract Removal with Implant;  Surgeon: Chapito Phillips MD;  Location: [facility]     RELEASE TRIGGER FINGER Right 2017    Procedure: RELEASE TRIGGER FINGER;  Right Thumb A1 Pulley Release;  Surgeon: Jake Viveros MD;  Location: [facility]     SURGICAL HISTORY OF -       right retromastoid craniectomy and decompression trigeminal nerve     TONSILLECTOMY & ADENOIDECTOMY  child    T&A        Family History   Problem Relation Age of Onset     Alzheimer Disease Mother          at age 85     Osteoporosis Mother      Arthritis Mother      Alcohol/Drug Father      Respiratory Father      Eye Disorder Maternal Grandfather         Macular degneration     C.A.D. Brother         Tripple bypass age 57     Cardiovascular Brother      Cancer Brother         leukemia (ALL)     Cardiovascular Brother      Cardiovascular Brother      Neurologic Disorder Son      Heart Disease Son      Melanoma No family hx of      Skin Cancer No family hx of        Social History     Tobacco Use     Smoking status: Never Smoker     Smokeless tobacco: Never Used   Substance Use Topics     Alcohol use: No        MEDICATIONS:  No current facility-administered medications for this encounter.      Current Outpatient Medications   Medication Sig     acetaminophen (TYLENOL) 325 MG tablet Take 325-650 mg by mouth 2 times daily     Ascorbic Acid (VITAMIN C ER PO) Take 1 tablet by mouth daily      ASPIRIN 81 MG OR TABS 1 TABLET DAILY     Calcium Carb-Cholecalciferol (CALCIUM + D3) 600-200 MG-UNIT TABS Take 1 tablet by mouth daily     estradiol (ESTRACE) 0.1 MG/GM vaginal cream Use 0.3 g (small amount) nightly for 2 weeks, then twice weekly     glucosamine-chondroitin 500-400 MG CAPS Take 1 capsule by mouth daily     Lactobacillus (ACIDOPHILUS) CAPS Take 1 tablet by mouth daily     melatonin 5 MG CAPS Take 5 mg by mouth At Bedtime     MULTI-VITAMIN OR TABS 1 TABLET DAILY     Omega-3 Fatty Acids (OMEGA-3 FISH OIL PO) Take 1 capsule by mouth daily     omeprazole (PRILOSEC) 40 MG capsule Take 1 capsule (40 mg) by mouth daily     simvastatin (ZOCOR) 20 MG tablet Take 1 tablet (20 mg) by mouth At Bedtime     VITAMIN D OR 1 DAILY       ALLERGIES:     Allergies   Allergen Reactions     Biaxin [Clarithromycin]      per pt       Celebrex [Celecoxib]      per pt     Cipro [Ciprofloxacin]      per pt     Darvocet [Propoxyphene N-Apap]      Fleet Phospho Soda [Sodium Phosphate/Biphosphate]      nausea per pt     Morphine      Neurontin [Gabapentin]      per pt     Nortriptyline      per pt     Percocet [Oxycodone-Acetaminophen]      Serzone [Nefazodone Hydrochloride]      per pt     Tegretol [Carbamazepine]      Vicodin [Acetaminophen]      per pt-dizziness     Vioxx      per pt     Vivactil [Protriptyline Hcl]      per pt     Wellbutrin [Bupropion Hcl]      Zithromax [Azithromycin Dihydrate]        PERTINENT SYSTEMS REVIEW:    1. No - Do you have a history of heart attack, stroke, stent, bypass or surgery on an artery in the head, neck, heart or legs?  2. No - Do you ever have any pain or discomfort in your chest?  3. No - Do you have a  history of  Heart Failure?  4. No - Are you troubled by shortness of breath when walking: On the level, up a slight hill or at night?  5. No - Do you currently have a cold, bronchitis or other respiratory infection?  6. No - Do you have a cough, shortness of breath or wheezing?  7. No - Do you sometimes get pains in the calves of your legs when you walk?  8. No - Do you or anyone in your family have previous history of blood clots?  9. No - Do you or does anyone in your family have a serious bleeding problem such as prolonged bleeding following surgeries or cuts?  10. No - Have you ever had problems with anemia or been told to take iron pills?  11. No - Have you had any abnormal blood loss such as black, tarry or bloody stools, or abnormal vaginal bleeding?  12. No - Have you ever had a blood transfusion?  13. No - Have you or any of your relatives ever had problems with anesthesia?  14. No - Do you have sleep apnea, excessive snoring or daytime drowsiness?  15. No - Do you have any prosthetic heart valves?  16. No - Do you have prosthetic joints?    EXAMINATION:  Vitals were reviewed                     Vison:  Va, right - 20/30;   BAT, right - 20/80;  HEENT:  Cataract, otherwise unremarkable.  LUNGS:  Clear  CV:  Regular rate and rhythm without murmur  ABD:  Soft and nontender  NEURO:  Alert and nonfocal    IMPRESSION:  Patient cleared for ophthalmic surgery.  Low risk with monitored, light sedation.  I have assessed the patient's DVT risk, and no additional orders necessary.    PLAN:  Procedure(s):  Cataract Removal with Implant, Right Eye      Chapito Phillips MD

## 2019-04-08 ENCOUNTER — ANESTHESIA EVENT (OUTPATIENT)
Dept: SURGERY | Facility: CLINIC | Age: 74
End: 2019-04-08
Payer: COMMERCIAL

## 2019-04-08 NOTE — ANESTHESIA PREPROCEDURE EVALUATION
Anesthesia Pre-Procedure Evaluation    Patient: Mariah Jacinto   MRN: [id number] : 1945          Preoperative Diagnosis: cataract    Procedure(s):  Cataract Removal with Implant    Past Medical History:   Diagnosis Date     Adhesive capsulitis of shoulder     Right shoulder tendonitis     Basal cell carcinoma      Displacement of cervical intervertebral disc without myelopathy      Esophageal reflux      Major depression in complete remission (H) 2009    celexa caused spinning side effect      MENOPAUSE --ERT      OSTEOPENIA      Squamous cell carcinoma      Past Surgical History:   Procedure Laterality Date     ABLATE VEIN VARICOSE RADIO FREQUENCY WITHOUT PHLEBECTOMY MULTIPLE STAB  3/28/2013    Procedure: ABLATE VEIN VARICOSE RADIO FREQUENCY WITHOUT PHLEBECTOMY MULTIPLE STAB;  Bilateral ablation of varicose veins legs-to ultrasound at 0930  ;  Surgeon: Noam Soliman MD;  Location: [facility]     C ANESTH,LOWER ARM SURGERY      ulnar nerve decompression - right     COLONOSCOPY  2013    Procedure: COLONOSCOPY;  Colonoscopy;  Surgeon: Yuliya Nathan MD;  Location: [facility]     ESOPHAGOSCOPY, GASTROSCOPY, DUODENOSCOPY (EGD), COMBINED N/A 2015    Procedure: COMBINED ESOPHAGOSCOPY, GASTROSCOPY, DUODENOSCOPY (EGD), BIOPSY SINGLE OR MULTIPLE;  Surgeon: Yuliya Nathan MD;  [facility]: [facility]     ESOPHAGOSCOPY, GASTROSCOPY, DUODENOSCOPY (EGD), COMBINED N/A 2017    Procedure: COMBINED ESOPHAGOSCOPY, GASTROSCOPY, DUODENOSCOPY (EGD), BIOPSY SINGLE OR MULTIPLE;  Surgeon: Qasim Bowie MD;  Location: [facility]     EXCISE MASS FINGER Right 2019    Procedure: Excision Right Ring Finger Volar Middle Phalanx Mass;  Surgeon: Jake Viveros MD;  Location: [facility]     HYSTERECTOMY, PAP NO LONGER INDICATED      has both ovaries out also      HYSTERECTOMY, VAGINAL  1988    Hysterectomy, oophorectomy     PHACOEMULSIFICATION WITH STANDARD INTRAOCULAR LENS IMPLANT Left  3/18/2019    Procedure: Cataract Removal with Implant;  Surgeon: Chapito Phillips MD;  Location: [facility]     RELEASE TRIGGER FINGER Right 6/23/2017    Procedure: RELEASE TRIGGER FINGER;  Right Thumb A1 Pulley Release;  Surgeon: Jake Viveros MD;  Location: [facility]     SURGICAL HISTORY OF -   2009    right retromastoid craniectomy and decompression trigeminal nerve     TONSILLECTOMY & ADENOIDECTOMY  child    T&A        Anesthesia Evaluation     . Pt has had prior anesthetic. Type: General and MAC           ROS/MED HX    ENT/Pulmonary:     (+)other ENT- Hearing loss; Tinnitus, , . .    Neurologic:     (+)other neuro Trigeminal nerve pain; intercostal neuralgia    Cardiovascular:  - neg cardiovascular ROS       METS/Exercise Tolerance:  >4 METS   Hematologic:  - neg hematologic  ROS       Musculoskeletal:   (+)  other musculoskeletal- Cervical disc d      GI/Hepatic:     (+) GERD       Renal/Genitourinary:  - ROS Renal section negative       Endo:  - neg endo ROS       Psychiatric:     (+) psychiatric history depression      Infectious Disease:  - neg infectious disease ROS       Malignancy:   (+) Malignancy History of Skin          Other:    - neg other ROS                      Physical Exam  Normal systems: cardiovascular, pulmonary and dental    Airway   Mallampati: II  TM distance: >3 FB  Neck ROM: full    Dental     Cardiovascular       Pulmonary             Lab Results   Component Value Date    WBC 5.8 05/06/2015    HGB 14.4 05/06/2015    HCT 42.9 05/06/2015     05/06/2015    SED 8 05/02/2016     09/05/2018    POTASSIUM 4.7 09/05/2018    CHLORIDE 105 09/05/2018    CO2 27 09/05/2018    BUN 21 09/05/2018    CR 0.71 09/05/2018    GLC 86 09/05/2018    KARELY 8.7 09/05/2018    PHOS 3.7 01/11/2012    MAG 2.5 (H) 03/09/2010    ALBUMIN 3.8 05/06/2015    PROTTOTAL 6.9 05/06/2015    ALT 27 05/06/2015    AST 17 05/06/2015    ALKPHOS 84 05/06/2015    BILITOTAL 0.4 05/06/2015    LIPASE 114 02/12/2012     "AMYLASE 70 12/23/2011    PTT 27 08/03/2009    INR 0.98 08/03/2009    TSH 0.71 04/16/2018       Preop Vitals  BP Readings from Last 3 Encounters:   04/05/19 132/68   03/21/19 128/64   03/18/19 142/69    Pulse Readings from Last 3 Encounters:   04/05/19 66   03/21/19 68   03/18/19 62      Resp Readings from Last 3 Encounters:   04/05/19 18   03/21/19 16   03/18/19 16    SpO2 Readings from Last 3 Encounters:   04/05/19 98%   03/21/19 98%   03/18/19 98%      Temp Readings from Last 1 Encounters:   04/05/19 36.7  C (98  F) (Tympanic)    Ht Readings from Last 1 Encounters:   04/05/19 1.626 m (5' 4\")      Wt Readings from Last 1 Encounters:   04/05/19 61.7 kg (136 lb)    Estimated body mass index is 23.34 kg/m  as calculated from the following:    Height as of 4/5/19: 1.626 m (5' 4\").    Weight as of 4/5/19: 61.7 kg (136 lb).       Anesthesia Plan      History & Physical Review  History and physical reviewed and following examination; no interval change.    ASA Status:  2 .    NPO Status:  > 8 hours    Plan for MAC Maintenance will be TIVA.  Reason for MAC:  Difficulty with conscious sedation (QS)  PONV prophylaxis:  Ondansetron (or other 5HT-3) and Dexamethasone or Solumedrol       Postoperative Care  Postoperative pain management:  IV analgesics and Oral pain medications.      Consents  Anesthetic plan, risks, benefits and alternatives discussed with:  Patient..                 Marianne Zheng APRN CRNA  "

## 2019-04-10 ENCOUNTER — ANESTHESIA (OUTPATIENT)
Dept: SURGERY | Facility: CLINIC | Age: 74
End: 2019-04-10
Payer: COMMERCIAL

## 2019-04-10 ENCOUNTER — HOSPITAL ENCOUNTER (OUTPATIENT)
Facility: CLINIC | Age: 74
Discharge: HOME | End: 2019-04-10
Attending: OPHTHALMOLOGY | Admitting: OPHTHALMOLOGY
Payer: COMMERCIAL

## 2019-04-10 VITALS
TEMPERATURE: 98 F | WEIGHT: 136 LBS | HEIGHT: 64 IN | RESPIRATION RATE: 16 BRPM | DIASTOLIC BLOOD PRESSURE: 76 MMHG | HEART RATE: 71 BPM | OXYGEN SATURATION: 100 % | SYSTOLIC BLOOD PRESSURE: 144 MMHG | BODY MASS INDEX: 23.22 KG/M2

## 2019-04-10 PROCEDURE — 25800030 ZZH RX IP 258 OP 636: Performed by: NURSE ANESTHETIST, CERTIFIED REGISTERED

## 2019-04-10 PROCEDURE — 25000125 ZZHC RX 250: Performed by: OPHTHALMOLOGY

## 2019-04-10 PROCEDURE — 25000128 H RX IP 250 OP 636: Performed by: OPHTHALMOLOGY

## 2019-04-10 PROCEDURE — 71000022 ZZH RECOVERY CATRACT PACKAGE: Performed by: OPHTHALMOLOGY

## 2019-04-10 PROCEDURE — 25000125 ZZHC RX 250: Performed by: NURSE ANESTHETIST, CERTIFIED REGISTERED

## 2019-04-10 PROCEDURE — 25000128 H RX IP 250 OP 636: Performed by: NURSE ANESTHETIST, CERTIFIED REGISTERED

## 2019-04-10 PROCEDURE — 36000025 ZZH CATARACT SURGICAL PACKAGE: Performed by: OPHTHALMOLOGY

## 2019-04-10 PROCEDURE — 37000012 ZZH ANESTHESIA CATARACT PACKAGE: Performed by: OPHTHALMOLOGY

## 2019-04-10 PROCEDURE — V2632 POST CHMBR INTRAOCULAR LENS: HCPCS | Performed by: OPHTHALMOLOGY

## 2019-04-10 DEVICE — EYE IMP IOL AMO PCL TECNIS ZCB00 20.0: Type: IMPLANTABLE DEVICE | Site: POSTERIOR CHAMBER | Status: FUNCTIONAL

## 2019-04-10 RX ORDER — SODIUM CHLORIDE, SODIUM LACTATE, POTASSIUM CHLORIDE, CALCIUM CHLORIDE 600; 310; 30; 20 MG/100ML; MG/100ML; MG/100ML; MG/100ML
INJECTION, SOLUTION INTRAVENOUS CONTINUOUS
Status: CANCELLED | OUTPATIENT
Start: 2019-04-10

## 2019-04-10 RX ORDER — CYCLOPENTOLATE HYDROCHLORIDE 10 MG/ML
1 SOLUTION/ DROPS OPHTHALMIC
Status: COMPLETED | OUTPATIENT
Start: 2019-04-10 | End: 2019-04-10

## 2019-04-10 RX ORDER — LIDOCAINE 40 MG/G
CREAM TOPICAL
Status: DISCONTINUED | OUTPATIENT
Start: 2019-04-10 | End: 2019-04-10 | Stop reason: HOSPADM

## 2019-04-10 RX ORDER — MEPERIDINE HYDROCHLORIDE 25 MG/ML
12.5 INJECTION INTRAMUSCULAR; INTRAVENOUS; SUBCUTANEOUS
Status: DISCONTINUED | OUTPATIENT
Start: 2019-04-10 | End: 2019-04-10 | Stop reason: HOSPADM

## 2019-04-10 RX ORDER — ONDANSETRON 2 MG/ML
4 INJECTION INTRAMUSCULAR; INTRAVENOUS EVERY 30 MIN PRN
Status: DISCONTINUED | OUTPATIENT
Start: 2019-04-10 | End: 2019-04-10 | Stop reason: HOSPADM

## 2019-04-10 RX ORDER — NALOXONE HYDROCHLORIDE 0.4 MG/ML
.1-.4 INJECTION, SOLUTION INTRAMUSCULAR; INTRAVENOUS; SUBCUTANEOUS
Status: DISCONTINUED | OUTPATIENT
Start: 2019-04-10 | End: 2019-04-10 | Stop reason: HOSPADM

## 2019-04-10 RX ORDER — TROPICAMIDE 10 MG/ML
1 SOLUTION/ DROPS OPHTHALMIC
Status: COMPLETED | OUTPATIENT
Start: 2019-04-10 | End: 2019-04-10

## 2019-04-10 RX ORDER — SODIUM CHLORIDE, SODIUM LACTATE, POTASSIUM CHLORIDE, CALCIUM CHLORIDE 600; 310; 30; 20 MG/100ML; MG/100ML; MG/100ML; MG/100ML
INJECTION, SOLUTION INTRAVENOUS CONTINUOUS
Status: DISCONTINUED | OUTPATIENT
Start: 2019-04-10 | End: 2019-04-10 | Stop reason: HOSPADM

## 2019-04-10 RX ORDER — PHENYLEPHRINE HYDROCHLORIDE 25 MG/ML
1 SOLUTION/ DROPS OPHTHALMIC
Status: COMPLETED | OUTPATIENT
Start: 2019-04-10 | End: 2019-04-10

## 2019-04-10 RX ORDER — TETRACAINE HYDROCHLORIDE 5 MG/ML
SOLUTION OPHTHALMIC PRN
Status: DISCONTINUED | OUTPATIENT
Start: 2019-04-10 | End: 2019-04-10 | Stop reason: HOSPADM

## 2019-04-10 RX ORDER — ONDANSETRON 4 MG/1
4 TABLET, ORALLY DISINTEGRATING ORAL EVERY 30 MIN PRN
Status: DISCONTINUED | OUTPATIENT
Start: 2019-04-10 | End: 2019-04-10 | Stop reason: HOSPADM

## 2019-04-10 RX ORDER — DEXAMETHASONE SODIUM PHOSPHATE 4 MG/ML
4 INJECTION, SOLUTION INTRA-ARTICULAR; INTRALESIONAL; INTRAMUSCULAR; INTRAVENOUS; SOFT TISSUE EVERY 10 MIN PRN
Status: DISCONTINUED | OUTPATIENT
Start: 2019-04-10 | End: 2019-04-10 | Stop reason: HOSPADM

## 2019-04-10 RX ORDER — BALANCED SALT SOLUTION 6.4; .75; .48; .3; 3.9; 1.7 MG/ML; MG/ML; MG/ML; MG/ML; MG/ML; MG/ML
SOLUTION OPHTHALMIC PRN
Status: DISCONTINUED | OUTPATIENT
Start: 2019-04-10 | End: 2019-04-10 | Stop reason: HOSPADM

## 2019-04-10 RX ADMIN — MIDAZOLAM 1 MG: 1 INJECTION INTRAMUSCULAR; INTRAVENOUS at 09:06

## 2019-04-10 RX ADMIN — MIDAZOLAM 1 MG: 1 INJECTION INTRAMUSCULAR; INTRAVENOUS at 09:08

## 2019-04-10 RX ADMIN — PHENYLEPHRINE HYDROCHLORIDE 1 DROP: 25 SOLUTION/ DROPS OPHTHALMIC at 08:42

## 2019-04-10 RX ADMIN — CYCLOPENTOLATE HYDROCHLORIDE 1 DROP: 10 SOLUTION/ DROPS OPHTHALMIC at 08:42

## 2019-04-10 RX ADMIN — SODIUM CHLORIDE, POTASSIUM CHLORIDE, SODIUM LACTATE AND CALCIUM CHLORIDE: 600; 310; 30; 20 INJECTION, SOLUTION INTRAVENOUS at 08:22

## 2019-04-10 RX ADMIN — TROPICAMIDE 1 DROP: 10 SOLUTION/ DROPS OPHTHALMIC at 08:30

## 2019-04-10 RX ADMIN — TROPICAMIDE 1 DROP: 10 SOLUTION/ DROPS OPHTHALMIC at 08:21

## 2019-04-10 RX ADMIN — PHENYLEPHRINE HYDROCHLORIDE 1 DROP: 25 SOLUTION/ DROPS OPHTHALMIC at 08:30

## 2019-04-10 RX ADMIN — LIDOCAINE HYDROCHLORIDE 1 ML: 10 INJECTION, SOLUTION EPIDURAL; INFILTRATION; INTRACAUDAL; PERINEURAL at 08:31

## 2019-04-10 RX ADMIN — CYCLOPENTOLATE HYDROCHLORIDE 1 DROP: 10 SOLUTION/ DROPS OPHTHALMIC at 08:21

## 2019-04-10 RX ADMIN — TROPICAMIDE 1 DROP: 10 SOLUTION/ DROPS OPHTHALMIC at 08:42

## 2019-04-10 RX ADMIN — PHENYLEPHRINE HYDROCHLORIDE 1 DROP: 25 SOLUTION/ DROPS OPHTHALMIC at 08:21

## 2019-04-10 RX ADMIN — CYCLOPENTOLATE HYDROCHLORIDE 1 DROP: 10 SOLUTION/ DROPS OPHTHALMIC at 08:30

## 2019-04-10 ASSESSMENT — MIFFLIN-ST. JEOR: SCORE: 1101.89

## 2019-04-10 NOTE — OP NOTE
OPHTHALMOLOGY OPERATIVE NOTE    PATIENT: Mariah Jacinto  DATE OF SURGERY: 4/10/2019  PREOPERATIVE DIAGNOSIS:  Senile Nuclear Cataract, Right eye  POSTOPERATIVE DIAGNOSIS:  Senile Nuclear Cataract, Right eye  OPERATIVE PROCEDURE:  Phacoemulsification with placement of intraocular lens  SURGEON:  Chapito Phillips MD  ANESTHESIA:  Topical / MAC  EBL:  None  SPECIMENS:  None  COMPLICATIONS:  None    PROCEDURE:  The patient was brought to the operating room at [facility].  The right eye was prepped and draped in the usual fashion for cataract surgery.  A wire lid speculum was inserted.  A super sharp blade was used to make a paracentesis at the 11 O'clock position.  The super sharp blade was used to make a partial thickness temporal groove, which was 3 mm in length.  0.8 mL of non-preserved epi-Shugarcaine was injected into the anterior chamber.  Viscoelastic was used to inflate the anterior chamber through a cannula.  A 2.5 mm microkeratome was used to make a temporal clear corneal incision in a two-plane fashion.  A cystotome needle and forceps were used to make a capsulorrhexis.  Hydrodissection and hydrodelineation were performed with Balance Salt Solution.  The lens was then phacoemulsified and removed without complications.  The cortical material was removed with bimanual irrigation and aspiration.  The capsular bag was filled with viscoelastic.  A posterior chamber intraocular lens, preselected and recorded, was folded and inserted into the capsular bag.  The viscoelastic was removed with the irrigation and aspiration tip.  Balanced Salt Solution with Vigamox, 150mg/0.1mL, was used to refill the anterior chamber.  The wounds were checked for water tightness and required no suture.  The wire lid speculum was removed.  The patient's right eye was cleaned and a drop of each post-operative drop was placed, followed by a snow shield.  The patient tolerated the procedure well, and there  were no complications.      Chapito Phillips MD

## 2019-04-10 NOTE — ANESTHESIA POSTPROCEDURE EVALUATION
Patient: Mraiah Jacinto    Procedure(s):  Cataract Removal with Implant    Diagnosis:cataract  Diagnosis Additional Information: No value filed.    Anesthesia Type:  No value filed.    Note:  Anesthesia Post Evaluation    Patient location during evaluation: Bedside  Patient participation: Able to fully participate in evaluation  Level of consciousness: awake  Pain management: adequate  Airway patency: patent  Cardiovascular status: stable  Respiratory status: room air  Hydration status: stable  PONV: none             Last vitals:  Vitals:    04/10/19 0803 04/10/19 0928   BP: 126/75 124/61   Pulse: 71    Resp: 16 16   Temp: 36.7  C (98  F)    SpO2: 100% 100%         Electronically Signed By: VICKY Martinez CRNA  April 10, 2019  9:37 AM

## 2019-04-10 NOTE — ANESTHESIA CARE TRANSFER NOTE
Patient: Mariah Jacinto    Procedure(s):  Cataract Removal with Implant    Diagnosis: cataract  Diagnosis Additional Information: No value filed.    Anesthesia Type:   No value filed.     Note:  Airway :Room Air  Patient transferred to:Phase II  Handoff Report: Identifed the Patient, Identified the Reponsible Provider, Reviewed the pertinent medical history, Discussed the surgical course, Reviewed Intra-OP anesthesia mangement and issues during anesthesia, Set expectations for post-procedure period and Allowed opportunity for questions and acknowledgement of understanding      Vitals: (Last set prior to Anesthesia Care Transfer)    CRNA VITALS  4/10/2019 0853 - 4/10/2019 0937      4/10/2019             Pulse:  67    SpO2:  98 %                Electronically Signed By: VICKY Martinez CRNA  April 10, 2019  9:37 AM

## 2019-04-16 ENCOUNTER — OFFICE VISIT (OUTPATIENT)
Dept: ORTHOPEDICS | Facility: CLINIC | Age: 74
End: 2019-04-16
Payer: COMMERCIAL

## 2019-04-16 ENCOUNTER — ANCILLARY PROCEDURE (OUTPATIENT)
Dept: GENERAL RADIOLOGY | Facility: CLINIC | Age: 74
End: 2019-04-16
Attending: PEDIATRICS
Payer: COMMERCIAL

## 2019-04-16 VITALS
DIASTOLIC BLOOD PRESSURE: 82 MMHG | SYSTOLIC BLOOD PRESSURE: 138 MMHG | HEIGHT: 64 IN | BODY MASS INDEX: 23.22 KG/M2 | WEIGHT: 136 LBS

## 2019-04-16 DIAGNOSIS — M54.16 LUMBAR RADICULAR PAIN: ICD-10-CM

## 2019-04-16 DIAGNOSIS — M79.604 RIGHT LEG PAIN: Primary | ICD-10-CM

## 2019-04-16 PROCEDURE — 72100 X-RAY EXAM L-S SPINE 2/3 VWS: CPT

## 2019-04-16 PROCEDURE — 99214 OFFICE O/P EST MOD 30 MIN: CPT | Mod: 25 | Performed by: PEDIATRICS

## 2019-04-16 ASSESSMENT — MIFFLIN-ST. JEOR: SCORE: 1101.89

## 2019-04-16 NOTE — PATIENT INSTRUCTIONS
Plan:  - Today's Plan of Care:  Rehab: Physical Therapy: [facility] - [phone]    -We also discussed other future treatment options:  MRI of the right leg    Follow Up: 4-6 weeks    If you have any further questions for your physician or physician s care team you can call [phone] and use option 3 to leave a voice message. Calls received during business hours will be returned same day.

## 2019-04-16 NOTE — PROGRESS NOTES
Sports Medicine Clinic Visit    PCP: Eli Sommerharley Jacinto is a 73 year old female who is seen  in consultation at the request of  Eli Sommer M.D. presenting with right lower extremity pain    Injury: She reports 2 months of right lower extremity pain. She states the pain is intermittent and improves with tylenol and ibuprofen. She reports numbness as tingling through her extremity also occasionally. She denies lower back pain at this time. She states the pain can radiate from her hip to her ankle. She denies any weakness.  - Mostly happens when getting out of the car after shopping.  Unclear if it's due to increased walking or increased time in the car.  Feels like her leg is weak and heavy at those times.    Mariah was asked to complete the Oswestry Low Back Disability Index and Deven Start Back screening tool.  today in the office.  Disability score: 4.44%.     Location of Pain: right leg  Duration of Pain: 2 month(s)  Rating of Pain at worst: 8/10  Rating of Pain Currently: 4/10  Symptoms are better with: Tylenol and Ibuprofen  Symptoms are worse with: prolonged standing and walking  Additional Features:   Positive: paresthesias and numbness   Negative: swelling, bruising, popping, grinding, catching, locking, instability and weakness  Other evaluation and/or treatments so far consists of: Nothing  Prior History of related problems: nothing    Social History: retired    Review of Systems  Skin: no bruising, no swelling  Musculoskeletal: as above  Neurologic: occasional numbness, paresthesias  Remainder of review of systems is negative including constitutional, CV, pulmonary, GI, except as noted in HPI or medical history.    Patient's current problem list, past medical and surgical history, and family history were reviewed.    Patient Active Problem List   Diagnosis     Disorder of bone and cartilage     Enthesopathy of hip region     Sensorineural hearing loss     Right hip pain      Trigeminal Neuralgia right      Hyperlipidemia LDL goal <160     Advanced directives, counseling/discussion     Intercostal neuralgia     Health Care Home     Trochanteric bursitis     Subjective tinnitus, bilateral     Gastroesophageal reflux disease without esophagitis     Chronic constipation     Dense breast tissue on mammogram     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    celexa caused spinning side effect      MENOPAUSE --ERT      OSTEOPENIA      Squamous cell carcinoma      Past Surgical History:   Procedure Laterality Date     ABLATE VEIN VARICOSE RADIO FREQUENCY WITHOUT PHLEBECTOMY MULTIPLE STAB  3/28/2013    Procedure: ABLATE VEIN VARICOSE RADIO FREQUENCY WITHOUT PHLEBECTOMY MULTIPLE STAB;  Bilateral ablation of varicose veins legs-to ultrasound at 0930  ;  Surgeon: Noam Soliman MD;  Location: [facility]     C ANESTH,LOWER ARM SURGERY  1999    ulnar nerve decompression - right     COLONOSCOPY  8/20/2013    Procedure: COLONOSCOPY;  Colonoscopy;  Surgeon: Yuliya Nathan MD;  Location: [facility]     ESOPHAGOSCOPY, GASTROSCOPY, DUODENOSCOPY (EGD), COMBINED N/A 5/12/2015    Procedure: COMBINED ESOPHAGOSCOPY, GASTROSCOPY, DUODENOSCOPY (EGD), BIOPSY SINGLE OR MULTIPLE;  Surgeon: Yuliya Nathan MD;  Location: [facility]     ESOPHAGOSCOPY, GASTROSCOPY, DUODENOSCOPY (EGD), COMBINED N/A 1/31/2017    Procedure: COMBINED ESOPHAGOSCOPY, GASTROSCOPY, DUODENOSCOPY (EGD), BIOPSY SINGLE OR MULTIPLE;  Surgeon: Qasim Bowie MD;  Location: [facility]     EXCISE MASS FINGER Right 2/19/2019    Procedure: Excision Right Ring Finger Volar Middle Phalanx Mass;  Surgeon: Jake Viveros MD;  Location: [facility]     HYSTERECTOMY, PAP NO LONGER INDICATED      has both ovaries  "out also      HYSTERECTOMY, VAGINAL  1988    Hysterectomy, oophorectomy     PHACOEMULSIFICATION WITH STANDARD INTRAOCULAR LENS IMPLANT Left 3/18/2019    Procedure: Cataract Removal with Implant;  Surgeon: Chapito Phillips MD;  Location: [facility]     PHACOEMULSIFICATION WITH STANDARD INTRAOCULAR LENS IMPLANT Right 4/10/2019    Procedure: Cataract Removal with Implant;  Surgeon: Chapito Phillips MD;  Location: [facility]     RELEASE TRIGGER FINGER Right 2017    Procedure: RELEASE TRIGGER FINGER;  Right Thumb A1 Pulley Release;  Surgeon: Jake Viveros MD;  Location: [facility]     SURGICAL HISTORY OF -       right retromastoid craniectomy and decompression trigeminal nerve     TONSILLECTOMY & ADENOIDECTOMY  child    T&A      Family History   Problem Relation Age of Onset     Alzheimer Disease Mother          at age 85     Osteoporosis Mother      Arthritis Mother      Alcohol/Drug Father      Respiratory Father      Eye Disorder Maternal Grandfather         Macular degneration     C.A.D. Brother         Tripple bypass age 57     Cardiovascular Brother      Cancer Brother         leukemia (ALL)     Cardiovascular Brother      Cardiovascular Brother      Neurologic Disorder Son      Heart Disease Son      Melanoma No family hx of      Skin Cancer No family hx of        Objective  /82 (BP Location: Left arm, Patient Position: Chair, Cuff Size: Adult Regular)   Ht 1.626 m (5' 4\")   Wt 61.7 kg (136 lb)   BMI 23.34 kg/m      GENERAL APPEARANCE: healthy, alert and no distress   GAIT: NORMAL  SKIN: no suspicious lesions or rashes  HEENT: Sclera clear, anicteric  CV: good peripheral pulses  RESP: Breathing not labored  NEURO: Normal strength and tone, mentation intact and speech normal  PSYCH:  mentation appears normal and affect normal/bright    Low back/Hip exam:    Inspection:     no visible deformity in the low back       normal skin       normal vascular       normal lymphatic       no " asymmetry    Posture:      normal    Tender:     none    Non Tender:     remainder of lumbar spine    ROM:      full flexion - with pain into lateral right leg       full extension  - Full and symmetric hip ROM    Strength:     hip flexion 5/5 bilateral       knee extension 5/5 bilateral       ankle dorsiflexion 5/5 bilateral       ankle plantarflexion 5/5 bilateral       dorsiflexion of the great toe 5/5 bilateral       able to heel and toe walk    Reflexes:     patellar (L3, L4) symmetric normal       achilles tendons (S1) symmetric normal    Sensation:    grossly intact throughout lower extremities    Special tests:      straight leg raise left negative        straight leg raise right negative       neg (-) BYRON  bilateral       neg (-) FADIR  bilateral    Bilateral Knee exam    Inspection:      no effusion, swelling of bruising bilateral    Patella:      Crepitus noted in the patellofemoral joint bilateral    Tender:      Mild lateral knee pain, fibular head right    Non Tender:      remainder of knee area bilateral    Knee ROM:      Full active and passive ROM with flexion and extension bilateral    Strength:      5/5 with knee extension bilateral  - 5/5 ankle strength bilateral throughout as well    Special Tests:     neg (-) Jhonatan right       neg (-) anterior drawer right       neg (-) posterior drawer right       neg (-) varus at 0 deg and 30 deg right       neg (-) valgus at 0 deg and 30 deg right    Gait:      normal    Neurovascular:      2+ peripheral pulses bilaterally and brisk capillary refill       sensation grossly intact    Radiology  I ordered, visualized and reviewed these images with the patient  2 XR views of lumbar spine reviewed: no acute bony abnormality, mild - moderate degenerative change throughout  - will follow official read    I visualized and reviewed these images with the patient  Xr Pelvis 1/2 Views  Result Date: 4/5/2019  RIGHT KNEE STANDING TWO VIEWS, PELVIS 1/2 VIEW(S)  4/5/2019 11:03 AM HISTORY: Right knee and hip pain. COMPARISON: Pelvis 12/5/2006.   IMPRESSION: Right knee: No acute bony abnormality or significant degenerative changes. No joint space effusion. Pelvis: No acute or significant chronic bony abnormality and no change since the prior exam. JOE PEREIRA MD    Xr Knee Standing Right 2 Views  Result Date: 4/5/2019  RIGHT KNEE STANDING TWO VIEWS, PELVIS 1/2 VIEW(S) 4/5/2019 11:03 AM HISTORY: Right knee and hip pain. COMPARISON: [id number]/2006.   IMPRESSION: Right knee: No acute bony abnormality or significant degenerative changes. No joint space effusion. Pelvis: No acute or significant chronic bony abnormality and no change since the prior exam. JOE PEREIRA MD    Us Lower Extremity Venous Duplex Right  Result Date: 3/21/2019  ULTRASOUND LOWER EXTREMITY VENOUS DUPLEX RIGHT 3/21/2019 9:40 AM CLINICAL HISTORY/INDICATION: Right calf pain. COMPARISON: 4/1/2013 TECHNIQUE: Grayscale, color-flow, and spectral waveform analysis were performed of the deep veins of the right lower extremity FINDINGS: The right common femoral vein, femoral vein popliteal vein, tibial veins and great saphenous vein demonstrate normal compressibility, spectral waveform, color flow and augmentation. Images obtained at the site of patient's pain show no focal abnormality. The contralateral left common femoral vein demonstrates normal compressibility, spectral waveform, color flow and augmentation.   IMPRESSION: 1. No deep vein thrombosis in the right lower extremity. 2. Images obtained at the site of patient's pain show no focal abnormality. TITA PANDYA DO    Assessment:  1. Right leg pain      Overall reassuring exam, more likely radicular pain with only provocative maneuver today was lumbar flexion.  Does have some lateral knee tenderness, though this doesn't correspond to the other pain she's describing.  I recommend PT evaluation first step, could consider other imaging pending  clinical course.    Plan:  - Today's Plan of Care:  Rehab: Physical Therapy: [facility] - [phone]    -We also discussed other future treatment options:  MRI of the right leg    Follow Up: 4-6 weeks    Concerning signs and symptoms were reviewed.  The patient expressed understanding of this management plan and all questions were answered at this time.    Thanks for the opportunity to participate in the care of this patient, I will keep you updated on their progress.    CC: Eli Dee MD CAQ  Primary Care Sports Medicine  [facility]

## 2019-04-16 NOTE — LETTER
4/16/2019         RE: Mariah Jacinto  [address] [place] [address]        Dear Colleague,    Thank you for referring your patient, Mariah Jacinto, to the [facility]. Please see a copy of my visit note below.    Sports Medicine Clinic Visit    PCP: Eli Sommer    Mariah Jacinto is a 73 year old female who is seen  in consultation at the request of  Eli Sommer M.D. presenting with right lower extremity pain    Injury: She reports 2 months of right lower extremity pain. She states the pain is intermittent and improves with tylenol and ibuprofen. She reports numbness as tingling through her extremity also occasionally. She denies lower back pain at this time. She states the pain can radiate from her hip to her ankle. She denies any weakness.  - Mostly happens when getting out of the car after shopping.  Unclear if it's due to increased walking or increased time in the car.  Feels like her leg is weak and heavy at those times.    Mariah was asked to complete the Oswestry Low Back Disability Index and Deven Start Back screening tool.  today in the office.  Disability score: 4.44%.     Location of Pain: right leg  Duration of Pain: 2 month(s)  Rating of Pain at worst: 8/10  Rating of Pain Currently: 4/10  Symptoms are better with: Tylenol and Ibuprofen  Symptoms are worse with: prolonged standing and walking  Additional Features:   Positive: paresthesias and numbness   Negative: swelling, bruising, popping, grinding, catching, locking, instability and weakness  Other evaluation and/or treatments so far consists of: Nothing  Prior History of related problems: nothing    Social History: retired    Review of Systems  Skin: no bruising, no swelling  Musculoskeletal: as above  Neurologic: occasional numbness, paresthesias  Remainder of review of systems is negative including constitutional, CV, pulmonary, GI, except as noted in HPI or medical history.    Patient's  current problem list, past medical and surgical history, and family history were reviewed.    Patient Active Problem List   Diagnosis     Disorder of bone and cartilage     Enthesopathy of hip region     Sensorineural hearing loss     Right hip pain     Trigeminal Neuralgia right      Hyperlipidemia LDL goal <160     Advanced directives, counseling/discussion     Intercostal neuralgia     Health Care Home     Trochanteric bursitis     Subjective tinnitus, bilateral     Gastroesophageal reflux disease without esophagitis     Chronic constipation     Dense breast tissue on mammogram     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    celexa caused spinning side effect      MENOPAUSE --ERT      OSTEOPENIA      Squamous cell carcinoma      Past Surgical History:   Procedure Laterality Date     ABLATE VEIN VARICOSE RADIO FREQUENCY WITHOUT PHLEBECTOMY MULTIPLE STAB  3/28/2013    Procedure: ABLATE VEIN VARICOSE RADIO FREQUENCY WITHOUT PHLEBECTOMY MULTIPLE STAB;  Bilateral ablation of varicose veins legs-to ultrasound at 0930  ;  Surgeon: Noam Soliman MD;  Location: [facility]      ANESTH,LOWER ARM SURGERY  1999    ulnar nerve decompression - right     COLONOSCOPY  8/20/2013    Procedure: COLONOSCOPY;  Colonoscopy;  Surgeon: Yuliya Nathan MD;  Location: [facility]     ESOPHAGOSCOPY, GASTROSCOPY, DUODENOSCOPY (EGD), COMBINED N/A 5/12/2015    Procedure: COMBINED ESOPHAGOSCOPY, GASTROSCOPY, DUODENOSCOPY (EGD), BIOPSY SINGLE OR MULTIPLE;  Surgeon: Yuliya Nathan MD;  Location: [facility]     ESOPHAGOSCOPY, GASTROSCOPY, DUODENOSCOPY (EGD), COMBINED N/A 1/31/2017    Procedure: COMBINED ESOPHAGOSCOPY, GASTROSCOPY, DUODENOSCOPY (EGD), BIOPSY SINGLE OR MULTIPLE;  Surgeon: Jamir  "Qasim RANGEL MD;  Location: [facility]     EXCISE MASS FINGER Right 2019    Procedure: Excision Right Ring Finger Volar Middle Phalanx Mass;  Surgeon: Jake Viveros MD;  Location: [facility]     HYSTERECTOMY, PAP NO LONGER INDICATED      has both ovaries out also      HYSTERECTOMY, VAGINAL  1988    Hysterectomy, oophorectomy     PHACOEMULSIFICATION WITH STANDARD INTRAOCULAR LENS IMPLANT Left 3/18/2019    Procedure: Cataract Removal with Implant;  Surgeon: Chapito Phillips MD;  Location: [facility]     PHACOEMULSIFICATION WITH STANDARD INTRAOCULAR LENS IMPLANT Right 4/10/2019    Procedure: Cataract Removal with Implant;  Surgeon: Chapito Phillips MD;  Location: [facility]     RELEASE TRIGGER FINGER Right 2017    Procedure: RELEASE TRIGGER FINGER;  Right Thumb A1 Pulley Release;  Surgeon: Jake Viveros MD;  Location: [facility]     SURGICAL HISTORY OF -       right retromastoid craniectomy and decompression trigeminal nerve     TONSILLECTOMY & ADENOIDECTOMY  child    T&A      Family History   Problem Relation Age of Onset     Alzheimer Disease Mother          at age 85     Osteoporosis Mother      Arthritis Mother      Alcohol/Drug Father      Respiratory Father      Eye Disorder Maternal Grandfather         Macular degneration     C.A.D. Brother         Tripple bypass age 57     Cardiovascular Brother      Cancer Brother         leukemia (ALL)     Cardiovascular Brother      Cardiovascular Brother      Neurologic Disorder Son      Heart Disease Son      Melanoma No family hx of      Skin Cancer No family hx of        Objective  /82 (BP Location: Left arm, Patient Position: Chair, Cuff Size: Adult Regular)   Ht 1.626 m (5' 4\")   Wt 61.7 kg (136 lb)   BMI 23.34 kg/m       GENERAL APPEARANCE: healthy, alert and no distress   GAIT: NORMAL  SKIN: no suspicious lesions or rashes  HEENT: Sclera clear, anicteric  CV: good peripheral pulses  RESP: Breathing not labored  NEURO: Normal strength and " tone, mentation intact and speech normal  PSYCH:  mentation appears normal and affect normal/bright    Low back/Hip exam:    Inspection:     no visible deformity in the low back       normal skin       normal vascular       normal lymphatic       no asymmetry    Posture:      normal    Tender:     none    Non Tender:     remainder of lumbar spine    ROM:      full flexion - with pain into lateral right leg       full extension  - Full and symmetric hip ROM    Strength:     hip flexion 5/5 bilateral       knee extension 5/5 bilateral       ankle dorsiflexion 5/5 bilateral       ankle plantarflexion 5/5 bilateral       dorsiflexion of the great toe 5/5 bilateral       able to heel and toe walk    Reflexes:     patellar (L3, L4) symmetric normal       achilles tendons (S1) symmetric normal    Sensation:    grossly intact throughout lower extremities    Special tests:      straight leg raise left negative        straight leg raise right negative       neg (-) BYRON  bilateral       neg (-) FADIR  bilateral    Bilateral Knee exam    Inspection:      no effusion, swelling of bruising bilateral    Patella:      Crepitus noted in the patellofemoral joint bilateral    Tender:      Mild lateral knee pain, fibular head right    Non Tender:      remainder of knee area bilateral    Knee ROM:      Full active and passive ROM with flexion and extension bilateral    Strength:      5/5 with knee extension bilateral  - 5/5 ankle strength bilateral throughout as well    Special Tests:     neg (-) Jhonatan right       neg (-) anterior drawer right       neg (-) posterior drawer right       neg (-) varus at 0 deg and 30 deg right       neg (-) valgus at 0 deg and 30 deg right    Gait:      normal    Neurovascular:      2+ peripheral pulses bilaterally and brisk capillary refill       sensation grossly intact    Radiology  I ordered, visualized and reviewed these images with the patient  2 XR views of lumbar spine reviewed: no acute bony  abnormality, mild - moderate degenerative change throughout  - will follow official read    I visualized and reviewed these images with the patient  Xr Pelvis 1/2 Views  Result Date: 4/5/2019  RIGHT KNEE STANDING TWO VIEWS, PELVIS 1/2 VIEW(S) 4/5/2019 11:03 AM HISTORY: Right knee and hip pain. COMPARISON: Pelvis 12/5/2006.   IMPRESSION: Right knee: No acute bony abnormality or significant degenerative changes. No joint space effusion. Pelvis: No acute or significant chronic bony abnormality and no change since the prior exam. JOE PEREIRA MD    Xr Knee Standing Right 2 Views  Result Date: 4/5/2019  RIGHT KNEE STANDING TWO VIEWS, PELVIS 1/2 VIEW(S) 4/5/2019 11:03 AM HISTORY: Right knee and hip pain. COMPARISON: [id number]/5/2006.   IMPRESSION: Right knee: No acute bony abnormality or significant degenerative changes. No joint space effusion. Pelvis: No acute or significant chronic bony abnormality and no change since the prior exam. JOE PEREIRA MD    Us Lower Extremity Venous Duplex Right  Result Date: 3/21/2019  ULTRASOUND LOWER EXTREMITY VENOUS DUPLEX RIGHT 3/21/2019 9:40 AM CLINICAL HISTORY/INDICATION: Right calf pain. COMPARISON: 4/1/2013 TECHNIQUE: Grayscale, color-flow, and spectral waveform analysis were performed of the deep veins of the right lower extremity FINDINGS: The right common femoral vein, femoral vein popliteal vein, tibial veins and great saphenous vein demonstrate normal compressibility, spectral waveform, color flow and augmentation. Images obtained at the site of patient's pain show no focal abnormality. The contralateral left common femoral vein demonstrates normal compressibility, spectral waveform, color flow and augmentation.   IMPRESSION: 1. No deep vein thrombosis in the right lower extremity. 2. Images obtained at the site of patient's pain show no focal abnormality. TITA PANDYA,     Assessment:  1. Right leg pain      Overall reassuring exam, more likely radicular pain with  only provocative maneuver today was lumbar flexion.  Does have some lateral knee tenderness, though this doesn't correspond to the other pain she's describing.  I recommend PT evaluation first step, could consider other imaging pending clinical course.    Plan:  - Today's Plan of Care:  Rehab: Physical Therapy: [facility] - [phone]    -We also discussed other future treatment options:  MRI of the right leg    Follow Up: 4-6 weeks    Concerning signs and symptoms were reviewed.  The patient expressed understanding of this management plan and all questions were answered at this time.    Thanks for the opportunity to participate in the care of this patient, I will keep you updated on their progress.    CC: Eli Dee MD CAQ  Primary Care Sports Medicine  [facility]      Again, thank you for allowing me to participate in the care of your patient.        Sincerely,        Allison Dee MD

## 2019-04-22 ENCOUNTER — HOSPITAL ENCOUNTER (OUTPATIENT)
Dept: PHYSICAL THERAPY | Facility: CLINIC | Age: 74
Setting detail: THERAPIES SERIES
End: 2019-04-22
Attending: PEDIATRICS
Payer: COMMERCIAL

## 2019-04-22 DIAGNOSIS — M79.604 RIGHT LEG PAIN: ICD-10-CM

## 2019-04-22 PROCEDURE — 97110 THERAPEUTIC EXERCISES: CPT | Mod: GP | Performed by: PHYSICAL THERAPIST

## 2019-04-22 PROCEDURE — 97161 PT EVAL LOW COMPLEX 20 MIN: CPT | Mod: GP | Performed by: PHYSICAL THERAPIST

## 2019-04-22 PROCEDURE — 97140 MANUAL THERAPY 1/> REGIONS: CPT | Mod: GP | Performed by: PHYSICAL THERAPIST

## 2019-04-22 NOTE — PROGRESS NOTES
[facility]          OUTPATIENT PHYSICAL THERAPY ORTHOPEDIC EVALUATION  PLAN OF TREATMENT FOR OUTPATIENT REHABILITATION  (COMPLETE FOR INITIAL CLAIMS ONLY)  Patient's Last Name, First Name, M.I.  YOB: 1945  [place]  [place]    Provider s Name:  [facility]   Medical Record No.  [id number]   Start of Care Date:  04/22/19   Onset Date:  01/22/19   Type:     _X__PT   ___OT   ___SLP Medical Diagnosis:  Right leg pain     PT Diagnosis:  Lumbar spinal stenosis; right hamstring and gastroc hypertonicity; right sciatic N tension   Visits from SOC:  1      _________________________________________________________________________________  Plan of Treatment/Functional Goals:  ADL retraining, balance training, gait training, joint mobilization, motor coordination training, neuromuscular re-education, ROM, strengthening, stretching     Cryotherapy     Goals     Goal Description: Patient will have >=5 degrees of right ankle DF ROM to allow for normalized gait.  Target Date: 06/17/19       Goal Description: Patient will be able to grocery shop without right LE symptoms.  Target Date: 06/03/19       Goal Description: Patient will be independent with her HEP to reduce future occurrence of pain and disability.  Target Date: 05/13/19         Therapy Frequency:  1 time/week  Predicted Duration of Therapy Intervention:  8 weeks    Bonnie Cota, PT                 I CERTIFY THE NEED FOR THESE SERVICES FURNISHED UNDER        THIS PLAN OF TREATMENT AND WHILE UNDER MY CARE     (Physician co-signature of this document indicates review and certification of the therapy plan).                       Certification Date From:  04/22/19   Certification Date To:  06/17/19    Referring Provider:  Allison Dee MD    Initial Assessment        See Epic Evaluation Start of Care Date: 04/22/19

## 2019-04-22 NOTE — PROGRESS NOTES
04/22/19 1000   General Information   Type of Visit Initial OP Ortho PT Evaluation   Start of Care Date 04/22/19   Referring Physician Allison Dee MD   Patient/Family Goals Statement To no longer have pain   Orders Evaluate and Treat   Date of Order 04/16/19   Insurance Type Medicare;[employer]   Insurance Comments/Visits Authorized Cert required; auth > 20 visits    Medical Diagnosis Right leg pain   Body Part(s)   Body Part(s) Lumbar Spine/SI   Presentation and Etiology   Pertinent history of current problem (include personal factors and/or comorbidities that impact the POC) Patient reports: she has been having intermittant right posterolateral thigh, calf, and lateral foot symptoms for a few months.  Insidious onset.  Only time it is definitly worse is after shopping (walking for a fair amount in [place]).     Impairments A. Pain;D. Decreased ROM   Functional Limitations perform activities of daily living;perform desired leisure / sports activities   Symptom Location Right lateral thigh; lateral gastroc; at times lateral foot   How/Where did it occur From insidious onset   Onset date of current episode/exacerbation 01/22/19   Chronicity New   Pain rating (0-10 point scale) Best (/10);Worst (/10)   Best (/10) 4   Worst (/10) 10   Pain quality A. Sharp;C. Aching;G. Cramping   Frequency of pain/symptoms B. Intermittent   Pain/symptoms are: The same all the time   Pain/symptoms exacerbated by A. Sitting;B. Walking   Pain/symptoms eased by D. Nothing   Progression of symptoms since onset: Worsened   Current / Previous Interventions   Diagnostic Tests: X-ray   X-ray Results Results   X-ray results 4/16/19 XR lumbar spine: IMPRESSION: There has been no change in a mild degenerative anterolisthesis at L4-L5. Vertebral body alignment is otherwise normal with no fracture or osseous lesion seen. I suspect there is mild degenerative disc disease at all levels, with either mild disc height loss and/or marginal osteophyte  formation. This has slightly progressed since the previous study. No other abnormality or change is demonstrated.   Prior Level of Function   Prior Level of Function-Mobility Independent   Prior Level of Function-ADLs Independent   Current Level of Function   Current Community Support Family/friend caregiver   Patient role/employment history F. Retired   Living environment House/townhome   Home/community accessibility Stairs to basement   Fall Risk Screen   Fall screen completed by PT   Have you fallen 2 or more times in the past year? No   Have you fallen and had an injury in the past year? No   Is patient a fall risk? No   System Outcome Measures   Outcome Measures Low Back Pain (see Oswestry and Deven)   Lumbar Spine/SI Objective Findings   Posture Les rounded shoulders, increased thoracic kyphosis   Gait/Locomotion Les Trendelenburg   Balance/Proprioception (Single Leg Stance) Right=20 sec; left=30 sec   Flexion ROM Fingertips to mid shin pain free   Extension ROM 50% pain free   Right Side Bending ROM 50% pain free   Left Side Bending ROM 50% pain free   Hip Screen Straight leg raise right=80 deg; left=90 deg   Hip Flexion (L2) Strength 5/5 les   Hip Abduction Strength 4/5 les   Hip Extension Strength 4/5 les   Knee Extension (L3) Strength 5/5 les   Ankle Dorsiflexion (L4) Strength 5/5 les   Great Toe Extension (L5) Strength 5/5 les   Ankle Plantar Flexion (S1) Strength 5/5 les   Hamstring Flexibility Hypertonic, tender to palpation   Piriformis Flexibility Right limited,  painful   Lumbar/SI Special Tests Comments Dorsiflexion right=0 deg; left=5 deg   Planned Therapy Interventions   Planned Therapy Interventions ADL retraining;balance training;gait training;joint mobilization;motor coordination training;neuromuscular re-education;ROM;strengthening;stretching   Planned Modality Interventions   Planned Modality Interventions Cryotherapy   Clinical Impression   Criteria for Skilled Therapeutic Interventions Met  yes, treatment indicated   PT Diagnosis Lumbar spinal stenosis; right hamstring and gastroc hypertonicity; right sciatic N tension   Influenced by the following impairments Pain; weakness; impaired ROM; impaired posture   Functional limitations due to impairments Pain and difficulty participating in ADL's, walking, shopping   Clinical Presentation Stable/Uncomplicated   Clinical Presentation Rationale (+) motivation; pain relief in PT session; overall health   Clinical Decision Making (Complexity) Low complexity   Therapy Frequency 1 time/week   Predicted Duration of Therapy Intervention (days/wks) 8 weeks   Risk & Benefits of therapy have been explained Yes   Patient, Family & other staff in agreement with plan of care Yes   Clinical Impression Comments Mariah arrives today with right LE symptoms that present as radicular, exacerbated by hypertonic hamstring and gastrocnemius, and sciatic N tension.  She will benefit from skilled PT.   Education Assessment   Preferred Learning Style Listening;Pictures/video;Demonstration   Barriers to Learning No barriers   ORTHO GOALS   PT Ortho Eval Goals 1;2;3   Ortho Goal 1   Goal Description Patient will have >=5 degrees of right ankle DF ROM to allow for normalized gait.   Target Date 06/17/19   Ortho Goal 2   Goal Description Patient will be able to grocery shop without right LE symptoms.   Target Date 06/03/19   Ortho Goal 3   Goal Description Patient will be independent with her HEP to reduce future occurrence of pain and disability.   Target Date 05/13/19   Total Evaluation Time   PT Eval, Low Complexity Minutes (82858) 20   Therapy Certification   Certification date from 04/22/19   Certification date to 06/17/19   Medical Diagnosis Right leg pain       Bonnie Cota, PT, DPT  Doctor of Physical Therapy #[id number]  [facility]  [phone]  [email or web address]

## 2019-05-15 ENCOUNTER — HOSPITAL ENCOUNTER (OUTPATIENT)
Dept: PHYSICAL THERAPY | Facility: CLINIC | Age: 74
Setting detail: THERAPIES SERIES
End: 2019-05-15
Attending: PEDIATRICS
Payer: COMMERCIAL

## 2019-05-15 PROCEDURE — 97110 THERAPEUTIC EXERCISES: CPT | Mod: GP | Performed by: PHYSICAL THERAPIST

## 2019-05-15 PROCEDURE — 97140 MANUAL THERAPY 1/> REGIONS: CPT | Mod: GP | Performed by: PHYSICAL THERAPIST

## 2019-05-30 ENCOUNTER — HOSPITAL ENCOUNTER (OUTPATIENT)
Dept: PHYSICAL THERAPY | Facility: CLINIC | Age: 74
Setting detail: THERAPIES SERIES
End: 2019-05-30
Attending: PEDIATRICS
Payer: COMMERCIAL

## 2019-05-30 PROCEDURE — 97140 MANUAL THERAPY 1/> REGIONS: CPT | Mod: GP | Performed by: PHYSICAL THERAPIST

## 2019-05-30 PROCEDURE — 97110 THERAPEUTIC EXERCISES: CPT | Mod: GP | Performed by: PHYSICAL THERAPIST

## 2019-06-11 ENCOUNTER — OFFICE VISIT (OUTPATIENT)
Dept: ORTHOPEDICS | Facility: CLINIC | Age: 74
End: 2019-06-11
Payer: COMMERCIAL

## 2019-06-11 ENCOUNTER — TELEPHONE (OUTPATIENT)
Dept: ORTHOPEDICS | Facility: CLINIC | Age: 74
End: 2019-06-11

## 2019-06-11 VITALS
SYSTOLIC BLOOD PRESSURE: 137 MMHG | WEIGHT: 136 LBS | DIASTOLIC BLOOD PRESSURE: 77 MMHG | HEIGHT: 64 IN | BODY MASS INDEX: 23.22 KG/M2

## 2019-06-11 DIAGNOSIS — M79.604 RIGHT LEG PAIN: Primary | ICD-10-CM

## 2019-06-11 PROCEDURE — 99214 OFFICE O/P EST MOD 30 MIN: CPT | Performed by: PEDIATRICS

## 2019-06-11 ASSESSMENT — MIFFLIN-ST. JEOR: SCORE: 1101.89

## 2019-06-11 NOTE — PATIENT INSTRUCTIONS
Plan:  - Today's Plan of Care:  Discussed MRI    Follow Up: To be determined, will call    If you have any further questions for your physician or physician s care team you can call [phone] and use option 3 to leave a voice message. Calls received during business hours will be returned same day.

## 2019-06-11 NOTE — PROGRESS NOTES
Sports Medicine Clinic Visit - Interim History June 11, 2019    PCP: Eli Sommer JENNA aJcinto is a 73 year old female who is seen in f/u up for Right leg pain. Since last visit on 4/16/19 patient has had 3 session of physical therapy. She reports initial she was seeing a reduction in the severity of her pain and symptoms with PT but over the last few weeks her pain pain has increased.    Initial injury History 4/16/2019: She reports 2 months of right lower extremity pain. She states the pain is intermittent and improves with tylenol and ibuprofen. She reports numbness as tingling through her extremity also occasionally. She denies lower back pain at this time. She states the pain can radiate from her hip to her ankle. She denies any weakness.  - Mostly happens when getting out of the car after shopping.  Unclear if it's due to increased walking or increased time in the car.  Feels like her leg is weak and heavy at those times.    Symptoms are better with: nothing  Symptoms are worse with: standing and walking  Additional Features:   Positive: paresthesias and numbness   Negative: swelling, bruising, popping, grinding, catching, locking, instability and weakness    Social History: retired    Review of Systems  Skin: no bruising, no swelling  Musculoskeletal: as above  Neurologic: no numbness, paresthesias  Remainder of review of systems is negative including constitutional, CV, pulmonary, GI, except as noted in HPI or medical history.    Patient's current problem list, past medical and surgical history, and family history were reviewed.    Patient Active Problem List   Diagnosis     Disorder of bone and cartilage     Enthesopathy of hip region     Sensorineural hearing loss     Right hip pain     Trigeminal Neuralgia right      Hyperlipidemia LDL goal <160     Advanced directives, counseling/discussion     Intercostal neuralgia     Health Care Home     Trochanteric bursitis     Subjective tinnitus,  bilateral     Gastroesophageal reflux disease without esophagitis     Chronic constipation     Dense breast tissue on mammogram     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    celexa caused spinning side effect      MENOPAUSE --ERT      OSTEOPENIA      Squamous cell carcinoma      Past Surgical History:   Procedure Laterality Date     ABLATE VEIN VARICOSE RADIO FREQUENCY WITHOUT PHLEBECTOMY MULTIPLE STAB  3/28/2013    Procedure: ABLATE VEIN VARICOSE RADIO FREQUENCY WITHOUT PHLEBECTOMY MULTIPLE STAB;  Bilateral ablation of varicose veins legs-to ultrasound at 0930  ;  Surgeon: Noam Soliman MD;  Location: [facility]     C ANESTH,LOWER ARM SURGERY  1999    ulnar nerve decompression - right     COLONOSCOPY  8/20/2013    Procedure: COLONOSCOPY;  Colonoscopy;  Surgeon: Yuliya Nathan MD;  Location: [facility]     ESOPHAGOSCOPY, GASTROSCOPY, DUODENOSCOPY (EGD), COMBINED N/A 5/12/2015    Procedure: COMBINED ESOPHAGOSCOPY, GASTROSCOPY, DUODENOSCOPY (EGD), BIOPSY SINGLE OR MULTIPLE;  Surgeon: Yuliya Nathan MD;  Location: [facility]     ESOPHAGOSCOPY, GASTROSCOPY, DUODENOSCOPY (EGD), COMBINED N/A 1/31/2017    Procedure: COMBINED ESOPHAGOSCOPY, GASTROSCOPY, DUODENOSCOPY (EGD), BIOPSY SINGLE OR MULTIPLE;  Surgeon: Qasim Bowie MD;  Location: [facility]     EXCISE MASS FINGER Right 2/19/2019    Procedure: Excision Right Ring Finger Volar Middle Phalanx Mass;  Surgeon: Jake Viveros MD;  Location: [facility]     HYSTERECTOMY, PAP NO LONGER INDICATED      has both ovaries out also      HYSTERECTOMY, VAGINAL  1988    Hysterectomy, oophorectomy     PHACOEMULSIFICATION WITH STANDARD INTRAOCULAR LENS IMPLANT Left 3/18/2019    Procedure: Cataract Removal with Implant;  Surgeon:  "Chapito Phillips MD;  Location: [facility]     PHACOEMULSIFICATION WITH STANDARD INTRAOCULAR LENS IMPLANT Right 4/10/2019    Procedure: Cataract Removal with Implant;  Surgeon: Chapito Phillips MD;  Location: [facility]     RELEASE TRIGGER FINGER Right 2017    Procedure: RELEASE TRIGGER FINGER;  Right Thumb A1 Pulley Release;  Surgeon: Jake Viveros MD;  Location: [facility]     SURGICAL HISTORY OF -       right retromastoid craniectomy and decompression trigeminal nerve     TONSILLECTOMY & ADENOIDECTOMY  child    T&A      Family History   Problem Relation Age of Onset     Alzheimer Disease Mother          at age 85     Osteoporosis Mother      Arthritis Mother      Alcohol/Drug Father      Respiratory Father      Eye Disorder Maternal Grandfather         Macular degneration     C.A.D. Brother         Tripple bypass age 57     Cardiovascular Brother      Cancer Brother         leukemia (ALL)     Cardiovascular Brother      Cardiovascular Brother      Neurologic Disorder Son      Heart Disease Son      Melanoma No family hx of      Skin Cancer No family hx of        Objective  /77 (BP Location: Left arm, Patient Position: Chair, Cuff Size: Adult Regular)   Ht 1.626 m (5' 4\")   Wt 61.7 kg (136 lb)   BMI 23.34 kg/m      Low back/Hip exam:     Inspection:     no visible deformity in the low back       normal skin       normal vascular       normal lymphatic       no asymmetry     Posture:      normal     Tender:     none     Non Tender:     remainder of lumbar spine     ROM:      full flexion - with pain into lateral right leg       full extension  - Full and symmetric hip ROM     Strength:     hip flexion 5/5 bilateral       knee extension 5/5 bilateral       ankle dorsiflexion 5/5 bilateral       ankle plantarflexion 5/5 bilateral       dorsiflexion of the great toe 5/5 bilateral       able to heel and toe walk     Reflexes:     patellar (L3, L4) symmetric normal       achilles tendons " (S1) symmetric normal     Sensation:    grossly intact throughout lower extremities     Special tests:      straight leg raise left negative        straight leg raise right negative       neg (-) BYRON  bilateral       neg (-) FADIR  bilateral     Bilateral Knee exam     Inspection:      no effusion, swelling of bruising bilateral     Patella:      Crepitus noted in the patellofemoral joint bilateral     Tender:      Mild lateral knee pain, fibular head right     Non Tender:      remainder of knee area bilateral     Knee ROM:      Full active and passive ROM with flexion and extension bilateral     Strength:      5/5 with knee extension bilateral  - 5/5 ankle strength bilateral throughout as well     Special Tests:     neg (-) Jhonatan right       neg (-) anterior drawer right       neg (-) posterior drawer right       neg (-) varus at 0 deg and 30 deg right       neg (-) valgus at 0 deg and 30 deg right     Gait:      normal     Neurovascular:      2+ peripheral pulses bilaterally and brisk capillary refill       sensation grossly intact    Radiology  I ordered, visualized and reviewed these images with the patient  LUMBAR SPINE 2 VIEWS   4/16/2019 10:29 AM  HISTORY: Lumbar radicular pain.  COMPARISON: 8/9/2006                                                         IMPRESSION: There has been no change in a mild degenerative  anterolisthesis at L4-L5. Vertebral body alignment is otherwise normal  with no fracture or osseous lesion seen. I suspect there is mild  degenerative disc disease at all levels, with either mild disc height  loss and/or marginal osteophyte formation. This has slightly  progressed since the previous study. No other abnormality or change is  demonstrated.     Xr Pelvis 1/2 Views  Result Date: 4/5/2019  RIGHT KNEE STANDING TWO VIEWS, PELVIS 1/2 VIEW(S) 4/5/2019 11:03 AM HISTORY: Right knee and hip pain. COMPARISON: Pelvis 12/5/2006.   IMPRESSION: Right knee: No acute bony abnormality or  significant degenerative changes. No joint space effusion. Pelvis: No acute or significant chronic bony abnormality and no change since the prior exam. JOE PEREIRA MD     Xr Knee Standing Right 2 Views  Result Date: 4/5/2019  RIGHT KNEE STANDING TWO VIEWS, PELVIS 1/2 VIEW(S) 4/5/2019 11:03 AM HISTORY: Right knee and hip pain. COMPARISON: [id number]/5/2006.   IMPRESSION: Right knee: No acute bony abnormality or significant degenerative changes. No joint space effusion. Pelvis: No acute or significant chronic bony abnormality and no change since the prior exam. JOE PEREIRA MD     Us Lower Extremity Venous Duplex Right  Result Date: 3/21/2019  ULTRASOUND LOWER EXTREMITY VENOUS DUPLEX RIGHT 3/21/2019 9:40 AM CLINICAL HISTORY/INDICATION: Right calf pain. COMPARISON: 4/1/2013 TECHNIQUE: Grayscale, color-flow, and spectral waveform analysis were performed of the deep veins of the right lower extremity FINDINGS: The right common femoral vein, femoral vein popliteal vein, tibial veins and great saphenous vein demonstrate normal compressibility, spectral waveform, color flow and augmentation. Images obtained at the site of patient's pain show no focal abnormality. The contralateral left common femoral vein demonstrates normal compressibility, spectral waveform, color flow and augmentation.   IMPRESSION: 1. No deep vein thrombosis in the right lower extremity. 2. Images obtained at the site of patient's pain show no focal abnormality. TITA PANDYA DO      Assessment:  1. Right leg pain      Right leg pain, likely radicular, however, otherwise reassuring exam.  Will likely start with lumbar MRI, however, will discuss with physical therapy as well.    Plan:  - Today's Plan of Care:  Discussed MRI    Follow Up: To be determined, will call    Concerning signs and symptoms were reviewed.  The patient expressed understanding of this management plan and all questions were answered at this time.    Allison Dee MD  [place]  Primary Care Sports Medicine  [facility]

## 2019-06-11 NOTE — TELEPHONE ENCOUNTER
Discussed with patient. She was instructed on scheduling the MRI and will follow up with Dr Dee following the MRI.  Jarod Galan ATC

## 2019-06-11 NOTE — LETTER
6/11/2019         RE: Mariah Jacinto  [address] [place] [address]        Dear Colleague,    Thank you for referring your patient, Mariah Jacinto, to the [facility]. Please see a copy of my visit note below.    Sports Medicine Clinic Visit - Interim History June 11, 2019    PCP: Eli Sommer    Mariah Jacinto is a 73 year old female who is seen in f/u up for Right leg pain. Since last visit on 4/16/19 patient has had 3 session of physical therapy. She reports initial she was seeing a reduction in the severity of her pain and symptoms with PT but over the last few weeks her pain pain has increased.    Initial injury History 4/16/2019: She reports 2 months of right lower extremity pain. She states the pain is intermittent and improves with tylenol and ibuprofen. She reports numbness as tingling through her extremity also occasionally. She denies lower back pain at this time. She states the pain can radiate from her hip to her ankle. She denies any weakness.  - Mostly happens when getting out of the car after shopping.  Unclear if it's due to increased walking or increased time in the car.  Feels like her leg is weak and heavy at those times.    Symptoms are better with: nothing  Symptoms are worse with: standing and walking  Additional Features:   Positive: paresthesias and numbness   Negative: swelling, bruising, popping, grinding, catching, locking, instability and weakness    Social History: retired    Review of Systems  Skin: no bruising, no swelling  Musculoskeletal: as above  Neurologic: no numbness, paresthesias  Remainder of review of systems is negative including constitutional, CV, pulmonary, GI, except as noted in HPI or medical history.    Patient's current problem list, past medical and surgical history, and family history were reviewed.    Patient Active Problem List   Diagnosis     Disorder of bone and cartilage     Enthesopathy of hip region      Sensorineural hearing loss     Right hip pain     Trigeminal Neuralgia right      Hyperlipidemia LDL goal <160     Advanced directives, counseling/discussion     Intercostal neuralgia     Health Care Home     Trochanteric bursitis     Subjective tinnitus, bilateral     Gastroesophageal reflux disease without esophagitis     Chronic constipation     Dense breast tissue on mammogram     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    celexa caused spinning side effect      MENOPAUSE --ERT      OSTEOPENIA      Squamous cell carcinoma      Past Surgical History:   Procedure Laterality Date     ABLATE VEIN VARICOSE RADIO FREQUENCY WITHOUT PHLEBECTOMY MULTIPLE STAB  3/28/2013    Procedure: ABLATE VEIN VARICOSE RADIO FREQUENCY WITHOUT PHLEBECTOMY MULTIPLE STAB;  Bilateral ablation of varicose veins legs-to ultrasound at 0930  ;  Surgeon: Noam Soliman MD;  Location: [facility] ANESTH,LOWER ARM SURGERY  1999    ulnar nerve decompression - right     COLONOSCOPY  8/20/2013    Procedure: COLONOSCOPY;  Colonoscopy;  Surgeon: Yuliya Nathan MD;  Location: [facility]     ESOPHAGOSCOPY, GASTROSCOPY, DUODENOSCOPY (EGD), COMBINED N/A 5/12/2015    Procedure: COMBINED ESOPHAGOSCOPY, GASTROSCOPY, DUODENOSCOPY (EGD), BIOPSY SINGLE OR MULTIPLE;  Surgeon: Yuliya Nathan MD;  Location: [facility]     ESOPHAGOSCOPY, GASTROSCOPY, DUODENOSCOPY (EGD), COMBINED N/A 1/31/2017    Procedure: COMBINED ESOPHAGOSCOPY, GASTROSCOPY, DUODENOSCOPY (EGD), BIOPSY SINGLE OR MULTIPLE;  Surgeon: Qasim Bowie MD;  Location: [facility]     EXCISE MASS FINGER Right 2/19/2019    Procedure: Excision Right Ring Finger Volar Middle Phalanx Mass;  Surgeon: Jake Viveros MD;  Location: [facility]      "HYSTERECTOMY, PAP NO LONGER INDICATED      has both ovaries out also      HYSTERECTOMY, VAGINAL  1988    Hysterectomy, oophorectomy     PHACOEMULSIFICATION WITH STANDARD INTRAOCULAR LENS IMPLANT Left 3/18/2019    Procedure: Cataract Removal with Implant;  Surgeon: Chapito Phillips MD;  Location: [facility]     PHACOEMULSIFICATION WITH STANDARD INTRAOCULAR LENS IMPLANT Right 4/10/2019    Procedure: Cataract Removal with Implant;  Surgeon: Chapito Phillips MD;  Location: [facility]     RELEASE TRIGGER FINGER Right 2017    Procedure: RELEASE TRIGGER FINGER;  Right Thumb A1 Pulley Release;  Surgeon: Jake Viveros MD;  Location: [facility]     SURGICAL HISTORY OF -       right retromastoid craniectomy and decompression trigeminal nerve     TONSILLECTOMY & ADENOIDECTOMY  child    T&A      Family History   Problem Relation Age of Onset     Alzheimer Disease Mother          at age 85     Osteoporosis Mother      Arthritis Mother      Alcohol/Drug Father      Respiratory Father      Eye Disorder Maternal Grandfather         Macular degneration     C.A.D. Brother         Tripple bypass age 57     Cardiovascular Brother      Cancer Brother         leukemia (ALL)     Cardiovascular Brother      Cardiovascular Brother      Neurologic Disorder Son      Heart Disease Son      Melanoma No family hx of      Skin Cancer No family hx of        Objective  /77 (BP Location: Left arm, Patient Position: Chair, Cuff Size: Adult Regular)   Ht 1.626 m (5' 4\")   Wt 61.7 kg (136 lb)   BMI 23.34 kg/m       Low back/Hip exam:     Inspection:     no visible deformity in the low back       normal skin       normal vascular       normal lymphatic       no asymmetry     Posture:      normal     Tender:     none     Non Tender:     remainder of lumbar spine     ROM:      full flexion - with pain into lateral right leg       full extension  - Full and symmetric hip ROM     Strength:     hip flexion 5/5 bilateral       knee " extension 5/5 bilateral       ankle dorsiflexion 5/5 bilateral       ankle plantarflexion 5/5 bilateral       dorsiflexion of the great toe 5/5 bilateral       able to heel and toe walk     Reflexes:     patellar (L3, L4) symmetric normal       achilles tendons (S1) symmetric normal     Sensation:    grossly intact throughout lower extremities     Special tests:      straight leg raise left negative        straight leg raise right negative       neg (-) BYRON  bilateral       neg (-) FADIR  bilateral     Bilateral Knee exam     Inspection:      no effusion, swelling of bruising bilateral     Patella:      Crepitus noted in the patellofemoral joint bilateral     Tender:      Mild lateral knee pain, fibular head right     Non Tender:      remainder of knee area bilateral     Knee ROM:      Full active and passive ROM with flexion and extension bilateral     Strength:      5/5 with knee extension bilateral  - 5/5 ankle strength bilateral throughout as well     Special Tests:     neg (-) Jhonatan right       neg (-) anterior drawer right       neg (-) posterior drawer right       neg (-) varus at 0 deg and 30 deg right       neg (-) valgus at 0 deg and 30 deg right     Gait:      normal     Neurovascular:      2+ peripheral pulses bilaterally and brisk capillary refill       sensation grossly intact    Radiology  I ordered, visualized and reviewed these images with the patient  LUMBAR SPINE 2 VIEWS   4/16/2019 10:29 AM  HISTORY: Lumbar radicular pain.  COMPARISON: 8/9/2006                                                         IMPRESSION: There has been no change in a mild degenerative  anterolisthesis at L4-L5. Vertebral body alignment is otherwise normal  with no fracture or osseous lesion seen. I suspect there is mild  degenerative disc disease at all levels, with either mild disc height  loss and/or marginal osteophyte formation. This has slightly  progressed since the previous study. No other abnormality or change  is  demonstrated.     Xr Pelvis 1/2 Views  Result Date: 4/5/2019  RIGHT KNEE STANDING TWO VIEWS, PELVIS 1/2 VIEW(S) 4/5/2019 11:03 AM HISTORY: Right knee and hip pain. COMPARISON: Pelvis 12/5/2006.   IMPRESSION: Right knee: No acute bony abnormality or significant degenerative changes. No joint space effusion. Pelvis: No acute or significant chronic bony abnormality and no change since the prior exam. JOE PEREIRA MD     Xr Knee Standing Right 2 Views  Result Date: 4/5/2019  RIGHT KNEE STANDING TWO VIEWS, PELVIS 1/2 VIEW(S) 4/5/2019 11:03 AM HISTORY: Right knee and hip pain. COMPARISON: [id number]/5/2006.   IMPRESSION: Right knee: No acute bony abnormality or significant degenerative changes. No joint space effusion. Pelvis: No acute or significant chronic bony abnormality and no change since the prior exam. JOE PEREIRA MD     Us Lower Extremity Venous Duplex Right  Result Date: 3/21/2019  ULTRASOUND LOWER EXTREMITY VENOUS DUPLEX RIGHT 3/21/2019 9:40 AM CLINICAL HISTORY/INDICATION: Right calf pain. COMPARISON: 4/1/2013 TECHNIQUE: Grayscale, color-flow, and spectral waveform analysis were performed of the deep veins of the right lower extremity FINDINGS: The right common femoral vein, femoral vein popliteal vein, tibial veins and great saphenous vein demonstrate normal compressibility, spectral waveform, color flow and augmentation. Images obtained at the site of patient's pain show no focal abnormality. The contralateral left common femoral vein demonstrates normal compressibility, spectral waveform, color flow and augmentation.   IMPRESSION: 1. No deep vein thrombosis in the right lower extremity. 2. Images obtained at the site of patient's pain show no focal abnormality. TITA PANDYA DO      Assessment:  1. Right leg pain      Right leg pain, likely radicular, however, otherwise reassuring exam.  Will likely start with lumbar MRI, however, will discuss with physical therapy as well.    Plan:  - Today's  Plan of Care:  Discussed MRI    Follow Up: To be determined, will call    Concerning signs and symptoms were reviewed.  The patient expressed understanding of this management plan and all questions were answered at this time.    Allison Dee MD [facility]  Primary Care Sports Medicine  [facility]    Again, thank you for allowing me to participate in the care of your patient.        Sincerely,        Allison Dee MD

## 2019-06-11 NOTE — TELEPHONE ENCOUNTER
Please call patient.  Reviewed with PT - will start with lumbar MRI as discussed.  This has been ordered - please instruct patient how to schedule.    Follow up after MRI to review results.    Allison Dee MD

## 2019-06-17 ENCOUNTER — HOSPITAL ENCOUNTER (OUTPATIENT)
Dept: MRI IMAGING | Facility: CLINIC | Age: 74
Discharge: HOME | End: 2019-06-17
Attending: PEDIATRICS | Admitting: PEDIATRICS
Payer: COMMERCIAL

## 2019-06-17 DIAGNOSIS — M79.604 RIGHT LEG PAIN: ICD-10-CM

## 2019-06-17 PROCEDURE — 72148 MRI LUMBAR SPINE W/O DYE: CPT

## 2019-06-20 NOTE — PROGRESS NOTES
Sports Medicine Clinic Visit - Interim History June 20, 2019    PCP: Eli Sommer [place] is a 73 year old female who is seen in f/u up for Right leg pain. Since last visit on 6/11/19 patient has completed an MRI of the lumbar spine. She reports no significant change in her pain. She reports radiating pain to her right leg and foot.  Today mainly pain in lateral hip and bottom of foot.    Initial injury History 4/16/2019: She reports 2 months of right lower extremity pain. She states the pain is intermittent and improves with tylenol and ibuprofen. She reports numbness as tingling through her extremity also occasionally. She denies lower back pain at this time. She states the pain can radiate from her hip to her ankle. She denies any weakness.  - Mostly happens when getting out of the car after shopping.  Unclear if it's due to increased walking or increased time in the car.  Feels like her leg is weak and heavy at those times.    Symptoms are better with: Nothing  Symptoms are worse with: standing and walking  Additional Features:   Positive: paresthesias and numbness   Negative: swelling, bruising, popping, grinding, catching, locking, instability and weakness    Social History: retired    Review of Systems  Skin: no bruising, no swelling  Musculoskeletal: as above  Neurologic: occasional numbness, paresthesias  Remainder of review of systems is negative including constitutional, CV, pulmonary, GI, except as noted in HPI or medical history.    Patient's current problem list, past medical and surgical history, and family history were reviewed.    Patient Active Problem List   Diagnosis     Disorder of bone and cartilage     Enthesopathy of hip region     Sensorineural hearing loss     Right hip pain     Trigeminal Neuralgia right      Hyperlipidemia LDL goal <160     Advanced directives, counseling/discussion     Intercostal neuralgia     Health Care Home     Trochanteric bursitis     Subjective  tinnitus, bilateral     Gastroesophageal reflux disease without esophagitis     Chronic constipation     Dense breast tissue on mammogram     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    celexa caused spinning side effect      MENOPAUSE --ERT      OSTEOPENIA      Squamous cell carcinoma      Past Surgical History:   Procedure Laterality Date     ABLATE VEIN VARICOSE RADIO FREQUENCY WITHOUT PHLEBECTOMY MULTIPLE STAB  3/28/2013    Procedure: ABLATE VEIN VARICOSE RADIO FREQUENCY WITHOUT PHLEBECTOMY MULTIPLE STAB;  Bilateral ablation of varicose veins legs-to ultrasound at 0930  ;  Surgeon: Noam Soliman MD;  Location: [facility]     C ANESTH,LOWER ARM SURGERY  1999    ulnar nerve decompression - right     COLONOSCOPY  8/20/2013    Procedure: COLONOSCOPY;  Colonoscopy;  Surgeon: Yuliya Nathan MD;  Location: [facility]     ESOPHAGOSCOPY, GASTROSCOPY, DUODENOSCOPY (EGD), COMBINED N/A 5/12/2015    Procedure: COMBINED ESOPHAGOSCOPY, GASTROSCOPY, DUODENOSCOPY (EGD), BIOPSY SINGLE OR MULTIPLE;  Surgeon: Yuliya Nathan MD;  Location: [facility]     ESOPHAGOSCOPY, GASTROSCOPY, DUODENOSCOPY (EGD), COMBINED N/A 1/31/2017    Procedure: COMBINED ESOPHAGOSCOPY, GASTROSCOPY, DUODENOSCOPY (EGD), BIOPSY SINGLE OR MULTIPLE;  Surgeon: Qasim Bowie MD;  Location: [facility]     EXCISE MASS FINGER Right 2/19/2019    Procedure: Excision Right Ring Finger Volar Middle Phalanx Mass;  Surgeon: Jake Viveros MD;  Location: [facility]     HYSTERECTOMY, PAP NO LONGER INDICATED      has both ovaries out also      HYSTERECTOMY, VAGINAL  1988    Hysterectomy, oophorectomy     PHACOEMULSIFICATION WITH STANDARD INTRAOCULAR LENS IMPLANT Left 3/18/2019    Procedure: Cataract Removal with Implant;   "Surgeon: Chapito Phillips MD;  Location: [facility]     PHACOEMULSIFICATION WITH STANDARD INTRAOCULAR LENS IMPLANT Right 4/10/2019    Procedure: Cataract Removal with Implant;  Surgeon: Chapito Phillips MD;  Location: [facility]     RELEASE TRIGGER FINGER Right 2017    Procedure: RELEASE TRIGGER FINGER;  Right Thumb A1 Pulley Release;  Surgeon: Jake Viveros MD;  Location: [facility]     SURGICAL HISTORY OF -       right retromastoid craniectomy and decompression trigeminal nerve     TONSILLECTOMY & ADENOIDECTOMY  child    T&A      Family History   Problem Relation Age of Onset     Alzheimer Disease Mother          at age 85     Osteoporosis Mother      Arthritis Mother      Alcohol/Drug Father      Respiratory Father      Eye Disorder Maternal Grandfather         Macular degneration     C.A.D. Brother         Tripple bypass age 57     Cardiovascular Brother      Cancer Brother         leukemia (ALL)     Cardiovascular Brother      Cardiovascular Brother      Neurologic Disorder Son      Heart Disease Son      Melanoma No family hx of      Skin Cancer No family hx of        Objective  /76   Ht 1.626 m (5' 4\")   Wt 62.6 kg (138 lb)   BMI 23.69 kg/m      GENERAL APPEARANCE: healthy, alert and no distress   GAIT: NORMAL  SKIN: no suspicious lesions or rashes  HEENT: Sclera clear, anicteric  CV: good peripheral pulses  RESP: Breathing not labored  NEURO: Normal strength and tone, mentation intact and speech normal  PSYCH:  mentation appears normal and affect normal/bright    Low back/Hip exam:     Inspection:     no visible deformity in the low back       normal skin       normal vascular       normal lymphatic       no asymmetry     Posture:      normal     Tender:     none     Non Tender:     remainder of lumbar spine     ROM:      full flexion - with pain into lateral right leg       full extension  - Full and symmetric hip ROM     Strength:     hip flexion 5/5 bilateral       knee " extension 5/5 bilateral       ankle dorsiflexion 5/5 bilateral       ankle plantarflexion 5/5 bilateral       dorsiflexion of the great toe 5/5 bilateral       able to heel and toe walk     Reflexes:     patellar (L3, L4) symmetric normal       achilles tendons (S1) symmetric normal     Sensation:    grossly intact throughout lower extremities     Special tests:      straight leg raise left negative        straight leg raise right negative       neg (-) BYRON  bilateral       neg (-) FADIR  bilateral    Bilateral Foot and Ankle Exam:    Inspection:       no visible ecchymosis       no visible edema or effusion    Tender:       No tenderness on bottom of foot    Radiology  I ordered, visualized and reviewed these images with the patient  MRI LUMBAR SPINE WITHOUT CONTRAST   6/17/2019 9:53 AM      HISTORY: Six weeks of right low back and leg pain.     COMPARISON: Radiographs on 4/16/2019.     TECHNIQUE: Multiplanar MR imaging was performed without contrast.     FINDINGS: Numbering of the levels is based on the demonstration of  five lumbar type vertebral bodies on previous radiographs. There is a  mild degenerative grade 1 spondylolisthesis at L4-L5. There is also a  minimal degenerative anterolisthesis at L5-S1. Vertebral body  alignment is otherwise normal. No fracture is seen. No pars  interarticularis defect is demonstrated. There is a 1.6 cm somewhat  atypical hemangioma within the L5 vertebral body. No other abnormal  marrow signal intensity is identified. The conus medullaris terminates  at the level of the L1 vertebral body. No intrathecal abnormality is  seen. The adjacent soft tissues are unremarkable.     Findings by specific level:     T12-L1: The disc and facet joints are normal. No stenosis is seen.     L1-L2: The disc and facet joints are normal. No stenosis is seen.     L2-L3: There is mild disc height loss. There is a mild diffuse disc  bulge with no herniation or stenosis seen. The facet joints  are  unremarkable.     L3-L4: The disc height is well preserved. There is a minimal disc  bulge with no herniation or stenosis seen. The facet joints  demonstrate mild degenerative change bilaterally.     L4-L5: The disc height is well preserved. There is a mild disc bulge  with no focal herniation seen. There are prominent degenerative  changes in the facet joints with mild prominence of the ligamentum  flava. There is mild central canal stenosis. No neural foraminal  narrowing is seen.     L5-S1: The disc height is well-preserved. There is a mild disc bulge  with no focal herniation seen. Prominent left and moderate right facet  joint degenerative changes are present. No stenosis is seen.                                                                      IMPRESSION: Degenerative changes of the lumbar spine. No disc  herniation or stenosis is demonstrated.    I visualized and reviewed these images with the patient  LUMBAR SPINE 2 VIEWS   4/16/2019 10:29 AM  HISTORY: Lumbar radicular pain.  COMPARISON: 8/9/2006                                                         IMPRESSION: There has been no change in a mild degenerative  anterolisthesis at L4-L5. Vertebral body alignment is otherwise normal  with no fracture or osseous lesion seen. I suspect there is mild  degenerative disc disease at all levels, with either mild disc height  loss and/or marginal osteophyte formation. This has slightly  progressed since the previous study. No other abnormality or change is  demonstrated.      Xr Pelvis 1/2 Views  Result Date: 4/5/2019  RIGHT KNEE STANDING TWO VIEWS, PELVIS 1/2 VIEW(S) 4/5/2019 11:03 AM HISTORY: Right knee and hip pain. COMPARISON: Pelvis 12/5/2006.   IMPRESSION: Right knee: No acute bony abnormality or significant degenerative changes. No joint space effusion. Pelvis: No acute or significant chronic bony abnormality and no change since the prior exam. JOE PEREIRA MD     Xr Knee Standing Right 2  Views  Result Date: 4/5/2019  RIGHT KNEE STANDING TWO VIEWS, PELVIS 1/2 VIEW(S) 4/5/2019 11:03 AM HISTORY: Right knee and hip pain. COMPARISON: [id number]/5/2006.   IMPRESSION: Right knee: No acute bony abnormality or significant degenerative changes. No joint space effusion. Pelvis: No acute or significant chronic bony abnormality and no change since the prior exam. JOE PEREIRA MD     Us Lower Extremity Venous Duplex Right  Result Date: 3/21/2019  ULTRASOUND LOWER EXTREMITY VENOUS DUPLEX RIGHT 3/21/2019 9:40 AM CLINICAL HISTORY/INDICATION: Right calf pain. COMPARISON: 4/1/2013 TECHNIQUE: Grayscale, color-flow, and spectral waveform analysis were performed of the deep veins of the right lower extremity FINDINGS: The right common femoral vein, femoral vein popliteal vein, tibial veins and great saphenous vein demonstrate normal compressibility, spectral waveform, color flow and augmentation. Images obtained at the site of patient's pain show no focal abnormality. The contralateral left common femoral vein demonstrates normal compressibility, spectral waveform, color flow and augmentation.   IMPRESSION: 1. No deep vein thrombosis in the right lower extremity. 2. Images obtained at the site of patient's pain show no focal abnormality. TITA PANDYA DO    Assessment:  1. Right leg pain      Right leg pain.  History and initial PT evaluation raises some concern for radicular pain, however, exam has always been reassuring.  MRI with some degeneration that could be causing radicular symptoms, however, no clear nerve impingement or stenosis.  Neuropathy also a possibility.  - We discussed continued physical therapy to address mechanical issues.  - We discussed possible hip MRI given right hip pain is more persistant, however, exam overall reassuring.  - We discussed trial of lumbar DILCIA to help determine if symptoms are radicular, however, patient states that she doesn't react well to corticosteroid injections.  - We  discussed trial of gabapentin, however, patient has an allergy listed and has not responded well to this in the past.  - We discussed possible Neurology referral or EMG, patient reports she's had this test in the past for neuropathy.    At this point, will have patient follow up with PCP to discussed these options again.  My recommendation would likely be follow up with Neurology first step.    Plan:  - Today's Plan of Care:  Continue Physical Therapy and Follow up with PCP Dr. Sommer    -We also discussed other future treatment options:  MRI of right hip, Lumbar DILCIA, Neurology Referral, Trial of gabapentin    Follow Up: as needed with me, pending discussion with PCP    Concerning signs and symptoms were reviewed.  The patient expressed understanding of this management plan and all questions were answered at this time.    Allison Dee MD CA  Primary Care Sports Medicine  [facility]

## 2019-06-21 ENCOUNTER — OFFICE VISIT (OUTPATIENT)
Dept: ORTHOPEDICS | Facility: CLINIC | Age: 74
End: 2019-06-21
Payer: COMMERCIAL

## 2019-06-21 VITALS
HEIGHT: 64 IN | DIASTOLIC BLOOD PRESSURE: 76 MMHG | BODY MASS INDEX: 23.56 KG/M2 | WEIGHT: 138 LBS | SYSTOLIC BLOOD PRESSURE: 132 MMHG

## 2019-06-21 DIAGNOSIS — M79.604 RIGHT LEG PAIN: Primary | ICD-10-CM

## 2019-06-21 PROCEDURE — 99213 OFFICE O/P EST LOW 20 MIN: CPT | Performed by: PEDIATRICS

## 2019-06-21 ASSESSMENT — MIFFLIN-ST. JEOR: SCORE: 1110.96

## 2019-06-21 NOTE — LETTER
6/21/2019         RE: Mariah Jacinto  [id number] [place] [address]        Dear Colleague,    Thank you for referring your patient, Mariah Jacinto, to the [facility]. Please see a copy of my visit note below.    Sports Medicine Clinic Visit - Interim History June 20, 2019    PCP: Eli Sommer    Mariah Jacinto is a 73 year old female who is seen in f/u up for Right leg pain. Since last visit on 6/11/19 patient has completed an MRI of the lumbar spine. She reports no significant change in her pain. She reports radiating pain to her right leg and foot.  Today mainly pain in lateral hip and bottom of foot.    Initial injury History 4/16/2019: She reports 2 months of right lower extremity pain. She states the pain is intermittent and improves with tylenol and ibuprofen. She reports numbness as tingling through her extremity also occasionally. She denies lower back pain at this time. She states the pain can radiate from her hip to her ankle. She denies any weakness.  - Mostly happens when getting out of the car after shopping.  Unclear if it's due to increased walking or increased time in the car.  Feels like her leg is weak and heavy at those times.    Symptoms are better with: Nothing  Symptoms are worse with: standing and walking  Additional Features:   Positive: paresthesias and numbness   Negative: swelling, bruising, popping, grinding, catching, locking, instability and weakness    Social History: retired    Review of Systems  Skin: no bruising, no swelling  Musculoskeletal: as above  Neurologic: occasional numbness, paresthesias  Remainder of review of systems is negative including constitutional, CV, pulmonary, GI, except as noted in HPI or medical history.    Patient's current problem list, past medical and surgical history, and family history were reviewed.    Patient Active Problem List   Diagnosis     Disorder of bone and cartilage     Enthesopathy of hip  region     Sensorineural hearing loss     Right hip pain     Trigeminal Neuralgia right      Hyperlipidemia LDL goal <160     Advanced directives, counseling/discussion     Intercostal neuralgia     Health Care Home     Trochanteric bursitis     Subjective tinnitus, bilateral     Gastroesophageal reflux disease without esophagitis     Chronic constipation     Dense breast tissue on mammogram     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    celexa caused spinning side effect      MENOPAUSE --ERT      OSTEOPENIA      Squamous cell carcinoma      Past Surgical History:   Procedure Laterality Date     ABLATE VEIN VARICOSE RADIO FREQUENCY WITHOUT PHLEBECTOMY MULTIPLE STAB  3/28/2013    Procedure: ABLATE VEIN VARICOSE RADIO FREQUENCY WITHOUT PHLEBECTOMY MULTIPLE STAB;  Bilateral ablation of varicose veins legs-to ultrasound at 0930  ;  Surgeon: Noam Soliman MD;  Location: [facility] ANESTH,LOWER ARM SURGERY  1999    ulnar nerve decompression - right     COLONOSCOPY  8/20/2013    Procedure: COLONOSCOPY;  Colonoscopy;  Surgeon: Yuliya Nathan MD;  Location: [facility]     ESOPHAGOSCOPY, GASTROSCOPY, DUODENOSCOPY (EGD), COMBINED N/A 5/12/2015    Procedure: COMBINED ESOPHAGOSCOPY, GASTROSCOPY, DUODENOSCOPY (EGD), BIOPSY SINGLE OR MULTIPLE;  Surgeon: Yuliya Nathan MD;  Location: [facility]     ESOPHAGOSCOPY, GASTROSCOPY, DUODENOSCOPY (EGD), COMBINED N/A 1/31/2017    Procedure: COMBINED ESOPHAGOSCOPY, GASTROSCOPY, DUODENOSCOPY (EGD), BIOPSY SINGLE OR MULTIPLE;  Surgeon: Qasim Bowie MD;  Location: [facility]     EXCISE MASS FINGER Right 2/19/2019    Procedure: Excision Right Ring Finger Volar Middle Phalanx Mass;  Surgeon: Jake Viveros MD;  Location: [facility]      "HYSTERECTOMY, PAP NO LONGER INDICATED      has both ovaries out also      HYSTERECTOMY, VAGINAL  1988    Hysterectomy, oophorectomy     PHACOEMULSIFICATION WITH STANDARD INTRAOCULAR LENS IMPLANT Left 3/18/2019    Procedure: Cataract Removal with Implant;  Surgeon: Chapito Phillips MD;  Location: [facility]     PHACOEMULSIFICATION WITH STANDARD INTRAOCULAR LENS IMPLANT Right 4/10/2019    Procedure: Cataract Removal with Implant;  Surgeon: Chapito Phillips MD;  Location: [facility]     RELEASE TRIGGER FINGER Right 2017    Procedure: RELEASE TRIGGER FINGER;  Right Thumb A1 Pulley Release;  Surgeon: Jake Viveros MD;  Location: [facility]     SURGICAL HISTORY OF -       right retromastoid craniectomy and decompression trigeminal nerve     TONSILLECTOMY & ADENOIDECTOMY  child    T&A      Family History   Problem Relation Age of Onset     Alzheimer Disease Mother          at age 85     Osteoporosis Mother      Arthritis Mother      Alcohol/Drug Father      Respiratory Father      Eye Disorder Maternal Grandfather         Macular degneration     C.A.D. Brother         Tripple bypass age 57     Cardiovascular Brother      Cancer Brother         leukemia (ALL)     Cardiovascular Brother      Cardiovascular Brother      Neurologic Disorder Son      Heart Disease Son      Melanoma No family hx of      Skin Cancer No family hx of        Objective  /76   Ht 1.626 m (5' 4\")   Wt 62.6 kg (138 lb)   BMI 23.69 kg/m       GENERAL APPEARANCE: healthy, alert and no distress   GAIT: NORMAL  SKIN: no suspicious lesions or rashes  HEENT: Sclera clear, anicteric  CV: good peripheral pulses  RESP: Breathing not labored  NEURO: Normal strength and tone, mentation intact and speech normal  PSYCH:  mentation appears normal and affect normal/bright    Low back/Hip exam:     Inspection:     no visible deformity in the low back       normal skin       normal vascular       normal lymphatic       no " asymmetry     Posture:      normal     Tender:     none     Non Tender:     remainder of lumbar spine     ROM:      full flexion - with pain into lateral right leg       full extension  - Full and symmetric hip ROM     Strength:     hip flexion 5/5 bilateral       knee extension 5/5 bilateral       ankle dorsiflexion 5/5 bilateral       ankle plantarflexion 5/5 bilateral       dorsiflexion of the great toe 5/5 bilateral       able to heel and toe walk     Reflexes:     patellar (L3, L4) symmetric normal       achilles tendons (S1) symmetric normal     Sensation:    grossly intact throughout lower extremities     Special tests:      straight leg raise left negative        straight leg raise right negative       neg (-) BYRON  bilateral       neg (-) FADIR  bilateral    Bilateral Foot and Ankle Exam:    Inspection:       no visible ecchymosis       no visible edema or effusion    Tender:       No tenderness on bottom of foot    Radiology  I ordered, visualized and reviewed these images with the patient  MRI LUMBAR SPINE WITHOUT CONTRAST   6/17/2019 9:53 AM      HISTORY: Six weeks of right low back and leg pain.     COMPARISON: Radiographs on 4/16/2019.     TECHNIQUE: Multiplanar MR imaging was performed without contrast.     FINDINGS: Numbering of the levels is based on the demonstration of  five lumbar type vertebral bodies on previous radiographs. There is a  mild degenerative grade 1 spondylolisthesis at L4-L5. There is also a  minimal degenerative anterolisthesis at L5-S1. Vertebral body  alignment is otherwise normal. No fracture is seen. No pars  interarticularis defect is demonstrated. There is a 1.6 cm somewhat  atypical hemangioma within the L5 vertebral body. No other abnormal  marrow signal intensity is identified. The conus medullaris terminates  at the level of the L1 vertebral body. No intrathecal abnormality is  seen. The adjacent soft tissues are unremarkable.     Findings by specific  level:     T12-L1: The disc and facet joints are normal. No stenosis is seen.     L1-L2: The disc and facet joints are normal. No stenosis is seen.     L2-L3: There is mild disc height loss. There is a mild diffuse disc  bulge with no herniation or stenosis seen. The facet joints are  unremarkable.     L3-L4: The disc height is well preserved. There is a minimal disc  bulge with no herniation or stenosis seen. The facet joints  demonstrate mild degenerative change bilaterally.     L4-L5: The disc height is well preserved. There is a mild disc bulge  with no focal herniation seen. There are prominent degenerative  changes in the facet joints with mild prominence of the ligamentum  flava. There is mild central canal stenosis. No neural foraminal  narrowing is seen.     L5-S1: The disc height is well-preserved. There is a mild disc bulge  with no focal herniation seen. Prominent left and moderate right facet  joint degenerative changes are present. No stenosis is seen.                                                                      IMPRESSION: Degenerative changes of the lumbar spine. No disc  herniation or stenosis is demonstrated.    I visualized and reviewed these images with the patient  LUMBAR SPINE 2 VIEWS   4/16/2019 10:29 AM  HISTORY: Lumbar radicular pain.  COMPARISON: 8/9/2006                                                         IMPRESSION: There has been no change in a mild degenerative  anterolisthesis at L4-L5. Vertebral body alignment is otherwise normal  with no fracture or osseous lesion seen. I suspect there is mild  degenerative disc disease at all levels, with either mild disc height  loss and/or marginal osteophyte formation. This has slightly  progressed since the previous study. No other abnormality or change is  demonstrated.      Xr Pelvis 1/2 Views  Result Date: 4/5/2019  RIGHT KNEE STANDING TWO VIEWS, PELVIS 1/2 VIEW(S) 4/5/2019 11:03 AM HISTORY: Right knee and hip pain. COMPARISON:  Pelvis 12/5/2006.   IMPRESSION: Right knee: No acute bony abnormality or significant degenerative changes. No joint space effusion. Pelvis: No acute or significant chronic bony abnormality and no change since the prior exam. JOE PEREIRA MD     Xr Knee Standing Right 2 Views  Result Date: 4/5/2019  RIGHT KNEE STANDING TWO VIEWS, PELVIS 1/2 VIEW(S) 4/5/2019 11:03 AM HISTORY: Right knee and hip pain. COMPARISON: [id number]/5/2006.   IMPRESSION: Right knee: No acute bony abnormality or significant degenerative changes. No joint space effusion. Pelvis: No acute or significant chronic bony abnormality and no change since the prior exam. JOE PEREIRA MD     Us Lower Extremity Venous Duplex Right  Result Date: 3/21/2019  ULTRASOUND LOWER EXTREMITY VENOUS DUPLEX RIGHT 3/21/2019 9:40 AM CLINICAL HISTORY/INDICATION: Right calf pain. COMPARISON: 4/1/2013 TECHNIQUE: Grayscale, color-flow, and spectral waveform analysis were performed of the deep veins of the right lower extremity FINDINGS: The right common femoral vein, femoral vein popliteal vein, tibial veins and great saphenous vein demonstrate normal compressibility, spectral waveform, color flow and augmentation. Images obtained at the site of patient's pain show no focal abnormality. The contralateral left common femoral vein demonstrates normal compressibility, spectral waveform, color flow and augmentation.   IMPRESSION: 1. No deep vein thrombosis in the right lower extremity. 2. Images obtained at the site of patient's pain show no focal abnormality. TITA PANDYA DO    Assessment:  1. Right leg pain      Right leg pain.  History and initial PT evaluation raises some concern for radicular pain, however, exam has always been reassuring.  MRI with some degeneration that could be causing radicular symptoms, however, no clear nerve impingement or stenosis.  Neuropathy also a possibility.  - We discussed continued physical therapy to address mechanical issues.  -  We discussed possible hip MRI given right hip pain is more persistant, however, exam overall reassuring.  - We discussed trial of lumbar DILCIA to help determine if symptoms are radicular, however, patient states that she doesn't react well to corticosteroid injections.  - We discussed trial of gabapentin, however, patient has an allergy listed and has not responded well to this in the past.  - We discussed possible Neurology referral or EMG, patient reports she's had this test in the past for neuropathy.    At this point, will have patient follow up with PCP to discussed these options again.  My recommendation would likely be follow up with Neurology first step.    Plan:  - Today's Plan of Care:  Continue Physical Therapy and Follow up with PCP Dr. Sommer    -We also discussed other future treatment options:  MRI of right hip, Lumbar DILCIA, Neurology Referral, Trial of gabapentin    Follow Up: as needed with me, pending discussion with PCP    Concerning signs and symptoms were reviewed.  The patient expressed understanding of this management plan and all questions were answered at this time.    Allison Dee MD [facility]  Primary Care Sports Medicine  [facility]    Again, thank you for allowing me to participate in the care of your patient.        Sincerely,        Allison Dee MD

## 2019-06-21 NOTE — PATIENT INSTRUCTIONS
Plan:  - Today's Plan of Care:  Continue Physical Therapy and Follow up with PCP Dr. Sommer    -We also discussed other future treatment options:  MRI of right hip, Lumbar DILCIA, Neurology Referral, Trial of gabapentin    Follow Up: as needed with me, pending discussion with PCP    If you have any further questions for your physician or physician s care team you can call [phone] and use option 3 to leave a voice message. Calls received during business hours will be returned same day.

## 2019-06-21 NOTE — Clinical Note
Domenic Benitez, I'm sending Mariah back to discuss her symptoms with you.  PT helping minimally, no clear nerve impingement on MRI and she's hesitant to try any other treatment for possible radicular pain (see note).  It looks like she's seen Neurology in the past, so I think that's a good next option.  Wanted her to touch base with you first given all her history.  Let me know if any questions.  Thanks! Allison

## 2019-06-24 ENCOUNTER — HOSPITAL ENCOUNTER (OUTPATIENT)
Dept: PHYSICAL THERAPY | Facility: CLINIC | Age: 74
Setting detail: THERAPIES SERIES
End: 2019-06-24
Attending: PEDIATRICS
Payer: COMMERCIAL

## 2019-06-24 PROCEDURE — 97110 THERAPEUTIC EXERCISES: CPT | Mod: GP | Performed by: PHYSICAL THERAPIST

## 2019-06-24 PROCEDURE — 97140 MANUAL THERAPY 1/> REGIONS: CPT | Mod: GP | Performed by: PHYSICAL THERAPIST

## 2019-06-24 NOTE — PROGRESS NOTES
Outpatient Physical Therapy Progress Note     Patient: Mariah Jacinto  : 1945    Beginning/End Dates of Reporting Period:  19 to 2019    Referring Provider: Allison Dee MD    Therapy Diagnosis: Right lower extremity cramping; hip and core weakness     Client Self Report: Went back to [facility] for imaging of the low back and right knee.  Images showed slight degeneration, however no suspicion for a pinched nerve.  Patient feels the pain is muscular, almost like her calf / hamstring / quads are cramping.  Feels the hands on therapy helps the most.  Diligent with stretching at home.    Objective Measurements:  Objective Measure: Right ankle dorsiflexion   Details: 10 deg  Objective Measure: Time able to sleep before waking due to leg pain  Details: 2-3 hours            Goals:  Goal Identifier     Goal Description Patient will have >=5 degrees of right ankle DF ROM to allow for normalized gait.   Target Date 19   Date Met  19   Progress:     Goal Identifier     Goal Description Patient will be able to grocery shop without right LE symptoms.   Target Date 19   Date Met      Progress:     Goal Identifier     Goal Description Patient will be independent with her HEP to reduce future occurrence of pain and disability.   Target Date 19   Date Met  19   Progress:     Goal Identifier     Goal Description Patient will be able to sleep for >=6 hours without waking due to right LE pain.   Target Date 19   Date Met      Progress:     Progress Toward Goals:   Progress this reporting period: Since last visit patient's right knee has presented with less swelling and pain.  She continues to feel right global lower extremity tightness and spasms, typically at night.  Patient reports good symptom relief with manual therapy and stretching.  She will benefit from continued skilled PT for 6-8 more weeks to progress.    Plan:  Changes to therapy plan of care: 1x/wk for 6-8  weeks    Discharge:  No      RECERTIFICATION    Mariah Jacinto  1945    Session Number: 4 [facility] / Medicare since start of care.    Reasons for Continuing Treatment:   To progress right LE flexibility, reduce gastroc and hamstring tone, improve hip and core strength    Frequency/Duration  1 times per week for 8 weeks for a total of 8 visits.    Recertification Period  6/17/19 - 8/19/19    Physician Signature:    Date:    X_______________________________________________________    Physician Name: Allison Dee MD    I certify the need for these services furnished under this plan of treatment and while under my care. Physician co-signature of this document indicates review and certification of the therapy plan.  This signature may be written on paper, or electronically signed within EP[employer].

## 2019-07-01 ENCOUNTER — HOSPITAL ENCOUNTER (OUTPATIENT)
Dept: PHYSICAL THERAPY | Facility: CLINIC | Age: 74
Setting detail: THERAPIES SERIES
End: 2019-07-01
Attending: PEDIATRICS
Payer: COMMERCIAL

## 2019-07-01 PROCEDURE — 97140 MANUAL THERAPY 1/> REGIONS: CPT | Mod: GP | Performed by: PHYSICAL THERAPIST

## 2019-07-01 PROCEDURE — 97110 THERAPEUTIC EXERCISES: CPT | Mod: GP | Performed by: PHYSICAL THERAPIST

## 2019-07-03 DIAGNOSIS — M21.42 PES PLANUS OF BOTH FEET: ICD-10-CM

## 2019-07-03 DIAGNOSIS — M79.604 RIGHT LEG PAIN: Primary | ICD-10-CM

## 2019-07-03 DIAGNOSIS — M21.41 PES PLANUS OF BOTH FEET: ICD-10-CM

## 2019-07-24 ENCOUNTER — OFFICE VISIT (OUTPATIENT)
Dept: DERMATOLOGY | Facility: CLINIC | Age: 74
End: 2019-07-24
Payer: COMMERCIAL

## 2019-07-24 VITALS — OXYGEN SATURATION: 98 % | SYSTOLIC BLOOD PRESSURE: 149 MMHG | DIASTOLIC BLOOD PRESSURE: 72 MMHG | HEART RATE: 70 BPM

## 2019-07-24 DIAGNOSIS — L82.0 INFLAMED SEBORRHEIC KERATOSIS: ICD-10-CM

## 2019-07-24 DIAGNOSIS — L82.1 SEBORRHEIC KERATOSIS: ICD-10-CM

## 2019-07-24 DIAGNOSIS — D23.9 DERMAL NEVUS: ICD-10-CM

## 2019-07-24 DIAGNOSIS — D18.01 ANGIOMA OF SKIN: ICD-10-CM

## 2019-07-24 DIAGNOSIS — Z85.828 HISTORY OF SKIN CANCER: Primary | ICD-10-CM

## 2019-07-24 DIAGNOSIS — L81.4 LENTIGO: ICD-10-CM

## 2019-07-24 PROCEDURE — 99213 OFFICE O/P EST LOW 20 MIN: CPT | Performed by: DERMATOLOGY

## 2019-07-24 NOTE — PROGRESS NOTES
Mariah Jacinto is a 73 year old year old female patient here today for hx of non-melanoma skin cancer.  She notes spot on r and left arm.   .  Patient states this has been present for a while.  Patient reports the following symptoms:  itching.  Patient reports the following previous treatments none.  Patient reports the following modifying factors none.  Associated symptoms: none.  Patient has no other skin complaints today.  Remainder of the HPI, Meds, PMH, Allergies, FH, and SH was reviewed in chart.      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    celexa caused spinning side effect      MENOPAUSE --ERT      OSTEOPENIA      Squamous cell carcinoma        Past Surgical History:   Procedure Laterality Date     ABLATE VEIN VARICOSE RADIO FREQUENCY WITHOUT PHLEBECTOMY MULTIPLE STAB  3/28/2013    Procedure: ABLATE VEIN VARICOSE RADIO FREQUENCY WITHOUT PHLEBECTOMY MULTIPLE STAB;  Bilateral ablation of varicose veins legs-to ultrasound at 0930  ;  Surgeon: Noam Soliman MD;  Location: [facility] ANESTH,LOWER ARM SURGERY  1999    ulnar nerve decompression - right     COLONOSCOPY  8/20/2013    Procedure: COLONOSCOPY;  Colonoscopy;  Surgeon: Yuliya Nathan MD;  Location: [facility]     ESOPHAGOSCOPY, GASTROSCOPY, DUODENOSCOPY (EGD), COMBINED N/A 5/12/2015    Procedure: COMBINED ESOPHAGOSCOPY, GASTROSCOPY, DUODENOSCOPY (EGD), BIOPSY SINGLE OR MULTIPLE;  Surgeon: Yuliya Nathan MD;  Location: [facility]     ESOPHAGOSCOPY, GASTROSCOPY, DUODENOSCOPY (EGD), COMBINED N/A 1/31/2017    Procedure: COMBINED ESOPHAGOSCOPY, GASTROSCOPY, DUODENOSCOPY (EGD), BIOPSY SINGLE OR MULTIPLE;  Surgeon: Qasim Bowie MD;  Location: [facility]     EXCISE MASS FINGER Right 2/19/2019     Procedure: Excision Right Ring Finger Volar Middle Phalanx Mass;  Surgeon: Jake Viveros MD;  Location: [facility]     HYSTERECTOMY, PAP NO LONGER INDICATED      has both ovaries out also      HYSTERECTOMY, VAGINAL  1988    Hysterectomy, oophorectomy     PHACOEMULSIFICATION WITH STANDARD INTRAOCULAR LENS IMPLANT Left 3/18/2019    Procedure: Cataract Removal with Implant;  Surgeon: Chapito Phillips MD;  Location: [facility]     PHACOEMULSIFICATION WITH STANDARD INTRAOCULAR LENS IMPLANT Right 4/10/2019    Procedure: Cataract Removal with Implant;  Surgeon: Chapito Phillips MD;  Location: [facility]     RELEASE TRIGGER FINGER Right 2017    Procedure: RELEASE TRIGGER FINGER;  Right Thumb A1 Pulley Release;  Surgeon: Jake Viveros MD;  Location: [facility]     SURGICAL HISTORY OF -       right retromastoid craniectomy and decompression trigeminal nerve     TONSILLECTOMY & ADENOIDECTOMY  child    T&A         Family History   Problem Relation Age of Onset     Alzheimer Disease Mother          at age 85     Osteoporosis Mother      Arthritis Mother      Alcohol/Drug Father      Respiratory Father      Eye Disorder Maternal Grandfather         Macular degneration     C.A.D. Brother         Tripple bypass age 57     Cardiovascular Brother      Cancer Brother         leukemia (ALL)     Cardiovascular Brother      Cardiovascular Brother      Neurologic Disorder Son      Heart Disease Son      Melanoma No family hx of      Skin Cancer No family hx of        Social History     Socioeconomic History     Marital status:      Spouse name: Not on file     Number of children: Not on file     Years of education: Not on file     Highest education level: Not on file   Occupational History     Not on file   Social Needs     Financial resource strain: Not on file     Food insecurity:     Worry: Not on file     Inability: Not on file     Transportation needs:     Medical: Not on file     Non-medical: Not on file    Tobacco Use     Smoking status: Never Smoker     Smokeless tobacco: Never Used   Substance and Sexual Activity     Alcohol use: No     Drug use: No     Sexual activity: Yes     Birth control/protection: Surgical     Comment: complete hysterectomy 1988   Lifestyle     Physical activity:     Days per week: Not on file     Minutes per session: Not on file     Stress: Not on file   Relationships     Social connections:     Talks on phone: Not on file     Gets together: Not on file     Attends Gnosticist service: Not on file     Active member of club or organization: Not on file     Attends meetings of clubs or organizations: Not on file     Relationship status: Not on file     Intimate partner violence:     Fear of current or ex partner: Not on file     Emotionally abused: Not on file     Physically abused: Not on file     Forced sexual activity: Not on file   Other Topics Concern     Parent/sibling w/ CABG, MI or angioplasty before 65F 55M? No   Social History Narrative     Not on file       Outpatient Encounter Medications as of 7/24/2019   Medication Sig Dispense Refill     acetaminophen (TYLENOL) 325 MG tablet Take 325-650 mg by mouth 2 times daily       Ascorbic Acid (VITAMIN C ER PO) Take 1 tablet by mouth daily        ASPIRIN 81 MG OR TABS 1 TABLET DAILY       Calcium Carb-Cholecalciferol (CALCIUM + D3) 600-200 MG-UNIT TABS Take 1 tablet by mouth daily       estradiol (ESTRACE) 0.1 MG/GM vaginal cream Use 0.3 g (small amount) nightly for 2 weeks, then twice weekly 42.5 g 1     glucosamine-chondroitin 500-400 MG CAPS Take 1 capsule by mouth daily       Lactobacillus (ACIDOPHILUS) CAPS Take 1 tablet by mouth daily       melatonin 5 MG CAPS Take 5 mg by mouth At Bedtime 1 capsule 0     MULTI-VITAMIN OR TABS 1 TABLET DAILY       Omega-3 Fatty Acids (OMEGA-3 FISH OIL PO) Take 1 capsule by mouth daily       omeprazole (PRILOSEC) 40 MG capsule Take 1 capsule (40 mg) by mouth daily 90 capsule 3     simvastatin (ZOCOR)  20 MG tablet Take 1 tablet (20 mg) by mouth At Bedtime 90 tablet 3     VITAMIN D OR 1 DAILY       No facility-administered encounter medications on file as of 7/24/2019.              Review Of Systems  Skin: As above  Eyes: negative  Ears/Nose/Throat: negative  Respiratory: No shortness of breath, dyspnea on exertion, cough, or hemoptysis  Cardiovascular: negative  Gastrointestinal: negative  Genitourinary: negative  Musculoskeletal: negative  Neurologic: negative  Psychiatric: negative  Hematologic/Lymphatic/Immunologic: negative  Endocrine: negative      O:   NAD, WDWN, Alert & Oriented, Mood & Affect wnl, Vitals stable   Here today alone   /72   Pulse 70   SpO2 98%    General appearance normal   Vitals stable   Alert, oriented and in no acute distress      Following lymph nodes palpated: Occipital, Cervical, Supraclavicular no lad   l and R arm inflamed seborrheic keratosis      Stuck on papules and brown macules on trunk and ext   Red papules on trunk  Flesh colored papules on trunk     The remainder of the full exam was unremarkable; the following areas were examined:  conjunctiva/lids, oral mucosa, neck, peripheral vascular system, abdomen, lymph nodes, digits/nails, eccrine and apocrine glands, scalp/hair, face, neck, chest, abdomen, buttocks, back, RUE, LUE, RLE, LLE       Eyes: Conjunctivae/lids:Normal     ENT: Lips, buccal mucosa, tongue: normal    MSK:Normal    Cardiovascular: peripheral edema none    Pulm: Breathing Normal    Lymph Nodes: No Head and Neck Lymphadenopathy     Neuro/Psych: Orientation:Normal; Mood/Affect:Normal      A/P:  1. Seborrheic keratosis, lentigo, angioma, dermal nevus, inflamed seborrheic keratosis   BENIGN LESIONS DISCUSSED WITH PATIENT:  I discussed the specifics of tumor, prognosis, and genetics of benign lesions.  I explained that treatment of these lesions would be purely cosmetic and not medically neccessary.  I discussed with patient different removal options  including excision, cautery and /or laser.      Nature and genetics of benign skin lesions dicussed with patient.  Signs and Symptoms of skin cancer discussed with patient.  Patient encouraged to perform monthly skin exams.  UV precautions reviewed with patient.  Skin care regimen reviewed with patient: Eliminate harsh soaps, i.e. Dial, zest, irsih spring; Mild soaps such as Cetaphil or Dove sensitive skin, avoid hot or cold showers, aggressive use of emollients including vanicream, cetaphil or cerave discussed with patient.    Risks of non-melanoma skin cancer discussed with patient   Return to clinic 12 months

## 2019-07-24 NOTE — NURSING NOTE
Chief Complaint   Patient presents with     Skin Check       Vitals:    07/24/19 0900   BP: 149/72   Pulse: 70   SpO2: 98%     Wt Readings from Last 1 Encounters:   06/21/19 62.6 kg (138 lb)       Lori Chan LPN.................7/24/2019

## 2019-07-24 NOTE — LETTER
7/24/2019         RE: Mariah Jacinto  [id number] [place] [address]        Dear Colleague,    Thank you for referring your patient, Mariah Jacinto, to the [facility]. Please see a copy of my visit note below.    Mariah Jacinto is a 73 year old year old female patient here today for hx of non-melanoma skin cancer.  She notes spot on r and left arm.   .  Patient states this has been present for a while.  Patient reports the following symptoms:  itching.  Patient reports the following previous treatments none.  Patient reports the following modifying factors none.  Associated symptoms: none.  Patient has no other skin complaints today.  Remainder of the HPI, Meds, PMH, Allergies, FH, and SH was reviewed in chart.      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    celexa caused spinning side effect      MENOPAUSE --ERT      OSTEOPENIA      Squamous cell carcinoma        Past Surgical History:   Procedure Laterality Date     ABLATE VEIN VARICOSE RADIO FREQUENCY WITHOUT PHLEBECTOMY MULTIPLE STAB  3/28/2013    Procedure: ABLATE VEIN VARICOSE RADIO FREQUENCY WITHOUT PHLEBECTOMY MULTIPLE STAB;  Bilateral ablation of varicose veins legs-to ultrasound at 0930  ;  Surgeon: Noam Soliman MD;  Location: [facility]     C ANESTH,LOWER ARM SURGERY  1999    ulnar nerve decompression - right     COLONOSCOPY  8/20/2013    Procedure: COLONOSCOPY;  Colonoscopy;  Surgeon: Yuliya Nathan MD;  Location: [facility]     ESOPHAGOSCOPY, GASTROSCOPY, DUODENOSCOPY (EGD), COMBINED N/A 5/12/2015    Procedure: COMBINED ESOPHAGOSCOPY, GASTROSCOPY, DUODENOSCOPY (EGD), BIOPSY SINGLE OR MULTIPLE;  Surgeon: Yuliya Nathan MD;  Location: [facility]     ESOPHAGOSCOPY,  GASTROSCOPY, DUODENOSCOPY (EGD), COMBINED N/A 2017    Procedure: COMBINED ESOPHAGOSCOPY, GASTROSCOPY, DUODENOSCOPY (EGD), BIOPSY SINGLE OR MULTIPLE;  Surgeon: Qasim Bowie MD;  Location: [facility]     EXCISE MASS FINGER Right 2019    Procedure: Excision Right Ring Finger Volar Middle Phalanx Mass;  Surgeon: Jake Viveros MD;  Location: [facility]     HYSTERECTOMY, PAP NO LONGER INDICATED      has both ovaries out also      HYSTERECTOMY, VAGINAL  1988    Hysterectomy, oophorectomy     PHACOEMULSIFICATION WITH STANDARD INTRAOCULAR LENS IMPLANT Left 3/18/2019    Procedure: Cataract Removal with Implant;  Surgeon: Chapito Phillips MD;  Location: [facility]     PHACOEMULSIFICATION WITH STANDARD INTRAOCULAR LENS IMPLANT Right 4/10/2019    Procedure: Cataract Removal with Implant;  Surgeon: Chapito Phillips MD;  Location: [facility]     RELEASE TRIGGER FINGER Right 2017    Procedure: RELEASE TRIGGER FINGER;  Right Thumb A1 Pulley Release;  Surgeon: Jake Viveros MD;  Location: [facility]     SURGICAL HISTORY OF -       right retromastoid craniectomy and decompression trigeminal nerve     TONSILLECTOMY & ADENOIDECTOMY  child    T&A         Family History   Problem Relation Age of Onset     Alzheimer Disease Mother          at age 85     Osteoporosis Mother      Arthritis Mother      Alcohol/Drug Father      Respiratory Father      Eye Disorder Maternal Grandfather         Macular degneration     C.A.D. Brother         Tripple bypass age 57     Cardiovascular Brother      Cancer Brother         leukemia (ALL)     Cardiovascular Brother      Cardiovascular Brother      Neurologic Disorder Son      Heart Disease Son      Melanoma No family hx of      Skin Cancer No family hx of        Social History     Socioeconomic History     Marital status:      Spouse name: Not on file     Number of children: Not on file     Years of education: Not on file     Highest education level: Not on file    Occupational History     Not on file   Social Needs     Financial resource strain: Not on file     Food insecurity:     Worry: Not on file     Inability: Not on file     Transportation needs:     Medical: Not on file     Non-medical: Not on file   Tobacco Use     Smoking status: Never Smoker     Smokeless tobacco: Never Used   Substance and Sexual Activity     Alcohol use: No     Drug use: No     Sexual activity: Yes     Birth control/protection: Surgical     Comment: complete hysterectomy 1988   Lifestyle     Physical activity:     Days per week: Not on file     Minutes per session: Not on file     Stress: Not on file   Relationships     Social connections:     Talks on phone: Not on file     Gets together: Not on file     Attends Hinduism service: Not on file     Active member of club or organization: Not on file     Attends meetings of clubs or organizations: Not on file     Relationship status: Not on file     Intimate partner violence:     Fear of current or ex partner: Not on file     Emotionally abused: Not on file     Physically abused: Not on file     Forced sexual activity: Not on file   Other Topics Concern     Parent/sibling w/ CABG, MI or angioplasty before 65F 55M? No   Social History Narrative     Not on file       Outpatient Encounter Medications as of 7/24/2019   Medication Sig Dispense Refill     acetaminophen (TYLENOL) 325 MG tablet Take 325-650 mg by mouth 2 times daily       Ascorbic Acid (VITAMIN C ER PO) Take 1 tablet by mouth daily        ASPIRIN 81 MG OR TABS 1 TABLET DAILY       Calcium Carb-Cholecalciferol (CALCIUM + D3) 600-200 MG-UNIT TABS Take 1 tablet by mouth daily       estradiol (ESTRACE) 0.1 MG/GM vaginal cream Use 0.3 g (small amount) nightly for 2 weeks, then twice weekly 42.5 g 1     glucosamine-chondroitin 500-400 MG CAPS Take 1 capsule by mouth daily       Lactobacillus (ACIDOPHILUS) CAPS Take 1 tablet by mouth daily       melatonin 5 MG CAPS Take 5 mg by mouth At Bedtime 1  capsule 0     MULTI-VITAMIN OR TABS 1 TABLET DAILY       Omega-3 Fatty Acids (OMEGA-3 FISH OIL PO) Take 1 capsule by mouth daily       omeprazole (PRILOSEC) 40 MG capsule Take 1 capsule (40 mg) by mouth daily 90 capsule 3     simvastatin (ZOCOR) 20 MG tablet Take 1 tablet (20 mg) by mouth At Bedtime 90 tablet 3     VITAMIN D OR 1 DAILY       No facility-administered encounter medications on file as of 7/24/2019.              Review Of Systems  Skin: As above  Eyes: negative  Ears/Nose/Throat: negative  Respiratory: No shortness of breath, dyspnea on exertion, cough, or hemoptysis  Cardiovascular: negative  Gastrointestinal: negative  Genitourinary: negative  Musculoskeletal: negative  Neurologic: negative  Psychiatric: negative  Hematologic/Lymphatic/Immunologic: negative  Endocrine: negative      O:   NAD, WDWN, Alert & Oriented, Mood & Affect wnl, Vitals stable   Here today alone   /72   Pulse 70   SpO2 98%    General appearance normal   Vitals stable   Alert, oriented and in no acute distress      Following lymph nodes palpated: Occipital, Cervical, Supraclavicular no lad   l and R arm inflamed seborrheic keratosis      Stuck on papules and brown macules on trunk and ext   Red papules on trunk  Flesh colored papules on trunk     The remainder of the full exam was unremarkable; the following areas were examined:  conjunctiva/lids, oral mucosa, neck, peripheral vascular system, abdomen, lymph nodes, digits/nails, eccrine and apocrine glands, scalp/hair, face, neck, chest, abdomen, buttocks, back, RUE, LUE, RLE, LLE       Eyes: Conjunctivae/lids:Normal     ENT: Lips, buccal mucosa, tongue: normal    MSK:Normal    Cardiovascular: peripheral edema none    Pulm: Breathing Normal    Lymph Nodes: No Head and Neck Lymphadenopathy     Neuro/Psych: Orientation:Normal; Mood/Affect:Normal      A/P:  1. Seborrheic keratosis, lentigo, angioma, dermal nevus, inflamed seborrheic keratosis   BENIGN LESIONS DISCUSSED WITH  PATIENT:  I discussed the specifics of tumor, prognosis, and genetics of benign lesions.  I explained that treatment of these lesions would be purely cosmetic and not medically neccessary.  I discussed with patient different removal options including excision, cautery and /or laser.      Nature and genetics of benign skin lesions dicussed with patient.  Signs and Symptoms of skin cancer discussed with patient.  Patient encouraged to perform monthly skin exams.  UV precautions reviewed with patient.  Skin care regimen reviewed with patient: Eliminate harsh soaps, i.e. Dial, zest, irsih spring; Mild soaps such as Cetaphil or Dove sensitive skin, avoid hot or cold showers, aggressive use of emollients including vanicream, cetaphil or cerave discussed with patient.    Risks of non-melanoma skin cancer discussed with patient   Return to clinic 12 months      Again, thank you for allowing me to participate in the care of your patient.        Sincerely,        Chapito Franco MD

## 2019-09-10 ENCOUNTER — HOSPITAL ENCOUNTER (OUTPATIENT)
Dept: MAMMOGRAPHY | Facility: CLINIC | Age: 74
Discharge: HOME | End: 2019-09-10
Attending: FAMILY MEDICINE | Admitting: FAMILY MEDICINE
Payer: COMMERCIAL

## 2019-09-10 DIAGNOSIS — Z12.31 VISIT FOR SCREENING MAMMOGRAM: ICD-10-CM

## 2019-09-10 PROCEDURE — 77063 BREAST TOMOSYNTHESIS BI: CPT

## 2019-09-13 DIAGNOSIS — E78.5 HYPERLIPIDEMIA LDL GOAL <160: ICD-10-CM

## 2019-09-13 RX ORDER — SIMVASTATIN 20 MG
20 TABLET ORAL AT BEDTIME
Qty: 30 TABLET | Refills: 0 | Status: SHIPPED | OUTPATIENT
Start: 2019-09-13 | End: 2019-09-23

## 2019-09-13 NOTE — TELEPHONE ENCOUNTER
Medication is being filled for 1 time refill only due to:  Patient needs to be seen because due for appt.   Has one scheduled 9-23-19.  Luanne BARNARD RN

## 2019-09-13 NOTE — TELEPHONE ENCOUNTER
"Requested Prescriptions   Pending Prescriptions Disp Refills     simvastatin (ZOCOR) 20 MG tablet [Pharmacy Med Name: SIMVASTATIN 20 MG TABLET] 90 tablet 3     Sig: TAKE 1 TABLET (20 MG) BY MOUTH AT BEDTIME       Statins Protocol Failed - 9/13/2019  2:11 AM        Failed - LDL on file in past 12 months     Recent Labs   Lab Test 09/05/18  0947   LDL 93             Passed - No abnormal creatine kinase in past 12 months     No lab results found.             Passed - Recent (12 mo) or future (30 days) visit within the authorizing provider's specialty     Patient had office visit in the last 12 months or has a visit in the next 30 days with authorizing provider or within the authorizing provider's specialty.  See \"Patient Info\" tab in inbasket, or \"Choose Columns\" in Meds & Orders section of the refill encounter.              Passed - Medication is active on med list        Passed - Patient is age 18 or older        Passed - No active pregnancy on record        Passed - No positive pregnancy test in past 12 months        Last Written Prescription Date:  9/5/2018  Last Fill Quantity: 90,  # refills: 3   Last office visit: 4/5/2019 with prescribing provider:  Kemal   Future Office Visit:   Next 5 appointments (look out 90 days)    Sep 23, 2019 12:40 PM CDT  PHYSICAL with Eli Sommer MD  [facility] [address] [facility] [id number] [phone]           "

## 2019-09-14 DIAGNOSIS — K21.9 GASTROESOPHAGEAL REFLUX DISEASE WITHOUT ESOPHAGITIS: ICD-10-CM

## 2019-09-16 RX ORDER — OMEPRAZOLE 40 MG/1
CAPSULE, DELAYED RELEASE ORAL
Qty: 90 CAPSULE | Refills: 1 | Status: SHIPPED | OUTPATIENT
Start: 2019-09-16 | End: 2020-03-15

## 2019-09-16 NOTE — TELEPHONE ENCOUNTER
"Requested Prescriptions   Pending Prescriptions Disp Refills     omeprazole (PRILOSEC) 40 MG DR capsule [Pharmacy Med Name: OMEPRAZOLE DR 40 MG CAPSULE] 90 capsule 3     Sig: TAKE 1 CAPSULE BY MOUTH EVERY DAY       PPI Protocol Passed - 9/14/2019 12:34 AM        Passed - Not on Clopidogrel (unless Pantoprazole ordered)        Passed - No diagnosis of osteoporosis on record        Passed - Recent (12 mo) or future (30 days) visit within the authorizing provider's specialty     Patient had office visit in the last 12 months or has a visit in the next 30 days with authorizing provider or within the authorizing provider's specialty.  See \"Patient Info\" tab in inbasket, or \"Choose Columns\" in Meds & Orders section of the refill encounter.              Passed - Medication is active on med list        Passed - Patient is age 18 or older        Passed - No active pregnacy on record        Passed - No positive pregnancy test in past 12 months        Last Written Prescription Date:  9/5/2018  Last Fill Quantity: 90,  # refills: 3   Last office visit: 4/5/2019 with prescribing provider:  Kemal    Future Office Visit:   Next 5 appointments (look out 90 days)    Sep 23, 2019 12:40 PM CDT  PH[facility] with Eli Sommer MD  [facility] [address] [facility] [address] [phone]           "

## 2019-09-16 NOTE — TELEPHONE ENCOUNTER
Prescription approved per [facility] Refill Protocol.  Radha Wilson RNC

## 2019-09-23 ENCOUNTER — OFFICE VISIT (OUTPATIENT)
Dept: FAMILY MEDICINE | Facility: CLINIC | Age: 74
End: 2019-09-23
Payer: COMMERCIAL

## 2019-09-23 VITALS
RESPIRATION RATE: 18 BRPM | SYSTOLIC BLOOD PRESSURE: 126 MMHG | WEIGHT: 139 LBS | HEART RATE: 72 BPM | BODY MASS INDEX: 23.73 KG/M2 | DIASTOLIC BLOOD PRESSURE: 54 MMHG | HEIGHT: 64 IN | TEMPERATURE: 98.4 F | OXYGEN SATURATION: 94 %

## 2019-09-23 DIAGNOSIS — M25.532 LEFT WRIST PAIN: ICD-10-CM

## 2019-09-23 DIAGNOSIS — N36.2 URETHRAL CARUNCLE: ICD-10-CM

## 2019-09-23 DIAGNOSIS — E78.5 HYPERLIPIDEMIA LDL GOAL <160: ICD-10-CM

## 2019-09-23 DIAGNOSIS — Z00.00 ENCOUNTER FOR MEDICARE ANNUAL WELLNESS EXAM: Primary | ICD-10-CM

## 2019-09-23 LAB
CHOLEST SERPL-MCNC: 204 MG/DL
HDLC SERPL-MCNC: 59 MG/DL
LDLC SERPL CALC-MCNC: 105 MG/DL
NONHDLC SERPL-MCNC: 145 MG/DL
TRIGL SERPL-MCNC: 200 MG/DL

## 2019-09-23 PROCEDURE — 36415 COLL VENOUS BLD VENIPUNCTURE: CPT | Performed by: FAMILY MEDICINE

## 2019-09-23 PROCEDURE — 99213 OFFICE O/P EST LOW 20 MIN: CPT | Mod: 25 | Performed by: FAMILY MEDICINE

## 2019-09-23 PROCEDURE — 80061 LIPID PANEL: CPT | Performed by: FAMILY MEDICINE

## 2019-09-23 PROCEDURE — 99397 PER PM REEVAL EST PAT 65+ YR: CPT | Performed by: FAMILY MEDICINE

## 2019-09-23 RX ORDER — ESTRADIOL 0.1 MG/G
CREAM VAGINAL
Qty: 42.5 G | Refills: 1 | Status: CANCELLED | OUTPATIENT
Start: 2019-09-23

## 2019-09-23 RX ORDER — SIMVASTATIN 20 MG
20 TABLET ORAL AT BEDTIME
Qty: 90 TABLET | Refills: 3 | Status: SHIPPED | OUTPATIENT
Start: 2019-09-23 | End: 2020-09-23

## 2019-09-23 RX ORDER — MULTIVITAMIN WITH IRON
1 TABLET ORAL DAILY
COMMUNITY
End: 2020-09-23

## 2019-09-23 RX ORDER — SIMVASTATIN 20 MG
20 TABLET ORAL AT BEDTIME
Qty: 30 TABLET | Refills: 0 | Status: SHIPPED | OUTPATIENT
Start: 2019-09-23 | End: 2019-09-23

## 2019-09-23 RX ORDER — ESTRIOL MICRONIZED 100 %
POWDER (GRAM) MISCELLANEOUS
Qty: 50 G | Refills: 3 | Status: SHIPPED | OUTPATIENT
Start: 2019-09-23 | End: 2020-07-22

## 2019-09-23 ASSESSMENT — PAIN SCALES - GENERAL: PAINLEVEL: EXTREME PAIN (9)

## 2019-09-23 ASSESSMENT — MIFFLIN-ST. JEOR: SCORE: 1115.5

## 2019-09-23 ASSESSMENT — ACTIVITIES OF DAILY LIVING (ADL): CURRENT_FUNCTION: NO ASSISTANCE NEEDED

## 2019-09-23 NOTE — PATIENT INSTRUCTIONS
Wear the wrist brace as much as possible for the next few weeks to give your wrist a break from activity.  If not improving, consider referral to [facility] or hand therapy.         Please be aware that there will be an additional charge during your preventative visit due to either a new diagnosis and/or chronic disease management.    Preventative visits screen for diseases prior to they occur.  They do not cover for any new diagnosis or chronic disease management.     If you have questions regarding your coverage please check with your insurance provider.  At [facility] we need to code correctly to be in compliance with all insurance companies.        Our Clinic hours are:  Mondays    7:20 am - 7 pm  Tues -  Fri  7:20 am - 5 pm    Clinic Phone: [phone]    The clinic lab opens at 7:30 am Mon - Fri and appointments are required.    [facility]. [phone]  Monday  8 am - 7pm  Tues - Fri 8 am - 5:30 pm         Patient Education   Personalized Prevention Plan  You are due for the preventive services outlined below.  Your care team is available to assist you in scheduling these services.  If you have already completed any of these items, please share that information with your care team to update in your medical record.  Health Maintenance Due   Topic Date Due     Diptheria Tetanus Pertussis (DTAP/TDAP/TD) Vaccine (3 - Td) 10/11/2017     Flu Vaccine (1) 09/01/2019     FALL RISK ASSESSMENT  09/05/2019     Annual Wellness Visit  09/05/2019       Preventing Falls in the Home  An adult or child can fall for many reasons. If you are an older adult, you may fall because your reaction time slows down. Your muscles and joints may get stiff, weak, or less flexible because of illness, medicines, or a physical condition. These things can also make a child more likely to fall or be injured in a fall.  Other health problems that make falls more likely  include:    Arthritis    Dizziness or lightheadedness when you get out of bed (orthostatic hypotension)    History of a stroke    Dizziness    Anemia    Certain medicines taken for mental illness    Problems with balance or gait    History of falls with or without an injury    Changes in vision (vision impairment)    Changes in thinking skills and memory (cognitive impairment)  Injuries from a fall can include broken bones, dislocated joints, and cuts. When these injuries are serious enough, they can make it impossible for you or a child who is injured in a fall to live on his or her own.  Prevention tips  To help prevent falls and fall-related injuries, follow the tips below.   Floors  Make floors safer by doing the following:     Put nonskid pads under area rugs.    Remove throw rugs.    Replace worn floor coverings.    Tack carpets firmly to each step on carpeted stairs. Put nonskid strips on the edges of uncarpeted stairs.    Keep floors and stairs free of clutter and cords.    Arrange furniture so there are clear pathways.    Clean up any spills right away.    Wear shoes that fit.  Bathrooms    Make bathrooms safer by doing the following:     Install grab bars in the tub or shower.    Apply nonskid strips or put a nonskid rubber mat in the tub or shower.    Sit on a bath chair to bathe.    Use bathmats with nonskid backing.  Lighting and the environment  Improve lighting in your home by doing the following:     Keep a flashlight in each room. Or put a lamp next to the bed within easy reach.    Put nightlights in the bedrooms, hallways, kitchen, and bathrooms.    Make sure all stairways have good lighting.    Take your time when going up and down stairs.    Put handrails on both sides of stairs and in walkways for more support. To prevent injury to your wrist or arm, don t use handrails to pull yourself up.    Install grab bars to pull yourself up.    Move or rearrange items that you use often. This will make  them easier to find or reach.    Look at your home to find any safety hazards. Especially look at doorways, walkways, and the driveway. Remove or repair any safety problems that you find.  Date Last Reviewed: 8/1/2016 2000-2018 [employer]. [address] [place] [address], [place] [address]. All rights reserved. This information is not intended as a substitute for professional medical care. Always follow your healthcare professional's instructions.

## 2019-09-23 NOTE — PROGRESS NOTES
"SUBJECTIVE:   Mariah Jacinto is a 73 year old female who presents for Preventive Visit.    Chief Complaint   Patient presents with     Physical     Are you in the first 12 months of your Medicare coverage?  No    Healthy Habits:     In general, how would you rate your overall health?  Good    Frequency of exercise:  2-3 days/week    Duration of exercise:  15-30 minutes    Do you usually eat at least 4 servings of fruit and vegetables a day, include whole grains    & fiber and avoid regularly eating high fat or \"junk\" foods?  Yes    Taking medications regularly:  No    Barriers to taking medications:  None    Medication side effects:  None    Ability to successfully perform activities of daily living:  No assistance needed    Home Safety:  No safety concerns identified    Hearing Impairment:  No hearing concerns    In the past 6 months, have you been bothered by leaking of urine?  No    In general, how would you rate your overall mental or emotional health?  Good      PHQ-2 Total Score: 0    Additional concerns today:  Yes (patient fell and after fall her left hand started hurting and then 2 days ago her elbow started clicking with pain rated at a 9/10 when it clicks. )    Do you feel safe in your environment? Yes    Do you have a Health Care Directive? Yes: Advance Directive has been received and scanned.      Fall risk  Fallen 2 or more times in the past year?: No  Any fall with injury in the past year?: Yes    Cognitive Screening   1) Repeat 3 items (Leader, Season, Table)    2) Clock draw: NORMAL  3) 3 item recall: Recalls 3 objects  Results: 3 items recalled: COGNITIVE IMPAIRMENT LESS LIKELY    Mini-CogTM Copyright GARY Khoury. Licensed by the author for use in [facility]; reprinted with permission (baron@.[facility]). All rights reserved.      Do you have sleep apnea, excessive snoring or daytime drowsiness?: no    Reviewed and updated as needed this visit by clinical staff  Tobacco  Allergies  Meds  " Med Hx  Surg Hx  Fam Hx  Soc Hx        Reviewed and updated as needed this visit by Provider        Social History     Tobacco Use     Smoking status: Never Smoker     Smokeless tobacco: Never Used   Substance Use Topics     Alcohol use: No     If you drink alcohol do you typically have >3 drinks per day or >7 drinks per week? No     No flowsheet data found.        Hyperlipidemia Follow-Up      Are you having any of the following symptoms? (Select all that apply)  No complaints of shortness of breath, chest pain or pressure.  No increased sweating or nausea with activity.  No left-sided neck or arm pain.  No complaints of pain in calves when walking 1-2 blocks.    Are you regularly taking any medication or supplement to lower your cholesterol?   Yes- simvastatin    Are you having muscle aches or other side effects that you think could be caused by your cholesterol lowering medication?  No        Current providers sharing in care for this patient include:   Patient Care Team:  Eli Sommer MD as PCP - General (Family Practice)  Eli Sommer MD as Assigned PCP    The following health maintenance items are reviewed in Epic and correct as of today:  Health Maintenance   Topic Date Due     DTAP/TDAP/TD IMMUNIZATION (3 - Td) 10/11/2017     INFLUENZA VACCINE (1) 09/01/2019     FALL RISK ASSESSMENT  09/05/2019     MEDICARE ANNUAL WELLNESS VISIT  09/05/2019     MAMMO SCREENING  09/10/2021     COLONOSCOPY  08/20/2023     LIPID  09/05/2023     ADVANCE CARE PLANNING  03/21/2024     DEXA  Completed     PHQ-2  Completed     PNEUMOCOCCAL IMMUNIZATION 65+ LOW/MEDIUM RISK  Completed     ZOSTER IMMUNIZATION  Completed     IPV IMMUNIZATION  Aged Out     MENINGITIS IMMUNIZATION  Aged Out     Lab work is in process  Labs reviewed in EPIC  Pneumonia Vaccine: utd  Mammogram: utd    Review of Systems  Constitutional, HEENT, cardiovascular, pulmonary, gi and gu systems are negative, except as otherwise noted.    Left elbow -  "starting clicking with certain movements just two days ago.  Not painful today. Unable to reproduce pain.    and wrist has been painful for a month - she tripped and caught herself on the wall with her left hand.  No initial pain but two days later developed pain in the thenar eminence and into the volar aspect of the left wrist.  No swelling.  Not painful all the time.  Unable to reproduce the pain today.     OBJECTIVE:   /54   Pulse 72   Temp 98.4  F (36.9  C) (Tympanic)   Resp 18   Ht 1.626 m (5' 4\")   Wt 63 kg (139 lb)   SpO2 94%   Breastfeeding? No   BMI 23.86 kg/m   Estimated body mass index is 23.86 kg/m  as calculated from the following:    Height as of this encounter: 1.626 m (5' 4\").    Weight as of this encounter: 63 kg (139 lb).  Physical Exam  GENERAL: healthy, alert and no distress  NECK: no adenopathy, no asymmetry, masses, or scars and thyroid normal to palpation  RESP: lungs clear to auscultation - no rales, rhonchi or wheezes  CV: regular rate and rhythm, normal S1 S2, no S3 or S4, no murmur, click or rub, no peripheral edema and peripheral pulses strong  ABDOMEN: soft, nontender, no hepatosplenomegaly, no masses and bowel sounds normal  MS: LUE exam shows normal strength and muscle mass, no deformities, no erythema, induration, or nodules, no evidence of joint effusion, ROM of all joints is normal, no evidence of joint instability and strength of upper extremities is 5/5  SKIN: no suspicious lesions or rashes  NEURO: Normal strength and tone, mentation intact and speech normal  PSYCH: mentation appears normal, affect normal/bright    Diagnostic Test Results:  Labs reviewed in Epic    ASSESSMENT / PLAN:   1. Encounter for Medicare annual wellness exam       2. Hyperlipidemia LDL goal <160     - Lipid panel reflex to direct LDL Non-fasting  - simvastatin (ZOCOR) 20 MG tablet; Take 1 tablet (20 mg) by mouth At Bedtime  Dispense: 90 tablet; Refill: 3    3. Urethral caruncle     - estriol " "micronized POWD; 0.2% Estriol in Vanicream.  0.25 g 2x/week vaginally  Dispense: 50 g; Refill: 3    4.  Wrist pain/?strain - no evidence of fracture.   Wrist splint given, consider [facility] referral or hand therapy if not improving.    5.  Elbow pain/clicking - exam is normal and unable to reproduce concern today in clinic.  No indication for imaging.     End of Life Planning:  Patient currently has an advanced directive: No.  I have verified the patient's ablity to prepare an advanced directive/make health care decisions.  Literature was provided to assist patient in preparing an advanced directive.    COUNSELING:  Reviewed preventive health counseling, as reflected in patient instructions       Regular exercise       Healthy diet/nutrition    Estimated body mass index is 23.86 kg/m  as calculated from the following:    Height as of this encounter: 1.626 m (5' 4\").    Weight as of this encounter: 63 kg (139 lb).         reports that she has never smoked. She has never used smokeless tobacco.      Appropriate preventive services were discussed with this patient, including applicable screening as appropriate for cardiovascular disease, diabetes, osteopenia/osteoporosis, and glaucoma.  As appropriate for age/gender, discussed screening for colorectal cancer, prostate cancer, breast cancer, and cervical cancer. Checklist reviewing preventive services available has been given to the patient.    Reviewed patients plan of care and provided an AVS. The Basic Care Plan (routine screening as documented in Health Maintenance) for Mariah meets the Care Plan requirement. This Care Plan has been established and reviewed with the Patient.    Counseling Resources:  ATP IV Guidelines  Pooled Cohorts Equation Calculator  Breast Cancer Risk Calculator  FRAX Risk Assessment  ICSI Preventive Guidelines  Dietary Guidelines for Americans, 2010  USDA's MyPlate  ASA Prophylaxis  Lung CA Screening    Eli Sommer, " MD  [facility]    Identified Health Risks:    She is at risk for falling and has been provided with information to reduce the risk of falling at home.

## 2019-11-06 NOTE — PROGRESS NOTES
Chief Complaint   Patient presents with     Hearing Problem     Consult Hearing change left ear/wears HA's/change in hearing in Left ear- has audio with from [facility]     History of Present Illness   Mariah Jacinto is a 74 year old female who presents to me today for ear evaluation.  The patient reports a long-standing history of hearing loss in both ears.  She is had her current hearing aids for 3 to 4 years.  She has been going to [facility] for her hearing care.  Roughly 2 months ago the patient noticed decrease in hearing in her left ear.  She denies any problems with otalgia, otorrhea, bloody otorrhea.  Her tinnitus is at its baseline, usually high-pitched and constant.  Tinnitus is improved with using hearing aid.  Over the last few days, she had some unsteadiness and imbalance without lashell vertigo.  She not had previous ear surgery.  No significant history of noise exposure.  Due to the findings on her audiogram, she was sent for further ear evaluation.    My review of her outside audiogram from February 25, 2017 showed mild sloping to severe sensorineural hearing loss in both ears.  Pure-tone average was 45 dB on the right and 50 dB in the left.  Speech reception threshold was 55 dB in the right and 50 dB in the left.  The patient had 64% word recognition on the right and 80% word recognition on the left.    The patient had an updated audiogram on 10/21/2019.  My review of the audiogram showed mild sloping to severe sensorineural hearing loss in the right ear and moderate sloping to severe sensorineural hearing loss in the left ear.  Pure-tone average was 46 dB on the right and 55 dB on the left.  Speech reception threshold was 55 dB in the right and 50 dB on the left.  The patient had 64% word recognition on the right and 80% word recognition on the left.    The patient did undergo an MRI of her brain with [facility] protocol on 8/8/2015.  My review of her MRI did not show any retrocochlear pathology.  She had normal  inner ear and brainstem.    Past Medical History  Patient Active Problem List   Diagnosis     Disorder of bone and cartilage     Enthesopathy of hip region     Sensorineural hearing loss     Right hip pain     Trigeminal Neuralgia right      Hyperlipidemia LDL goal <160     Advanced directives, counseling/discussion     Intercostal neuralgia     Health Care Home     Trochanteric bursitis     Subjective tinnitus, bilateral     Gastroesophageal reflux disease without esophagitis     Chronic constipation     Dense breast tissue on mammogram     Current Medications     Current Outpatient Medications:      acetaminophen (TYLENOL) 325 MG tablet, Take 325-650 mg by mouth 2 times daily, Disp: , Rfl:      Ascorbic Acid (VITAMIN C ER PO), Take 1 tablet by mouth daily , Disp: , Rfl:      Calcium Carb-Cholecalciferol (CALCIUM + D3) 600-200 MG-UNIT TABS, Take 1 tablet by mouth daily, Disp: , Rfl:      estriol micronized POWD, 0.2% Estriol in Vanicream.  0.25 g 2x/week vaginally, Disp: 50 g, Rfl: 3     glucosamine-chondroitin 500-400 MG CAPS, Take 1 capsule by mouth daily, Disp: , Rfl:      Lactobacillus (ACIDOPHILUS) CAPS, Take 1 tablet by mouth daily, Disp: , Rfl:      magnesium 250 MG tablet, Take 1 tablet by mouth daily, Disp: , Rfl:      melatonin 5 MG CAPS, Take 5 mg by mouth At Bedtime, Disp: 1 capsule, Rfl: 0     MULTI-VITAMIN OR TABS, 1 TABLET DAILY, Disp: , Rfl:      Omega-3 Fatty Acids (OMEGA-3 FISH OIL PO), Take 1 capsule by mouth daily, Disp: , Rfl:      omeprazole (PRILOSEC) 40 MG DR capsule, TAKE 1 CAPSULE BY MOUTH EVERY DAY, Disp: 90 capsule, Rfl: 1     simvastatin (ZOCOR) 20 MG tablet, Take 1 tablet (20 mg) by mouth At Bedtime, Disp: 90 tablet, Rfl: 3     VITAMIN D OR, 1 DAILY, Disp: , Rfl:     Allergies  Allergies   Allergen Reactions     Biaxin [Clarithromycin]      per pt       Celebrex [Celecoxib]      per pt     Cipro [Ciprofloxacin]      per pt     Darvocet [Propoxyphene N-Apap]      Fleet Phospho Soda  [Sodium Phosphate/Biphosphate]      nausea per pt     Morphine      Neurontin [Gabapentin]      per pt     Nortriptyline      per pt     Percocet [Oxycodone-Acetaminophen]      Serzone [Nefazodone Hydrochloride]      per pt     Tegretol [Carbamazepine]      Vicodin [Acetaminophen]      per pt-dizziness     Vioxx      per pt     Vivactil [Protriptyline Hcl]      per pt     Wellbutrin [Bupropion Hcl]      Zithromax [Azithromycin Dihydrate]        Social History   Social History     Socioeconomic History     Marital status:      Spouse name: Not on file     Number of children: Not on file     Years of education: Not on file     Highest education level: Not on file   Occupational History     Not on file   Social Needs     Financial resource strain: Not on file     Food insecurity:     Worry: Not on file     Inability: Not on file     Transportation needs:     Medical: Not on file     Non-medical: Not on file   Tobacco Use     Smoking status: Never Smoker     Smokeless tobacco: Never Used   Substance and Sexual Activity     Alcohol use: No     Drug use: No     Sexual activity: Yes     Birth control/protection: Surgical     Comment: complete hysterectomy 1988   Lifestyle     Physical activity:     Days per week: Not on file     Minutes per session: Not on file     Stress: Not on file   Relationships     Social connections:     Talks on phone: Not on file     Gets together: Not on file     Attends Yarsani service: Not on file     Active member of club or organization: Not on file     Attends meetings of clubs or organizations: Not on file     Relationship status: Not on file     Intimate partner violence:     Fear of current or ex partner: Not on file     Emotionally abused: Not on file     Physically abused: Not on file     Forced sexual activity: Not on file   Other Topics Concern     Parent/sibling w/ CABG, MI or angioplasty before 65F 55M? No   Social History Narrative     Not on file       Family  "History  Family History   Problem Relation Age of Onset     Alzheimer Disease Mother          at age 85     Osteoporosis Mother      Arthritis Mother      Alcohol/Drug Father      Respiratory Father      Eye Disorder Maternal Grandfather         Macular degneration     C.A.D. Brother         Tripple bypass age 57     Cardiovascular Brother      Cancer Brother         leukemia (ALL)     Cardiovascular Brother      Cardiovascular Brother      Neurologic Disorder Son      Heart Disease Son      Melanoma No family hx of      Skin Cancer No family hx of        Review of Systems  As per HPI and PMHx, otherwise 10+ comprehensive system review is negative.    Physical Exam  /71 (BP Location: Right arm, Patient Position: Sitting, Cuff Size: Adult Regular)   Pulse 72   Temp 97.6  F (36.4  C) (Oral)   Ht 1.626 m (5' 4\")   Wt 63.2 kg (139 lb 6 oz)   BMI 23.92 kg/m    GENERAL: Patient is a pleasant, cooperative 74 year old female in no acute distress.  HEAD: Normocephalic, atraumatic.  Hair and scalp are normal.  EYES: Pupils are equal, round, reactive to light and accommodation.  Extraocular movements are intact.  The sclera nonicteric without injection.  The extraocular structures are normal.  EARS: See below.  NOSE: Nares are patent.  Nasal mucosa is pink and moist.  Negative anterior rhinoscopy.  NEUROLOGIC: Cranial nerves II through XII are grossly intact.  Voice is strong.  Patient is House-Brackman I/VI bilaterally.  CARDIOVASCULAR: Extremities are warm and well-perfused.  No significant peripheral edema.  RESPIRATORY: Patient has nonlabored breathing without cough, wheeze, stridor.  PSYCHIATRIC: Patient is alert and oriented.  Mood and affect appear normal.  SKIN: Warm and dry.  No scalp, face, or neck lesions noted.    Physical Exam and Procedure    EARS: Normal shape and symmetry.  No tenderness when palpating the mastoid or tragal areas bilaterally.  The ears were then examined under the otic binocular " microscope.  Otoscopic exam on the right reveals a minimal amount of cerumen. The right tympanic membrane was round, intact without evidence of effusion.  No retraction, granulation, drainage, or evidence of cholesteatoma.      Attention was turned to the left ear.  Otoscopic exam on the left reveals impacted cerumen down to the level of the tympanic membrane.  The cerumen was removed with an alligator forceps.  The left tympanic membrane was round, intact without evidence of effusion.  No retraction, granulation, drainage, or evidence of cholesteatoma.      Assessment and Plan     ICD-10-CM    1. Sensorineural hearing loss, bilateral H90.3    2. Asymmetrical hearing loss of left ear H91.8X2    3. Impacted cerumen of left ear H61.22 Remove Cerumen     It was my pleasure seeing Mariah Jacinto today in clinic.  The patient presents today with bilateral sensorineural hearing loss with some asymmetric hearing loss on the left that has progressed in the last 2-1/2 years.  Overall, her pure-tone average, SRT, and word recognition of stayed stable.  She had an MRI with [facility] protocol in 2015 that was within normal limits.  She did have impacted cerumen on the left that I did removed today in clinic.  We will have to see if this helps her hearing aid function more appropriately.  I can find no evidence of serious CNS disorders or other complicating factors that could be causing this.  We spent the remainder of today's visit on education. We discussed hearing protection in noisy environments.    The patient will follow up as necessary for worsening symptoms or changes in symptoms.     Jean Marie Fernandez MD  Department of Otolarygology-Head and Neck Surgery  [facility]

## 2019-11-07 ENCOUNTER — OFFICE VISIT (OUTPATIENT)
Dept: OTOLARYNGOLOGY | Facility: CLINIC | Age: 74
End: 2019-11-07
Payer: COMMERCIAL

## 2019-11-07 VITALS
DIASTOLIC BLOOD PRESSURE: 71 MMHG | WEIGHT: 139.38 LBS | BODY MASS INDEX: 23.79 KG/M2 | TEMPERATURE: 97.6 F | SYSTOLIC BLOOD PRESSURE: 132 MMHG | HEART RATE: 72 BPM | HEIGHT: 64 IN

## 2019-11-07 DIAGNOSIS — H61.22 IMPACTED CERUMEN OF LEFT EAR: ICD-10-CM

## 2019-11-07 DIAGNOSIS — H90.3 SENSORINEURAL HEARING LOSS, BILATERAL: Primary | ICD-10-CM

## 2019-11-07 PROCEDURE — 69210 REMOVE IMPACTED EAR WAX UNI: CPT | Mod: LT | Performed by: OTOLARYNGOLOGY

## 2019-11-07 PROCEDURE — 99203 OFFICE O/P NEW LOW 30 MIN: CPT | Mod: 25 | Performed by: OTOLARYNGOLOGY

## 2019-11-07 ASSESSMENT — MIFFLIN-ST. JEOR: SCORE: 1112.2

## 2019-11-07 NOTE — NURSING NOTE
"Initial /71 (BP Location: Right arm, Patient Position: Sitting, Cuff Size: Adult Regular)   Pulse 72   Temp 97.6  F (36.4  C) (Oral)   Ht 1.626 m (5' 4\")   Wt 63.2 kg (139 lb 6 oz)   BMI 23.92 kg/m   Estimated body mass index is 23.92 kg/m  as calculated from the following:    Height as of this encounter: 1.626 m (5' 4\").    Weight as of this encounter: 63.2 kg (139 lb 6 oz). .    Linda Monroe CMA    "

## 2019-11-07 NOTE — LETTER
11/7/2019         RE: Mariah Jacinto  [address] [place] [address]        Dear Colleague,    Thank you for referring your patient, Mariah Jacinto, to the [facility]. Please see a copy of my visit note below.    Chief Complaint   Patient presents with     Hearing Problem     Consult Hearing change left ear/wears HA's/change in hearing in Left ear- has audio with from [facility]     History of Present Illness   Mariah Jacinto is a 74 year old female who presents to me today for ear evaluation.  The patient reports a long-standing history of hearing loss in both ears.  She is had her current hearing aids for 3 to 4 years.  She has been going to [facility] for her hearing care.  Roughly 2 months ago the patient noticed decrease in hearing in her left ear.  She denies any problems with otalgia, otorrhea, bloody otorrhea.  Her tinnitus is at its baseline, usually high-pitched and constant.  Tinnitus is improved with using hearing aid.  Over the last few days, she had some unsteadiness and imbalance without lashell vertigo.  She not had previous ear surgery.  No significant history of noise exposure.  Due to the findings on her audiogram, she was sent for further ear evaluation.    My review of her outside audiogram from February 25, 2017 showed mild sloping to severe sensorineural hearing loss in both ears.  Pure-tone average was 45 dB on the right and 50 dB in the left.  Speech reception threshold was 55 dB in the right and 50 dB in the left.  The patient had 64% word recognition on the right and 80% word recognition on the left.    The patient had an updated audiogram on 10/21/2019.  My review of the audiogram showed mild sloping to severe sensorineural hearing loss in the right ear and moderate sloping to severe sensorineural hearing loss in the left ear.  Pure-tone average was 46 dB on the right and 55 dB on the left.  Speech reception threshold was 55 dB in the right and 50 dB on the left.  The  patient had 64% word recognition on the right and 80% word recognition on the left.    The patient did undergo an MRI of her brain with [facility] protocol on 8/8/2015.  My review of her MRI did not show any retrocochlear pathology.  She had normal inner ear and brainstem.    Past Medical History  Patient Active Problem List   Diagnosis     Disorder of bone and cartilage     Enthesopathy of hip region     Sensorineural hearing loss     Right hip pain     Trigeminal Neuralgia right      Hyperlipidemia LDL goal <160     Advanced directives, counseling/discussion     Intercostal neuralgia     Health Care Home     Trochanteric bursitis     Subjective tinnitus, bilateral     Gastroesophageal reflux disease without esophagitis     Chronic constipation     Dense breast tissue on mammogram     Current Medications     Current Outpatient Medications:      acetaminophen (TYLENOL) 325 MG tablet, Take 325-650 mg by mouth 2 times daily, Disp: , Rfl:      Ascorbic Acid (VITAMIN C ER PO), Take 1 tablet by mouth daily , Disp: , Rfl:      Calcium Carb-Cholecalciferol (CALCIUM + D3) 600-200 MG-UNIT TABS, Take 1 tablet by mouth daily, Disp: , Rfl:      estriol micronized POWD, 0.2% Estriol in Vanicream.  0.25 g 2x/week vaginally, Disp: 50 g, Rfl: 3     glucosamine-chondroitin 500-400 MG CAPS, Take 1 capsule by mouth daily, Disp: , Rfl:      Lactobacillus (ACIDOPHILUS) CAPS, Take 1 tablet by mouth daily, Disp: , Rfl:      magnesium 250 MG tablet, Take 1 tablet by mouth daily, Disp: , Rfl:      melatonin 5 MG CAPS, Take 5 mg by mouth At Bedtime, Disp: 1 capsule, Rfl: 0     MULTI-VITAMIN OR TABS, 1 TABLET DAILY, Disp: , Rfl:      Omega-3 Fatty Acids (OMEGA-3 FISH OIL PO), Take 1 capsule by mouth daily, Disp: , Rfl:      omeprazole (PRILOSEC) 40 MG DR capsule, TAKE 1 CAPSULE BY MOUTH EVERY DAY, Disp: 90 capsule, Rfl: 1     simvastatin (ZOCOR) 20 MG tablet, Take 1 tablet (20 mg) by mouth At Bedtime, Disp: 90 tablet, Rfl: 3     VITAMIN D OR, 1  DAILY, Disp: , Rfl:     Allergies  Allergies   Allergen Reactions     Biaxin [Clarithromycin]      per pt       Celebrex [Celecoxib]      per pt     Cipro [Ciprofloxacin]      per pt     Darvocet [Propoxyphene N-Apap]      Fleet Phospho Soda [Sodium Phosphate/Biphosphate]      nausea per pt     Morphine      Neurontin [Gabapentin]      per pt     Nortriptyline      per pt     Percocet [Oxycodone-Acetaminophen]      Serzone [Nefazodone Hydrochloride]      per pt     Tegretol [Carbamazepine]      Vicodin [Acetaminophen]      per pt-dizziness     Vioxx      per pt     Vivactil [Protriptyline Hcl]      per pt     Wellbutrin [Bupropion Hcl]      Zithromax [Azithromycin Dihydrate]        Social History   Social History     Socioeconomic History     Marital status:      Spouse name: Not on file     Number of children: Not on file     Years of education: Not on file     Highest education level: Not on file   Occupational History     Not on file   Social Needs     Financial resource strain: Not on file     Food insecurity:     Worry: Not on file     Inability: Not on file     Transportation needs:     Medical: Not on file     Non-medical: Not on file   Tobacco Use     Smoking status: Never Smoker     Smokeless tobacco: Never Used   Substance and Sexual Activity     Alcohol use: No     Drug use: No     Sexual activity: Yes     Birth control/protection: Surgical     Comment: complete hysterectomy 1988   Lifestyle     Physical activity:     Days per week: Not on file     Minutes per session: Not on file     Stress: Not on file   Relationships     Social connections:     Talks on phone: Not on file     Gets together: Not on file     Attends Yazdanism service: Not on file     Active member of club or organization: Not on file     Attends meetings of clubs or organizations: Not on file     Relationship status: Not on file     Intimate partner violence:     Fear of current or ex partner: Not on file     Emotionally abused:  "Not on file     Physically abused: Not on file     Forced sexual activity: Not on file   Other Topics Concern     Parent/sibling w/ CABG, MI or angioplasty before 65F 55M? No   Social History Narrative     Not on file       Family History  Family History   Problem Relation Age of Onset     Alzheimer Disease Mother          at age 85     Osteoporosis Mother      Arthritis Mother      Alcohol/Drug Father      Respiratory Father      Eye Disorder Maternal Grandfather         Macular degneration     C.A.D. Brother         Tripple bypass age 57     Cardiovascular Brother      Cancer Brother         leukemia (ALL)     Cardiovascular Brother      Cardiovascular Brother      Neurologic Disorder Son      Heart Disease Son      Melanoma No family hx of      Skin Cancer No family hx of        Review of Systems  As per HPI and PMHx, otherwise 10+ comprehensive system review is negative.    Physical Exam  /71 (BP Location: Right arm, Patient Position: Sitting, Cuff Size: Adult Regular)   Pulse 72   Temp 97.6  F (36.4  C) (Oral)   Ht 1.626 m (5' 4\")   Wt 63.2 kg (139 lb 6 oz)   BMI 23.92 kg/m     GENERAL: Patient is a pleasant, cooperative 74 year old female in no acute distress.  HEAD: Normocephalic, atraumatic.  Hair and scalp are normal.  EYES: Pupils are equal, round, reactive to light and accommodation.  Extraocular movements are intact.  The sclera nonicteric without injection.  The extraocular structures are normal.  EARS: See below.  NOSE: Nares are patent.  Nasal mucosa is pink and moist.  Negative anterior rhinoscopy.  NEUROLOGIC: Cranial nerves II through XII are grossly intact.  Voice is strong.  Patient is House-Brackman I/VI bilaterally.  CARDIOVASCULAR: Extremities are warm and well-perfused.  No significant peripheral edema.  RESPIRATORY: Patient has nonlabored breathing without cough, wheeze, stridor.  PSYCHIATRIC: Patient is alert and oriented.  Mood and affect appear normal.  SKIN: Warm and dry.  " No scalp, face, or neck lesions noted.    Physical Exam and Procedure    EARS: Normal shape and symmetry.  No tenderness when palpating the mastoid or tragal areas bilaterally.  The ears were then examined under the otic binocular microscope.  Otoscopic exam on the right reveals a minimal amount of cerumen. The right tympanic membrane was round, intact without evidence of effusion.  No retraction, granulation, drainage, or evidence of cholesteatoma.      Attention was turned to the left ear.  Otoscopic exam on the left reveals impacted cerumen down to the level of the tympanic membrane.  The cerumen was removed with an alligator forceps.  The left tympanic membrane was round, intact without evidence of effusion.  No retraction, granulation, drainage, or evidence of cholesteatoma.      Assessment and Plan     ICD-10-CM    1. Sensorineural hearing loss, bilateral H90.3    2. Asymmetrical hearing loss of left ear H91.8X2    3. Impacted cerumen of left ear H61.22 Remove Cerumen     It was my pleasure seeing Mariah Jacinto today in clinic.  The patient presents today with bilateral sensorineural hearing loss with some asymmetric hearing loss on the left that has progressed in the last 2-1/2 years.  Overall, her pure-tone average, SRT, and word recognition of stayed stable.  She had an MRI with [facility] protocol in 2015 that was within normal limits.  She did have impacted cerumen on the left that I did removed today in clinic.  We will have to see if this helps her hearing aid function more appropriately.  I can find no evidence of serious CNS disorders or other complicating factors that could be causing this.  We spent the remainder of today's visit on education. We discussed hearing protection in noisy environments.    The patient will follow up as necessary for worsening symptoms or changes in symptoms.     Jean Marie Fernandez MD  Department of Otolarygology-Head and Neck Surgery  [facility]    Again, thank you  for allowing me to participate in the care of your patient.        Sincerely,        Jean Marie Fernandez MD

## 2019-11-07 NOTE — PATIENT INSTRUCTIONS
Per physician instructions      If you have questions or concerns on any instructions given to you by your provider today or if you need to schedule an appointment, you can reach us at [phone].

## 2019-12-02 NOTE — PROGRESS NOTES
Outpatient Physical Therapy Discharge Note     Patient: Mariah Jacinto  : 1945    Beginning/End Dates of Reporting Period:  19 to 2019    Referring Provider: Allison Dee MD    Therapy Diagnosis: Right LE pain     Client Self Report: Feels like the right leg pain has overall improved, pain is now isolated along the posterior calcaneus / lateral Achilles.  Walked a distance on grass which caused mild soreness, was able to stretch it out afterwards.    Objective Measurements:  Objective Measure: Right ankle dorsiflexion   Details: 10 deg  Objective Measure: Time able to sleep before waking due to leg pain  Details: 3-4 hrs  Objective Measure: Single leg stance  Details: right=5 sec; left=30 sec            Goals:  Goal Identifier     Goal Description Patient will have >=5 degrees of right ankle DF ROM to allow for normalized gait.   Target Date 19   Date Met  19   Progress:     Goal Identifier     Goal Description Patient will be able to grocery shop without right LE symptoms.   Target Date 19   Date Met  19   Progress:     Goal Identifier     Goal Description Patient will be independent with her HEP to reduce future occurrence of pain and disability.   Target Date 19   Date Met  19   Progress:     Goal Identifier     Goal Description Patient will be able to sleep for >=6 hours without waking due to right LE pain.   Target Date 19   Date Met      Progress:       Progress Toward Goals:   Progress this reporting period: Mariah made good progress with improving right LE flexibility and reducing pain.  She is able to walk community distances without LBP.  She independent with her long term HEP.      Plan:  Discharge from therapy.    Discharge:    Reason for Discharge: Working independently towards final goals    Equipment Issued: Theraband    Discharge Plan: Patient to continue home program.      Bonnie Cota, PT, DTP, SCS  Doctor of Physical Therapy  #[id number]  [facility]  [phone]  [email or web address]

## 2019-12-02 NOTE — ADDENDUM NOTE
Encounter addended by: Bonnie Cota, PT on: 12/2/2019 2:46 PM   Actions taken: Clinical Note Signed, Episode resolved

## 2020-01-07 ENCOUNTER — TRANSFERRED RECORDS (OUTPATIENT)
Dept: HEALTH INFORMATION MANAGEMENT | Facility: CLINIC | Age: 75
End: 2020-01-07

## 2020-01-22 ENCOUNTER — NURSE TRIAGE (OUTPATIENT)
Dept: NURSING | Facility: CLINIC | Age: 75
End: 2020-01-22

## 2020-01-22 ENCOUNTER — TELEPHONE (OUTPATIENT)
Dept: FAMILY MEDICINE | Facility: CLINIC | Age: 75
End: 2020-01-22

## 2020-01-22 NOTE — TELEPHONE ENCOUNTER
Clinic Action Needed: yes    [facility] Triage Call  Presenting Problem:  Patient says she is still on estrogen cream because she was not told to stop it.     Please call her back.    Routed to: MAREN Sloan RN/[facility]

## 2020-01-22 NOTE — TELEPHONE ENCOUNTER
LM to call clinic/RN to discuss med.  Estrogen cream was given on 9/23/19 for Urethral Caruncle.  Freddy

## 2020-01-22 NOTE — TELEPHONE ENCOUNTER
Patient says she is still on estrogen cream because she was not told to stop it.  [facility]NA advised will let clinic know to call her back.  Caller verbalizes understanding.      Reason for Disposition    [1] Caller requesting NON-URGENT health information AND [2] PCP's office is the best resource    Additional Information    Negative: [1] Caller is not with the adult (patient) AND [2] reporting urgent symptoms    Negative: Lab result questions    Negative: Medication questions    Negative: Caller can't be reached by phone    Negative: Caller has already spoken to PCP or another triager    Negative: RN needs further essential information from caller in order to complete triage    Negative: Requesting regular office appointment    Protocols used: INFORMATION ONLY CALL-A-

## 2020-01-22 NOTE — TELEPHONE ENCOUNTER
Reason for Call:  Medication question    Detailed comments: patient is calling and asking if Dr. Sommer wants her to continue with the estrogen cream. Please advise.    Phone Number Patient can be reached at: Home number on file [phone] (home)    Best Time: any    Can we leave a detailed message on this number? YES   Aditi Mead  Clinic [facility] [place]       Call taken on 1/22/2020 at 9:32 AM by Aditi Anderson

## 2020-01-23 NOTE — TELEPHONE ENCOUNTER
"Pt asking if she should continue the Estrogen Cream?  States the \"sore is healed\" but didn't no if she should keep using it to prevent UTI's?  Advise.  [email or web address]    "

## 2020-01-27 ENCOUNTER — TELEPHONE (OUTPATIENT)
Dept: FAMILY MEDICINE | Facility: CLINIC | Age: 75
End: 2020-01-27

## 2020-01-27 DIAGNOSIS — M79.661 PAIN OF RIGHT LOWER LEG: Primary | ICD-10-CM

## 2020-01-27 DIAGNOSIS — M21.42 PES PLANUS OF BOTH FEET: ICD-10-CM

## 2020-01-27 DIAGNOSIS — M21.41 PES PLANUS OF BOTH FEET: ICD-10-CM

## 2020-01-27 NOTE — TELEPHONE ENCOUNTER
Reason for Call: Request for an order or referral:    Order or referral being requested: Patient would like a new referral for PT for her leg.  She saw PT for this issue last summer and she would like to go again but needs a new referral    Date needed: at your convenience    Has the patient been seen by the PCP for this problem? YES    Additional comments:     Phone number Patient can be reached at:  Home number on file [phone] (home)    Best Time:  Any    Can we leave a detailed message on this number?  YES    Call taken on 1/27/2020 at 3:30 PM by Lizy Cotter

## 2020-01-27 NOTE — TELEPHONE ENCOUNTER
Referral cued up and ready for signature.  Previous dx of 'ight leg pain' not an option so 'right lower leg pain' selected, after reviewing several OV notes.     Liana PIERCE RN

## 2020-01-27 NOTE — TELEPHONE ENCOUNTER
Do you want to see pt first or resubmit referral? Last sent 7/3/19:    ORTHOTICS REFERRAL  Diagnosis   M79.604 (ICD-10-CM) - Right leg pain   M21.41, M21.42 (ICD-10-CM) - Pes planus of both feet     Liana PIERCE RN

## 2020-02-03 ENCOUNTER — HOSPITAL ENCOUNTER (OUTPATIENT)
Dept: PHYSICAL THERAPY | Facility: CLINIC | Age: 75
Setting detail: THERAPIES SERIES
End: 2020-02-03
Attending: FAMILY MEDICINE
Payer: COMMERCIAL

## 2020-02-03 DIAGNOSIS — M79.661 PAIN OF RIGHT LOWER LEG: ICD-10-CM

## 2020-02-03 PROCEDURE — 97110 THERAPEUTIC EXERCISES: CPT | Mod: GP | Performed by: PHYSICAL THERAPIST

## 2020-02-03 PROCEDURE — 97140 MANUAL THERAPY 1/> REGIONS: CPT | Mod: GP | Performed by: PHYSICAL THERAPIST

## 2020-02-03 PROCEDURE — 97161 PT EVAL LOW COMPLEX 20 MIN: CPT | Mod: GP | Performed by: PHYSICAL THERAPIST

## 2020-02-03 NOTE — PROGRESS NOTES
[facility]          OUTPATIENT PHYSICAL THERAPY ORTHOPEDIC EVALUATION  PLAN OF TREATMENT FOR OUTPATIENT REHABILITATION  (COMPLETE FOR INITIAL CLAIMS ONLY)  Patient's Last Name, First Name, M.I.  YOB: 1945  Mariah Jacinto    Provider s Name:  [facility]   Medical Record No.  [id number]   Start of Care Date:  02/03/20   Onset Date:  01/03/20   Type:     _X__PT   ___OT   ___SLP Medical Diagnosis:  Pain of right lower leg     PT Diagnosis:  Left LE pain with sciatic N tension, weak gluteals   Visits from SOC:  1      _________________________________________________________________________________  Plan of Treatment/Functional Goals:  ADL retraining, balance training, gait training, joint mobilization, motor coordination training, manual therapy, neuromuscular re-education, ROM, strengthening, stretching     Cryotherapy     Goals     Goal Description: Patient will be able to walk >=15 minutes without left LE pain.  Target Date: 03/30/20       Goal Description: Patient will be able to put on a shoe without hip pain.  Target Date: 04/13/20       Goal Description: Patient will be independent with his HEP to reduce future occurrence of pain and disability.  Target Date: 02/24/20           Therapy Frequency:  1 time/week  Predicted Duration of Therapy Intervention:  8 weeks    Bonnie Cota, PT                 I CERTIFY THE NEED FOR THESE SERVICES FURNISHED UNDER        THIS PLAN OF TREATMENT AND WHILE UNDER MY CARE     (Physician co-signature of this document indicates review and certification of the therapy plan).                       Certification Date From:  02/03/20   Certification Date To:  03/30/20    Referring Provider:  Eli Sommer MD    Initial Assessment        See Epic Evaluation Start of Care Date: 02/03/20

## 2020-02-03 NOTE — PROGRESS NOTES
02/03/20 0900   General Information   Type of Visit Initial OP Ortho PT Evaluation   Start of Care Date 02/03/20   Referring Physician Eli Sommer MD   Patient/Family Goals Statement to no longer have pain, especially for upcoming trip to [place]   Orders Evaluate and Treat   Date of Order 01/27/20   Certification Required? Yes   Medical Diagnosis Pain of right lower leg   Surgical/Medical history reviewed Yes   Body Part(s)   Body Part(s) Hip   Presentation and Etiology   Pertinent history of current problem (include personal factors and/or comorbidities that impact the POC) Patient reports a chronic history of right lower leg pain.  Insidious onset last spring, improved with physical therapy.  Now over the past month pain has returned.  Pain is isolated to distal calf / achilles and IT band.  Right leg overall feels tighter.   Impairments A. Pain   Functional Limitations perform activities of daily living;perform desired leisure / sports activities   Symptom Location Right distal achilles and tendon insertion; right mid IT band   How/Where did it occur From insidious onset   Onset date of current episode/exacerbation 01/03/20   Chronicity Recurrent   Pain rating (0-10 point scale) Best (/10);Worst (/10)   Best (/10) 3   Worst (/10) 9   Pain quality C. Aching;G. Cramping   Frequency of pain/symptoms C. With activity   Pain/symptoms are: Worse during the night   Pain/symptoms exacerbated by A. Sitting;B. Walking;E. Rest   Pain/symptoms eased by D. Nothing   Progression of symptoms since onset: Worsened   Current / Previous Interventions   Diagnostic Tests: X-ray;MRI   X-ray Results Results   X-ray results 4/16/19 XR lumbar spine: IMPRESSION: There has been no change in a mild degenerative anterolisthesis at L4-L5. Vertebral body alignment is otherwise normal with no fracture or osseous lesion seen. I suspect there is mild degenerative disc disease at all levels, with either mild disc height loss and/or marginal  osteophyte formation. This has slightly progressed since the previous study. No other abnormality or change is demonstrated.   MRI Results Results   MRI results 6/17/19 MR lumbar spine: IMPRESSION: Degenerative changes of the lumbar spine. No dis herniation or stenosis is demonstrated.   Prior Level of Function   Prior Level of Function-Mobility Independent   Prior Level of Function-ADLs Independent   Current Level of Function   Current Community Support Family/friend caregiver   Fall Risk Screen   Fall screen completed by PT   Have you fallen 2 or more times in the past year? No   Have you fallen and had an injury in the past year? No   Is patient a fall risk? No   Abuse Screen (yes response referral indicated)   Feels Unsafe at Home or Work/School no   Feels Threatened by Someone no   Does Anyone Try to Keep You From Having Contact with Others or Doing Things Outside Your Home? no   Physical Signs of Abuse Present no   Hip Objective Findings   Side (if bilateral, select both right and left) Left   Integumentary  Some varicose veins global left LE, pt wears compression stockings (knee high and thigh high) for this   Posture chrissie rounded shoulders, increased thoracic kyphosis   Balance/Proprioception (Single Leg Stance) 15 sec - no pain   Lumbar ROM flxn=fingertips to patella pain free; some pain with extn    Straight Leg Raise Test right=40 deg; left=80 deg   Left Hip Flexion PROM 120 deg pain free   Left Hip ER PROM 30 deg pain free   Left Hip IR PROM 30 deg pain free   Left Hip Ext PROM 5 deg pain free   Left Hip Flexion Strength 4/5   Left Hip Abduction Strength 3/5   Left Hip Extension Strength 4/5   Planned Therapy Interventions   Planned Therapy Interventions ADL retraining;balance training;gait training;joint mobilization;motor coordination training;manual therapy;neuromuscular re-education;ROM;strengthening;stretching   Planned Modality Interventions   Planned Modality Interventions Cryotherapy   Clinical  Impression   Criteria for Skilled Therapeutic Interventions Met yes, treatment indicated   PT Diagnosis Left LE pain with sciatic N tension, weak gluteals   Influenced by the following impairments Pain; weakness; impaired ROM; impaired gait and proprioception   Functional limitations due to impairments Difficulty walking long distances   Clinical Presentation Stable/Uncomplicated   Clinical Presentation Rationale (+) motivation; overall health; previous success with PT  (-) chronic recurrent nature   Clinical Decision Making (Complexity) Low complexity   Therapy Frequency 1 time/week   Predicted Duration of Therapy Intervention (days/wks) 8 weeks   Risk & Benefits of therapy have been explained Yes   Patient, Family & other staff in agreement with plan of care Yes   Clinical Impression Comments Mariah arrives today with flare up of left LE pain; she will benefit from skilled PT to address the above impairments and functional limitations.   Education Assessment   Preferred Learning Style Listening;Demonstration;Pictures/video   Barriers to Learning No barriers   ORTHO GOALS   PT Ortho Eval Goals 1;2;3   Ortho Goal 1   Goal Description Patient will be able to walk >=15 minutes without left LE pain.   Target Date 03/30/20   Ortho Goal 2   Goal Description Patient will be able to put on a shoe without hip pain.   Target Date 04/13/20   Ortho Goal 3   Goal Description Patient will be independent with his HEP to reduce future occurrence of pain and disability.   Target Date 02/24/20   Total Evaluation Time   PT Eval, Low Complexity Minutes (02973) 15   Therapy Certification   Certification date from 02/03/20   Certification date to 03/30/20   Medical Diagnosis Pain of right lower leg     Bonnie Cota, PT, DTP, SCS  Doctor of Physical Therapy #[id number]  [facility]  [phone]  [email or web address]

## 2020-02-10 ENCOUNTER — HOSPITAL ENCOUNTER (OUTPATIENT)
Dept: PHYSICAL THERAPY | Facility: CLINIC | Age: 75
Setting detail: THERAPIES SERIES
End: 2020-02-10
Attending: FAMILY MEDICINE
Payer: COMMERCIAL

## 2020-02-10 PROCEDURE — 97110 THERAPEUTIC EXERCISES: CPT | Mod: GP | Performed by: PHYSICAL THERAPIST

## 2020-02-24 ENCOUNTER — HOSPITAL ENCOUNTER (OUTPATIENT)
Dept: PHYSICAL THERAPY | Facility: CLINIC | Age: 75
Setting detail: THERAPIES SERIES
End: 2020-02-24
Attending: FAMILY MEDICINE
Payer: COMMERCIAL

## 2020-02-24 PROCEDURE — 97110 THERAPEUTIC EXERCISES: CPT | Mod: GP | Performed by: PHYSICAL THERAPIST

## 2020-03-09 ENCOUNTER — HOSPITAL ENCOUNTER (OUTPATIENT)
Dept: PHYSICAL THERAPY | Facility: CLINIC | Age: 75
Setting detail: THERAPIES SERIES
End: 2020-03-09
Attending: FAMILY MEDICINE
Payer: COMMERCIAL

## 2020-03-09 PROCEDURE — 97140 MANUAL THERAPY 1/> REGIONS: CPT | Mod: GP | Performed by: PHYSICAL THERAPIST

## 2020-03-09 PROCEDURE — 97110 THERAPEUTIC EXERCISES: CPT | Mod: GP | Performed by: PHYSICAL THERAPIST

## 2020-03-13 DIAGNOSIS — K21.9 GASTROESOPHAGEAL REFLUX DISEASE WITHOUT ESOPHAGITIS: ICD-10-CM

## 2020-03-13 NOTE — TELEPHONE ENCOUNTER
"Requested Prescriptions   Pending Prescriptions Disp Refills     omeprazole (PRILOSEC) 40 MG DR capsule [Pharmacy Med Name: Omeprazole 40 MG Oral Capsule Delayed Release] 90 capsule 0     Sig: Take 1 capsule by mouth once daily       PPI Protocol Passed - 3/13/2020 10:07 AM        Passed - Not on Clopidogrel (unless Pantoprazole ordered)        Passed - No diagnosis of osteoporosis on record        Passed - Recent (12 mo) or future (30 days) visit within the authorizing provider's specialty     Patient has had an office visit with the authorizing provider or a provider within the authorizing providers department within the previous 12 mos or has a future within next 30 days. See \"Patient Info\" tab in inbasket, or \"Choose Columns\" in Meds & Orders section of the refill encounter.              Passed - Medication is active on med list        Passed - Patient is age 18 or older        Passed - No active pregnacy on record        Passed - No positive pregnancy test in past 12 months               "

## 2020-03-15 RX ORDER — OMEPRAZOLE 40 MG/1
CAPSULE, DELAYED RELEASE ORAL
Qty: 90 CAPSULE | Refills: 0 | Status: SHIPPED | OUTPATIENT
Start: 2020-03-15 | End: 2020-06-11

## 2020-06-11 DIAGNOSIS — K21.9 GASTROESOPHAGEAL REFLUX DISEASE WITHOUT ESOPHAGITIS: ICD-10-CM

## 2020-06-11 RX ORDER — OMEPRAZOLE 40 MG/1
CAPSULE, DELAYED RELEASE ORAL
Qty: 90 CAPSULE | Refills: 0 | Status: SHIPPED | OUTPATIENT
Start: 2020-06-11 | End: 2020-09-23

## 2020-06-11 NOTE — TELEPHONE ENCOUNTER
"Requested Prescriptions   Pending Prescriptions Disp Refills     omeprazole (PRILOSEC) 40 MG DR capsule [Pharmacy Med Name: Omeprazole 40 MG Oral Capsule Delayed Release] 90 capsule 0     Sig: Take 1 capsule by mouth once daily       PPI Protocol Passed - 6/11/2020  9:39 AM        Passed - Not on Clopidogrel (unless Pantoprazole ordered)        Passed - No diagnosis of osteoporosis on record        Passed - Recent (12 mo) or future (30 days) visit within the authorizing provider's specialty     Patient has had an office visit with the authorizing provider or a provider within the authorizing providers department within the previous 12 mos or has a future within next 30 days. See \"Patient Info\" tab in inbasket, or \"Choose Columns\" in Meds & Orders section of the refill encounter.              Passed - Medication is active on med list        Passed - Patient is age 18 or older        Passed - No active pregnacy on record        Passed - No positive pregnancy test in past 12 months           Prescription approved per [facility].    "

## 2020-07-18 ENCOUNTER — TELEPHONE (OUTPATIENT)
Dept: FAMILY MEDICINE | Facility: CLINIC | Age: 75
End: 2020-07-18

## 2020-07-18 DIAGNOSIS — N36.2 URETHRAL CARUNCLE: ICD-10-CM

## 2020-07-21 NOTE — TELEPHONE ENCOUNTER
"Requested Prescriptions   Pending Prescriptions Disp Refills     estradiol (ESTRACE) 0.1 MG/GM vaginal cream [Pharmacy Med Name: Estradiol 0.1 MG/GM Vaginal Cream] 43 g 0     Sig: USE 0.3 GRAMS (SMALL AMOUNT) NIGHTLY FOR 2 WEEKS, THEN USE TWICE WEEKLY       Hormone Replacement Therapy Failed - 7/18/2020 12:39 PM        Failed - Medication is active on med list        Passed - Blood pressure under 140/90 in past 12 months     BP Readings from Last 3 Encounters:   11/07/19 132/71   09/23/19 126/54   07/24/19 149/72                 Passed - Recent (12 mo) or future (30 days) visit within the authorizing provider's specialty     Patient has had an office visit with the authorizing provider or a provider within the authorizing providers department within the previous 12 mos or has a future within next 30 days. See \"Patient Info\" tab in inbasket, or \"Choose Columns\" in Meds & Orders section of the refill encounter.              Passed - Patient has mammogram in past 2 years on file if age 50-75        Passed - Patient is 18 years of age or older        Passed - No active pregnancy on record        Passed - No positive pregnancy test on record in past 12 months             "

## 2020-07-22 ENCOUNTER — OFFICE VISIT (OUTPATIENT)
Dept: DERMATOLOGY | Facility: CLINIC | Age: 75
End: 2020-07-22
Payer: COMMERCIAL

## 2020-07-22 ENCOUNTER — TELEPHONE (OUTPATIENT)
Dept: FAMILY MEDICINE | Facility: CLINIC | Age: 75
End: 2020-07-22

## 2020-07-22 VITALS — OXYGEN SATURATION: 98 % | HEART RATE: 63 BPM | SYSTOLIC BLOOD PRESSURE: 134 MMHG | DIASTOLIC BLOOD PRESSURE: 70 MMHG

## 2020-07-22 DIAGNOSIS — D23.9 DERMAL NEVUS: ICD-10-CM

## 2020-07-22 DIAGNOSIS — L81.4 LENTIGO: ICD-10-CM

## 2020-07-22 DIAGNOSIS — Z85.828 HISTORY OF SKIN CANCER: Primary | ICD-10-CM

## 2020-07-22 DIAGNOSIS — D18.01 ANGIOMA OF SKIN: ICD-10-CM

## 2020-07-22 DIAGNOSIS — L82.1 SEBORRHEIC KERATOSIS: ICD-10-CM

## 2020-07-22 PROCEDURE — 99213 OFFICE O/P EST LOW 20 MIN: CPT | Performed by: DERMATOLOGY

## 2020-07-22 RX ORDER — ESTRIOL MICRONIZED 100 %
POWDER (GRAM) MISCELLANEOUS
Qty: 50 G | Refills: 3 | Status: SHIPPED | OUTPATIENT
Start: 2020-07-22 | End: 2020-09-23

## 2020-07-22 RX ORDER — ESTRADIOL 0.1 MG/G
CREAM VAGINAL
Qty: 43 G | Refills: 0 | OUTPATIENT
Start: 2020-07-22

## 2020-07-22 NOTE — TELEPHONE ENCOUNTER
Check with patient.  The estriol micronized powder is what we changed to due to cost of the estradiol cream.     What is she using and what does she want refilled?      Eli Sommer M.D.

## 2020-07-22 NOTE — TELEPHONE ENCOUNTER
Ordered through our pharmacy which is where she got it in the past.  They may mail it out, she can check with pharmacy.    Order changed to the estriol micronized powder.    Eli Sommer M.D.

## 2020-07-22 NOTE — PROGRESS NOTES
Mariah Jacinto is a 74 year old year old female patient here today for hx of non-melanoma skin cancer.  She denies any new or changing skin lesions.  Patient reports the following modifying factors none.  Associated symptoms: none.  Patient has no other skin complaints today.  Remainder of the HPI, Meds, PMH, Allergies, FH, and SH was reviewed in chart.      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    celexa caused spinning side effect      MENOPAUSE --ERT      OSTEOPENIA      Squamous cell carcinoma        Past Surgical History:   Procedure Laterality Date     ABLATE VEIN VARICOSE RADIO FREQUENCY WITHOUT PHLEBECTOMY MULTIPLE STAB  3/28/2013    Procedure: ABLATE VEIN VARICOSE RADIO FREQUENCY WITHOUT PHLEBECTOMY MULTIPLE STAB;  Bilateral ablation of varicose veins legs-to ultrasound at 0930  ;  Surgeon: Noam Soliman MD;  Location: [facility]     C ANESTH,LOWER ARM SURGERY  1999    ulnar nerve decompression - right     COLONOSCOPY  8/20/2013    Procedure: COLONOSCOPY;  Colonoscopy;  Surgeon: Yuliya Nathan MD;  Location: [facility]     ESOPHAGOSCOPY, GASTROSCOPY, DUODENOSCOPY (EGD), COMBINED N/A 5/12/2015    Procedure: COMBINED ESOPHAGOSCOPY, GASTROSCOPY, DUODENOSCOPY (EGD), BIOPSY SINGLE OR MULTIPLE;  Surgeon: Yuliya Nathan MD;  Location: [facility]     ESOPHAGOSCOPY, GASTROSCOPY, DUODENOSCOPY (EGD), COMBINED N/A 1/31/2017    Procedure: COMBINED ESOPHAGOSCOPY, GASTROSCOPY, DUODENOSCOPY (EGD), BIOPSY SINGLE OR MULTIPLE;  Surgeon: Qasim Bowie MD;  Location: [facility]     EXCISE MASS FINGER Right 2/19/2019    Procedure: Excision Right Ring Finger Volar Middle Phalanx Mass;  Surgeon: Jake Viveros MD;  Location: [facility]     HYSTERECTOMY, PAP NO LONGER  INDICATED      has both ovaries out also      HYSTERECTOMY, VAGINAL  1988    Hysterectomy, oophorectomy     PHACOEMULSIFICATION WITH STANDARD INTRAOCULAR LENS IMPLANT Left 3/18/2019    Procedure: Cataract Removal with Implant;  Surgeon: Chapito Phillips MD;  Location: [facility]     PHACOEMULSIFICATION WITH STANDARD INTRAOCULAR LENS IMPLANT Right 4/10/2019    Procedure: Cataract Removal with Implant;  Surgeon: Chapito Phillips MD;  Location: [facility]     RELEASE TRIGGER FINGER Right 2017    Procedure: RELEASE TRIGGER FINGER;  Right Thumb A1 Pulley Release;  Surgeon: Jake Viveros MD;  Location: [facility]     SURGICAL HISTORY OF -       right retromastoid craniectomy and decompression trigeminal nerve     TONSILLECTOMY & ADENOIDECTOMY  child    T&A         Family History   Problem Relation Age of Onset     Alzheimer Disease Mother          at age 85     Osteoporosis Mother      Arthritis Mother      Alcohol/Drug Father      Respiratory Father      Eye Disorder Maternal Grandfather         Macular degneration     C.A.D. Brother         Tripple bypass age 57     Cardiovascular Brother      Cancer Brother         leukemia (ALL)     Cardiovascular Brother      Cardiovascular Brother      Neurologic Disorder Son      Heart Disease Son      Melanoma No family hx of      Skin Cancer No family hx of        Social History     Socioeconomic History     Marital status:      Spouse name: Not on file     Number of children: Not on file     Years of education: Not on file     Highest education level: Not on file   Occupational History     Not on file   Social Needs     Financial resource strain: Not on file     Food insecurity     Worry: Not on file     Inability: Not on file     Transportation needs     Medical: Not on file     Non-medical: Not on file   Tobacco Use     Smoking status: Never Smoker     Smokeless tobacco: Never Used   Substance and Sexual Activity     Alcohol use: No     Drug use: No      Sexual activity: Yes     Birth control/protection: Surgical     Comment: complete hysterectomy 1988   Lifestyle     Physical activity     Days per week: Not on file     Minutes per session: Not on file     Stress: Not on file   Relationships     Social connections     Talks on phone: Not on file     Gets together: Not on file     Attends Ep[employer]opalian service: Not on file     Active member of club or organization: Not on file     Attends meetings of clubs or organizations: Not on file     Relationship status: Not on file     Intimate partner violence     Fear of current or ex partner: Not on file     Emotionally abused: Not on file     Physically abused: Not on file     Forced sexual activity: Not on file   Other Topics Concern     Parent/sibling w/ CABG, MI or angioplasty before 65F 55M? No   Social History Narrative     Not on file       Outpatient Encounter Medications as of 7/22/2020   Medication Sig Dispense Refill     acetaminophen (TYLENOL) 325 MG tablet Take 325-650 mg by mouth 2 times daily       Ascorbic Acid (VITAMIN C ER PO) Take 1 tablet by mouth daily        Calcium Carb-Cholecalciferol (CALCIUM + D3) 600-200 MG-UNIT TABS Take 1 tablet by mouth daily       estriol micronized POWD 0.2% Estriol in Vanicream.  0.25 g 2x/week vaginally 50 g 3     glucosamine-chondroitin 500-400 MG CAPS Take 1 capsule by mouth daily       Lactobacillus (ACIDOPHILUS) CAPS Take 1 tablet by mouth daily       magnesium 250 MG tablet Take 1 tablet by mouth daily       melatonin 5 MG CAPS Take 5 mg by mouth At Bedtime 1 capsule 0     MULTI-VITAMIN OR TABS 1 TABLET DAILY       Omega-3 Fatty Acids (OMEGA-3 FISH OIL PO) Take 1 capsule by mouth daily       omeprazole (PRILOSEC) 40 MG DR capsule Take 1 capsule by mouth once daily 90 capsule 0     simvastatin (ZOCOR) 20 MG tablet Take 1 tablet (20 mg) by mouth At Bedtime 90 tablet 3     VITAMIN D OR 1 DAILY       No facility-administered encounter medications on file as of 7/22/2020.               Review Of Systems  Skin: As above  Eyes: negative  Ears/Nose/Throat: negative  Respiratory: No shortness of breath, dyspnea on exertion, cough, or hemoptysis  Cardiovascular: negative  Gastrointestinal: negative  Genitourinary: negative  Musculoskeletal: negative  Neurologic: negative  Psychiatric: negative  Hematologic/Lymphatic/Immunologic: negative  Endocrine: negative      O:   NAD, WDWN, Alert & Oriented, Mood & Affect wnl, Vitals stable   Here today alone   /70   Pulse 63   SpO2 98%    General appearance normal   Vitals stable   Alert, oriented and in no acute distress      Following lymph nodes palpated: Occipital, Cervical, Supraclavicular no lad       Stuck on papules and brown macules on trunk and ext   Red papules on trunk  Flesh colored papules on trunk     The remainder of the full exam was normal; the following areas were examined:  conjunctiva/lids, oral mucosa, neck, peripheral vascular system, abdomen, lymph nodes, digits/nails, eccrine and apocrine glands, scalp/hair, face, neck, chest, abdomen, buttocks, back, RUE, LUE, RLE, LLE       Eyes: Conjunctivae/lids:Normal     ENT: Lips, buccal mucosa, tongue: normal    MSK:Normal    Cardiovascular: peripheral edema none    Pulm: Breathing Normal    Lymph Nodes: No Head and Neck Lymphadenopathy     Neuro/Psych: Orientation:Alert and Orientedx3 ; Mood/Affect:normal       A/P:  1. Seborrheic keratosis, lentigo, angioma, dermal nevus, hx of non-melanoma skin cancer   BENIGN LESIONS DISCUSSED WITH PATIENT:  I discussed the specifics of tumor, prognosis, and genetics of benign lesions.  I explained that treatment of these lesions would be purely cosmetic and not medically neccessary.  I discussed with patient different removal options including excision, cautery and /or laser.      Nature and genetics of benign skin lesions dicussed with patient.  Signs and Symptoms of skin cancer discussed with patient.  Patient encouraged to perform  monthly skin exams.  UV precautions reviewed with patient.  Skin care regimen reviewed with patient: Eliminate harsh soaps, i.e. Dial, zest, irsih spring; Mild soaps such as Cetaphil or Dove sensitive skin, avoid hot or cold showers, aggressive use of emollients including vanicream, cetaphil or cerave discussed with patient.    Risks of non-melanoma skin cancer discussed with patient   Return to clinic 12 months

## 2020-07-22 NOTE — LETTER
7/22/2020         RE: Mariah Jacinto  [address] [place] [address]        Dear Colleague,    Thank you for referring your patient, Mariah Jacinto, to the [facility]. Please see a copy of my visit note below.    Mariah Jacinto is a 74 year old year old female patient here today for hx of non-melanoma skin cancer.  She denies any new or changing skin lesions.  Patient reports the following modifying factors none.  Associated symptoms: none.  Patient has no other skin complaints today.  Remainder of the HPI, Meds, PMH, Allergies, FH, and SH was reviewed in chart.      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    celexa caused spinning side effect      MENOPAUSE --ERT      OSTEOPENIA      Squamous cell carcinoma        Past Surgical History:   Procedure Laterality Date     ABLATE VEIN VARICOSE RADIO FREQUENCY WITHOUT PHLEBECTOMY MULTIPLE STAB  3/28/2013    Procedure: ABLATE VEIN VARICOSE RADIO FREQUENCY WITHOUT PHLEBECTOMY MULTIPLE STAB;  Bilateral ablation of varicose veins legs-to ultrasound at 0930  ;  Surgeon: Noam Soliman MD;  Location: [facility]      ANESTH,LOWER ARM SURGERY  1999    ulnar nerve decompression - right     COLONOSCOPY  8/20/2013    Procedure: COLONOSCOPY;  Colonoscopy;  Surgeon: Yuliya Nathan MD;  Location: [facility]     ESOPHAGOSCOPY, GASTROSCOPY, DUODENOSCOPY (EGD), COMBINED N/A 5/12/2015    Procedure: COMBINED ESOPHAGOSCOPY, GASTROSCOPY, DUODENOSCOPY (EGD), BIOPSY SINGLE OR MULTIPLE;  Surgeon: Yuliya Nathan MD;  Location: [facility]     ESOPHAGOSCOPY, GASTROSCOPY, DUODENOSCOPY (EGD), COMBINED N/A 1/31/2017    Procedure: COMBINED ESOPHAGOSCOPY, GASTROSCOPY, DUODENOSCOPY (EGD), BIOPSY SINGLE OR MULTIPLE;   Surgeon: Qasim Bowie MD;  Location: [facility]     EXCISE MASS FINGER Right 2019    Procedure: Excision Right Ring Finger Volar Middle Phalanx Mass;  Surgeon: Jake Viveros MD;  Location: [facility]     HYSTERECTOMY, PAP NO LONGER INDICATED      has both ovaries out also      HYSTERECTOMY, VAGINAL  1988    Hysterectomy, oophorectomy     PHACOEMULSIFICATION WITH STANDARD INTRAOCULAR LENS IMPLANT Left 3/18/2019    Procedure: Cataract Removal with Implant;  Surgeon: Chapito Phillips MD;  Location: [facility]     PHACOEMULSIFICATION WITH STANDARD INTRAOCULAR LENS IMPLANT Right 4/10/2019    Procedure: Cataract Removal with Implant;  Surgeon: Chapito Phillips MD;  Location: [facility]     RELEASE TRIGGER FINGER Right 2017    Procedure: RELEASE TRIGGER FINGER;  Right Thumb A1 Pulley Release;  Surgeon: Jake Viveros MD;  Location: [facility]     SURGICAL HISTORY OF -       right retromastoid craniectomy and decompression trigeminal nerve     TONSILLECTOMY & ADENOIDECTOMY  child    T&A         Family History   Problem Relation Age of Onset     Alzheimer Disease Mother          at age 85     Osteoporosis Mother      Arthritis Mother      Alcohol/Drug Father      Respiratory Father      Eye Disorder Maternal Grandfather         Macular degneration     C.A.D. Brother         Tripple bypass age 57     Cardiovascular Brother      Cancer Brother         leukemia (ALL)     Cardiovascular Brother      Cardiovascular Brother      Neurologic Disorder Son      Heart Disease Son      Melanoma No family hx of      Skin Cancer No family hx of        Social History     Socioeconomic History     Marital status:      Spouse name: Not on file     Number of children: Not on file     Years of education: Not on file     Highest education level: Not on file   Occupational History     Not on file   Social Needs     Financial resource strain: Not on file     Food insecurity     Worry: Not on file     Inability: Not  on file     Transportation needs     Medical: Not on file     Non-medical: Not on file   Tobacco Use     Smoking status: Never Smoker     Smokeless tobacco: Never Used   Substance and Sexual Activity     Alcohol use: No     Drug use: No     Sexual activity: Yes     Birth control/protection: Surgical     Comment: complete hysterectomy 1988   Lifestyle     Physical activity     Days per week: Not on file     Minutes per session: Not on file     Stress: Not on file   Relationships     Social connections     Talks on phone: Not on file     Gets together: Not on file     Attends Voodoo service: Not on file     Active member of club or organization: Not on file     Attends meetings of clubs or organizations: Not on file     Relationship status: Not on file     Intimate partner violence     Fear of current or ex partner: Not on file     Emotionally abused: Not on file     Physically abused: Not on file     Forced sexual activity: Not on file   Other Topics Concern     Parent/sibling w/ CABG, MI or angioplasty before 65F 55M? No   Social History Narrative     Not on file       Outpatient Encounter Medications as of 7/22/2020   Medication Sig Dispense Refill     acetaminophen (TYLENOL) 325 MG tablet Take 325-650 mg by mouth 2 times daily       Ascorbic Acid (VITAMIN C ER PO) Take 1 tablet by mouth daily        Calcium Carb-Cholecalciferol (CALCIUM + D3) 600-200 MG-UNIT TABS Take 1 tablet by mouth daily       estriol micronized POWD 0.2% Estriol in Vanicream.  0.25 g 2x/week vaginally 50 g 3     glucosamine-chondroitin 500-400 MG CAPS Take 1 capsule by mouth daily       Lactobacillus (ACIDOPHILUS) CAPS Take 1 tablet by mouth daily       magnesium 250 MG tablet Take 1 tablet by mouth daily       melatonin 5 MG CAPS Take 5 mg by mouth At Bedtime 1 capsule 0     MULTI-VITAMIN OR TABS 1 TABLET DAILY       Omega-3 Fatty Acids (OMEGA-3 FISH OIL PO) Take 1 capsule by mouth daily       omeprazole (PRILOSEC) 40 MG DR capsule Take 1  capsule by mouth once daily 90 capsule 0     simvastatin (ZOCOR) 20 MG tablet Take 1 tablet (20 mg) by mouth At Bedtime 90 tablet 3     VITAMIN D OR 1 DAILY       No facility-administered encounter medications on file as of 7/22/2020.              Review Of Systems  Skin: As above  Eyes: negative  Ears/Nose/Throat: negative  Respiratory: No shortness of breath, dyspnea on exertion, cough, or hemoptysis  Cardiovascular: negative  Gastrointestinal: negative  Genitourinary: negative  Musculoskeletal: negative  Neurologic: negative  Psychiatric: negative  Hematologic/Lymphatic/Immunologic: negative  Endocrine: negative      O:   NAD, WDWN, Alert & Oriented, Mood & Affect wnl, Vitals stable   Here today alone   /70   Pulse 63   SpO2 98%    General appearance normal   Vitals stable   Alert, oriented and in no acute distress      Following lymph nodes palpated: Occipital, Cervical, Supraclavicular no lad       Stuck on papules and brown macules on trunk and ext   Red papules on trunk  Flesh colored papules on trunk     The remainder of the full exam was normal; the following areas were examined:  conjunctiva/lids, oral mucosa, neck, peripheral vascular system, abdomen, lymph nodes, digits/nails, eccrine and apocrine glands, scalp/hair, face, neck, chest, abdomen, buttocks, back, RUE, LUE, RLE, LLE       Eyes: Conjunctivae/lids:Normal     ENT: Lips, buccal mucosa, tongue: normal    MSK:Normal    Cardiovascular: peripheral edema none    Pulm: Breathing Normal    Lymph Nodes: No Head and Neck Lymphadenopathy     Neuro/Psych: Orientation:Alert and Orientedx3 ; Mood/Affect:normal       A/P:  1. Seborrheic keratosis, lentigo, angioma, dermal nevus, hx of non-melanoma skin cancer   BENIGN LESIONS DISCUSSED WITH PATIENT:  I discussed the specifics of tumor, prognosis, and genetics of benign lesions.  I explained that treatment of these lesions would be purely cosmetic and not medically neccessary.  I discussed with patient  different removal options including excision, cautery and /or laser.      Nature and genetics of benign skin lesions dicussed with patient.  Signs and Symptoms of skin cancer discussed with patient.  Patient encouraged to perform monthly skin exams.  UV precautions reviewed with patient.  Skin care regimen reviewed with patient: Eliminate harsh soaps, i.e. Dial, zest, irsih spring; Mild soaps such as Cetaphil or Dove sensitive skin, avoid hot or cold showers, aggressive use of emollients including vanicream, cetaphil or cerave discussed with patient.    Risks of non-melanoma skin cancer discussed with patient   Return to clinic 12 months      Again, thank you for allowing me to participate in the care of your patient.        Sincerely,        Chapito Franco MD

## 2020-07-22 NOTE — TELEPHONE ENCOUNTER
Pt picked up Estradiol cream from [facility].  This med was cheaper then the compounded version.  [email or web address]

## 2020-07-22 NOTE — TELEPHONE ENCOUNTER
Reason for call:  Patient reporting a symptom    Symptom or request: Pt states that the Rx that was sent to [email or web address] [place] today is too expensive.  Pt would like to go back to old Rx of Estradiol and wants Rx sent to [facility] today please.  Please call patient and advise.      Duration (how long have symptoms been present): ongoing    Have you been treated for this before? Yes    Additional comments:     Phone Number patient can be reached at:  Home number on file [phone] (home)    Best Time:  any    Can we leave a detailed message on this number:  YES    Call taken on 7/22/2020 at 12:38 PM by Judith Lynn

## 2020-07-22 NOTE — TELEPHONE ENCOUNTER
I called [facility] pharmacy since we received an error notification that rx was not received.    [facility] confirms prescription was received.  [facility] has forwarded this order to the compounding pharmacy.    [facility] pharmacy will contact pt to confirm arrangement to mail to pt and payment.    Angelita Danielle RN

## 2020-07-23 DIAGNOSIS — N95.2 ATROPHIC VAGINITIS: Primary | ICD-10-CM

## 2020-07-24 RX ORDER — ESTRADIOL 0.1 MG/G
CREAM VAGINAL
Qty: 43 G | Refills: 0 | Status: SHIPPED | OUTPATIENT
Start: 2020-07-24 | End: 2020-09-23

## 2020-07-24 NOTE — TELEPHONE ENCOUNTER
Patient states that this cream is much cheaper than the compounded medication. Would like new prescription sent.    Lilia Gay RN

## 2020-08-04 ENCOUNTER — OFFICE VISIT (OUTPATIENT)
Dept: FAMILY MEDICINE | Facility: CLINIC | Age: 75
End: 2020-08-04
Payer: COMMERCIAL

## 2020-08-04 VITALS
TEMPERATURE: 97.9 F | HEART RATE: 71 BPM | HEIGHT: 64 IN | OXYGEN SATURATION: 99 % | DIASTOLIC BLOOD PRESSURE: 70 MMHG | SYSTOLIC BLOOD PRESSURE: 124 MMHG | WEIGHT: 140 LBS | RESPIRATION RATE: 16 BRPM | BODY MASS INDEX: 23.9 KG/M2

## 2020-08-04 DIAGNOSIS — J32.9 SINUSITIS, UNSPECIFIED CHRONICITY, UNSPECIFIED LOCATION: Primary | ICD-10-CM

## 2020-08-04 PROCEDURE — 99213 OFFICE O/P EST LOW 20 MIN: CPT | Performed by: NURSE PRACTITIONER

## 2020-08-04 ASSESSMENT — MIFFLIN-ST. JEOR: SCORE: 1111.07

## 2020-08-04 NOTE — PROGRESS NOTES
Subjective     Mariah Jacinto is a 74 year old female who presents to clinic today for the following health issues:    HPI       Concern - Dental Pain  Onset: Saw dentist on the 9th of July, pt is reporting pain a bit before that but no specific time frame     Description:   Dental pain on left side, top and bottom, dull ache comes and goes     Intensity: mild- severe     Progression of Symptoms:  same    Accompanying Signs & Symptoms:  Left ear ache and having a hard time breathing through nostril last night, pain got worse with hot coffee this morning.     Previous history of similar problem:   No     Precipitating factors:   Worsened by: Hot liquids    Alleviating factors:  Improved by: no    Therapies Tried and outcome: sinus tablet (Sudafed)     States sudafed has helped some. She denies tension in her jaw.  She denies any other specific URI complaint.  Dentist did take x ray and nothing found to explain left sided facial discomfort. No swelling or fever noted.    Patient Active Problem List   Diagnosis     Disorder of bone and cartilage     Enthesopathy of hip region     Sensorineural hearing loss     Right hip pain     Trigeminal Neuralgia right      Hyperlipidemia LDL goal <160     Advanced directives, counseling/discussion     Intercostal neuralgia     Health Care Home     Trochanteric bursitis     Subjective tinnitus, bilateral     Gastroesophageal reflux disease without esophagitis     Chronic constipation     Dense breast tissue on mammogram     Past Surgical History:   Procedure Laterality Date     ABLATE VEIN VARICOSE RADIO FREQUENCY WITHOUT PHLEBECTOMY MULTIPLE STAB  3/28/2013    Procedure: ABLATE VEIN VARICOSE RADIO FREQUENCY WITHOUT PHLEBECTOMY MULTIPLE STAB;  Bilateral ablation of varicose veins legs-to ultrasound at 0930  ;  Surgeon: Noam Soliman MD;  Location: [facility]     C ANESTH,LOWER ARM SURGERY  1999    ulnar nerve decompression - right     COLONOSCOPY  8/20/2013    Procedure:  COLONOSCOPY;  Colonoscopy;  Surgeon: Yuliya Nathan MD;  Location: [facility]     ESOPHAGOSCOPY, GASTROSCOPY, DUODENOSCOPY (EGD), COMBINED N/A 2015    Procedure: COMBINED ESOPHAGOSCOPY, GASTROSCOPY, DUODENOSCOPY (EGD), BIOPSY SINGLE OR MULTIPLE;  Surgeon: Yuliya Nathan MD;  Location: [facility]     ESOPHAGOSCOPY, GASTROSCOPY, DUODENOSCOPY (EGD), COMBINED N/A 2017    Procedure: COMBINED ESOPHAGOSCOPY, GASTROSCOPY, DUODENOSCOPY (EGD), BIOPSY SINGLE OR MULTIPLE;  Surgeon: Qasim Bowie MD;  Location: [facility]     EXCISE MASS FINGER Right 2019    Procedure: Excision Right Ring Finger Volar Middle Phalanx Mass;  Surgeon: Jake Viveros MD;  Location: [facility]     HYSTERECTOMY, PAP NO LONGER INDICATED      has both ovaries out also      HYSTERECTOMY, VAGINAL  1988    Hysterectomy, oophorectomy     PHACOEMULSIFICATION WITH STANDARD INTRAOCULAR LENS IMPLANT Left 3/18/2019    Procedure: Cataract Removal with Implant;  Surgeon: Chapito Phillips MD;  Location: [facility]     PHACOEMULSIFICATION WITH STANDARD INTRAOCULAR LENS IMPLANT Right 4/10/2019    Procedure: Cataract Removal with Implant;  Surgeon: Chapito Phillips MD;  Location: [facility]     RELEASE TRIGGER FINGER Right 2017    Procedure: RELEASE TRIGGER FINGER;  Right Thumb A1 Pulley Release;  Surgeon: Jake Viveros MD;  Location: [facility]     SURGICAL HISTORY OF -       right retromastoid craniectomy and decompression trigeminal nerve     TONSILLECTOMY & ADENOIDECTOMY  child    T&A        Social History     Tobacco Use     Smoking status: Never Smoker     Smokeless tobacco: Never Used   Substance Use Topics     Alcohol use: No     Family History   Problem Relation Age of Onset     Alzheimer Disease Mother          at age 85     Osteoporosis Mother      Arthritis Mother      Alcohol/Drug Father      Respiratory Father      Eye Disorder Maternal Grandfather         Macular degneration     C.A.D. Brother          Tripple bypass age 57     Cardiovascular Brother      Cancer Brother         leukemia (ALL)     Cardiovascular Brother      Cardiovascular Brother      Neurologic Disorder Son      Heart Disease Son      Melanoma No family hx of      Skin Cancer No family hx of          Current Outpatient Medications   Medication Sig Dispense Refill     acetaminophen (TYLENOL) 325 MG tablet Take 325-650 mg by mouth 2 times daily       amoxicillin-clavulanate (AUGMENTIN) 875-125 MG tablet Take 1 tablet by mouth 2 times daily 20 tablet 0     Ascorbic Acid (VITAMIN C ER PO) Take 1 tablet by mouth daily        Calcium Carb-Cholecalciferol (CALCIUM + D3) 600-200 MG-UNIT TABS Take 1 tablet by mouth daily       estradiol (ESTRACE) 0.1 MG/GM vaginal cream USE 0.3 GRAMS (SMALL AMOUNT) NIGHTLY FOR 2 WEEKS, THEN USE TWICE WEEKLY 43 g 0     melatonin 5 MG CAPS Take 5 mg by mouth At Bedtime 1 capsule 0     MULTI-VITAMIN OR TABS 1 TABLET DAILY       Omega-3 Fatty Acids (OMEGA-3 FISH OIL PO) Take 1 capsule by mouth daily       omeprazole (PRILOSEC) 40 MG DR capsule Take 1 capsule by mouth once daily 90 capsule 0     simvastatin (ZOCOR) 20 MG tablet Take 1 tablet (20 mg) by mouth At Bedtime 90 tablet 3     VITAMIN D OR 1 DAILY       estriol micronized POWD 0.2% Estriol in Vanicream.  0.25 g 2x/week vaginally (Patient not taking: Reported on 8/4/2020) 50 g 3     glucosamine-chondroitin 500-400 MG CAPS Take 1 capsule by mouth daily       Lactobacillus (ACIDOPHILUS) CAPS Take 1 tablet by mouth daily       magnesium 250 MG tablet Take 1 tablet by mouth daily       Allergies   Allergen Reactions     Biaxin [Clarithromycin]      per pt       Celebrex [Celecoxib]      per pt     Cipro [Ciprofloxacin]      per pt     Darvocet [Propoxyphene N-Apap]      Fleet Phospho Soda [Sodium Phosphate/Biphosphate]      nausea per pt     Morphine      Neurontin [Gabapentin]      per pt     Nortriptyline      per pt     Percocet [Oxycodone-Acetaminophen]      Serzone  [Nefazodone Hydrochloride]      per pt     Tegretol [Carbamazepine]      Vicodin [Acetaminophen]      per pt-dizziness     Vioxx      per pt     Vivactil [Protriptyline Hcl]      per pt     Wellbutrin [Bupropion Hcl]      Zithromax [Azithromycin Dihydrate]      BP Readings from Last 3 Encounters:   08/04/20 124/70   07/22/20 134/70   11/07/19 132/71    Wt Readings from Last 3 Encounters:   08/04/20 63.5 kg (140 lb)   11/07/19 63.2 kg (139 lb 6 oz)   09/23/19 63 kg (139 lb)                    Reviewed and updated as needed this visit by Provider         Review of Systems   Constitutional, HEENT, cardiovascular, pulmonary, gi and gu systems are negative, except as otherwise noted.      Objective    There were no vitals taken for this visit.  There is no height or weight on file to calculate BMI.  Physical Exam   GENERAL: healthy, alert and no distress  HENT: ear canals, wearing hearing aids and TM's normal, pharynx without erythema, no sinus tenderness, teeth appear intact, no evidence of TMJ  NECK: no adenopathy, no asymmetry  RESP: lungs clear to auscultation - no rales, rhonchi or wheezes  CV: regular rate and rhythm  MS: no gross musculoskeletal defects noted      Diagnostic Test Results:  Labs reviewed in Epic  No results found for this or any previous visit (from the past 24 hour(s)).        Assessment & Plan     1. Sinusitis, unspecified chronicity, unspecified location    - amoxicillin-clavulanate (AUGMENTIN) 875-125 MG tablet; Take 1 tablet by mouth 2 times daily  Dispense: 20 tablet; Refill: 0   Discussed how to take the medication(s), expected outcomes, potential side effects.  Will try antibiotic and if no resolution, consider ENT referral for further evaluation of her discomfort.     See Patient Instructions  Patient Instructions   Take antibiotic as prescribed.  If this discomfort persists, I recommend a consult with ENT.        Return in about 1 week (around 8/11/2020) for or sooner if symptoms persist  or worsen.    VICKY Vora [facility]

## 2020-09-22 ENCOUNTER — HOSPITAL ENCOUNTER (OUTPATIENT)
Dept: MAMMOGRAPHY | Facility: CLINIC | Age: 75
Discharge: HOME | End: 2020-09-22
Attending: FAMILY MEDICINE | Admitting: FAMILY MEDICINE
Payer: COMMERCIAL

## 2020-09-22 DIAGNOSIS — Z12.31 OTHER SCREENING MAMMOGRAM: ICD-10-CM

## 2020-09-22 PROCEDURE — 77067 SCR MAMMO BI INCL CAD: CPT

## 2020-09-23 ENCOUNTER — OFFICE VISIT (OUTPATIENT)
Dept: FAMILY MEDICINE | Facility: CLINIC | Age: 75
End: 2020-09-23
Payer: COMMERCIAL

## 2020-09-23 VITALS
TEMPERATURE: 97.2 F | DIASTOLIC BLOOD PRESSURE: 62 MMHG | WEIGHT: 139 LBS | SYSTOLIC BLOOD PRESSURE: 124 MMHG | RESPIRATION RATE: 16 BRPM | BODY MASS INDEX: 23.73 KG/M2 | HEART RATE: 70 BPM | OXYGEN SATURATION: 99 % | HEIGHT: 64 IN

## 2020-09-23 DIAGNOSIS — M94.0 COSTOCHONDRITIS: ICD-10-CM

## 2020-09-23 DIAGNOSIS — K21.9 GASTROESOPHAGEAL REFLUX DISEASE WITHOUT ESOPHAGITIS: ICD-10-CM

## 2020-09-23 DIAGNOSIS — N95.2 ATROPHIC VAGINITIS: ICD-10-CM

## 2020-09-23 DIAGNOSIS — Z00.00 ENCOUNTER FOR MEDICARE ANNUAL WELLNESS EXAM: Primary | ICD-10-CM

## 2020-09-23 DIAGNOSIS — E78.5 HYPERLIPIDEMIA LDL GOAL <160: ICD-10-CM

## 2020-09-23 LAB
ANION GAP SERPL CALCULATED.3IONS-SCNC: 5 MMOL/L (ref 3–14)
BUN SERPL-MCNC: 16 MG/DL (ref 7–30)
CALCIUM SERPL-MCNC: 9.2 MG/DL (ref 8.5–10.1)
CHLORIDE SERPL-SCNC: 106 MMOL/L (ref 94–109)
CHOLEST SERPL-MCNC: 182 MG/DL
CO2 SERPL-SCNC: 27 MMOL/L (ref 20–32)
CREAT SERPL-MCNC: 0.71 MG/DL (ref 0.52–1.04)
GFR SERPL CREATININE-BSD FRML MDRD: 83 ML/MIN/{1.73_M2}
GLUCOSE SERPL-MCNC: 77 MG/DL (ref 70–99)
HDLC SERPL-MCNC: 66 MG/DL
LDLC SERPL CALC-MCNC: 90 MG/DL
NONHDLC SERPL-MCNC: 116 MG/DL
POTASSIUM SERPL-SCNC: 4.4 MMOL/L (ref 3.4–5.3)
SODIUM SERPL-SCNC: 138 MMOL/L (ref 133–144)
TRIGL SERPL-MCNC: 129 MG/DL

## 2020-09-23 PROCEDURE — 99397 PER PM REEVAL EST PAT 65+ YR: CPT | Performed by: FAMILY MEDICINE

## 2020-09-23 PROCEDURE — 36415 COLL VENOUS BLD VENIPUNCTURE: CPT | Performed by: FAMILY MEDICINE

## 2020-09-23 PROCEDURE — 80048 BASIC METABOLIC PNL TOTAL CA: CPT | Performed by: FAMILY MEDICINE

## 2020-09-23 PROCEDURE — 99213 OFFICE O/P EST LOW 20 MIN: CPT | Mod: 25 | Performed by: FAMILY MEDICINE

## 2020-09-23 PROCEDURE — 80061 LIPID PANEL: CPT | Performed by: FAMILY MEDICINE

## 2020-09-23 RX ORDER — OMEPRAZOLE 40 MG/1
CAPSULE, DELAYED RELEASE ORAL
Qty: 90 CAPSULE | Refills: 3 | Status: SHIPPED | OUTPATIENT
Start: 2020-09-23 | End: 2021-09-22

## 2020-09-23 RX ORDER — SIMVASTATIN 20 MG
20 TABLET ORAL AT BEDTIME
Qty: 90 TABLET | Refills: 3 | Status: SHIPPED | OUTPATIENT
Start: 2020-09-23 | End: 2021-09-22

## 2020-09-23 RX ORDER — ESTRADIOL 0.1 MG/G
CREAM VAGINAL
Qty: 43 G | Refills: 4 | Status: SHIPPED | OUTPATIENT
Start: 2020-09-24 | End: 2022-05-02

## 2020-09-23 RX ORDER — ACETAMINOPHEN 325 MG/1
325 TABLET ORAL ONCE
COMMUNITY

## 2020-09-23 ASSESSMENT — ACTIVITIES OF DAILY LIVING (ADL): CURRENT_FUNCTION: NO ASSISTANCE NEEDED

## 2020-09-23 ASSESSMENT — MIFFLIN-ST. JEOR: SCORE: 1106.53

## 2020-09-23 ASSESSMENT — PAIN SCALES - GENERAL: PAINLEVEL: MILD PAIN (3)

## 2020-09-23 NOTE — PROGRESS NOTES
"SUBJECTIVE:   Mariah Jacinto is a 74 year old female who presents for Preventive Visit.      Patient has been advised of split billing requirements and indicates understanding: Yes   Are you in the first 12 months of your Medicare coverage?  No    Healthy Habits:     In general, how would you rate your overall health?  Good    Frequency of exercise:  1 day/week    Duration of exercise:  15-30 minutes    Do you usually eat at least 4 servings of fruit and vegetables a day, include whole grains    & fiber and avoid regularly eating high fat or \"junk\" foods?  Yes    Taking medications regularly:  No    Barriers to taking medications:  None    Medication side effects:  None    Ability to successfully perform activities of daily living:  No assistance needed    Home Safety:  No safety concerns identified    Hearing Impairment:  No hearing concerns    In the past 6 months, have you been bothered by leaking of urine?  No    In general, how would you rate your overall mental or emotional health?  Good      PHQ-2 Total Score: 0    Additional concerns today:  No     Chief Complaint   Patient presents with     Physical       Do you feel safe in your environment? Yes    Have you ever done Advance Care Planning? (For example, a Health Directive, POLST, or a discussion with a medical provider or your loved ones about your wishes): Yes, advance care planning is on file.      Fall risk  Fallen 2 or more times in the past year?: No  Any fall with injury in the past year?: No    Cognitive Screening   1) Repeat 3 items (Leader, Season, Table)    2) Clock draw: NORMAL  3) 3 item recall: Recalls 2 objects   Results: NORMAL clock, 1-2 items recalled: COGNITIVE IMPAIRMENT LESS LIKELY    Mini-CogTM Copyright GARY Khoury. Licensed by the author for use in [facility]; reprinted with permission (baron@.[email or web address]). All rights reserved.      Do you have sleep apnea, excessive snoring or daytime drowsiness?: no    Reviewed and updated as " needed this visit by clinical staff  Tobacco  Allergies  Meds  Problems  Med Hx  Surg Hx  Fam Hx  Soc Hx          Reviewed and updated as needed this visit by Provider        Social History     Tobacco Use     Smoking status: Never Smoker     Smokeless tobacco: Never Used   Substance Use Topics     Alcohol use: No     If you drink alcohol do you typically have >3 drinks per day or >7 drinks per week? No     No flowsheet data found.        Hyperlipidemia Follow-Up      Are you regularly taking any medication or supplement to lower your cholesterol?   Yes- simvastatin    Are you having muscle aches or other side effects that you think could be caused by your cholesterol lowering medication?  No      Current providers sharing in care for this patient include:   Patient Care Team:  Eli Sommer MD as PCP - General (Family Practice)  Eli Sommer MD as Assigned PCP    The following health maintenance items are reviewed in Epic and correct as of today:  Health Maintenance   Topic Date Due     DTAP/TDAP/TD IMMUNIZATION (3 - Td) 10/11/2017     INFLUENZA VACCINE (1) 09/01/2020     MEDICARE ANNUAL WELLNESS VISIT  09/23/2020     FALL RISK ASSESSMENT  09/23/2020     MAMMO SCREENING  09/22/2022     COLORECTAL CANCER SCREENING  08/20/2023     ADVANCE CARE PLANNING  03/21/2024     LIPID  09/23/2024     DEXA  Completed     PHQ-2  Completed     PNEUMOCOCCAL IMMUNIZATION 65+ LOW/MEDIUM RISK  Completed     ZOSTER IMMUNIZATION  Completed     IPV IMMUNIZATION  Aged Out     MENINGITIS IMMUNIZATION  Aged Out     HEPATITIS B IMMUNIZATION  Aged Out     Lab work is in process      Review of Systems  Constitutional, HEENT, cardiovascular, pulmonary, gi and gu systems are negative, except as otherwise noted.    Did wake overnight 2 weeks ago with left rib pain, hurt to take a deep breath or to press on her ribs.   No fall, no injury, no overuse that she can think of.     She states the pain is gradually getting better, now <75%  "of where it started.    No fever/chills. No shortness of breath.     OBJECTIVE:   /62   Pulse 70   Temp 97.2  F (36.2  C) (Tympanic)   Resp 16   Ht 1.619 m (5' 3.75\")   Wt 63 kg (139 lb)   SpO2 99%   Breastfeeding No   BMI 24.05 kg/m   Estimated body mass index is 24.05 kg/m  as calculated from the following:    Height as of this encounter: 1.619 m (5' 3.75\").    Weight as of this encounter: 63 kg (139 lb).  Physical Exam  GENERAL: healthy, alert and no distress  NECK: no adenopathy, no asymmetry, masses, or scars and thyroid normal to palpation  RESP: lungs clear to auscultation - no rales, rhonchi or wheezes  Chest wall: pain on palpation of ribs in left midaxillary line.    CV: regular rate and rhythm, normal S1 S2, no S3 or S4, no murmur, click or rub, no peripheral edema and peripheral pulses strong  ABDOMEN: soft, nontender, no hepatosplenomegaly, no masses and bowel sounds normal  MS: no gross musculoskeletal defects noted, no edema    Diagnostic Test Results:  Labs reviewed in Epic    ASSESSMENT / PLAN:   1. Encounter for Medicare annual wellness exam       2. Gastroesophageal reflux disease without esophagitis     - omeprazole (PRILOSEC) 40 MG DR capsule; Take 1 capsule by mouth once daily  Dispense: 90 capsule; Refill: 3  - OFFICE/OUTPT VISIT,EST,LEVL III    3. Hyperlipidemia LDL goal <160     - simvastatin (ZOCOR) 20 MG tablet; Take 1 tablet (20 mg) by mouth At Bedtime  Dispense: 90 tablet; Refill: 3  - Lipid panel reflex to direct LDL Fasting  - Basic metabolic panel  - OFFICE/OUTPT VISIT,EST,LEVL III    4. Atrophic vaginitis     - estradiol (ESTRACE) 0.1 MG/GM vaginal cream; Place vaginally twice a week  Dispense: 43 g; Refill: 4    5. Costochondritis   discussed benign nature of this.   Anticipate it will continue to improve.  NSAIDS for short term, ice/heat prn.  - OFFICE/OUTPT VISIT,EST,LEVL III    Patient has been advised of split billing requirements and indicates understanding: " "Yes  COUNSELING:  Reviewed preventive health counseling, as reflected in patient instructions       Regular exercise       Healthy diet/nutrition    Estimated body mass index is 24.05 kg/m  as calculated from the following:    Height as of this encounter: 1.619 m (5' 3.75\").    Weight as of this encounter: 63 kg (139 lb).        She reports that she has never smoked. She has never used smokeless tobacco.      Appropriate preventive services were discussed with this patient, including applicable screening as appropriate for cardiovascular disease, diabetes, osteopenia/osteoporosis, and glaucoma.  As appropriate for age/gender, discussed screening for colorectal cancer, prostate cancer, breast cancer, and cervical cancer. Checklist reviewing preventive services available has been given to the patient.    Reviewed patients plan of care and provided an AVS. The Basic Care Plan (routine screening as documented in Health Maintenance) for Mariah meets the Care Plan requirement. This Care Plan has been established and reviewed with the Patient.    Counseling Resources:  ATP IV Guidelines  Pooled Cohorts Equation Calculator  Breast Cancer Risk Calculator  Breast Cancer: Medication to Reduce Risk  FRAX Risk Assessment  ICSI Preventive Guidelines  Dietary Guidelines for Americans, 2010  USDA's MyPlate  ASA Prophylaxis  Lung CA Screening    Eli Sommer MD  [facility]    Identified Health Risks:  "

## 2020-09-23 NOTE — PATIENT INSTRUCTIONS
Our Clinic hours are:  Mondays    7:20 am - 7 pm  Tues - Fri  7:20 am - 5 pm    Clinic Phone: [phone]    The clinic lab opens at 7:30 am Mon - Fri and appointments are required.    [facility]. [phone]  Monday  8 am - 7pm  Tues - Fri 8 am - 5:30 pm           Patient Education   Personalized Prevention Plan  You are due for the preventive services outlined below.  Your care team is available to assist you in scheduling these services.  If you have already completed any of these items, please share that information with your care team to update in your medical record.  Health Maintenance Due   Topic Date Due     Diptheria Tetanus Pertussis (DTAP/TDAP/TD) Vaccine (3 - Td) 10/11/2017     Flu Vaccine (1) 09/01/2020     Annual Wellness Visit  09/23/2020     FALL RISK ASSESSMENT  09/23/2020       Exercise for a Healthier Heart     Exercise with a friend. When activity is fun, you're more likely to stick with it.   You may wonder how you can improve the health of your heart. If you re thinking about exercise, you re on the right track. You don t need to become an athlete, but you do need a certain amount of brisk exercise to help strengthen your heart. If you have been diagnosed with a heart condition, your doctor may recommend exercise to help stabilize your condition. To help make exercise a habit, choose safe, fun activities.  Be sure to check with your healthcare provider before starting an exercise program.  Why exercise?  Exercising regularly offers many healthy rewards. It can help you do all of the following:    Improve your blood cholesterol level to help prevent further heart trouble    Lower your blood pressure to help prevent a stroke or heart attack    Control diabetes, or reduce your risk of getting this disease    Improve your heart and lung function    Reach and maintain a healthy weight    Make your muscles stronger and more limber so you can stay active    Prevent  falls and fractures by slowing the loss of bone mass (osteoporosis)    Manage stress better    Reduce your blood pressure    Improve your sense of self and your body image  Exercise tips  Ease into your routine. Set small goals. Then build on them.  Exercise on most days. Aim for a total of 150 or more minutes of moderate to  vigorous intensity activity each week. Consider 40 minutes, 3 to 4 times a week. For best results, activity should last for 40 minutes on average. It is OK to work up to the 40 minute period over time. Examples of moderate-intensity activity is walking 1 mile in 15 minutes or 30 to 45 minutes of yard work.  Step up your daily activity level. Along with your exercise program, try being more active throughout the day. Walk instead of drive. Do more household tasks or yard work.  Choose one or more activities you enjoy. Walking is one of the easiest things you can do. You can also try swimming, riding a bike, dancing, or taking an exercise class.  Stop exercising and call your doctor if you:    Have chest pain or feel dizzy or lightheaded    Feel burning, tightness, pressure, or heaviness in your chest, neck, shoulders, back, or arms    Have unusual shortness of breath    Have increased joint or muscle pain    Have palpitations or an irregular heartbeat  Date Last Reviewed: 5/1/2016 2000-2019 [employer]. [address] [place] [address], [place] [address]. All rights reserved. This information is not intended as a substitute for professional medical care. Always follow your healthcare professional's instructions.

## 2020-10-26 NOTE — PROGRESS NOTES
Outpatient Physical Therapy Discharge Note     Patient: Mariah Jacinto  : 1945    Beginning/End Dates of Reporting Period:  2/3/20 to 10/26/2020    Referring Provider: Eli Sommer MD    Therapy Diagnosis: Right lower leg pain     Client Self Report: Pt reports: low back pain has improved, but tightness is in the back of the knee (distal hamstring and proximal calf) again.  Frustrated as it seems pain continues despite being diligent with HEP.  Wondering if it has to do with vericose veins and history of vein ablation?    Objective Measurements:  Objective Measure: Supine sciatic N assessment  Details: chrissie=lacking 20 deg  Objective Measure: hip ROM  Details: IR chrissie=15 deg; ER chrissie=40 deg  Objective Measure: gait  Details: right compensated trendelenburg               Goals:  Goal Identifier     Goal Description Patient will be able to walk >=15 minutes without left LE pain.   Target Date 20   Date Met      Progress:     Goal Identifier     Goal Description Patient will be able to put on a shoe without hip pain.   Target Date 20   Date Met      Progress:     Goal Identifier     Goal Description Patient will be independent with his HEP to reduce future occurrence of pain and disability.   Target Date 20   Date Met      Progress:       Progress Toward Goals:   Progress this reporting period: Patient experienced good pain relief with physical therapy.  She will benefit from continued stretching for posterior right LE muscles and neural structures.  Further treatment was interrupted from COVID pandemic.      Plan:  Discharge from therapy.    Discharge:    Reason for Discharge: COVID pandemic    Equipment Issued: theraband    Discharge Plan: Patient to continue home program.    Bonnie Cota, PT, DTP, SCS  Doctor of Physical Therapy #[id number]  [facility]  [phone]  [email or web address]

## 2021-01-01 NOTE — TELEPHONE ENCOUNTER
Called patient for pharmacy and to let her know I was sending in a prescription.    Eli Sommer M.D.     ID Band #[id number] verified  with Belinda Ga due to discharge.

## 2021-02-18 ENCOUNTER — IMMUNIZATION (OUTPATIENT)
Dept: FAMILY MEDICINE | Facility: CLINIC | Age: 76
End: 2021-02-18
Payer: COMMERCIAL

## 2021-02-18 PROCEDURE — 0001A PR COVID VAC PFIZER DIL RECON 30 MCG/0.3 ML IM: CPT

## 2021-02-18 PROCEDURE — 91300 PR COVID VAC PFIZER DIL RECON 30 MCG/0.3 ML IM: CPT

## 2021-03-11 ENCOUNTER — IMMUNIZATION (OUTPATIENT)
Dept: FAMILY MEDICINE | Facility: CLINIC | Age: 76
End: 2021-03-11
Attending: FAMILY MEDICINE
Payer: COMMERCIAL

## 2021-03-11 PROCEDURE — 91300 PR COVID VAC PFIZER DIL RECON 30 MCG/0.3 ML IM: CPT

## 2021-03-11 PROCEDURE — 0002A PR COVID VAC PFIZER DIL RECON 30 MCG/0.3 ML IM: CPT

## 2021-04-02 ENCOUNTER — OFFICE VISIT (OUTPATIENT)
Dept: FAMILY MEDICINE | Facility: CLINIC | Age: 76
End: 2021-04-02
Payer: COMMERCIAL

## 2021-04-02 ENCOUNTER — ANCILLARY PROCEDURE (OUTPATIENT)
Dept: GENERAL RADIOLOGY | Facility: CLINIC | Age: 76
End: 2021-04-02
Attending: FAMILY MEDICINE
Payer: COMMERCIAL

## 2021-04-02 VITALS
RESPIRATION RATE: 16 BRPM | BODY MASS INDEX: 23.05 KG/M2 | OXYGEN SATURATION: 99 % | TEMPERATURE: 97.2 F | WEIGHT: 135 LBS | DIASTOLIC BLOOD PRESSURE: 74 MMHG | HEIGHT: 64 IN | HEART RATE: 70 BPM | SYSTOLIC BLOOD PRESSURE: 118 MMHG

## 2021-04-02 DIAGNOSIS — R07.81 RIB PAIN ON LEFT SIDE: Primary | ICD-10-CM

## 2021-04-02 PROCEDURE — 71101 X-RAY EXAM UNILAT RIBS/CHEST: CPT | Mod: LT | Performed by: RADIOLOGY

## 2021-04-02 PROCEDURE — 99213 OFFICE O/P EST LOW 20 MIN: CPT | Performed by: FAMILY MEDICINE

## 2021-04-02 RX ORDER — NAPROXEN 500 MG/1
500 TABLET ORAL 2 TIMES DAILY WITH MEALS
Qty: 14 TABLET | Refills: 0 | Status: SHIPPED | OUTPATIENT
Start: 2021-04-02 | End: 2021-04-09

## 2021-04-02 ASSESSMENT — PAIN SCALES - GENERAL: PAINLEVEL: EXTREME PAIN (8)

## 2021-04-02 ASSESSMENT — MIFFLIN-ST. JEOR: SCORE: 1083.39

## 2021-04-02 NOTE — PATIENT INSTRUCTIONS
Naproxen 500 mg twice daily with food    Salonpas 4% lidocaine patches - over the counter at the pharmacy.      Our Clinic hours are:  Mondays    7:20 am - 7 pm  Tues -  Fri  7:20 am - 5 pm    Clinic Phone: [phone]    The clinic lab opens at 7:30 am Mon - Fri and appointments are required.    [facility]. [phone]  Monday  8 am - 7pm  Tues - Fri 8 am - 5:30 pm
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
## 2021-04-30 ENCOUNTER — TELEPHONE (OUTPATIENT)
Dept: FAMILY MEDICINE | Facility: CLINIC | Age: 76
End: 2021-04-30

## 2021-04-30 ENCOUNTER — VIRTUAL VISIT (OUTPATIENT)
Dept: FAMILY MEDICINE | Facility: CLINIC | Age: 76
End: 2021-04-30
Payer: COMMERCIAL

## 2021-04-30 DIAGNOSIS — R14.1 FLATULENCE, ERUCTATION AND GAS PAIN: Primary | ICD-10-CM

## 2021-04-30 DIAGNOSIS — R14.3 FLATULENCE, ERUCTATION AND GAS PAIN: Primary | ICD-10-CM

## 2021-04-30 DIAGNOSIS — R19.7 DIARRHEA, UNSPECIFIED TYPE: ICD-10-CM

## 2021-04-30 DIAGNOSIS — R14.2 FLATULENCE, ERUCTATION AND GAS PAIN: Primary | ICD-10-CM

## 2021-04-30 PROCEDURE — 99213 OFFICE O/P EST LOW 20 MIN: CPT | Mod: 95 | Performed by: NURSE PRACTITIONER

## 2021-04-30 RX ORDER — SIMETHICONE 80 MG
80 TABLET,CHEWABLE ORAL EVERY 6 HOURS PRN
Qty: 20 TABLET | Refills: 0 | Status: SHIPPED | OUTPATIENT
Start: 2021-04-30 | End: 2022-05-02
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
## 2021-04-30 NOTE — PROGRESS NOTES
"Mariah is a 75 year old who is being evaluated via a billable telephone visit.      What phone number would you like to be contacted at? [phone]  How would you like to obtain your AVS? Mail a copy    Assessment & Plan     Flatulence, eructation and gas pain  Discussed using Gas-X to reduce symptoms of cramping and bloating.  If no improvement follow-up in 3 days.  - simethicone (MYLICON) 80 MG chewable tablet; Take 1 tablet (80 mg) by mouth every 6 hours as needed for flatulence or cramping    Diarrhea, unspecified type  BRAT diet discussed.  In general I have a low suspicion for SBO as she has not had vomiting.  I discussed with patient to modify her diet until symptoms improve.  If no improvement to follow-up in 3 days.                   No follow-ups on file.    VICKY Multani CNP  [facility]    Subjective   Mariah is a 75 year old who presents for the following health issues     HPI     Diarrhea  Onset/Duration: 5 days  Description:       Consistency of stool: watery and explosive, now improving. Has had not as much \"explosive\" episodes.        Blood in stool: no       Number of loose stools past 24 hours: 4-5  Progression of Symptoms: same and intermittent  Accompanying signs and symptoms:       Fever: no       Nausea/Vomiting: YES- little nausea       Abdominal pain: YES       Weight loss: no       Episodes of constipation: no  History   Ill contacts: no  Recent use of antibiotics: no  Recent travels: no  Recent medication-new or changes(Rx or OTC): no  Precipitating or alleviating factors: None  Therapies tried and outcome: Pepto Bismol helps temporally     Feeling very gassy with burping and flatulence. She is feeling very bloated. She initially thought it was related to eating bad meat. It started with diarrhea and has now progressed to more bloating. Appetite is improving. She is eating small amount of foods like meat and vegetables. Biggest complaint is symptoms " of cramping with the bloating and gas. She had not had any vomiting.       Review of Systems   Constitutional, HEENT, cardiovascular, pulmonary, gi and gu systems are negative, except as otherwise noted.      Objective           Vitals:  No vitals were obtained today due to virtual visit.    Physical Exam   healthy, alert and no distress  PSYCH: Alert and oriented times 3; coherent speech, normal   rate and volume, able to articulate logical thoughts, able   to abstract reason, no tangential thoughts, no hallucinations   or delusions  Her affect is normal  RESP: No cough, no audible wheezing, able to talk in full sentences  Remainder of exam unable to be completed due to telephone visits                Phone call duration: 10 minutes

## 2021-04-30 NOTE — TELEPHONE ENCOUNTER
"Patient reports that she had diarrhea for the past 5 days. She thought that this might be caused from freezer burnt pork chops. She has 3 large episodes of liquid diarrhea, 2 loose stools, and 1-2 regular in a day. She also has slight nausea, no vomiting. Denies temp. She is able to drink fluids and eat without difficulty, but does not have much of an appetite. She does not feel like she is dehydrated. She has tried 1/2 dose of peptobismol 3 times a day as she does not want to get constipated. Patient also takes 1 teaspoon of \"Clear-lax\" in the evening to keep regular.   She did have her 2nd COVID vaccine on 3/11/21. Advised virtual visit. She does not have a smart phone or Ogint. Phone visit scheduled.  Padmini Ibarra RN    "

## 2021-06-09 ENCOUNTER — OFFICE VISIT (OUTPATIENT)
Dept: DERMATOLOGY | Facility: CLINIC | Age: 76
End: 2021-06-09
Payer: COMMERCIAL

## 2021-06-09 VITALS — OXYGEN SATURATION: 98 % | SYSTOLIC BLOOD PRESSURE: 138 MMHG | HEART RATE: 71 BPM | DIASTOLIC BLOOD PRESSURE: 67 MMHG

## 2021-06-09 DIAGNOSIS — L82.0 INFLAMED SEBORRHEIC KERATOSIS: ICD-10-CM

## 2021-06-09 DIAGNOSIS — L81.4 LENTIGO: ICD-10-CM

## 2021-06-09 DIAGNOSIS — Z85.828 HISTORY OF SKIN CANCER: Primary | ICD-10-CM

## 2021-06-09 DIAGNOSIS — L82.1 SEBORRHEIC KERATOSIS: ICD-10-CM

## 2021-06-09 DIAGNOSIS — D18.01 ANGIOMA OF SKIN: ICD-10-CM

## 2021-06-09 DIAGNOSIS — D23.9 DERMAL NEVUS: ICD-10-CM

## 2021-06-09 PROCEDURE — 17110 DESTRUCTION B9 LES UP TO 14: CPT | Performed by: DERMATOLOGY

## 2021-06-09 PROCEDURE — 99213 OFFICE O/P EST LOW 20 MIN: CPT | Mod: 25 | Performed by: DERMATOLOGY

## 2021-06-09 NOTE — PROGRESS NOTES
Mariah Jacinto is an extremely pleasant 75 year old year old female patient here today for itching spot n right breast.   .   Patient states this has been present for a while.  Patient reports the following symptoms:  itching.  Patient reports the following previous treatments none.  These treatments did not work.  Patient reports the following modifying factors none.  Associated symptoms: none.  Patient has no other skin complaints today.  Remainder of the HPI, Meds, PMH, Allergies, FH, and SH was reviewed in chart.      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    celexa caused spinning side effect      MENOPAUSE --ERT      OSTEOPENIA      Squamous cell carcinoma        Past Surgical History:   Procedure Laterality Date     ABLATE VEIN VARICOSE RADIO FREQUENCY WITHOUT PHLEBECTOMY MULTIPLE STAB  3/28/2013    Procedure: ABLATE VEIN VARICOSE RADIO FREQUENCY WITHOUT PHLEBECTOMY MULTIPLE STAB;  Bilateral ablation of varicose veins legs-to ultrasound at 0930  ;  Surgeon: Noam Soliman MD;  Location: [facility]      ANESTH,LOWER ARM SURGERY  1999    ulnar nerve decompression - right     COLONOSCOPY  8/20/2013    Procedure: COLONOSCOPY;  Colonoscopy;  Surgeon: Yuliya Nathan MD;  Location: [facility]     ESOPHAGOSCOPY, GASTROSCOPY, DUODENOSCOPY (EGD), COMBINED N/A 5/12/2015    Procedure: COMBINED ESOPHAGOSCOPY, GASTROSCOPY, DUODENOSCOPY (EGD), BIOPSY SINGLE OR MULTIPLE;  Surgeon: Yuliya Nathan MD;  Location: [facility]     ESOPHAGOSCOPY, GASTROSCOPY, DUODENOSCOPY (EGD), COMBINED N/A 1/31/2017    Procedure: COMBINED ESOPHAGOSCOPY, GASTROSCOPY, DUODENOSCOPY (EGD), BIOPSY SINGLE OR MULTIPLE;  Surgeon: Qasim Bowie MD;  Location: [facility]     EXCISE MASS FINGER Right  2019    Procedure: Excision Right Ring Finger Volar Middle Phalanx Mass;  Surgeon: Jake Viveros MD;  Location: [facility]     HYSTERECTOMY, PAP NO LONGER INDICATED      has both ovaries out also      HYSTERECTOMY, VAGINAL  1988    Hysterectomy, oophorectomy     PHACOEMULSIFICATION WITH STANDARD INTRAOCULAR LENS IMPLANT Left 3/18/2019    Procedure: Cataract Removal with Implant;  Surgeon: Chapito Phillips MD;  Location: [facility]     PHACOEMULSIFICATION WITH STANDARD INTRAOCULAR LENS IMPLANT Right 4/10/2019    Procedure: Cataract Removal with Implant;  Surgeon: Chapito Phillips MD;  Location: [facility]     RELEASE TRIGGER FINGER Right 2017    Procedure: RELEASE TRIGGER FINGER;  Right Thumb A1 Pulley Release;  Surgeon: Jake Viveros MD;  Location: [facility]     SURGICAL HISTORY OF -       right retromastoid craniectomy and decompression trigeminal nerve     TONSILLECTOMY & ADENOIDECTOMY  child    T&A         Family History   Problem Relation Age of Onset     Alzheimer Disease Mother          at age 85     Osteoporosis Mother      Arthritis Mother      Alcohol/Drug Father      Respiratory Father      Eye Disorder Maternal Grandfather         Macular degneration     C.A.D. Brother         Tripple bypass age 57     Cardiovascular Brother      Cancer Brother         leukemia (ALL)     Cardiovascular Brother      Cardiovascular Brother      Neurologic Disorder Son      Heart Disease Son      Melanoma No family hx of      Skin Cancer No family hx of        Social History     Socioeconomic History     Marital status:      Spouse name: Not on file     Number of children: Not on file     Years of education: Not on file     Highest education level: Not on file   Occupational History     Not on file   Social Needs     Financial resource strain: Not on file     Food insecurity     Worry: Not on file     Inability: Not on file     Transportation needs     Medical: Not on file     Non-medical:  Not on file   Tobacco Use     Smoking status: Never Smoker     Smokeless tobacco: Never Used   Substance and Sexual Activity     Alcohol use: No     Drug use: No     Sexual activity: Yes     Birth control/protection: Surgical     Comment: complete hysterectomy 1988   Lifestyle     Physical activity     Days per week: Not on file     Minutes per session: Not on file     Stress: Not on file   Relationships     Social connections     Talks on phone: Not on file     Gets together: Not on file     Attends [employer] service: Not on file     Active member of club or organization: Not on file     Attends meetings of clubs or organizations: Not on file     Relationship status: Not on file     Intimate partner violence     Fear of current or ex partner: Not on file     Emotionally abused: Not on file     Physically abused: Not on file     Forced sexual activity: Not on file   Other Topics Concern     Parent/sibling w/ CABG, MI or angioplasty before 65F 55M? No   Social History Narrative     Not on file       Outpatient Encounter Medications as of 6/9/2021   Medication Sig Dispense Refill     acetaminophen (TYLENOL) 325 MG tablet Take 325 mg by mouth once       Ascorbic Acid (VITAMIN C ER PO) Take 1 tablet by mouth daily        Calcium Carb-Cholecalciferol (CALCIUM + D3) 600-200 MG-UNIT TABS Take 1 tablet by mouth daily       estradiol (ESTRACE) 0.1 MG/GM vaginal cream Place vaginally twice a week 43 g 4     Lactobacillus (ACIDOPHILUS) CAPS Take 1 tablet by mouth daily       melatonin 5 MG CAPS Take 5 mg by mouth At Bedtime 1 capsule 0     MULTI-VITAMIN OR TABS 1 TABLET DAILY       Omega-3 Fatty Acids (OMEGA-3 FISH OIL PO) Take 1 capsule by mouth daily       omeprazole (PRILOSEC) 40 MG DR capsule Take 1 capsule by mouth once daily 90 capsule 3     simethicone (MYLICON) 80 MG chewable tablet Take 1 tablet (80 mg) by mouth every 6 hours as needed for flatulence or cramping 20 tablet 0     simvastatin (ZOCOR) 20 MG tablet Take 1  tablet (20 mg) by mouth At Bedtime 90 tablet 3     VITAMIN D OR 1 DAILY       No facility-administered encounter medications on file as of 6/9/2021.              O:   NAD, WDWN, Alert & Oriented, Mood & Affect wnl, Vitals stable   Here today alone   /67 (BP Location: Right arm, Patient Position: Sitting, Cuff Size: Adult Regular)   Pulse 71   SpO2 98%    General appearance normal   Vitals stable   Alert, oriented and in no acute distress     R chest inflamed seborrheic keratosis      Stuck on papules and brown macules on trunk and ext   Red papules on trunk  Flesh colored papules on trunk     The remainder of the full exam was normal; the following areas were examined:  conjunctiva/lids, oral mucosa, neck, peripheral vascular system, abdomen, lymph nodes, digits/nails, eccrine and apocrine glands, scalp/hair, face, neck, chest, abdomen, buttocks, back, RUE, LUE, RLE, LLE       Eyes: Conjunctivae/lids:Normal     ENT: Lips, buccal mucosa, tongue: normal    MSK:Normal    Cardiovascular: peripheral edema none    Pulm: Breathing Normal    Lymph Nodes: No Head and Neck Lymphadenopathy     Neuro/Psych: Orientation:Alert and Orientedx3 ; Mood/Affect:normal       A/P:  1. Seborrheic keratosis, lentigo, angioma, dermal nevus, hx of non-melanoma skin cancer   2. R breast inflamed seborrheic keratosis   LN2:  Treated with LN2 for 5s for 1-2 cycles. Warned risks of blistering, pain, pigment change, scarring, and incomplete resolution.  Advised patient to return if lesions do not completely resolve.  Wound care sheet given.    It was a pleasure speaking to Mariah Jacinto today.  Previous clinic notes and pertinent laboratory tests were reviewed prior to Mariah Jacinto's visit.  Signs and Symptoms of skin cancer discussed with patient.  Patient encouraged to perform monthly skin exams.  UV precautions reviewed with patient.  Risks of non-melanoma skin cancer discussed with patient   Return to clinic 12 months

## 2021-06-09 NOTE — NURSING NOTE
Chief Complaint   Patient presents with     Skin Check       Vitals:    06/09/21 1318   BP: 138/67   BP Location: Right arm   Patient Position: Sitting   Cuff Size: Adult Regular   Pulse: 71   SpO2: 98%     Wt Readings from Last 1 Encounters:   04/02/21 61.2 kg (135 lb)       Bessie Knapp LPN .................6/9/2021

## 2021-06-09 NOTE — LETTER
6/9/2021         RE: Mariah Jacinto  [address] [place] [address]        Dear Colleague,    Thank you for referring your patient, Mariah Jacinto, to the [facility]. Please see a copy of my visit note below.    Mariah Jacinto is an extremely pleasant 75 year old year old female patient here today for itching spot n right breast.   .   Patient states this has been present for a while.  Patient reports the following symptoms:  itching.  Patient reports the following previous treatments none.  These treatments did not work.  Patient reports the following modifying factors none.  Associated symptoms: none.  Patient has no other skin complaints today.  Remainder of the HPI, Meds, PMH, Allergies, FH, and SH was reviewed in chart.      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    celexa caused spinning side effect      MENOPAUSE --ERT      OSTEOPENIA      Squamous cell carcinoma        Past Surgical History:   Procedure Laterality Date     ABLATE VEIN VARICOSE RADIO FREQUENCY WITHOUT PHLEBECTOMY MULTIPLE STAB  3/28/2013    Procedure: ABLATE VEIN VARICOSE RADIO FREQUENCY WITHOUT PHLEBECTOMY MULTIPLE STAB;  Bilateral ablation of varicose veins legs-to ultrasound at 0930  ;  Surgeon: Noam Soliman MD;  Location: [facility]     C ANESTH,LOWER ARM SURGERY  1999    ulnar nerve decompression - right     COLONOSCOPY  8/20/2013    Procedure: COLONOSCOPY;  Colonoscopy;  Surgeon: uYliya Nathan MD;  Location: [facility]     ESOPHAGOSCOPY, GASTROSCOPY, DUODENOSCOPY (EGD), COMBINED N/A 5/12/2015    Procedure: COMBINED ESOPHAGOSCOPY, GASTROSCOPY, DUODENOSCOPY (EGD), BIOPSY SINGLE OR MULTIPLE;  Surgeon: Yuliya Nathan MD;  Location: [facility]      ESOPHAGOSCOPY, GASTROSCOPY, DUODENOSCOPY (EGD), COMBINED N/A 2017    Procedure: COMBINED ESOPHAGOSCOPY, GASTROSCOPY, DUODENOSCOPY (EGD), BIOPSY SINGLE OR MULTIPLE;  Surgeon: Qasim Bowie MD;  Location: [facility]     EXCISE MASS FINGER Right 2019    Procedure: Excision Right Ring Finger Volar Middle Phalanx Mass;  Surgeon: Jake Viveros MD;  Location: [facility]     HYSTERECTOMY, PAP NO LONGER INDICATED      has both ovaries out also      HYSTERECTOMY, VAGINAL  1988    Hysterectomy, oophorectomy     PHACOEMULSIFICATION WITH STANDARD INTRAOCULAR LENS IMPLANT Left 3/18/2019    Procedure: Cataract Removal with Implant;  Surgeon: Chapito Phillips MD;  Location: [facility]     PHACOEMULSIFICATION WITH STANDARD INTRAOCULAR LENS IMPLANT Right 4/10/2019    Procedure: Cataract Removal with Implant;  Surgeon: Chapito Phillips MD;  Location: [facility]     RELEASE TRIGGER FINGER Right 2017    Procedure: RELEASE TRIGGER FINGER;  Right Thumb A1 Pulley Release;  Surgeon: Jake Viveros MD;  Location: [facility]     SURGICAL HISTORY OF -       right retromastoid craniectomy and decompression trigeminal nerve     TONSILLECTOMY & ADENOIDECTOMY  child    T&A         Family History   Problem Relation Age of Onset     Alzheimer Disease Mother          at age 85     Osteoporosis Mother      Arthritis Mother      Alcohol/Drug Father      Respiratory Father      Eye Disorder Maternal Grandfather         Macular degneration     C.A.D. Brother         Tripple bypass age 57     Cardiovascular Brother      Cancer Brother         leukemia (ALL)     Cardiovascular Brother      Cardiovascular Brother      Neurologic Disorder Son      Heart Disease Son      Melanoma No family hx of      Skin Cancer No family hx of        Social History     Socioeconomic History     Marital status:      Spouse name: Not on file     Number of children: Not on file     Years of education: Not on file     Highest education  level: Not on file   Occupational History     Not on file   Social Needs     Financial resource strain: Not on file     Food insecurity     Worry: Not on file     Inability: Not on file     Transportation needs     Medical: Not on file     Non-medical: Not on file   Tobacco Use     Smoking status: Never Smoker     Smokeless tobacco: Never Used   Substance and Sexual Activity     Alcohol use: No     Drug use: No     Sexual activity: Yes     Birth control/protection: Surgical     Comment: complete hysterectomy 1988   Lifestyle     Physical activity     Days per week: Not on file     Minutes per session: Not on file     Stress: Not on file   Relationships     Social connections     Talks on phone: Not on file     Gets together: Not on file     Attends [employer] service: Not on file     Active member of club or organization: Not on file     Attends meetings of clubs or organizations: Not on file     Relationship status: Not on file     Intimate partner violence     Fear of current or ex partner: Not on file     Emotionally abused: Not on file     Physically abused: Not on file     Forced sexual activity: Not on file   Other Topics Concern     Parent/sibling w/ CABG, MI or angioplasty before 65F 55M? No   Social History Narrative     Not on file       Outpatient Encounter Medications as of 6/9/2021   Medication Sig Dispense Refill     acetaminophen (TYLENOL) 325 MG tablet Take 325 mg by mouth once       Ascorbic Acid (VITAMIN C ER PO) Take 1 tablet by mouth daily        Calcium Carb-Cholecalciferol (CALCIUM + D3) 600-200 MG-UNIT TABS Take 1 tablet by mouth daily       estradiol (ESTRACE) 0.1 MG/GM vaginal cream Place vaginally twice a week 43 g 4     Lactobacillus (ACIDOPHILUS) CAPS Take 1 tablet by mouth daily       melatonin 5 MG CAPS Take 5 mg by mouth At Bedtime 1 capsule 0     MULTI-VITAMIN OR TABS 1 TABLET DAILY       Omega-3 Fatty Acids (OMEGA-3 FISH OIL PO) Take 1 capsule by mouth daily       omeprazole  (PRILOSEC) 40 MG DR capsule Take 1 capsule by mouth once daily 90 capsule 3     simethicone (MYLICON) 80 MG chewable tablet Take 1 tablet (80 mg) by mouth every 6 hours as needed for flatulence or cramping 20 tablet 0     simvastatin (ZOCOR) 20 MG tablet Take 1 tablet (20 mg) by mouth At Bedtime 90 tablet 3     VITAMIN D OR 1 DAILY       No facility-administered encounter medications on file as of 6/9/2021.              O:   NAD, WDWN, Alert & Oriented, Mood & Affect wnl, Vitals stable   Here today alone   /67 (BP Location: Right arm, Patient Position: Sitting, Cuff Size: Adult Regular)   Pulse 71   SpO2 98%    General appearance normal   Vitals stable   Alert, oriented and in no acute distress     R chest inflamed seborrheic keratosis      Stuck on papules and brown macules on trunk and ext   Red papules on trunk  Flesh colored papules on trunk     The remainder of the full exam was normal; the following areas were examined:  conjunctiva/lids, oral mucosa, neck, peripheral vascular system, abdomen, lymph nodes, digits/nails, eccrine and apocrine glands, scalp/hair, face, neck, chest, abdomen, buttocks, back, RUE, LUE, RLE, LLE       Eyes: Conjunctivae/lids:Normal     ENT: Lips, buccal mucosa, tongue: normal    MSK:Normal    Cardiovascular: peripheral edema none    Pulm: Breathing Normal    Lymph Nodes: No Head and Neck Lymphadenopathy     Neuro/Psych: Orientation:Alert and Orientedx3 ; Mood/Affect:normal       A/P:  1. Seborrheic keratosis, lentigo, angioma, dermal nevus, hx of non-melanoma skin cancer   2. R breast inflamed seborrheic keratosis   LN2:  Treated with LN2 for 5s for 1-2 cycles. Warned risks of blistering, pain, pigment change, scarring, and incomplete resolution.  Advised patient to return if lesions do not completely resolve.  Wound care sheet given.    It was a pleasure speaking to Mariah Jacinto today.  Previous clinic notes and pertinent laboratory tests were reviewed prior to Mariah WEST  [place]'s visit.  Signs and Symptoms of skin cancer discussed with patient.  Patient encouraged to perform monthly skin exams.  UV precautions reviewed with patient.  Risks of non-melanoma skin cancer discussed with patient   Return to clinic 12 months        Again, thank you for allowing me to participate in the care of your patient.        Sincerely,        Chapito Franco MD

## 2021-06-21 NOTE — TELEPHONE ENCOUNTER
Reason for Disposition    Bad or foul-smelling urine    Additional Information    Negative: Shock suspected (e.g., cold/pale/clammy skin, too weak to stand, low BP, rapid pulse)    Negative: Sounds like a life-threatening emergency to the triager    Negative: Followed a genital area injury    Negative: Followed a genital area injury (penis, scrotum)    Negative: Vaginal discharge    Negative: Pus (white, yellow) or bloody discharge from end of penis    Negative: [1] Taking antibiotic for urinary tract infection (UTI) AND [2] female    Negative: [1] Taking antibiotic for urinary tract infection (UTI) AND [2] male    Negative: [1] Discomfort (pain, burning or stinging) when passing urine AND [2] pregnant    Negative: [1] Discomfort (pain, burning or stinging) when passing urine AND [2] postpartum < 1 month    Negative: [1] Discomfort (pain, burning or stinging) when passing urine AND [2] female    Negative: [1] Discomfort (pain, burning or stinging) when passing urine AND [2] male    Negative: Pain or itching in the vulvar area    Negative: Pain in scrotum is main symptom    Negative: Blood in the urine is main symptom    Negative: Symptoms arising from use of a urinary catheter (Mauro or Coude)    Negative: [1] Unable to urinate (or only a few drops) > 4 hours AND     [2] bladder feels very full (e.g., palpable bladder or strong urge to urinate)    Negative: [1] Decreased urination and [2] drinking very little AND [2] dehydration suspected (e.g., dark urine, no urine > 12 hours, very dry mouth, very lightheaded)    Negative: Patient sounds very sick or weak to the triager    Negative: Fever > 100.5 F (38.1 C)    Negative: Side (flank) or lower back pain present    Negative: [1] Can't control passage of urine (i.e., urinary incontinence) AND [2] new onset (< 2 weeks) or worsening    Negative: Urinating more frequently than usual (i.e., frequency)    Protocols used: URINARY SYMPTOMS-ADULT-  Caller states she is  Detail Level: Zone having urinary symptoms of UTI. Caller wants antibiotic called in. Triager advised caller to call back in a few hours when clinic opens up to speak with care team for an order of antibiotics. Caller verbalized and undestands directives.   Detail Level: Generalized

## 2021-07-15 ENCOUNTER — TELEPHONE (OUTPATIENT)
Dept: FAMILY MEDICINE | Facility: CLINIC | Age: 76
End: 2021-07-15

## 2021-07-15 DIAGNOSIS — M19.90 ARTHRITIS: Primary | ICD-10-CM

## 2021-07-15 NOTE — TELEPHONE ENCOUNTER
LM that pt can get Diclofenic gel OTC @ any Pharmacy.  Pt to return call if has questions.  Freddy

## 2021-07-15 NOTE — TELEPHONE ENCOUNTER
Patient had a virtual visit on 4/30/21 with another provider, and an office visit with PCP on 4/2/21.  This prescription is not on her medication list. Can she get this over the counter? Script is pended. Routed to provider.  Padmini Ibarra RN

## 2021-07-15 NOTE — TELEPHONE ENCOUNTER
Patient called and is requesting to get a topical gel for her hand that she got from the specialist.  The medication is Diclofenac.  Patient is asking if Kemal Cowan would take over prescribing it for her.  Pharmacy is [facility] in [place].      RENETTA Grant

## 2021-07-16 NOTE — TELEPHONE ENCOUNTER
Pt states this is much more expensive for her over the counter and is requesting a prescription for it, please advise.

## 2021-08-11 ENCOUNTER — OFFICE VISIT (OUTPATIENT)
Dept: FAMILY MEDICINE | Facility: CLINIC | Age: 76
End: 2021-08-11
Payer: COMMERCIAL

## 2021-08-11 ENCOUNTER — TELEPHONE (OUTPATIENT)
Dept: FAMILY MEDICINE | Facility: CLINIC | Age: 76
End: 2021-08-11

## 2021-08-11 VITALS
SYSTOLIC BLOOD PRESSURE: 120 MMHG | WEIGHT: 132 LBS | OXYGEN SATURATION: 98 % | BODY MASS INDEX: 23.39 KG/M2 | RESPIRATION RATE: 16 BRPM | HEART RATE: 68 BPM | HEIGHT: 63 IN | TEMPERATURE: 98.2 F | DIASTOLIC BLOOD PRESSURE: 68 MMHG

## 2021-08-11 DIAGNOSIS — K64.5 THROMBOSED EXTERNAL HEMORRHOIDS: Primary | ICD-10-CM

## 2021-08-11 DIAGNOSIS — K64.5 THROMBOSED EXTERNAL HEMORRHOIDS: ICD-10-CM

## 2021-08-11 PROCEDURE — 99213 OFFICE O/P EST LOW 20 MIN: CPT | Performed by: NURSE PRACTITIONER

## 2021-08-11 RX ORDER — LIDOCAINE HYDROCHLORIDE 20 MG/ML
JELLY TOPICAL 2 TIMES DAILY
Qty: 30 ML | Refills: 1 | Status: SHIPPED | OUTPATIENT
Start: 2021-08-11 | End: 2021-09-22

## 2021-08-11 RX ORDER — HYDROCORTISONE 25 MG/G
CREAM TOPICAL 2 TIMES DAILY PRN
Qty: 30 G | Refills: 1 | Status: SHIPPED | OUTPATIENT
Start: 2021-08-11 | End: 2021-09-22

## 2021-08-11 ASSESSMENT — MIFFLIN-ST. JEOR: SCORE: 1057.88

## 2021-08-11 NOTE — PROGRESS NOTES
"    Assessment & Plan     Thrombosed external hemorrhoids  - Lidocaine-Hydrocortisone Ace 1-1 % CREA; Externally apply 1 Application topically 2 times daily for 7 days  Discussed sitz bath's and avoiding pressure to anal area.  Discussed with patient that this should resolve on its own with time; however if still having pain after another week or 2 she should let us know.    The risks, benefits and treatment options of prescribed medications or other treatments have been discussed with the patient. The patient verbalized their understanding and should call or follow up if no improvement or if they develop further problems.    VICKY Rojas CNP  [facility] CHUY Lemus is a 75 year old who presents for the following health issues     HPI     Hemorrhoids  Onset/Duration: one week  Description:   Raeann-anal lump: YES  Pain: YES  Itching: YES  Accompanying Signs & Symptoms:  Blood in stool: no  Changes in stool pattern: no  History:   Any previous GI studies done:Colonoscopy 2013  Family History of colon cancer: no  Precipitating factors:   None  Alleviating factors:  None  Therapies tried and outcome: preparation H- cream and pads -nothing is helping    Has had hemorrhoids in the past.        Review of Systems   Constitutional, HEENT, cardiovascular, pulmonary, gi and gu systems are negative, except as otherwise noted.      Objective    /68 (BP Location: Right arm)   Pulse 68   Temp 98.2  F (36.8  C) (Tympanic)   Resp 16   Ht 1.6 m (5' 3\")   Wt 59.9 kg (132 lb)   SpO2 98%   BMI 23.38 kg/m    Body mass index is 23.38 kg/m .  Physical Exam   GENERAL: healthy, alert and no distress  RECTAL (female): 1 cm thrombosed external hemorrhoid, no erythema.  Soft and tender to touch.                "

## 2021-08-11 NOTE — TELEPHONE ENCOUNTER
"Received a fax from [facility] that they do not do Compound at there store The notes says.  \"We do not compound at our store. Can resend each ingredient separately or send to pharmacy that compounds.\"      "

## 2021-08-17 ENCOUNTER — TRANSFERRED RECORDS (OUTPATIENT)
Dept: HEALTH INFORMATION MANAGEMENT | Facility: CLINIC | Age: 76
End: 2021-08-17

## 2021-08-19 ENCOUNTER — TELEPHONE (OUTPATIENT)
Dept: FAMILY MEDICINE | Facility: CLINIC | Age: 76
End: 2021-08-19

## 2021-08-19 ENCOUNTER — HOSPITAL ENCOUNTER (OUTPATIENT)
Dept: MRI IMAGING | Facility: CLINIC | Age: 76
Discharge: HOME | End: 2021-08-19
Attending: ORTHOPAEDIC SURGERY | Admitting: ORTHOPAEDIC SURGERY
Payer: COMMERCIAL

## 2021-08-19 DIAGNOSIS — M79.2 RADICULAR PAIN OF RIGHT UPPER EXTREMITY: ICD-10-CM

## 2021-08-19 PROCEDURE — 72141 MRI NECK SPINE W/O DYE: CPT

## 2021-08-19 NOTE — TELEPHONE ENCOUNTER
Pt will continue to use Hemorrhoid cream and Lidocaine gel.  Pt has appt on 9/13/21 and will discuss if symptoms have not resolved/worsen.  Nii

## 2021-09-01 ENCOUNTER — TRANSFERRED RECORDS (OUTPATIENT)
Dept: HEALTH INFORMATION MANAGEMENT | Facility: CLINIC | Age: 76
End: 2021-09-01

## 2021-09-10 ENCOUNTER — HOSPITAL ENCOUNTER (OUTPATIENT)
Dept: MAMMOGRAPHY | Facility: CLINIC | Age: 76
Discharge: HOME | End: 2021-09-10
Attending: FAMILY MEDICINE | Admitting: FAMILY MEDICINE
Payer: COMMERCIAL

## 2021-09-10 DIAGNOSIS — Z12.31 VISIT FOR SCREENING MAMMOGRAM: ICD-10-CM

## 2021-09-10 PROCEDURE — 77063 BREAST TOMOSYNTHESIS BI: CPT

## 2021-09-20 ENCOUNTER — TRANSFERRED RECORDS (OUTPATIENT)
Dept: HEALTH INFORMATION MANAGEMENT | Facility: CLINIC | Age: 76
End: 2021-09-20

## 2021-09-22 ENCOUNTER — OFFICE VISIT (OUTPATIENT)
Dept: FAMILY MEDICINE | Facility: CLINIC | Age: 76
End: 2021-09-22
Payer: COMMERCIAL

## 2021-09-22 ENCOUNTER — PRE VISIT (OUTPATIENT)
Dept: SURGERY | Facility: CLINIC | Age: 76
End: 2021-09-22

## 2021-09-22 ENCOUNTER — PATIENT OUTREACH (OUTPATIENT)
Dept: SURGERY | Facility: CLINIC | Age: 76
End: 2021-09-22

## 2021-09-22 VITALS
WEIGHT: 134 LBS | HEIGHT: 64 IN | OXYGEN SATURATION: 99 % | BODY MASS INDEX: 22.88 KG/M2 | HEART RATE: 64 BPM | RESPIRATION RATE: 12 BRPM | SYSTOLIC BLOOD PRESSURE: 132 MMHG | DIASTOLIC BLOOD PRESSURE: 80 MMHG

## 2021-09-22 DIAGNOSIS — Z00.00 ENCOUNTER FOR MEDICARE ANNUAL WELLNESS EXAM: Primary | ICD-10-CM

## 2021-09-22 DIAGNOSIS — K21.9 GASTROESOPHAGEAL REFLUX DISEASE WITHOUT ESOPHAGITIS: ICD-10-CM

## 2021-09-22 DIAGNOSIS — K64.5 THROMBOSED EXTERNAL HEMORRHOIDS: ICD-10-CM

## 2021-09-22 DIAGNOSIS — E78.5 HYPERLIPIDEMIA LDL GOAL <160: ICD-10-CM

## 2021-09-22 LAB
ANION GAP SERPL CALCULATED.3IONS-SCNC: 6 MMOL/L (ref 3–14)
BUN SERPL-MCNC: 14 MG/DL (ref 7–30)
CALCIUM SERPL-MCNC: 8.9 MG/DL (ref 8.5–10.1)
CHLORIDE BLD-SCNC: 104 MMOL/L (ref 94–109)
CHOLEST SERPL-MCNC: 202 MG/DL
CO2 SERPL-SCNC: 28 MMOL/L (ref 20–32)
CREAT SERPL-MCNC: 0.77 MG/DL (ref 0.52–1.04)
FASTING STATUS PATIENT QL REPORTED: ABNORMAL
GFR SERPL CREATININE-BSD FRML MDRD: 75 ML/MIN/1.73M2
GLUCOSE BLD-MCNC: 97 MG/DL (ref 70–99)
HDLC SERPL-MCNC: 69 MG/DL
LDLC SERPL CALC-MCNC: 99 MG/DL
NONHDLC SERPL-MCNC: 133 MG/DL
POTASSIUM BLD-SCNC: 4.5 MMOL/L (ref 3.4–5.3)
SODIUM SERPL-SCNC: 138 MMOL/L (ref 133–144)
TRIGL SERPL-MCNC: 171 MG/DL

## 2021-09-22 PROCEDURE — 36415 COLL VENOUS BLD VENIPUNCTURE: CPT | Performed by: FAMILY MEDICINE

## 2021-09-22 PROCEDURE — 80061 LIPID PANEL: CPT | Performed by: FAMILY MEDICINE

## 2021-09-22 PROCEDURE — 99213 OFFICE O/P EST LOW 20 MIN: CPT | Mod: 25 | Performed by: FAMILY MEDICINE

## 2021-09-22 PROCEDURE — 80048 BASIC METABOLIC PNL TOTAL CA: CPT | Performed by: FAMILY MEDICINE

## 2021-09-22 PROCEDURE — 99397 PER PM REEVAL EST PAT 65+ YR: CPT | Performed by: FAMILY MEDICINE

## 2021-09-22 RX ORDER — LIDOCAINE HYDROCHLORIDE 20 MG/ML
JELLY TOPICAL 2 TIMES DAILY
Qty: 30 ML | Refills: 1 | Status: SHIPPED | OUTPATIENT
Start: 2021-09-22 | End: 2022-02-02

## 2021-09-22 RX ORDER — SIMVASTATIN 20 MG
20 TABLET ORAL AT BEDTIME
Qty: 90 TABLET | Refills: 3 | Status: SHIPPED | OUTPATIENT
Start: 2021-09-22 | End: 2022-09-08

## 2021-09-22 RX ORDER — HYDROCORTISONE 25 MG/G
CREAM TOPICAL 2 TIMES DAILY PRN
Qty: 30 G | Refills: 1 | Status: SHIPPED | OUTPATIENT
Start: 2021-09-22 | End: 2022-02-02

## 2021-09-22 RX ORDER — OMEPRAZOLE 40 MG/1
CAPSULE, DELAYED RELEASE ORAL
Qty: 90 CAPSULE | Refills: 3 | Status: SHIPPED | OUTPATIENT
Start: 2021-09-22 | End: 2022-09-08

## 2021-09-22 ASSESSMENT — ENCOUNTER SYMPTOMS
NERVOUS/ANXIOUS: 0
COUGH: 0
ABDOMINAL PAIN: 0
HEMATOCHEZIA: 0
PALPITATIONS: 0
JOINT SWELLING: 0
EYE PAIN: 0
BREAST MASS: 0
MYALGIAS: 0
FREQUENCY: 0
DIZZINESS: 0
DYSURIA: 0
HEARTBURN: 0
ARTHRALGIAS: 1
DIARRHEA: 0
CHILLS: 0
FEVER: 0
NAUSEA: 0
SHORTNESS OF BREATH: 0
WEAKNESS: 0
SORE THROAT: 0
HEMATURIA: 0
HEADACHES: 0
CONSTIPATION: 0
PARESTHESIAS: 0

## 2021-09-22 ASSESSMENT — ACTIVITIES OF DAILY LIVING (ADL): CURRENT_FUNCTION: NO ASSISTANCE NEEDED

## 2021-09-22 ASSESSMENT — MIFFLIN-ST. JEOR: SCORE: 1074.88

## 2021-09-22 NOTE — TELEPHONE ENCOUNTER
RECORDS RECEIVED FROM: CE and Internal   Appt date: 09/23/2021   NOTES STATUS DETAILS   OFFICE NOTE from referring provider  N/A    OFFICE NOTE from other specialist   N/A    DISCHARGE SUMMARY from hospital  N/A    DISCHARGE REPORT from the ER N/A    OPERATIVE REPORT  N/A    MEDICATION LIST Internal    LABS     PFC TESTING N/A    ANAL PAP N/A    BIOPSIES/PATHOLOGY RELATED TO DIAGNOSIS N/A    DIAGNOSTIC PROCEDURES     COLONOSCOPY N/A    UPPER ENDOSCOPY (EGD) Internal 01/31/2017   FLEX SIGMOIDOSCOPY  N/A    ERCP N/A    IMAGING (DISC & REPORT)      CT  N/A    MRI N/A    XRAY Internal 04/05/2019 -- XR Pelvis   ULTRASOUND (ENDOANAL/ENDORECTAL) N/A      Action 09/22/2021 JTV 5:04pm   Action Taken CSS called patient. Patient did not . CSS unable to LVM for patient to return call. CSS will try again tomorrow.      Action 09/23/2021 JTV  9:13pm   Action Taken CSS tried to call patient again. Patient did not . CSS unable to leave .

## 2021-09-22 NOTE — PROGRESS NOTES
Received a call from patient stating she was looking to schedule an appt for hemorrhoids. Unsure how she got my number but triaged her symptoms. She states she has been having issues with hemorrhoids for 2 months. Referral for thrombosed hemorrhoid. She states the area is very painful and hard. It is now difficult to sit or do activity. She has used creams and prep H. It is strange that after 2 months of therapy her pain is this bad still and the area is hard. Possible abscess vs cancer. Set up visit for tomorrow to assess.

## 2021-09-22 NOTE — PATIENT INSTRUCTIONS
Patient Education   Personalized Prevention Plan  You are due for the preventive services outlined below.  Your care team is available to assist you in scheduling these services.  If you have already completed any of these items, please share that information with your care team to update in your medical record.  Health Maintenance Due   Topic Date Due     ANNUAL REVIEW OF HM ORDERS  Never done     Hepatitis C Screening  Never done     Diptheria Tetanus Pertussis (DTAP/TDAP/TD) Vaccine (3 - Td or Tdap) 10/11/2017     FALL RISK ASSESSMENT  09/23/2021

## 2021-09-22 NOTE — PROGRESS NOTES
"SUBJECTIVE:   Mariah Jacinto is a 75 year old female who presents for Preventive Visit.      Patient has been advised of split billing requirements and indicates understanding: Yes   Are you in the first 12 months of your Medicare coverage?  No    Healthy Habits:     In general, how would you rate your overall health?  Good    Frequency of exercise:  2-3 days/week    Duration of exercise:  15-30 minutes    Do you usually eat at least 4 servings of fruit and vegetables a day, include whole grains    & fiber and avoid regularly eating high fat or \"junk\" foods?  Yes    Taking medications regularly:  No    Medication side effects:  None    Ability to successfully perform activities of daily living:  No assistance needed    Home Safety:  No safety concerns identified    Hearing Impairment:  No hearing concerns    In the past 6 months, have you been bothered by leaking of urine?  No    In general, how would you rate your overall mental or emotional health?  Good      PHQ-2 Total Score: 0    Additional concerns today:  No    Do you feel safe in your environment? Yes    Have you ever done Advance Care Planning? (For example, a Health Directive, POLST, or a discussion with a medical provider or your loved ones about your wishes): Yes, advance care planning is on file.       Fall risk  Fallen 2 or more times in the past year?: No  Any fall with injury in the past year?: No    Cognitive Screening   1) Repeat 3 items (Leader, Season, Table)    2) Clock draw: NORMAL  3) 3 item recall: Recalls 2 objects   Results: NORMAL clock, 1-2 items recalled: COGNITIVE IMPAIRMENT LESS LIKELY    Mini-CogTM Copyright GARY Khoury. Licensed by the author for use in [facility]; reprinted with permission (baron@.[facility]). All rights reserved.      Do you have sleep apnea, excessive snoring or daytime drowsiness?: no    Reviewed and updated as needed this visit by clinical staff  Tobacco  Allergies  Meds   Med Hx  Surg Hx  Fam Hx  " Soc Hx        Reviewed and updated as needed this visit by Provider                Social History     Tobacco Use     Smoking status: Never Smoker     Smokeless tobacco: Never Used   Substance Use Topics     Alcohol use: No     If you drink alcohol do you typically have >3 drinks per day or >7 drinks per week? No    Alcohol Use 9/22/2021   Prescreen: >3 drinks/day or >7 drinks/week? No   Prescreen: >3 drinks/day or >7 drinks/week? -         Hemorrhoids       Duration: 2 months    Description:   Pain: YES  Itching: no     Accompanying signs and symptoms:   Blood in stool: no   Changes in stool pattern: no     History (similar episodes/previous evaluation): Years ago    Precipitating or alleviating factors: None    Therapies tried and outcome: Was given medication that is not helping.       Current providers sharing in care for this patient include:   Patient Care Team:  Eli Sommer MD as PCP - General (Family Practice)  Eli Sommer MD as Assigned PCP  Chapito Franco MD as Assigned Surgical Provider    The following health maintenance items are reviewed in Epic and correct as of today:  Health Maintenance Due   Topic Date Due     ANNUAL REVIEW OF HM ORDERS  Never done     HEPATITIS C SCREENING  Never done     DTAP/TDAP/TD IMMUNIZATION (3 - Td or Tdap) 10/11/2017     FALL RISK ASSESSMENT  09/23/2021     Lab work is in process        Pertinent mammograms are reviewed under the imaging tab.    Review of Systems   Constitutional: Negative for chills and fever.   HENT: Positive for hearing loss. Negative for congestion, ear pain and sore throat.    Eyes: Negative for pain and visual disturbance.   Respiratory: Negative for cough and shortness of breath.    Cardiovascular: Negative for chest pain, palpitations and peripheral edema.   Gastrointestinal: Negative for abdominal pain, constipation, diarrhea, heartburn, hematochezia and nausea.   Breasts:  Negative for tenderness, breast mass and discharge.  "  Genitourinary: Negative for dysuria, frequency, genital sores, hematuria, pelvic pain, urgency, vaginal bleeding and vaginal discharge.   Musculoskeletal: Positive for arthralgias. Negative for joint swelling and myalgias.   Skin: Negative for rash.   Neurological: Negative for dizziness, weakness, headaches and paresthesias.   Psychiatric/Behavioral: Negative for mood changes. The patient is not nervous/anxious.      Constitutional, HEENT, cardiovascular, pulmonary, gi and gu systems are negative, except as otherwise noted.    OBJECTIVE:   /80   Pulse 64   Resp 12   Ht 1.613 m (5' 3.5\")   Wt 60.8 kg (134 lb)   SpO2 99%   BMI 23.36 kg/m   Estimated body mass index is 23.36 kg/m  as calculated from the following:    Height as of this encounter: 1.613 m (5' 3.5\").    Weight as of this encounter: 60.8 kg (134 lb).  Physical Exam  GENERAL: healthy, alert and no distress  EYES: Eyes grossly normal to inspection, PERRL and conjunctivae and sclerae normal  HENT: ear canals and TM's normal, nose and mouth without ulcers or lesions  NECK: no adenopathy, no asymmetry, masses, or scars and thyroid normal to palpation  RESP: lungs clear to auscultation - no rales, rhonchi or wheezes  BREAST: normal without masses, tenderness or nipple discharge and no palpable axillary masses or adenopathy  CV: regular rate and rhythm, normal S1 S2, no S3 or S4, no murmur, click or rub, no peripheral edema and peripheral pulses strong  ABDOMEN: soft, nontender, no hepatosplenomegaly, no masses and bowel sounds normal  RECTAL: tender thrombosed hemorrhoid at 3 oclock, 1 cm in diameter  MS: no gross musculoskeletal defects noted, no edema  SKIN: no suspicious lesions or rashes  NEURO: Normal strength and tone, mentation intact and speech normal  PSYCH: mentation appears normal, affect normal/bright    Diagnostic Test Results:  Labs reviewed in Epic    ASSESSMENT / PLAN:   (Z00.00) Encounter for Medicare annual wellness exam  " "(primary encounter diagnosis)  Comment:    Plan:      (K21.9) Gastroesophageal reflux disease without esophagitis  Comment:    Plan: omeprazole (PRILOSEC) 40 MG DR capsule, Basic         metabolic panel  (Ca, Cl, CO2, Creat, Gluc, K,         Na, BUN), OFFICE/OUTPT VISIT,EST,LEVL III             (E78.5) Hyperlipidemia LDL goal <160  Comment:    Plan: simvastatin (ZOCOR) 20 MG tablet, Basic         metabolic panel  (Ca, Cl, CO2, Creat, Gluc, K,         Na, BUN), Lipid panel reflex to direct LDL         Non-fasting, OFFICE/OUTPT VISIT,EST,LEVL III             (K64.5) Thrombosed external hemorrhoids  Comment: hemorrhoid not responding to preparation  H/topical lidocaine, still quite painful and bothersome  Plan: Adult General Surg Referral, hydrocortisone,         Perianal, (HYDROCORTISONE) 2.5 % cream,         lidocaine (XYLOCAINE) 2 % external gel        Continue miralax, fiber, see surgery      Patient has been advised of split billing requirements and indicates understanding: Yes  COUNSELING:  Reviewed preventive health counseling, as reflected in patient instructions       Regular exercise       Healthy diet/nutrition    Estimated body mass index is 23.36 kg/m  as calculated from the following:    Height as of this encounter: 1.613 m (5' 3.5\").    Weight as of this encounter: 60.8 kg (134 lb).        She reports that she has never smoked. She has never used smokeless tobacco.      Appropriate preventive services were discussed with this patient, including applicable screening as appropriate for cardiovascular disease, diabetes, osteopenia/osteoporosis, and glaucoma.  As appropriate for age/gender, discussed screening for colorectal cancer, prostate cancer, breast cancer, and cervical cancer. Checklist reviewing preventive services available has been given to the patient.    Reviewed patients plan of care and provided an AVS. The Basic Care Plan (routine screening as documented in Health Maintenance) for Mariah" meets the Care Plan requirement. This Care Plan has been established and reviewed with the Patient.    Counseling Resources:  ATP IV Guidelines  Pooled Cohorts Equation Calculator  Breast Cancer Risk Calculator  Breast Cancer: Medication to Reduce Risk  FRAX Risk Assessment  ICSI Preventive Guidelines  Dietary Guidelines for Americans, 2010  USDA's MyPlate  ASA Prophylaxis  Lung CA Screening    Eli Sommer MD  [facility]    Identified Health Risks:

## 2021-09-23 ENCOUNTER — OFFICE VISIT (OUTPATIENT)
Dept: SURGERY | Facility: CLINIC | Age: 76
End: 2021-09-23
Payer: COMMERCIAL

## 2021-09-23 ENCOUNTER — TRANSCRIBE ORDERS (OUTPATIENT)
Dept: OTHER | Age: 76
End: 2021-09-23

## 2021-09-23 VITALS
TEMPERATURE: 97.9 F | WEIGHT: 134.8 LBS | DIASTOLIC BLOOD PRESSURE: 70 MMHG | HEIGHT: 63 IN | OXYGEN SATURATION: 100 % | HEART RATE: 61 BPM | SYSTOLIC BLOOD PRESSURE: 140 MMHG | BODY MASS INDEX: 23.88 KG/M2

## 2021-09-23 DIAGNOSIS — M54.12 C6 RADICULOPATHY: Primary | ICD-10-CM

## 2021-09-23 DIAGNOSIS — K64.5 THROMBOSED EXTERNAL HEMORRHOIDS: Primary | ICD-10-CM

## 2021-09-23 PROCEDURE — 46320 REMOVAL OF HEMORRHOID CLOT: CPT | Performed by: NURSE PRACTITIONER

## 2021-09-23 PROCEDURE — 88304 TISSUE EXAM BY PATHOLOGIST: CPT | Mod: 26 | Performed by: PATHOLOGY

## 2021-09-23 PROCEDURE — 88304 TISSUE EXAM BY PATHOLOGIST: CPT | Mod: TC | Performed by: NURSE PRACTITIONER

## 2021-09-23 RX ORDER — LIDOCAINE HYDROCHLORIDE AND EPINEPHRINE 10; 10 MG/ML; UG/ML
1.5 INJECTION, SOLUTION INFILTRATION; PERINEURAL ONCE
Status: COMPLETED | OUTPATIENT
Start: 2021-09-23 | End: 2021-09-23

## 2021-09-23 RX ADMIN — LIDOCAINE HYDROCHLORIDE AND EPINEPHRINE 1.5 ML: 10; 10 INJECTION, SOLUTION INFILTRATION; PERINEURAL at 11:26

## 2021-09-23 ASSESSMENT — PAIN SCALES - GENERAL: PAINLEVEL: SEVERE PAIN (7)

## 2021-09-23 ASSESSMENT — MIFFLIN-ST. JEOR: SCORE: 1070.58

## 2021-09-23 NOTE — PROGRESS NOTES
"Colon and Rectal Surgery Consult Clinic Note    Date: 2021     Referring provider:  No referring provider defined for this encounter.     RE: Mariah Jacinto  : 1945  AMY: 2021    Mariah Jacinto is a very pleasant 75 year old female who presents today painful hemorrhoid.    HPI:  Mariah developed a painful external hemorrhoid about 2 months ago. This has been constantly painful for 2 months. She has tried over the counter and prescription hemorrhoid creams without improvement in symptoms. No bleeding or drainage. She has pain with bowel movements. Stools are soft and she takes Miralax daily for a history of constipation. It hurts to wipe after bowel movements as well. She had a normal colonoscopy 8 years ago. She is otherwise healthy. She had a thrombosed hemorrhoid that required excision in the s. She is not on any blood thinners.    Physical Examination:  BP (!) 140/70 (BP Location: Left arm, Patient Position: Sitting, Cuff Size: Adult Regular)   Pulse 61   Temp 97.9  F (36.6  C) (Oral)   Ht 5' 3\"   Wt 134 lb 12.8 oz   SpO2 100%   BMI 23.88 kg/m    General: alert, oriented, in no acute distress, sitting comfortably  HEENT: mucous membranes moist    Perianal external examination: Exam was chaperoned by Rosaline Navarro MA   Perianal skin: Intact with no excoriation or lichenification.  Lesions: No evidence of an external lesion, nodularity, or induration in the perianal region.  Eversion of buttocks: There was not evidence of an anal fissure. Details: N/A.  Skin tags or external hemorrhoids: Yes: small thrombosed hemorrhoid in the left posterior position. This was tender on palpation but no bleeding, drainage, or necrosis.  Digital rectal examination: Was deferred    Anoscopy: Was deferred    Procedure: Prior to the start of the procedure and with procedural staff participation, I verbally confirmed the patient s identity using two indicators, relevant allergies, that the procedure " was appropriate and matched the consent or emergent situation, and that the correct equipment/implants were available. Immediately prior to starting the procedure I conducted the Time Out with the procedural staff and re-confirmed the patient s name, procedure, and site/side. (The Joint Commission universal protocol was followed.)  Yes    Sedation (Moderate or Deep): None    After informed consent was obtained, patient agreed to excision of thrombosed external hemorrhoid. All of her questions were answered to her stated satisfaction. Verified that patient is not on any blood thinners.  The skin was cleansed with povidone-iodine solution. The base of the hemorrhoid was infiltrated with 5 cc of 1% lidocaine with epinephrine. She tolerated this well.   An elliptic incision was made into the anal skin overlying the thrombosis and the thrombus was evacuated. The base of the wound was examined for residual small clots which were sharply excised. There was minimal bleeding and the site was left open. She tolerated this well. 4X4 gauze applied to site.    Assessment/Plan: 75 year old female with thrombosed external hemorrhoid. This has been present for 2 months but was very painful for her so we discussed excision at the bedside. She tolerated this well. Old clots present but no necrosis or infection. Will have her follow up in clinic in one week for recheck or anytime in the meantime with any questions or concerns. Patient's questions were answered to her stated satisfaction and she is in agreement with this plan.      Medical history:  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    celexa caused spinning side effect      MENOPAUSE --ERT      OSTEOPENIA      Squamous cell carcinoma         Surgical history:  Past Surgical History:   Procedure Laterality Date     ABLATE VEIN VARICOSE RADIO FREQUENCY WITHOUT PHLEBECTOMY MULTIPLE STAB  3/28/2013    Procedure: ABLATE VEIN VARICOSE RADIO FREQUENCY WITHOUT PHLEBECTOMY MULTIPLE STAB;  Bilateral ablation of varicose veins legs-to ultrasound at 0930  ;  Surgeon: Noam Soliman MD;  Location: [facility]     C ANESTH,LOWER ARM SURGERY  1999    ulnar nerve decompression - right     COLONOSCOPY  8/20/2013    Procedure: COLONOSCOPY;  Colonoscopy;  Surgeon: Yuliya Nathan MD;  Location: [facility]     ESOPHAGOSCOPY, GASTROSCOPY, DUODENOSCOPY (EGD), COMBINED N/A 5/12/2015    Procedure: COMBINED ESOPHAGOSCOPY, GASTROSCOPY, DUODENOSCOPY (EGD), BIOPSY SINGLE OR MULTIPLE;  Surgeon: Yuliya Nathan MD;  Location: [facility]     ESOPHAGOSCOPY, GASTROSCOPY, DUODENOSCOPY (EGD), COMBINED N/A 1/31/2017    Procedure: COMBINED ESOPHAGOSCOPY, GASTROSCOPY, DUODENOSCOPY (EGD), BIOPSY SINGLE OR MULTIPLE;  Surgeon: Qasim Bowie MD;  Location: [facility]     EXCISE MASS FINGER Right 2/19/2019    Procedure: Excision Right Ring Finger Volar Middle Phalanx Mass;  Surgeon: Jake Viveros MD;  Location: [facility]     HYSTERECTOMY, PAP NO LONGER INDICATED      has both ovaries out also      HYSTERECTOMY, VAGINAL  1988    Hysterectomy, oophorectomy     PHACOEMULSIFICATION WITH STANDARD INTRAOCULAR LENS IMPLANT Left 3/18/2019    Procedure: Cataract Removal with Implant;  Surgeon: Chapito Phillips MD;  Location: [facility]     PHACOEMULSIFICATION WITH STANDARD INTRAOCULAR LENS IMPLANT Right 4/10/2019    Procedure: Cataract Removal with Implant;  Surgeon: Chapito Phillips MD;  Location: [facility]     RELEASE TRIGGER FINGER Right 6/23/2017    Procedure: RELEASE TRIGGER FINGER;  Right Thumb A1 Pulley Release;  Surgeon: Jake Viveros MD;  Location: [facility]     SURGICAL HISTORY OF -   2009    right retromastoid craniectomy and decompression trigeminal nerve      TONSILLECTOMY & ADENOIDECTOMY  child    T&A        Problem list:    Patient Active Problem List    Diagnosis Date Noted     Dense breast tissue on mammogram 09/27/2017     Priority: Medium     Chronic constipation 06/01/2017     Priority: Medium     Gastroesophageal reflux disease without esophagitis 11/17/2016     Priority: Medium     Subjective tinnitus, bilateral 04/04/2016     Priority: Medium     Trochanteric bursitis 09/25/2013     Priority: Medium     Health Care Home 08/13/2013     Priority: Medium     Keturah Granados RN-PHN   [phone]  FPA / [facility] Care Coordinator /                   Intercostal neuralgia 10/22/2012     Priority: Medium     Advanced directives, counseling/discussion 10/04/2012     Priority: Medium     Advance Directive received and scanned. Click on Code in the patient header to view. 10/4/2012  Health care directive scanned under the media tab.    Patient has completed an Advance/Health Care Directive (HCD), scanned into Epic Media tab, entry date of 10/4/2012.    Angelita Sommer  March 21, 2019         Hyperlipidemia LDL goal <160 10/31/2010     Priority: Medium     Trigeminal Neuralgia right  06/22/2009     Priority: Medium     Surgery 8/3/09 at    Right retromastoid craniectomy and microvascular decompression of the trigeminal nerve.   DX 2005        Right hip pain 12/15/2008     Priority: Medium     Sensorineural hearing loss 03/01/2007     Priority: Medium     Problem list name updated by automated process. Provider to review       Enthesopathy of hip region 10/03/2006     Priority: Medium     Disorder of bone and cartilage 04/25/2005     Priority: Medium     Problem list name updated by automated process. Provider to review         Medications:  Current Outpatient Medications   Medication Sig Dispense Refill     acetaminophen (TYLENOL) 325 MG tablet Take 325 mg by mouth once       Ascorbic Acid (VITAMIN C ER PO) Take 1 tablet by mouth daily         Calcium Carb-Cholecalciferol (CALCIUM + D3) 600-200 MG-UNIT TABS Take 1 tablet by mouth daily       diclofenac (VOLTAREN) 1 % topical gel Apply 2 g topically 4 times daily 350 g 3     hydrocortisone, Perianal, (HYDROCORTISONE) 2.5 % cream Place rectally 2 times daily as needed for hemorrhoids 30 g 1     Lactobacillus (ACIDOPHILUS) CAPS Take 1 tablet by mouth daily       lidocaine (XYLOCAINE) 2 % external gel Apply topically 2 times daily 30 mL 1     melatonin 5 MG CAPS Take 5 mg by mouth At Bedtime 1 capsule 0     MULTI-VITAMIN OR TABS 1 TABLET DAILY       Omega-3 Fatty Acids (OMEGA-3 FISH OIL PO) Take 1 capsule by mouth daily       omeprazole (PRILOSEC) 40 MG DR capsule Take 1 capsule by mouth once daily 90 capsule 3     simvastatin (ZOCOR) 20 MG tablet Take 1 tablet (20 mg) by mouth At Bedtime 90 tablet 3     VITAMIN D OR 1 DAILY       estradiol (ESTRACE) 0.1 MG/GM vaginal cream Place vaginally twice a week (Patient not taking: Reported on 2021) 43 g 4     simethicone (MYLICON) 80 MG chewable tablet Take 1 tablet (80 mg) by mouth every 6 hours as needed for flatulence or cramping (Patient not taking: Reported on 2021) 20 tablet 0       Allergies:  Allergies   Allergen Reactions     Biaxin [Clarithromycin]      per pt       Celebrex [Celecoxib]      per pt     Cipro [Ciprofloxacin]      per pt     Darvocet [Propoxyphene N-Apap]      Fleet Phospho Soda [Sodium Phosphate/Biphosphate]      nausea per pt     Morphine      Neurontin [Gabapentin]      per pt     Nortriptyline      per pt     Percocet [Oxycodone-Acetaminophen]      Serzone [Nefazodone Hydrochloride]      per pt     Tegretol [Carbamazepine]      Vicodin [Acetaminophen]      per pt-dizziness     Vioxx      per pt     Vivactil [Protriptyline Hcl]      per pt     Wellbutrin [Bupropion Hcl]      Zithromax [Azithromycin Dihydrate]        Family history:  Family History   Problem Relation Age of Onset     Alzheimer Disease Mother          at age 85  "    Osteoporosis Mother      Arthritis Mother      Alcohol/Drug Father      Respiratory Father      Eye Disorder Maternal Grandfather         Macular degneration     C.A.D. Brother         Tripple bypass age 57     Cardiovascular Brother      Cancer Brother         leukemia (ALL)     Cardiovascular Brother      Cardiovascular Brother      Neurologic Disorder Son      Heart Disease Son      Melanoma No family hx of      Skin Cancer No family hx of        Social history:  Social History     Tobacco Use     Smoking status: Never Smoker     Smokeless tobacco: Never Used   Substance Use Topics     Alcohol use: No    Marital status: .  Nursing Notes:   Rosaline Sanchez CMA  9/23/2021 10:28 AM  Signed  Chief Complaint   Patient presents with     New Patient     New consult for hemorrhoids       Vitals:    09/23/21 1024   BP: (!) 140/70   BP Location: Left arm   Patient Position: Sitting   Cuff Size: Adult Regular   Pulse: 61   Temp: 97.9  F (36.6  C)   TempSrc: Oral   SpO2: 100%   Weight: 134 lb 12.8 oz   Height: 5' 3\"       Body mass index is 23.88 kg/m .          Rosaline Santiago CMA         30 minutes spent on the date of the encounter doing chart review, history and exam, documentation and further activities as noted above.     VICKY Moreno, NP-C  Colon and Rectal Surgery   [facility]    This note was created using speech recognition software and may contain unintended word substitutions.    "

## 2021-09-23 NOTE — LETTER
"2021       RE: Mariah Jacinto  [address] [place] [address]     Dear Colleague,    Thank you for referring your patient, Mariah Jacinto, to the [facility] [place] at [facility]. Please see a copy of my visit note below.    Colon and Rectal Surgery Consult Clinic Note    Date: 2021     Referring provider:  No referring provider defined for this encounter.     RE: Mariah Jacinto  : 1945  AMY: 2021    Mariah Jacinto is a very pleasant 75 year old female who presents today painful hemorrhoid.    HPI:  Mariah developed a painful external hemorrhoid about 2 months ago. This has been constantly painful for 2 months. She has tried over the counter and prescription hemorrhoid creams without improvement in symptoms. No bleeding or drainage. She has pain with bowel movements. Stools are soft and she takes Miralax daily for a history of constipation. It hurts to wipe after bowel movements as well. She had a normal colonoscopy 8 years ago. She is otherwise healthy. She had a thrombosed hemorrhoid that required excision in the s. She is not on any blood thinners.    Physical Examination:  BP (!) 140/70 (BP Location: Left arm, Patient Position: Sitting, Cuff Size: Adult Regular)   Pulse 61   Temp 97.9  F (36.6  C) (Oral)   Ht 5' 3\"   Wt 134 lb 12.8 oz   SpO2 100%   BMI 23.88 kg/m    General: alert, oriented, in no acute distress, sitting comfortably  HEENT: mucous membranes moist    Perianal external examination: Exam was chaperoned by Rosaline Navarro MA   Perianal skin: Intact with no excoriation or lichenification.  Lesions: No evidence of an external lesion, nodularity, or induration in the perianal region.  Eversion of buttocks: There was not evidence of an anal fissure. Details: N/A.  Skin tags or external hemorrhoids: Yes: small thrombosed hemorrhoid in the left posterior position. " This was tender on palpation but no bleeding, drainage, or necrosis.  Digital rectal examination: Was deferred    Anoscopy: Was deferred    Procedure: Prior to the start of the procedure and with procedural staff participation, I verbally confirmed the patient s identity using two indicators, relevant allergies, that the procedure was appropriate and matched the consent or emergent situation, and that the correct equipment/implants were available. Immediately prior to starting the procedure I conducted the Time Out with the procedural staff and re-confirmed the patient s name, procedure, and site/side. (The Joint Commission universal protocol was followed.)  Yes    Sedation (Moderate or Deep): None    After informed consent was obtained, patient agreed to excision of thrombosed external hemorrhoid. All of her questions were answered to her stated satisfaction. Verified that patient is not on any blood thinners.  The skin was cleansed with povidone-iodine solution. The base of the hemorrhoid was infiltrated with 5 cc of 1% lidocaine with epinephrine. She tolerated this well.   An elliptic incision was made into the anal skin overlying the thrombosis and the thrombus was evacuated. The base of the wound was examined for residual small clots which were sharply excised. There was minimal bleeding and the site was left open. She tolerated this well. 4X4 gauze applied to site.    Assessment/Plan: 75 year old female with thrombosed external hemorrhoid. This has been present for 2 months but was very painful for her so we discussed excision at the bedside. She tolerated this well. Old clots present but no necrosis or infection. Will have her follow up in clinic in one week for recheck or anytime in the meantime with any questions or concerns. Patient's questions were answered to her stated satisfaction and she is in agreement with this plan.      Medical history:  Past Medical History:   Diagnosis Date     Adhesive  capsulitis of shoulder     Right shoulder tendonitis     Basal cell carcinoma      Displacement of cervical intervertebral disc without myelopathy      Esophageal reflux      Major depression in complete remission (H) 6/22/2009    celexa caused spinning side effect      MENOPAUSE --ERT      OSTEOPENIA      Squamous cell carcinoma        Surgical history:  Past Surgical History:   Procedure Laterality Date     ABLATE VEIN VARICOSE RADIO FREQUENCY WITHOUT PHLEBECTOMY MULTIPLE STAB  3/28/2013    Procedure: ABLATE VEIN VARICOSE RADIO FREQUENCY WITHOUT PHLEBECTOMY MULTIPLE STAB;  Bilateral ablation of varicose veins legs-to ultrasound at 0930  ;  Surgeon: Noam Soliman MD;  Location: [facility]     C ANESTH,LOWER ARM SURGERY  1999    ulnar nerve decompression - right     COLONOSCOPY  8/20/2013    Procedure: COLONOSCOPY;  Colonoscopy;  Surgeon: Yuliya Nathan MD;  Location: [facility]     ESOPHAGOSCOPY, GASTROSCOPY, DUODENOSCOPY (EGD), COMBINED N/A 5/12/2015    Procedure: COMBINED ESOPHAGOSCOPY, GASTROSCOPY, DUODENOSCOPY (EGD), BIOPSY SINGLE OR MULTIPLE;  Surgeon: Yuliya Nathan MD;  Location: [facility]     ESOPHAGOSCOPY, GASTROSCOPY, DUODENOSCOPY (EGD), COMBINED N/A 1/31/2017    Procedure: COMBINED ESOPHAGOSCOPY, GASTROSCOPY, DUODENOSCOPY (EGD), BIOPSY SINGLE OR MULTIPLE;  Surgeon: Qasim Bowie MD;  Location: [facility]     EXCISE MASS FINGER Right 2/19/2019    Procedure: Excision Right Ring Finger Volar Middle Phalanx Mass;  Surgeon: Jake Viveros MD;  Location: [facility]     HYSTERECTOMY, PAP NO LONGER INDICATED      has both ovaries out also      HYSTERECTOMY, VAGINAL  1988    Hysterectomy, oophorectomy     PHACOEMULSIFICATION WITH STANDARD INTRAOCULAR LENS IMPLANT Left 3/18/2019    Procedure: Cataract Removal with Implant;  Surgeon: Chapito Phillips MD;  Location: [facility]     PHACOEMULSIFICATION WITH STANDARD INTRAOCULAR LENS IMPLANT Right 4/10/2019    Procedure: Cataract Removal with  Implant;  Surgeon: Chapito Phillips MD;  Location: [facility]     RELEASE TRIGGER FINGER Right 6/23/2017    Procedure: RELEASE TRIGGER FINGER;  Right Thumb A1 Pulley Release;  Surgeon: Jake Viveros MD;  Location: [facility]     SURGICAL HISTORY OF -   2009    right retromastoid craniectomy and decompression trigeminal nerve     TONSILLECTOMY & ADENOIDECTOMY  child    T&A        Problem list:    Patient Active Problem List    Diagnosis Date Noted     Dense breast tissue on mammogram 09/27/2017     Priority: Medium     Chronic constipation 06/01/2017     Priority: Medium     Gastroesophageal reflux disease without esophagitis 11/17/2016     Priority: Medium     Subjective tinnitus, bilateral 04/04/2016     Priority: Medium     Trochanteric bursitis 09/25/2013     Priority: Medium     Health Care Home 08/13/2013     Priority: Medium     Keturah Granados RN-PHN   [phone]  FPA / [facility] Care Coordinator /                   Intercostal neuralgia 10/22/2012     Priority: Medium     Advanced directives, counseling/discussion 10/04/2012     Priority: Medium     Advance Directive received and scanned. Click on Code in the patient header to view. 10/4/2012  Health care directive scanned under the media tab.    Patient has completed an Advance/Health Care Directive (HCD), scanned into Epic Media tab, entry date of 10/4/2012.    Angelita Sommer  March 21, 2019         Hyperlipidemia LDL goal <160 10/31/2010     Priority: Medium     Trigeminal Neuralgia right  06/22/2009     Priority: Medium     Surgery 8/3/09 at    Right retromastoid craniectomy and microvascular decompression of the trigeminal nerve.   DX 2005        Right hip pain 12/15/2008     Priority: Medium     Sensorineural hearing loss 03/01/2007     Priority: Medium     Problem list name updated by automated process. Provider to review       Enthesopathy of hip region 10/03/2006     Priority: Medium     Disorder of bone and  cartilage 04/25/2005     Priority: Medium     Problem list name updated by automated process. Provider to review         Medications:  Current Outpatient Medications   Medication Sig Dispense Refill     acetaminophen (TYLENOL) 325 MG tablet Take 325 mg by mouth once       Ascorbic Acid (VITAMIN C ER PO) Take 1 tablet by mouth daily        Calcium Carb-Cholecalciferol (CALCIUM + D3) 600-200 MG-UNIT TABS Take 1 tablet by mouth daily       diclofenac (VOLTAREN) 1 % topical gel Apply 2 g topically 4 times daily 350 g 3     hydrocortisone, Perianal, (HYDROCORTISONE) 2.5 % cream Place rectally 2 times daily as needed for hemorrhoids 30 g 1     Lactobacillus (ACIDOPHILUS) CAPS Take 1 tablet by mouth daily       lidocaine (XYLOCAINE) 2 % external gel Apply topically 2 times daily 30 mL 1     melatonin 5 MG CAPS Take 5 mg by mouth At Bedtime 1 capsule 0     MULTI-VITAMIN OR TABS 1 TABLET DAILY       Omega-3 Fatty Acids (OMEGA-3 FISH OIL PO) Take 1 capsule by mouth daily       omeprazole (PRILOSEC) 40 MG DR capsule Take 1 capsule by mouth once daily 90 capsule 3     simvastatin (ZOCOR) 20 MG tablet Take 1 tablet (20 mg) by mouth At Bedtime 90 tablet 3     VITAMIN D OR 1 DAILY       estradiol (ESTRACE) 0.1 MG/GM vaginal cream Place vaginally twice a week (Patient not taking: Reported on 9/22/2021) 43 g 4     simethicone (MYLICON) 80 MG chewable tablet Take 1 tablet (80 mg) by mouth every 6 hours as needed for flatulence or cramping (Patient not taking: Reported on 9/22/2021) 20 tablet 0       Allergies:  Allergies   Allergen Reactions     Biaxin [Clarithromycin]      per pt       Celebrex [Celecoxib]      per pt     Cipro [Ciprofloxacin]      per pt     Darvocet [Propoxyphene N-Apap]      Fleet Phospho Soda [Sodium Phosphate/Biphosphate]      nausea per pt     Morphine      Neurontin [Gabapentin]      per pt     Nortriptyline      per pt     Percocet [Oxycodone-Acetaminophen]      Serzone [Nefazodone Hydrochloride]      per pt  "    Tegretol [Carbamazepine]      Vicodin [Acetaminophen]      per pt-dizziness     Vioxx      per pt     Vivactil [Protriptyline Hcl]      per pt     Wellbutrin [Bupropion Hcl]      Zithromax [Azithromycin Dihydrate]        Family history:  Family History   Problem Relation Age of Onset     Alzheimer Disease Mother          at age 85     Osteoporosis Mother      Arthritis Mother      Alcohol/Drug Father      Respiratory Father      Eye Disorder Maternal Grandfather         Macular degneration     C.A.D. Brother         Tripple bypass age 57     Cardiovascular Brother      Cancer Brother         leukemia (ALL)     Cardiovascular Brother      Cardiovascular Brother      Neurologic Disorder Son      Heart Disease Son      Melanoma No family hx of      Skin Cancer No family hx of        Social history:  Social History     Tobacco Use     Smoking status: Never Smoker     Smokeless tobacco: Never Used   Substance Use Topics     Alcohol use: No    Marital status: .  Nursing Notes:   Rosaline Sanchez CMA  2021 10:28 AM  Signed  Chief Complaint   Patient presents with     New Patient     New consult for hemorrhoids       Vitals:    21 1024   BP: (!) 140/70   BP Location: Left arm   Patient Position: Sitting   Cuff Size: Adult Regular   Pulse: 61   Temp: 97.9  F (36.6  C)   TempSrc: Oral   SpO2: 100%   Weight: 134 lb 12.8 oz   Height: 5' 3\"       Body mass index is 23.88 kg/m .          Rosaline Santiago CMA         30 minutes spent on the date of the encounter doing chart review, history and exam, documentation and further activities as noted above.     VICKY Moreno, NP-C  Colon and Rectal Surgery   [facility]    This note was created using speech recognition software and may contain unintended word substitutions.        Again, thank you for allowing me to participate in the care of your patient.      Sincerely,    Judith Cornell" VICKY Kaufman CNP

## 2021-09-23 NOTE — NURSING NOTE
"Chief Complaint   Patient presents with     New Patient     New consult for hemorrhoids       Vitals:    09/23/21 1024   BP: (!) 140/70   BP Location: Left arm   Patient Position: Sitting   Cuff Size: Adult Regular   Pulse: 61   Temp: 97.9  F (36.6  C)   TempSrc: Oral   SpO2: 100%   Weight: 134 lb 12.8 oz   Height: 5' 3\"       Body mass index is 23.88 kg/m .          Rosaline Santiago CMA    "

## 2021-09-24 ENCOUNTER — HOSPITAL ENCOUNTER (OUTPATIENT)
Dept: PHYSICAL THERAPY | Facility: CLINIC | Age: 76
Setting detail: THERAPIES SERIES
End: 2021-09-24
Attending: FAMILY MEDICINE
Payer: COMMERCIAL

## 2021-09-24 PROCEDURE — 97161 PT EVAL LOW COMPLEX 20 MIN: CPT | Mod: GP | Performed by: PHYSICAL THERAPIST

## 2021-09-24 PROCEDURE — 97110 THERAPEUTIC EXERCISES: CPT | Mod: GP | Performed by: PHYSICAL THERAPIST

## 2021-09-24 PROCEDURE — 97140 MANUAL THERAPY 1/> REGIONS: CPT | Mod: GP | Performed by: PHYSICAL THERAPIST

## 2021-09-24 NOTE — PROGRESS NOTES
[facility]          OUTPATIENT PHYSICAL THERAPY ORTHOPEDIC EVALUATION  PLAN OF TREATMENT FOR OUTPATIENT REHABILITATION  (COMPLETE FOR INITIAL CLAIMS ONLY)  Patient's Last Name, First Name, M.I.  YOB: 1945  HamillGuillermoe  JENNA    Provider s Name:  [facility]   Medical Record No.  [id number]   Start of Care Date:  09/24/21   Onset Date:  09/20/21   Type:     _X__PT   ___OT   ___SLP Medical Diagnosis:  RT C6 Radiculopathy     PT Diagnosis:  Impaired posture and mvmt through Thoracic spine; possibly referred pain from CVJ's;  Mechanical in nature; C8 radiculopathy.    Visits from SOC:  1      _________________________________________________________________________________  Plan of Treatment/Functional Goals:  joint mobilization, manual therapy, motor coordination training, neuromuscular re-education, strengthening, stretching     Cryotherapy, Hot packs, TENS, Traction, Ultrasound     Goals  Goal Identifier: STG  Goal Description: 1)Pt will practice proper posture activities to report 50% reduction in radicular symptoms, in 3 weeks.   Target Date: 10/15/21    Goal Identifier: STG  Goal Description: 2)Pt will report 4/10 pain with HH chores in 3 weeks.   Target Date: 10/15/21    Goal Identifier: LTG  Goal Description: 3)Pt will report 2/10 or less pain/symptoms with driving, in 6 weeks.   Target Date: 11/05/21    Goal Identifier: LTG  Goal Description: 4) Pt will report sleeping through the nite without pain waking her, in 6 weeks.   Target Date: 11/05/21    Goal Identifier: LTG  Goal Description: 5)Pt will be indep in HEP to prevent return of symptoms, in 6 weeks.   Target Date: 11/05/21          Therapy Frequency:  1 time/week  Predicted Duration of Therapy Intervention:  6 weeks, for up to 6 sessions    Jazlyn Santiago, PT                 I CERTIFY THE NEED FOR THESE SERVICES FURNISHED UNDER        THIS PLAN OF TREATMENT AND WHILE UNDER MY  CARE .             Physician Signature               Date    X_____________________________________________________                             Certification Date From:  09/24/21   Certification Date To:  11/05/21    Referring Provider:  Daryl Abel    Initial Assessment        See Epic Evaluation Start of Care Date: 09/24/21

## 2021-09-24 NOTE — PROGRESS NOTES
"Physical Therapy Initial Cervical Evaluation     09/24/21 0900   General Information   Type of Visit Initial OP Ortho PT Evaluation   Start of Care Date 09/24/21   Referring Physician Daryl Abel   Patient/Family Goals Statement Get rid of pain   Orders Evaluate and Treat   Orders Comment Treatment: active dynamic cervical spine stabilization program focusing on postural retraining exercises, core strengthening, stretching ROM exers, conditioning, optimization of the patient's aerobic index, cervical MFR, TrP release. Modalities: traction, heat, US and/or TENS, and institution of HEP.    Date of Order 09/20/21   Certification Required? Yes   Medical Diagnosis RT C6 Radiculopathy   Surgical/Medical history reviewed Yes   Precautions/Limitations no known precautions/limitations   General Information Comments PMHx: Depression, adhesive capsulitis (R) shoulder, Displacement of cervical intervertebral disc without myelopathy, trigeminal nerve decompression with craniectomy 2009, hysterectomy, trigger finger release (R), osteopenia   Body Part(s)   Body Part(s) Cervical Spine   Presentation and Etiology   Pertinent history of current problem (include personal factors and/or comorbidities that impact the POC) Pt has had her symptoms for \"months\" and was seeing a chiropractor who then referred her to see an orthopedic specialist for shoulder issue.  MD dx'd with C6 Cervical Radiculopathy and is now referred to PT.  Pt has primary c/o posterior (R) shoulder pain that goes down her (R) arm and makes the 4th and 5th digits go numb.     Impairments A. Pain;K. Numbness;L. Tingling   Functional Limitations perform activities of daily living;perform desired leisure / sports activities   Symptom Location posterior (R) shoulder   How/Where did it occur From insidious onset   Onset date of current episode/exacerbation 09/20/21   Chronicity Chronic   Best (/10) 5   Worst (/10) 7   Pain quality C. Aching  (catches in (R) shoulder if " "lifting heavier items)   Frequency of pain/symptoms A. Constant  (worse with activity)   Pain/symptoms are: The same all the time   Pain/symptoms exacerbated by C. Lifting;G. Certain positions;K. Home tasks   Pain/symptoms eased by D. Nothing   Progression of symptoms since onset: Worsened  (numbness in 2 fingers more often)   Current / Previous Interventions   Diagnostic Tests: MRI;X-ray   X-ray Results unremarkable   MRI Results Results   MRI results See chart for results from 8/2021   Prior Level of Function   Functional Level Prior Comment Has had her symptoms for \"months\"    Current Level of Function   Current Community Support Family/friend caregiver   Patient role/employment history F. Retired   Living environment [place]   Fall Risk Screen   Fall screen completed by PT   Have you fallen 2 or more times in the past year? No   Have you fallen and had an injury in the past year? No   Is patient a fall risk? No   Abuse Screen (yes response referral indicated)   Feels Unsafe at Home or Work/School no   Feels Threatened by Someone no   Does Anyone Try to Keep You From Having Contact with Others or Doing Things Outside Your Home? no   Physical Signs of Abuse Present no   Cervical Spine   Observation Rubs arm, holds (R) arm at side, guards when demos movement   Integumentary  No bruising, no edema   Posture Fwd head, rounded shoulders   Cervical Flexion ROM WNL   Cervical Extension ROM WNL   Cervical Right Side Bending ROM 25*   Cervical Left Side Bending ROM 25*   Cervical Right Rotation ROM 35*   Cervical Left Rotation ROM 35*   Thoracic Flexion ROM Fingertips to floor in sitting, but has significant (R) rib \"hump\"   Thoracic Extension ROM Maintains kyphosis with extension attempt   Thoracic Right Side Bending ROM 25*   Thoracic Left Sidebending ROM  35*   Shoulder AROM Screen WFL AROM (B) shoulders no increase in pain   Cervical/Thoracic/Shoulder ROM Comments \"catches\" with return of (R) arm to side after " "abd   Shoulder Shrug (C2-C4) Strength 5/5   Shoulder Abd (C5) Strength 5/5 (B)   Shoulder Add (C7) Strength 5/5 (B)   Shoulder ER (C5, C6) Strength 5-/5 (B)   Shoulder IR (C5, C6) Strength 5/5 (B)   Elbow Flexion (C5, C6) Strength 5/5 (B)   Elbow Extension (C7) Strength 5/5 (B)   Wrist Extension (C6) Strength 5-/5 (B)   Wrist Flexion (C7) Strength 5/5 (B)   Thumb Abd (C8) Strength 5/5 (B)   5th Finger Add (T1) Strength 5/5 (B)   Shoulder/Wrist/Hand Strength Comments no c/o pain with MMT   Levator Scapula Flexibility Tight (R) > (L)   Pectoralis Minor Flexibility Tight (R) > (L)   Cervical Rotation/Lateral Flexion Test made numbness \"a bit worse\"   Cervical/Shoulder Special Tests Comments deferred shoulder Special Tests   Palpation Tension and guarding throughout UT, supraspinatus, levator, MT and LT (R) > (L). Tender at Teres minor and infraspinatus insertions (R)   Dermatome/Sensory Testing T8 dermatome is pain and numbness area   Biceps Reflexes WNL (B)   Brachioradialis Reflexes Intact (B)   Neurological Testing Comments Numbness in 4th and 5th digits throughout eval, not changed with reflex testing   Adson's (scalene outlet) did not change sx's   Cayla's (costoclavicular outlet) did not change sx's   Chago's (pec minor outlet) did not change sx's   Planned Therapy Interventions   Planned Therapy Interventions joint mobilization;manual therapy;motor coordination training;neuromuscular re-education;strengthening;stretching   Planned Modality Interventions   Planned Modality Interventions Cryotherapy;Hot packs;TENS;Traction;Ultrasound   Clinical Impression   Criteria for Skilled Therapeutic Interventions Met yes, treatment indicated   PT Diagnosis Impaired posture and mvmt through Thoracic spine; possibly referred pain from CVJ's;  Mechanical in nature; C8 radiculopathy.    Influenced by the following impairments pain, poor posture, weak core muscles, decreased flexiblity   Functional limitations due to impairments " "Lifting, HH Chores, sleep is disrupted   Clinical Presentation Stable/Uncomplicated   Clinical Presentation Rationale longstanding pain/numbness in (R) UE; predcitable pain pattern; multiple comorbidities   Clinical Decision Making (Complexity) Low complexity   Therapy Frequency 1 time/week   Predicted Duration of Therapy Intervention (days/wks) 6 weeks, for up to 6 sessions   Risk & Benefits of therapy have been explained Yes   Patient, Family & other staff in agreement with plan of care Yes   Clinical Impression Comments Pt presents with insidious onset of posterior (R) shoulder/GHJ pain into posterior (R) UE, with numbness into 4th and 5th digits of (R) hand.  Pt had been seeing chiropractor for \"shoulder\" and was told to see Ortho Specialist and referred to PT.  Presents as C8 radiculopathy, MRI shows C5-6 disc bulge and stenosis.  Pt could benefit from skilled PT to improve posture, flexibility, and core strength to reduce pain and readiculopathy sx's.    Education Assessment   Preferred Learning Style Listening;Reading;Demonstration;Pictures/video   Barriers to Learning No barriers   Ortho Goal 1   Goal Identifier STG   Goal Description 1)Pt will practice proper posture activities to report 50% reduction in radicular symptoms, in 3 weeks.    Target Date 10/15/21   Ortho Goal 2   Goal Identifier STG   Goal Description 2)Pt will report 4/10 pain with HH chores in 3 weeks.    Target Date 10/15/21   Ortho Goal 3   Goal Identifier LTG   Goal Description 3)Pt will report 2/10 or less pain/symptoms with driving, in 6 weeks.    Target Date 11/05/21   Ortho Goal 4   Goal Identifier LTG   Goal Description 4) Pt will report sleeping through the nite without pain waking her, in 6 weeks.    Target Date 11/05/21   Ortho Goal 5   Goal Identifier LTG   Goal Description 5)Pt will be indep in HEP to prevent return of symptoms, in 6 weeks.    Target Date 11/05/21   Total Evaluation Time   PT Kira Low Complexity Minutes (67970) " 35   Therapy Certification   Certification date from 09/24/21   Certification date to 11/05/21   Medical Diagnosis RT C6 Radiculopathy   Thank you for the referral of this patient.  Jazlyn Santiago, PT, MA  #[id number]

## 2021-09-27 LAB
PATH REPORT.COMMENTS IMP SPEC: NORMAL
PATH REPORT.FINAL DX SPEC: NORMAL
PATH REPORT.GROSS SPEC: NORMAL
PATH REPORT.MICROSCOPIC SPEC OTHER STN: NORMAL
PATH REPORT.RELEVANT HX SPEC: NORMAL
PHOTO IMAGE: NORMAL

## 2021-10-01 ENCOUNTER — OFFICE VISIT (OUTPATIENT)
Dept: SURGERY | Facility: CLINIC | Age: 76
End: 2021-10-01
Payer: COMMERCIAL

## 2021-10-01 VITALS
OXYGEN SATURATION: 98 % | TEMPERATURE: 98 F | WEIGHT: 134.6 LBS | HEART RATE: 67 BPM | DIASTOLIC BLOOD PRESSURE: 71 MMHG | HEIGHT: 63 IN | SYSTOLIC BLOOD PRESSURE: 134 MMHG | BODY MASS INDEX: 23.85 KG/M2

## 2021-10-01 DIAGNOSIS — K64.5 THROMBOSED EXTERNAL HEMORRHOIDS: Primary | ICD-10-CM

## 2021-10-01 PROCEDURE — 99024 POSTOP FOLLOW-UP VISIT: CPT | Performed by: NURSE PRACTITIONER

## 2021-10-01 ASSESSMENT — MIFFLIN-ST. JEOR: SCORE: 1064.67

## 2021-10-01 ASSESSMENT — PAIN SCALES - GENERAL: PAINLEVEL: MILD PAIN (2)

## 2021-10-01 NOTE — LETTER
"10/1/2021       RE: Mariah Jacinto  [address] [place] [address]     Dear Colleague,    Thank you for referring your patient, Mariah Jacinto, to the [facility] COLON AND RECTAL SURGERY CLINIC [place] at [facility]. Please see a copy of my visit note below.      Colon and Rectal Surgery Follow-Up Clinic Note    RE: Mariah Jacinto  : 1945  AMY: 10/1/2021    Mariah Jacinto is a very pleasant 76 year old female here for follow-up after excision of thrombosed external hemorrhoid in clinic last week.     Interval history: Mariah has been doing well. Only a small amount of spotting blood. No pain. Bowel movements are normal and soft.    Physical Examination:   /71 (BP Location: Right arm, Patient Position: Sitting, Cuff Size: Adult Regular)   Pulse 67   Temp 98  F (36.7  C) (Oral)   Ht 5' 3\"   Wt 134 lb 9.6 oz   SpO2 98%   BMI 23.84 kg/m    General: Alert, oriented, in no acute distress  HEENT:mucous membranes moist  Perianal external examination:  Surgical site in the left posterior position almost completely healed without erythema or drainage.    Digital rectal examination: Was deferred.    Anoscopy: Was deferred.    Assessment/Plan: 76 year old female with s/p excision of thrombosed external hemorrhoid in clinic last week. She is healing well. Excision site is almost completely healed. Continue to avoid constipation or straining. She can follow up as needed. Patient's questions were answered to her stated satisfaction and she is in agreement with this plan.      Medical history:  Past Medical History:   Diagnosis Date     Adhesive capsulitis of shoulder     Right shoulder tendonitis     Basal cell carcinoma      Displacement of cervical intervertebral disc without myelopathy      Esophageal reflux      Major depression in complete remission (H) 2009    celexa caused spinning side effect      MENOPAUSE --ERT      " OSTEOPENIA      Squamous cell carcinoma        Surgical history:  Past Surgical History:   Procedure Laterality Date     ABLATE VEIN VARICOSE RADIO FREQUENCY WITHOUT PHLEBECTOMY MULTIPLE STAB  3/28/2013    Procedure: ABLATE VEIN VARICOSE RADIO FREQUENCY WITHOUT PHLEBECTOMY MULTIPLE STAB;  Bilateral ablation of varicose veins legs-to ultrasound at 0930  ;  Surgeon: Noam Soliman MD;  Location: [facility]     C ANESTH,LOWER ARM SURGERY  1999    ulnar nerve decompression - right     COLONOSCOPY  8/20/2013    Procedure: COLONOSCOPY;  Colonoscopy;  Surgeon: Yuliya Nathan MD;  Location: [facility]     ESOPHAGOSCOPY, GASTROSCOPY, DUODENOSCOPY (EGD), COMBINED N/A 5/12/2015    Procedure: COMBINED ESOPHAGOSCOPY, GASTROSCOPY, DUODENOSCOPY (EGD), BIOPSY SINGLE OR MULTIPLE;  Surgeon: Yuliya Nathan MD;  Location: [facility]     ESOPHAGOSCOPY, GASTROSCOPY, DUODENOSCOPY (EGD), COMBINED N/A 1/31/2017    Procedure: COMBINED ESOPHAGOSCOPY, GASTROSCOPY, DUODENOSCOPY (EGD), BIOPSY SINGLE OR MULTIPLE;  Surgeon: Qasim Bowie MD;  Location: [facility]     EXCISE MASS FINGER Right 2/19/2019    Procedure: Excision Right Ring Finger Volar Middle Phalanx Mass;  Surgeon: Jake Viveros MD;  Location: [facility]     HYSTERECTOMY, PAP NO LONGER INDICATED      has both ovaries out also      HYSTERECTOMY, VAGINAL  1988    Hysterectomy, oophorectomy     PHACOEMULSIFICATION WITH STANDARD INTRAOCULAR LENS IMPLANT Left 3/18/2019    Procedure: Cataract Removal with Implant;  Surgeon: Chapito Phillips MD;  Location: [facility]     PHACOEMULSIFICATION WITH STANDARD INTRAOCULAR LENS IMPLANT Right 4/10/2019    Procedure: Cataract Removal with Implant;  Surgeon: Chapito Phillips MD;  Location: [facility]     RELEASE TRIGGER FINGER Right 6/23/2017    Procedure: RELEASE TRIGGER FINGER;  Right Thumb A1 Pulley Release;  Surgeon: Jake Viveros MD;  Location: [facility]     SURGICAL HISTORY OF -   2009    right retromastoid craniectomy  and decompression trigeminal nerve     TONSILLECTOMY & ADENOIDECTOMY  child    T&A        Problem list:  Patient Active Problem List    Diagnosis Date Noted     Dense breast tissue on mammogram 09/27/2017     Priority: Medium     Chronic constipation 06/01/2017     Priority: Medium     Gastroesophageal reflux disease without esophagitis 11/17/2016     Priority: Medium     Subjective tinnitus, bilateral 04/04/2016     Priority: Medium     Trochanteric bursitis 09/25/2013     Priority: Medium     Health Care Home 08/13/2013     Priority: Medium     Keturah Granados RN-PHN   [phone]  FPA / [facility] Care Coordinator /                   Intercostal neuralgia 10/22/2012     Priority: Medium     Advanced directives, counseling/discussion 10/04/2012     Priority: Medium     Advance Directive received and scanned. Click on Code in the patient header to view. 10/4/2012  Health care directive scanned under the media tab.    Patient has completed an Advance/Health Care Directive (HCD), scanned into Epic Media tab, entry date of 10/4/2012.    Angelita Sommer  March 21, 2019         Hyperlipidemia LDL goal <160 10/31/2010     Priority: Medium     Trigeminal Neuralgia right  06/22/2009     Priority: Medium     Surgery 8/3/09 at    Right retromastoid craniectomy and microvascular decompression of the trigeminal nerve.   DX 2005        Right hip pain 12/15/2008     Priority: Medium     Sensorineural hearing loss 03/01/2007     Priority: Medium     Problem list name updated by automated process. Provider to review       Enthesopathy of hip region 10/03/2006     Priority: Medium     Disorder of bone and cartilage 04/25/2005     Priority: Medium     Problem list name updated by automated process. Provider to review         Medications:  Current Outpatient Medications   Medication Sig Dispense Refill     acetaminophen (TYLENOL) 325 MG tablet Take 325 mg by mouth once       Ascorbic Acid (VITAMIN C ER PO)  Take 1 tablet by mouth daily        Calcium Carb-Cholecalciferol (CALCIUM + D3) 600-200 MG-UNIT TABS Take 1 tablet by mouth daily       diclofenac (VOLTAREN) 1 % topical gel Apply 2 g topically 4 times daily 350 g 3     hydrocortisone, Perianal, (HYDROCORTISONE) 2.5 % cream Place rectally 2 times daily as needed for hemorrhoids 30 g 1     Lactobacillus (ACIDOPHILUS) CAPS Take 1 tablet by mouth daily       lidocaine (XYLOCAINE) 2 % external gel Apply topically 2 times daily 30 mL 1     melatonin 5 MG CAPS Take 5 mg by mouth At Bedtime 1 capsule 0     MULTI-VITAMIN OR TABS 1 TABLET DAILY       Omega-3 Fatty Acids (OMEGA-3 FISH OIL PO) Take 1 capsule by mouth daily       omeprazole (PRILOSEC) 40 MG DR capsule Take 1 capsule by mouth once daily 90 capsule 3     simvastatin (ZOCOR) 20 MG tablet Take 1 tablet (20 mg) by mouth At Bedtime 90 tablet 3     VITAMIN D OR 1 DAILY       estradiol (ESTRACE) 0.1 MG/GM vaginal cream Place vaginally twice a week (Patient not taking: Reported on 9/22/2021) 43 g 4     simethicone (MYLICON) 80 MG chewable tablet Take 1 tablet (80 mg) by mouth every 6 hours as needed for flatulence or cramping (Patient not taking: Reported on 9/22/2021) 20 tablet 0       Allergies:  Allergies   Allergen Reactions     Biaxin [Clarithromycin]      per pt       Celebrex [Celecoxib]      per pt     Cipro [Ciprofloxacin]      per pt     Darvocet [Propoxyphene N-Apap]      Fleet Phospho Soda [Sodium Phosphate/Biphosphate]      nausea per pt     Morphine      Neurontin [Gabapentin]      per pt     Nortriptyline      per pt     Percocet [Oxycodone-Acetaminophen]      Serzone [Nefazodone Hydrochloride]      per pt     Tegretol [Carbamazepine]      Vicodin [Acetaminophen]      per pt-dizziness     Vioxx      per pt     Vivactil [Protriptyline Hcl]      per pt     Wellbutrin [Bupropion Hcl]      Zithromax [Azithromycin Dihydrate]        Family history:  Family History   Problem Relation Age of Onset     Alzheimer  "Disease Mother          at age 85     Osteoporosis Mother      Arthritis Mother      Alcohol/Drug Father      Respiratory Father      Eye Disorder Maternal Grandfather         Macular degneration     C.A.D. Brother         Tripple bypass age 57     Cardiovascular Brother      Cancer Brother         leukemia (ALL)     Cardiovascular Brother      Cardiovascular Brother      Neurologic Disorder Son      Heart Disease Son      Melanoma No family hx of      Skin Cancer No family hx of        Social history:  Social History     Tobacco Use     Smoking status: Never Smoker     Smokeless tobacco: Never Used   Substance Use Topics     Alcohol use: No     Marital status: .  Nursing Notes:   Rosaline Sanchez CMA  10/1/2021 10:21 AM  Signed  Chief Complaint   Patient presents with     Follow Up     One week follow up after excision of thrombosed hemorrhoid       Vitals:    10/01/21 1017   BP: 134/71   BP Location: Right arm   Patient Position: Sitting   Cuff Size: Adult Regular   Pulse: 67   Temp: 98  F (36.7  C)   TempSrc: Oral   SpO2: 98%   Weight: 134 lb 9.6 oz   Height: 5' 3\"       Body mass index is 23.84 kg/m .          Rosaline Santiago CMA        10 minutes spent on the date of the encounter doing chart review, history and exam, documentation and further activities as noted above.     Judith Schultz, NP-C  Colon and Rectal Surgery  [facility]        Again, thank you for allowing me to participate in the care of your patient.      Sincerely,    Judith Schultz, APRN CNP      "

## 2021-10-01 NOTE — NURSING NOTE
"Chief Complaint   Patient presents with     Follow Up     One week follow up after excision of thrombosed hemorrhoid       Vitals:    10/01/21 1017   BP: 134/71   BP Location: Right arm   Patient Position: Sitting   Cuff Size: Adult Regular   Pulse: 67   Temp: 98  F (36.7  C)   TempSrc: Oral   SpO2: 98%   Weight: 134 lb 9.6 oz   Height: 5' 3\"       Body mass index is 23.84 kg/m .          Rosaline Santiago CMA    "

## 2021-10-01 NOTE — PROGRESS NOTES
"  Colon and Rectal Surgery Follow-Up Clinic Note    RE: Mariah Jacinto  : 1945  AMY: 10/1/2021    Mariah Jacinto is a very pleasant 76 year old female here for follow-up after excision of thrombosed external hemorrhoid in clinic last week.     Interval history: Mariah has been doing well. Only a small amount of spotting blood. No pain. Bowel movements are normal and soft.    Physical Examination:   /71 (BP Location: Right arm, Patient Position: Sitting, Cuff Size: Adult Regular)   Pulse 67   Temp 98  F (36.7  C) (Oral)   Ht 5' 3\"   Wt 134 lb 9.6 oz   SpO2 98%   BMI 23.84 kg/m    General: Alert, oriented, in no acute distress  HEENT:mucous membranes moist  Perianal external examination:  Surgical site in the left posterior position almost completely healed without erythema or drainage.    Digital rectal examination: Was deferred.    Anoscopy: Was deferred.    Assessment/Plan: 76 year old female with s/p excision of thrombosed external hemorrhoid in clinic last week. She is healing well. Excision site is almost completely healed. Continue to avoid constipation or straining. She can follow up as needed. Patient's questions were answered to her stated satisfaction and she is in agreement with this plan.      Medical history:  Past Medical History:   Diagnosis Date     Adhesive capsulitis of shoulder     Right shoulder tendonitis     Basal cell carcinoma      Displacement of cervical intervertebral disc without myelopathy      Esophageal reflux      Major depression in complete remission (H) 2009    celexa caused spinning side effect      MENOPAUSE --ERT      OSTEOPENIA      Squamous cell carcinoma        Surgical history:  Past Surgical History:   Procedure Laterality Date     ABLATE VEIN VARICOSE RADIO FREQUENCY WITHOUT PHLEBECTOMY MULTIPLE STAB  3/28/2013    Procedure: ABLATE VEIN VARICOSE RADIO FREQUENCY WITHOUT PHLEBECTOMY MULTIPLE STAB;  Bilateral ablation of varicose veins legs-to " ultrasound at 0930  ;  Surgeon: Noam Soliman MD;  Location: [facility]     C ANESTH,LOWER ARM SURGERY  1999    ulnar nerve decompression - right     COLONOSCOPY  8/20/2013    Procedure: COLONOSCOPY;  Colonoscopy;  Surgeon: Yuliya Nathan MD;  Location: [facility]     ESOPHAGOSCOPY, GASTROSCOPY, DUODENOSCOPY (EGD), COMBINED N/A 5/12/2015    Procedure: COMBINED ESOPHAGOSCOPY, GASTROSCOPY, DUODENOSCOPY (EGD), BIOPSY SINGLE OR MULTIPLE;  Surgeon: Yuliya Nathan MD;  Location: [facility]     ESOPHAGOSCOPY, GASTROSCOPY, DUODENOSCOPY (EGD), COMBINED N/A 1/31/2017    Procedure: COMBINED ESOPHAGOSCOPY, GASTROSCOPY, DUODENOSCOPY (EGD), BIOPSY SINGLE OR MULTIPLE;  Surgeon: Qasim Bowie MD;  Location: [facility]     EXCISE MASS FINGER Right 2/19/2019    Procedure: Excision Right Ring Finger Volar Middle Phalanx Mass;  Surgeon: Jake Viveros MD;  Location: [facility]     HYSTERECTOMY, PAP NO LONGER INDICATED      has both ovaries out also      HYSTERECTOMY, VAGINAL  1988    Hysterectomy, oophorectomy     PHACOEMULSIFICATION WITH STANDARD INTRAOCULAR LENS IMPLANT Left 3/18/2019    Procedure: Cataract Removal with Implant;  Surgeon: Chaptio Phillips MD;  Location: [facility]     PHACOEMULSIFICATION WITH STANDARD INTRAOCULAR LENS IMPLANT Right 4/10/2019    Procedure: Cataract Removal with Implant;  Surgeon: Chapito Phillips MD;  Location: [facility]     RELEASE TRIGGER FINGER Right 6/23/2017    Procedure: RELEASE TRIGGER FINGER;  Right Thumb A1 Pulley Release;  Surgeon: Jake Viveros MD;  Location: [facility]     SURGICAL HISTORY OF -   2009    right retromastoid craniectomy and decompression trigeminal nerve     TONSILLECTOMY & ADENOIDECTOMY  child    T&A        Problem list:  Patient Active Problem List    Diagnosis Date Noted     Dense breast tissue on mammogram 09/27/2017     Priority: Medium     Chronic constipation 06/01/2017     Priority: Medium     Gastroesophageal reflux disease without  esophagitis 11/17/2016     Priority: Medium     Subjective tinnitus, bilateral 04/04/2016     Priority: Medium     Trochanteric bursitis 09/25/2013     Priority: Medium     Health Care Home 08/13/2013     Priority: Medium     Keturah Granados RN-PHN   [phone]  FPA / [facility] Care Coordinator /                   Intercostal neuralgia 10/22/2012     Priority: Medium     Advanced directives, counseling/discussion 10/04/2012     Priority: Medium     Advance Directive received and scanned. Click on Code in the patient header to view. 10/4/2012  Health care directive scanned under the media tab.    Patient has completed an Advance/Health Care Directive (HCD), scanned into Epic Media tab, entry date of 10/4/2012.    Angelita Sommer  March 21, 2019         Hyperlipidemia LDL goal <160 10/31/2010     Priority: Medium     Trigeminal Neuralgia right  06/22/2009     Priority: Medium     Surgery 8/3/09 at    Right retromastoid craniectomy and microvascular decompression of the trigeminal nerve.   DX 2005        Right hip pain 12/15/2008     Priority: Medium     Sensorineural hearing loss 03/01/2007     Priority: Medium     Problem list name updated by automated process. Provider to review       Enthesopathy of hip region 10/03/2006     Priority: Medium     Disorder of bone and cartilage 04/25/2005     Priority: Medium     Problem list name updated by automated process. Provider to review         Medications:  Current Outpatient Medications   Medication Sig Dispense Refill     acetaminophen (TYLENOL) 325 MG tablet Take 325 mg by mouth once       Ascorbic Acid (VITAMIN C ER PO) Take 1 tablet by mouth daily        Calcium Carb-Cholecalciferol (CALCIUM + D3) 600-200 MG-UNIT TABS Take 1 tablet by mouth daily       diclofenac (VOLTAREN) 1 % topical gel Apply 2 g topically 4 times daily 350 g 3     hydrocortisone, Perianal, (HYDROCORTISONE) 2.5 % cream Place rectally 2 times daily as needed for  hemorrhoids 30 g 1     Lactobacillus (ACIDOPHILUS) CAPS Take 1 tablet by mouth daily       lidocaine (XYLOCAINE) 2 % external gel Apply topically 2 times daily 30 mL 1     melatonin 5 MG CAPS Take 5 mg by mouth At Bedtime 1 capsule 0     MULTI-VITAMIN OR TABS 1 TABLET DAILY       Omega-3 Fatty Acids (OMEGA-3 FISH OIL PO) Take 1 capsule by mouth daily       omeprazole (PRILOSEC) 40 MG DR capsule Take 1 capsule by mouth once daily 90 capsule 3     simvastatin (ZOCOR) 20 MG tablet Take 1 tablet (20 mg) by mouth At Bedtime 90 tablet 3     VITAMIN D OR 1 DAILY       estradiol (ESTRACE) 0.1 MG/GM vaginal cream Place vaginally twice a week (Patient not taking: Reported on 2021) 43 g 4     simethicone (MYLICON) 80 MG chewable tablet Take 1 tablet (80 mg) by mouth every 6 hours as needed for flatulence or cramping (Patient not taking: Reported on 2021) 20 tablet 0       Allergies:  Allergies   Allergen Reactions     Biaxin [Clarithromycin]      per pt       Celebrex [Celecoxib]      per pt     Cipro [Ciprofloxacin]      per pt     Darvocet [Propoxyphene N-Apap]      Fleet Phospho Soda [Sodium Phosphate/Biphosphate]      nausea per pt     Morphine      Neurontin [Gabapentin]      per pt     Nortriptyline      per pt     Percocet [Oxycodone-Acetaminophen]      Serzone [Nefazodone Hydrochloride]      per pt     Tegretol [Carbamazepine]      Vicodin [Acetaminophen]      per pt-dizziness     Vioxx      per pt     Vivactil [Protriptyline Hcl]      per pt     Wellbutrin [Bupropion Hcl]      Zithromax [Azithromycin Dihydrate]        Family history:  Family History   Problem Relation Age of Onset     Alzheimer Disease Mother          at age 85     Osteoporosis Mother      Arthritis Mother      Alcohol/Drug Father      Respiratory Father      Eye Disorder Maternal Grandfather         Macular degneration     C.A.D. Brother         Tripple bypass age 57     Cardiovascular Brother      Cancer Brother         leukemia (ALL)  "    Cardiovascular Brother      Cardiovascular Brother      Neurologic Disorder Son      Heart Disease Son      Melanoma No family hx of      Skin Cancer No family hx of        Social history:  Social History     Tobacco Use     Smoking status: Never Smoker     Smokeless tobacco: Never Used   Substance Use Topics     Alcohol use: No     Marital status: .  Nursing Notes:   Rosaline Sanchez CMA  10/1/2021 10:21 AM  Signed  Chief Complaint   Patient presents with     Follow Up     One week follow up after excision of thrombosed hemorrhoid       Vitals:    10/01/21 1017   BP: 134/71   BP Location: Right arm   Patient Position: Sitting   Cuff Size: Adult Regular   Pulse: 67   Temp: 98  F (36.7  C)   TempSrc: Oral   SpO2: 98%   Weight: 134 lb 9.6 oz   Height: 5' 3\"       Body mass index is 23.84 kg/m .          Rosaline Santiago CMA        10 minutes spent on the date of the encounter doing chart review, history and exam, documentation and further activities as noted above.     Judith Larkin NP-C  Colon and Rectal Surgery  [facility]    "

## 2021-10-04 ENCOUNTER — HOSPITAL ENCOUNTER (OUTPATIENT)
Dept: PHYSICAL THERAPY | Facility: CLINIC | Age: 76
Setting detail: THERAPIES SERIES
End: 2021-10-04
Attending: STUDENT IN AN ORGANIZED HEALTH CARE EDUCATION/TRAINING PROGRAM
Payer: COMMERCIAL

## 2021-10-04 PROCEDURE — 97110 THERAPEUTIC EXERCISES: CPT | Mod: GP | Performed by: PHYSICAL THERAPIST

## 2021-10-04 PROCEDURE — 97140 MANUAL THERAPY 1/> REGIONS: CPT | Mod: GP | Performed by: PHYSICAL THERAPIST

## 2021-10-05 ENCOUNTER — IMMUNIZATION (OUTPATIENT)
Dept: FAMILY MEDICINE | Facility: CLINIC | Age: 76
End: 2021-10-05
Payer: COMMERCIAL

## 2021-10-05 DIAGNOSIS — Z23 HIGH PRIORITY FOR 2019-NCOV VACCINE: Primary | ICD-10-CM

## 2021-10-05 PROCEDURE — 91300 COVID-19,PF,PFIZER (12+ YRS): CPT

## 2021-10-05 PROCEDURE — 99207 PR NO CHARGE NURSE ONLY: CPT

## 2021-10-05 PROCEDURE — 0003A COVID-19,PF,PFIZER (12+ YRS): CPT

## 2021-10-11 ENCOUNTER — HOSPITAL ENCOUNTER (OUTPATIENT)
Dept: PHYSICAL THERAPY | Facility: CLINIC | Age: 76
Setting detail: THERAPIES SERIES
End: 2021-10-11
Attending: STUDENT IN AN ORGANIZED HEALTH CARE EDUCATION/TRAINING PROGRAM
Payer: COMMERCIAL

## 2021-10-11 PROCEDURE — 97110 THERAPEUTIC EXERCISES: CPT | Mod: GP | Performed by: PHYSICAL THERAPIST

## 2021-10-11 PROCEDURE — 97140 MANUAL THERAPY 1/> REGIONS: CPT | Mod: GP | Performed by: PHYSICAL THERAPIST

## 2021-10-18 ENCOUNTER — HOSPITAL ENCOUNTER (OUTPATIENT)
Dept: PHYSICAL THERAPY | Facility: CLINIC | Age: 76
Setting detail: THERAPIES SERIES
End: 2021-10-18
Attending: STUDENT IN AN ORGANIZED HEALTH CARE EDUCATION/TRAINING PROGRAM
Payer: COMMERCIAL

## 2021-10-18 PROCEDURE — 97110 THERAPEUTIC EXERCISES: CPT | Mod: GP | Performed by: PHYSICAL THERAPIST

## 2021-10-18 PROCEDURE — 97012 MECHANICAL TRACTION THERAPY: CPT | Mod: GP | Performed by: PHYSICAL THERAPIST

## 2021-10-22 ENCOUNTER — HOSPITAL ENCOUNTER (OUTPATIENT)
Dept: PHYSICAL THERAPY | Facility: CLINIC | Age: 76
Setting detail: THERAPIES SERIES
End: 2021-10-22
Attending: STUDENT IN AN ORGANIZED HEALTH CARE EDUCATION/TRAINING PROGRAM
Payer: COMMERCIAL

## 2021-10-22 PROCEDURE — 97140 MANUAL THERAPY 1/> REGIONS: CPT | Mod: GP | Performed by: PHYSICAL THERAPIST

## 2021-10-25 ENCOUNTER — HOSPITAL ENCOUNTER (OUTPATIENT)
Dept: PHYSICAL THERAPY | Facility: CLINIC | Age: 76
Setting detail: THERAPIES SERIES
End: 2021-10-25
Attending: NURSE PRACTITIONER
Payer: COMMERCIAL

## 2021-10-25 PROCEDURE — 97012 MECHANICAL TRACTION THERAPY: CPT | Mod: GP | Performed by: PHYSICAL THERAPIST

## 2021-11-02 ENCOUNTER — HOSPITAL ENCOUNTER (OUTPATIENT)
Dept: PHYSICAL THERAPY | Facility: CLINIC | Age: 76
Setting detail: THERAPIES SERIES
End: 2021-11-02
Attending: NURSE PRACTITIONER
Payer: COMMERCIAL

## 2021-11-02 PROCEDURE — 97140 MANUAL THERAPY 1/> REGIONS: CPT | Mod: GP | Performed by: PHYSICAL THERAPIST

## 2021-11-02 PROCEDURE — 97110 THERAPEUTIC EXERCISES: CPT | Mod: GP | Performed by: PHYSICAL THERAPIST

## 2021-11-02 NOTE — PROGRESS NOTES
"Physical Therapy Progress Note and Medicare RECERTIFICATION    Mariah Jacinto  1945    Session Number: 7 ([facility]) since start of care.    Reasons for Continuing Treatment:   Pt had a set back with increased pain and HA's after trying traction.  Continues to have pain in impingement pattern at (R) shoulder.  Pt will benefit from cont skilled PT to work on impingement issue and revisit radicular symptoms after resolving the shoulder.      Frequency/Duration  2 times per week for 4 weeks for a total of 8 visits.    Medical Diagnosis:C6 radicular sx's, (R) shoulder pain    Onset Date:9/20/21    Start of Care Date: 9/24/21    Recertification Period  115/21 - 11/30/21    Physician Signature:    Date:    X_______________________________________________________    Physician Name: Daryl Abel MD    I certify the need for these services furnished under this plan of treatment and while under my care. Physician co-signature of this document indicates review and certification of the therapy plan.  This signature may be written on paper, or electronically signed within EPIC.        11/02/21 0800   Signing Clinician's Name / Credentials   Signing clinician's name / credentials Jazlyn Santiago, PT MA #[id number]   Session Number   Session Number 7 ([facility])   Authorization status Cert Req'd; 365   Progress Note/Recertification   Progress Note Due Date 11/30/21   Progress Note Completed Date 11/02/21   Recertification Due Date 11/30/21   Ortho Goal 1   Goal Identifier STG   Goal Description 1)Pt will practice proper posture activities to report 50% reduction in radicular symptoms, in 3 weeks.    Goal Progress Not Fully Met: \"I have had mild tingling in my last 2 fingers for so long, it is just always there.\"   (cont for 2 weeks)   Target Date 11/16/21   Ortho Goal 2   Goal Identifier STG   Goal Description 2)Pt will report 4/10 pain with HH chores in 3 weeks.    Target Date 11/16/21   Goal Progress Not Met: pt rates her " "pain at 5/10 at rest and\"all the time\" today.  (cont for 2 weeks)   Ortho Goal 3   Goal Identifier LTG   Goal Description 3)Pt will report 2/10 or less pain/symptoms with driving, in 6 weeks.    Target Date 11/30/21   Goal Progress Not Met: rates at 5/10 today  (cont for 4 weeks)   Ortho Goal 4   Goal Identifier LTG   Goal Description 4) Pt will report sleeping through the nite without pain waking her, in 6 weeks.    Target Date 11/23/21   Goal Progress *Did not comment on this today, but states still cannot sleep well on (R) side.  (cont for 3 weeks. )   Ortho Goal 5   Goal Identifier LTG   Goal Description 5)Pt will be indep in HEP to prevent return of symptoms, in 6 weeks.    Target Date 11/30/21   Goal Progress Ongoing and updated each session.   (cont for 4 weeks. )   Subjective Report   Subjective Report Pt tearful saying \"I think I am getting worse.\"  States her neck is sore laterally (R) - UT, Scalene.    Objective Measure 1   Objective Measure Pain   Details At rest = 5/10 at posterior (R) GHJ and (R) lateral neck   Objective Measure 2   Objective Measure ROM   Details AROM = 145* (R) and 140* (L); able to return arm to side with \"normal pattern\" but states she has to bend the elbow to do this.    Objective Measure 3   Objective Measure Palpation   Details Tight and tender post (R) shoulder at teres minor, MT and Levator;  Decreased inferior and posterior glide at (R) GHJ   Therapeutic Procedure/exercise   Therapeutic Procedures: strength, endurance, ROM, flexibillity minutes (78221) 15   Skilled Intervention Exer: posture; progression of HEP   Patient Response Able to demo all exers correctly and minimal pain increase after MT.    Treatment Detail Reviewed: Wall push ups, pectoral stretch, scap retraction,  and IR/ER with RTB.  Pt told to do less reps of ER as this is \"harder to do than the pulling in one,\" and states gets a little irritated.  Tried ER in SL with 1# wt and pt could do 20 reps with \"no " "changes.\"  Added Seated UT, Scalene and levator stretching.    Manual Therapy   Manual Therapy: Mobilization, MFR, MLD, friction massage minutes (73514) 20   Skilled Intervention MT: STM, mobs   Patient Response AROM improved to 150* and no pain with return to rest position.  \"Maybe a little better.\"    Treatment Detail Supine for Gr II inferior and posterior GHJ glides, and inferior capsule stretching.  Prone for cVJ mobs at C7-T6,  STM and TrP release to MT, treres minor and levator (R). First rib mobs with Gr II pressures.    Plan   Homework [id number]   Home program UPdated PTRx and printed sheet for UT/scalene/levator stretching.    Plan for next session See 2x per week to work on MT and advance HEP.  Cont 2x per week for 4 weeks - possibly weaning to 1x per week as able.  Up to 8 sessions.    Total Session Time   Timed Code Treatment Minutes 35 (TE,MT)   Total Treatment Time (sum of timed and untimed services) 35 (TE,MT)   Thank you for the referral of this patient.  Jazlyn Santiago, PT, MA  #[id number]    "

## 2021-11-05 ENCOUNTER — HOSPITAL ENCOUNTER (OUTPATIENT)
Dept: PHYSICAL THERAPY | Facility: CLINIC | Age: 76
Setting detail: THERAPIES SERIES
End: 2021-11-05
Attending: NURSE PRACTITIONER
Payer: COMMERCIAL

## 2021-11-05 PROCEDURE — 97140 MANUAL THERAPY 1/> REGIONS: CPT | Mod: GP | Performed by: PHYSICAL THERAPIST

## 2021-11-10 ENCOUNTER — HOSPITAL ENCOUNTER (OUTPATIENT)
Dept: PHYSICAL THERAPY | Facility: CLINIC | Age: 76
Setting detail: THERAPIES SERIES
End: 2021-11-10
Attending: NURSE PRACTITIONER
Payer: COMMERCIAL

## 2021-11-10 PROCEDURE — 97140 MANUAL THERAPY 1/> REGIONS: CPT | Mod: GP | Performed by: PHYSICAL THERAPIST

## 2021-11-10 PROCEDURE — 97110 THERAPEUTIC EXERCISES: CPT | Mod: GP | Performed by: PHYSICAL THERAPIST

## 2021-11-15 ENCOUNTER — HOSPITAL ENCOUNTER (OUTPATIENT)
Dept: PHYSICAL THERAPY | Facility: CLINIC | Age: 76
Setting detail: THERAPIES SERIES
End: 2021-11-15
Attending: NURSE PRACTITIONER
Payer: COMMERCIAL

## 2021-11-15 PROCEDURE — 97140 MANUAL THERAPY 1/> REGIONS: CPT | Mod: GP | Performed by: PHYSICAL THERAPIST

## 2021-11-19 ENCOUNTER — HOSPITAL ENCOUNTER (OUTPATIENT)
Dept: PHYSICAL THERAPY | Facility: CLINIC | Age: 76
Setting detail: THERAPIES SERIES
End: 2021-11-19
Attending: NURSE PRACTITIONER
Payer: COMMERCIAL

## 2021-11-19 PROCEDURE — 97140 MANUAL THERAPY 1/> REGIONS: CPT | Mod: GP | Performed by: PHYSICAL THERAPIST

## 2021-12-02 ENCOUNTER — HOSPITAL ENCOUNTER (OUTPATIENT)
Dept: PHYSICAL THERAPY | Facility: CLINIC | Age: 76
Setting detail: THERAPIES SERIES
End: 2021-12-02
Attending: NURSE PRACTITIONER
Payer: COMMERCIAL

## 2021-12-02 PROCEDURE — 97140 MANUAL THERAPY 1/> REGIONS: CPT | Mod: GP | Performed by: PHYSICAL THERAPIST

## 2021-12-02 PROCEDURE — 97110 THERAPEUTIC EXERCISES: CPT | Mod: GP | Performed by: PHYSICAL THERAPIST

## 2021-12-03 NOTE — PROGRESS NOTES
"Physical Therapy Progress Note and Medicare RECERTIFICATION    Mariah Jacinto  1945    Session Number: 12 ([facility]) since start of care.    Reasons for Continuing Treatment:   Pt continues to have posterior (R) GHJ and upper arm/shoulder pain, especially when she tries to sleep.  Pt could benefit from skilled PT to decrease pain and tension in (B) UB/shoulders, and improve postural habits to relieve symtpoms.     Frequency/Duration  2 times per week for 4 weeks, weaning to 1x per week, for a total of 6 visits.    Medical Diagnosis: Cervical radiculopathy    Onset Date: 9/20/21    Start of Care Date: 9/24/21    Recertification Period  11/30/21 - 1/2/21    Physician Signature:    Date:    X_______________________________________________________    Physician Name: Daryl Mccormack MD    I certify the need for these services furnished under this plan of treatment and while under my care. Physician co-signature of this document indicates review and certification of the therapy plan.  This signature may be written on paper, or electronically signed within EPIC.          12/02/21 1000   Signing Clinician's Name / Credentials   Signing clinician's name / credentials Jazlyn Santiago, PT MA #[id number]   Session Number   Session Number 12 ([facility])   Authorization status Cert Req'd; 365   Progress Note/Recertification   Progress Note Due Date 11/30/21   Progress Note Completed Date 12/02/21   Recertification Due Date 11/30/21   Ortho Goal 1   Goal Identifier STG   Goal Description 1)Pt will practice proper posture activities to report 50% reduction in radicular symptoms, in 3 weeks.    Goal Progress Met: pt states the 4th and 5th digit tingling/numbness is \"almost gone.\"   Target Date 11/16/21   Date Met 12/02/21   Ortho Goal 2   Goal Identifier STG   Goal Description 2)Pt will report 4/10 pain with HH chores in 3 weeks.    Goal Progress Not Met: pt rating pain at 5/10 on average today.   (cont for 2 weeks)   Target Date " "12/16/21   Ortho Goal 3   Goal Identifier LT   Goal Description 3)Pt will report 2/10 or less pain/symptoms with driving, in 6 weeks.    Goal Progress Not Met: pt states pain on average is 5/10.   (cont for 4 weeks)   Target Date 12/30/21   Ortho Goal 4   Goal Identifier LTG   Goal Description 4) Pt will report sleeping through the nite without pain waking her, in 6 weeks.    Goal Progress Not Met: rates pain at 7/10 while trying to fall asleep.   (cont for 3 weeks. )   Target Date 12/23/21   Ortho Goal 5   Goal Identifier LT   Goal Description 5)Pt will be indep in HEP to prevent return of symptoms, in 6 weeks.    Goal Progress Ongoing and updated each session.   (cont for 4 weeks. )   Target Date 12/30/21   Subjective Report   Subjective Report Pt been up since 2:30am with both shoulders hurting.  Minimal, \"faint\" tingling in the 4th and 5th digits (R) hand.  But even getting pain in (L) shoulder when she lies down to sleep.    Objective Measure 1   Objective Measure Pain (R) shoulder    Details Rest = 5/10; At nite = 8/10 (B) with (R) > (L), Does not let pain limit her during the day - \"I try to ignore the pain.\"    Objective Measure 2   Objective Measure (R) shoulder ROM   Details WFL, no increase in pain with fullest flexion   Objective Measure 3   Objective Measure Palpation   Details Multiple TrP's and areas of tension throughout (B) teres minor and T-paraspinals at level of T4-T8; decreased springing of CVJ's at T4-8   Objective Measure 4   Objective Measure Posture   Details Moderate fwd head, T-spine prominent kyphosis   Therapeutic Procedure/exercise   Therapeutic Procedures: strength, endurance, ROM, flexibillity minutes (23135) 10   Skilled Intervention Exer: postural correction, strengthening, stretching; progression of HEP   Patient Response Pt has difficulty doing correct cervical retraction. Able to correct when used mirror.    Treatment Detail Reviewed UT and levator stretching, and pectoral " "stretching followed with scapular retraction.  Taught cervical retraction, and modified to doing with her back against the wall and her trying to \"slide head to touch the back of the head to the wall.\"    Manual Therapy   Manual Therapy: Mobilization, MFR, MLD, friction massage minutes (88614) 20   Skilled Intervention MT: mobs, STM, TrP release   Patient Response Still sore/tender at (R) teres minor tendon.  Palpable \"cyst-like\" lump with tenderness mid scap (R).    Treatment Detail  Prone for MT: STM and TrP release to full posterior shoulder complex, with focus on MT and teres minor. Gr II rib PA mobs, and CVJ mobs at T4-8.    Plan   Homework [id number]   Home program Added \"chin tucks\"/cervical retraction at the wall.    Plan for next session See pt in 2 weeks. Cont with MT and advancing of HEP to progress toward goals.    Total Session Time   Timed Code Treatment Minutes 30 (TE,MT)   Total Treatment Time (sum of timed and untimed services) 30 (TE,MT)   Thank you for the referral of this patient.  Jazlyn Santiago, PT, MA  #[id number]    "

## 2021-12-09 ENCOUNTER — HOSPITAL ENCOUNTER (OUTPATIENT)
Dept: PHYSICAL THERAPY | Facility: CLINIC | Age: 76
Setting detail: THERAPIES SERIES
End: 2021-12-09
Attending: NURSE PRACTITIONER
Payer: COMMERCIAL

## 2021-12-09 PROCEDURE — 97140 MANUAL THERAPY 1/> REGIONS: CPT | Mod: GP | Performed by: PHYSICAL THERAPIST

## 2021-12-09 PROCEDURE — 97110 THERAPEUTIC EXERCISES: CPT | Mod: GP | Performed by: PHYSICAL THERAPIST

## 2022-01-29 ENCOUNTER — NURSE TRIAGE (OUTPATIENT)
Dept: NURSING | Facility: CLINIC | Age: 77
End: 2022-01-29
Payer: COMMERCIAL

## 2022-01-29 ENCOUNTER — OFFICE VISIT (OUTPATIENT)
Dept: URGENT CARE | Facility: URGENT CARE | Age: 77
End: 2022-01-29
Payer: COMMERCIAL

## 2022-01-29 ENCOUNTER — VIRTUAL VISIT (OUTPATIENT)
Dept: URGENT CARE | Facility: CLINIC | Age: 77
End: 2022-01-29
Payer: COMMERCIAL

## 2022-01-29 VITALS
DIASTOLIC BLOOD PRESSURE: 71 MMHG | WEIGHT: 140.8 LBS | BODY MASS INDEX: 24.94 KG/M2 | HEART RATE: 74 BPM | SYSTOLIC BLOOD PRESSURE: 128 MMHG | TEMPERATURE: 98.3 F | OXYGEN SATURATION: 98 %

## 2022-01-29 DIAGNOSIS — Z20.822 COVID-19 RULED OUT: Primary | ICD-10-CM

## 2022-01-29 DIAGNOSIS — J06.9 UPPER RESPIRATORY TRACT INFECTION, UNSPECIFIED TYPE: Primary | ICD-10-CM

## 2022-01-29 PROCEDURE — U0003 INFECTIOUS AGENT DETECTION BY NUCLEIC ACID (DNA OR RNA); SEVERE ACUTE RESPIRATORY SYNDROME CORONAVIRUS 2 (SARS-COV-2) (CORONAVIRUS DISEASE [COVID-19]), AMPLIFIED PROBE TECHNIQUE, MAKING USE OF HIGH THROUGHPUT TECHNOLOGIES AS DESCRIBED BY CMS-2020-01-R: HCPCS

## 2022-01-29 PROCEDURE — 99441 PR PHYSICIAN TELEPHONE EVALUATION 5-10 MIN: CPT | Mod: 95

## 2022-01-29 PROCEDURE — U0005 INFEC AGEN DETEC AMPLI PROBE: HCPCS

## 2022-01-29 NOTE — PROGRESS NOTES
Mariah Jacinto is a 76 year old female who is being evaluated via a billable telephone/video visit.    What phone number would you like to be contacted at? [phone]   How would you like to obtain your AVS? Mail a copy    Subjective   Mariah Jacinto is a 76 year old female who presents today via telephone/video for the following health issues  HPI   Acute Illness  Acute illness concerns:   Onset/Duration: 2days  Symptoms:  Fever: no  Chills/Sweats: no  Headache (location?): no  Sinus Pressure: no  Conjunctivitis:  no  Ear Pain: no  Rhinorrhea: YES  Congestion: YES  Sore Throat: no  Cough: no  Wheeze: no  Decreased Appetite: no  Nausea: no  Vomiting: no  Diarrhea: no  Dysuria/Freq.: no  Dysuria or Hematuria: no  Fatigue/Achiness: no  Sick/Strep Exposure: no  Therapies tried and outcome: rest, fluids, dayquil, nyquil with minimal relief    Patient Active Problem List   Diagnosis     Disorder of bone and cartilage     Enthesopathy of hip region     Sensorineural hearing loss     Right hip pain     Trigeminal Neuralgia right      Hyperlipidemia LDL goal <160     Advanced directives, counseling/discussion     Intercostal neuralgia     Health Care Home     Trochanteric bursitis     Subjective tinnitus, bilateral     Gastroesophageal reflux disease without esophagitis     Chronic constipation     Dense breast tissue on mammogram     Current Outpatient Medications   Medication     acetaminophen (TYLENOL) 325 MG tablet     Ascorbic Acid (VITAMIN C ER PO)     Calcium Carb-Cholecalciferol (CALCIUM + D3) 600-200 MG-UNIT TABS     diclofenac (VOLTAREN) 1 % topical gel     estradiol (ESTRACE) 0.1 MG/GM vaginal cream     hydrocortisone, Perianal, (HYDROCORTISONE) 2.5 % cream     Lactobacillus (ACIDOPHILUS) CAPS     lidocaine (XYLOCAINE) 2 % external gel     melatonin 5 MG CAPS     MULTI-VITAMIN OR TABS     Omega-3 Fatty Acids (OMEGA-3 FISH OIL PO)     omeprazole (PRILOSEC) 40 MG DR capsule     simethicone (MYLICON) 80 MG  chewable tablet     simvastatin (ZOCOR) 20 MG tablet     VITAMIN D OR     No current facility-administered medications for this visit.     Allergies   Allergen Reactions     Biaxin [Clarithromycin]      per pt       Celebrex [Celecoxib]      per pt     Cipro [Ciprofloxacin]      per pt     Darvocet [Propoxyphene N-Apap]      Fleet Phospho Soda [Sodium Phosphate/Biphosphate]      nausea per pt     Morphine      Neurontin [Gabapentin]      per pt     Nortriptyline      per pt     Percocet [Oxycodone-Acetaminophen]      Serzone [Nefazodone Hydrochloride]      per pt     Tegretol [Carbamazepine]      Vicodin [Acetaminophen]      per pt-dizziness     Vioxx      per pt     Vivactil [Protriptyline Hcl]      per pt     Wellbutrin [Bupropion Hcl]      Zithromax [Azithromycin Dihydrate]        Review of Systems   CONSTITUTIONAL: NEGATIVE for fever, chills, change in weight  INTEGUMENTARY/SKIN: NEGATIVE for worrisome rashes, moles or lesions  EYES: NEGATIVE for vision changes or irritation  CV: NEGATIVE for chest pain, palpitations or peripheral edema  GI: NEGATIVE for nausea, abdominal pain, heartburn, or change in bowel habits  MUSCULOSKELETAL: NEGATIVE for significant arthralgias or myalgia  NEURO: NEGATIVE for weakness, dizziness or paresthesias      Objective    Vitals:  No vitals were obtained today due to virtual visit.  Physical Exam   healthy, alert, no distress, and cooperative  PSYCH: Alert and oriented times 3; coherent speech, normal   rate and volume, able to articulate logical thoughts, able   to abstract reason, no tangential thoughts, no hallucinations   or delusions  Her affect is normal and pleasant  RESP: No cough, no audible wheezing, able to talk in full sentences  Remainder of exam unable to be completed due to telephone visits      Assessment/Plan:  Upper respiratory tract infection, unspecified type:  Will send for COVID19 testing.  no red flag symptoms.  Tylenol/ibuprofen as needed for pain/fever,  rest, fluids, chicken soup.  Recommend self quarantine pending results.  S/he plans to go to [facility] urgent care today instead of doing curbside testing.  Recheck in clinic if symptoms worsen or if symptoms do not improve.      Mari Landis PA-C      Phone call duration: 10 minutes

## 2022-01-29 NOTE — PROGRESS NOTES
A user error has taken place: encounter opened in error, closed for administrative reasons.    Dr Kapoor

## 2022-01-29 NOTE — TELEPHONE ENCOUNTER
"Patient requesting to know if she should be tested for COVID?  Reporting nasal congestion starting in past few days. Stating she feels she is getting a \"cold.\"  Afebrile.  Denies shortness of breath or chest pain.  Patient denies known exposure to COVID 19.  Stating she is fully vaccinated for COVID 19 including booster shot.    Disposition to call telemedicine provider now per triage guidelines.    Transferred to Central Scheduling to request telephone visit.      Allison Brumfield RN  [facility]      COVID 19 Nurse Triage Plan/Patient Instructions    Please be aware that novel coronavirus (COVID-19) may be circulating in the community. If you develop symptoms such as fever, cough, or SOB or if you have concerns about the presence of another infection including coronavirus (COVID-19), please contact your health care provider or visit [email or web address].     Disposition/Instructions    Virtual Visit with provider recommended. Reference Visit Selection Guide.    Thank you for taking steps to prevent the spread of this virus.  o Limit your contact with others.  o Wear a simple mask to cover your cough.  o Wash your hands well and often.    Resources    [facility]: About COVID-19: [email or web address]    CDC: What to Do If You're Sick: www.cdc.gov/coronavirus/2019-ncov/about/steps-when-sick.html    CDC: Ending Home Isolation: www.cdc.gov/coronavirus/2019-ncov/hcp/disposition-in-home-patients.html     CDC: Caring for Someone: www.cdc.gov/coronavirus/2019-ncov/if-you-are-sick/care-for-someone.html     [facility]: Interim Guidance for Hospital Discharge to Home: www.health.[facility].[place]/diseases/coronavirus/hcp/hospdischarge.pdf    [facility] clinical trials (COVID-19 research studies): clinicalaffairs.[facility]/umn-clinical-trials     Below are the COVID-19 hotlines at the [place] Department of Health ([facility]). Interpreters are available.   o For health questions: Call 620-554-8279 " or 1-409.724.5804 (7 a.m. to 7 p.m.)  o For questions about schools and childcare: Call 627-482-9685 or 1-941.732.3946 (7 a.m. to 7 p.m.)                       Reason for Disposition    HIGH RISK for severe COVID complications (e.g., age > 64 years, obesity with BMI > 25, pregnant, chronic lung disease or other chronic medical condition)  (Exception: Already seen by PCP and no new or worsening symptoms.)    Additional Information    Negative: SEVERE difficulty breathing (e.g., struggling for each breath, speaks in single words)    Negative: Difficult to awaken or acting confused (e.g., disoriented, slurred speech)    Negative: Bluish (or gray) lips or face now    Negative: Shock suspected (e.g., cold/pale/clammy skin, too weak to stand, low BP, rapid pulse)    Negative: Sounds like a life-threatening emergency to the triager    Negative: [1] COVID-19 exposure AND [2] no symptoms    Negative: COVID-19 vaccine reaction suspected (e.g., fever, headache, muscle aches) occurring 1 to 3 days after getting vaccine    Negative: COVID-19 vaccine, questions about    Negative: [1] Lives with someone known to have influenza (flu test positive) AND [2] flu-like symptoms (e.g., cough, runny nose, sore throat, SOB; with or without fever)    Negative: [1] Adult with possible COVID-19 symptoms AND [2] triager concerned about severity of symptoms or other causes    Negative: COVID-19 and breastfeeding, questions about    Negative: SEVERE or constant chest pain or pressure (Exception: mild central chest pain, present only when coughing)    Negative: MODERATE difficulty breathing (e.g., speaks in phrases, SOB even at rest, pulse 100-120)    Negative: [1] Headache AND [2] stiff neck (can't touch chin to chest)    Negative: MILD difficulty breathing (e.g., minimal/no SOB at rest, SOB with walking, pulse <100)    Negative: Chest pain or pressure    Negative: Patient sounds very sick or weak to the triager    Negative: Fever > 103 F (39.4  C)    Negative: [1] Fever > 101 F (38.3 C) AND [2] age > 60 years    Negative: [1] Fever > 100.0 F (37.8 C) AND [2] bedridden (e.g., nursing home patient, CVA, chronic illness, recovering from surgery)    Protocols used: CORONAVIRUS (COVID-19) DIAGNOSED OR [id number] 8.25.2021

## 2022-01-31 LAB — SARS-COV-2 RNA RESP QL NAA+PROBE: NEGATIVE

## 2022-02-02 ENCOUNTER — OFFICE VISIT (OUTPATIENT)
Dept: FAMILY MEDICINE | Facility: CLINIC | Age: 77
End: 2022-02-02
Payer: COMMERCIAL

## 2022-02-02 VITALS
HEIGHT: 63 IN | DIASTOLIC BLOOD PRESSURE: 68 MMHG | BODY MASS INDEX: 24.45 KG/M2 | WEIGHT: 138 LBS | SYSTOLIC BLOOD PRESSURE: 122 MMHG | OXYGEN SATURATION: 99 % | TEMPERATURE: 97.8 F | HEART RATE: 71 BPM | RESPIRATION RATE: 18 BRPM

## 2022-02-02 DIAGNOSIS — N30.01 ACUTE CYSTITIS WITH HEMATURIA: ICD-10-CM

## 2022-02-02 DIAGNOSIS — R30.0 BURNING WITH URINATION: Primary | ICD-10-CM

## 2022-02-02 LAB
ALBUMIN UR-MCNC: NEGATIVE MG/DL
APPEARANCE UR: CLEAR
BACTERIA #/AREA URNS HPF: ABNORMAL /HPF
BILIRUB UR QL STRIP: NEGATIVE
COLOR UR AUTO: YELLOW
GLUCOSE UR STRIP-MCNC: NEGATIVE MG/DL
HGB UR QL STRIP: ABNORMAL
KETONES UR STRIP-MCNC: NEGATIVE MG/DL
LEUKOCYTE ESTERASE UR QL STRIP: ABNORMAL
NITRATE UR QL: NEGATIVE
PH UR STRIP: 7.5 [PH] (ref 5–7)
RBC #/AREA URNS AUTO: ABNORMAL /HPF
SP GR UR STRIP: 1.01 (ref 1–1.03)
SQUAMOUS #/AREA URNS AUTO: ABNORMAL /LPF
UROBILINOGEN UR STRIP-ACNC: 0.2 E.U./DL
WBC #/AREA URNS AUTO: ABNORMAL /HPF

## 2022-02-02 PROCEDURE — 81001 URINALYSIS AUTO W/SCOPE: CPT | Performed by: FAMILY MEDICINE

## 2022-02-02 PROCEDURE — 99213 OFFICE O/P EST LOW 20 MIN: CPT | Performed by: FAMILY MEDICINE

## 2022-02-02 PROCEDURE — 87186 SC STD MICRODIL/AGAR DIL: CPT | Performed by: FAMILY MEDICINE

## 2022-02-02 PROCEDURE — 87086 URINE CULTURE/COLONY COUNT: CPT | Performed by: FAMILY MEDICINE

## 2022-02-02 RX ORDER — NITROFURANTOIN 25; 75 MG/1; MG/1
100 CAPSULE ORAL 2 TIMES DAILY
Qty: 10 CAPSULE | Refills: 0 | Status: SHIPPED | OUTPATIENT
Start: 2022-02-02 | End: 2022-02-07

## 2022-02-02 ASSESSMENT — PAIN SCALES - GENERAL: PAINLEVEL: SEVERE PAIN (6)

## 2022-02-02 ASSESSMENT — MIFFLIN-ST. JEOR: SCORE: 1080.09

## 2022-02-02 NOTE — PROGRESS NOTES
"  Assessment & Plan     Burning with urination  - UA macro with reflex to Microscopic and Culture - Clinc Collect  - Urine Microscopic Exam  - Urine Culture    Acute cystitis with hematuria  - nitroFURantoin macrocrystal-monohydrate (MACROBID) 100 MG capsule  Dispense: 10 capsule; Refill: 0    Return if symptoms worsen or fail to improve.    lEizabeth Cabral MD  [facility]        Nakia Lemus is a 76 year old who presents for the following health issues  accompanied by her self.    HPI     Genitourinary - Female  Onset/Duration: yesterday  Description:   Painful urination (Dysuria): YES           Frequency: YES  Blood in urine (Hematuria): no  Delay in urine (Hesitency): YES  Intensity: moderate  Progression of Symptoms:  worsening  Accompanying Signs & Symptoms:  Fever/chills: no  Flank pain: no  Nausea and vomiting: no  Vaginal symptoms: none  Abdominal/Pelvic Pain: no  History:   History of frequent UTI s: YES  History of kidney stones: no  Sexually Active: no  Possibility of pregnancy: No  Precipitating or alleviating factors: None  Therapies tried and outcome: Patient has been using estradiol vaginal cream and has been helping      Previously had been having this a lot more but not as much recently, ever since starting her estrace    Review of Systems   As above      Objective    /68   Pulse 71   Temp 97.8  F (36.6  C) (Tympanic)   Resp 18   Ht 1.6 m (5' 3\")   Wt 62.6 kg (138 lb)   SpO2 99%   BMI 24.45 kg/m    Body mass index is 24.45 kg/m .  Physical Exam   GENERAL: healthy, alert and no distress  RESP: normal respiratory effort, speaking in complete sentences  MS: no gross musculoskeletal defects noted, no edema  PSYCH: mentation appears normal, affect normal/bright            "

## 2022-02-03 LAB — BACTERIA UR CULT: ABNORMAL

## 2022-02-07 NOTE — PROGRESS NOTES
"[facility]    Outpatient Physical Therapy Discharge Note  Patient: Mariah Jacinto  : 1945    Beginning/End Dates of Reporting Period:  21 to 21    Referring Provider: Daryl Mccormack MD    Therapy Diagnosis: Cervicalgia     Client Self Report: Pt still struggling with door way pectoral stretch getting pain in anterior shoulder.     Objective Measurements:  Objective Measure: Pain (R) shoulder   Details: Not rated today; Points more anterior GHJ when asked where her pain is today.   Objective Measure: (R) shoulder ROM  Details: Not assessed today. Shows improved scap retraction/depression with AROM flexion  Objective Measure: Palpation  Details: Tight and tender throughout (R) posterior shoulder complex: levator, rhomboids, supraspinatus and teres minor; TrPs T-paraspinals from T4-T6     Goals:  Goal Identifier STG   Goal Description 1)Pt will practice proper posture activities to report 50% reduction in radicular symptoms, in 3 weeks.    Target Date 21   Date Met  21   Progress (detail required for progress note): Met: pt states the 4th and 5th digit tingling/numbness is \"almost gone.\"     Goal Identifier STG   Goal Description 2)Pt will report 4/10 pain with HH chores in 3 weeks.    Target Date 21   Date Met      Progress (detail required for progress note): Not Met: pt rating pain at 5/10 on average today.  (cont for 2 weeks)     Goal Identifier LTG   Goal Description 3)Pt will report 2/10 or less pain/symptoms with driving, in 6 weeks.    Target Date 21   Date Met      Progress (detail required for progress note): Not Met: pt states pain on average is 5/10.  (cont for 4 weeks)     Goal Identifier LTG   Goal Description 4) Pt will report sleeping through the nite without pain waking her, in 6 weeks.    Target Date 21   Date Met      Progress (detail required for " progress note): Not Met: rates pain at 7/10 while trying to fall asleep.  (cont for 3 weeks. )     Goal Identifier LTG   Goal Description 5)Pt will be indep in HEP to prevent return of symptoms, in 6 weeks.    Target Date 12/30/21   Date Met      Progress (detail required for progress note): Ongoing and updated each session.  (cont for 4 weeks. )         Plan:  Discharge from therapy.    Discharge:    Reason for Discharge: No further expectation of progress.  Patient chooses to discontinue therapy.    Equipment Issued: none    Discharge Plan: Patient to continue home program.

## 2022-02-09 ENCOUNTER — OFFICE VISIT (OUTPATIENT)
Dept: FAMILY MEDICINE | Facility: CLINIC | Age: 77
End: 2022-02-09
Payer: COMMERCIAL

## 2022-02-09 VITALS
OXYGEN SATURATION: 99 % | DIASTOLIC BLOOD PRESSURE: 76 MMHG | WEIGHT: 139 LBS | SYSTOLIC BLOOD PRESSURE: 138 MMHG | HEIGHT: 63 IN | BODY MASS INDEX: 24.63 KG/M2 | RESPIRATION RATE: 16 BRPM | TEMPERATURE: 98.6 F | HEART RATE: 71 BPM

## 2022-02-09 DIAGNOSIS — R30.0 DYSURIA: ICD-10-CM

## 2022-02-09 DIAGNOSIS — Z23 NEED FOR VACCINATION: ICD-10-CM

## 2022-02-09 DIAGNOSIS — N39.0 URINARY TRACT INFECTION, ACUTE: Primary | ICD-10-CM

## 2022-02-09 LAB
ALBUMIN UR-MCNC: NEGATIVE MG/DL
APPEARANCE UR: ABNORMAL
BACTERIA #/AREA URNS HPF: ABNORMAL /HPF
BILIRUB UR QL STRIP: NEGATIVE
COLOR UR AUTO: YELLOW
GLUCOSE UR STRIP-MCNC: NEGATIVE MG/DL
HGB UR QL STRIP: NEGATIVE
KETONES UR STRIP-MCNC: NEGATIVE MG/DL
LEUKOCYTE ESTERASE UR QL STRIP: ABNORMAL
NITRATE UR QL: NEGATIVE
PH UR STRIP: 7 [PH] (ref 5–7)
RBC #/AREA URNS AUTO: ABNORMAL /HPF
SP GR UR STRIP: 1.02 (ref 1–1.03)
UROBILINOGEN UR STRIP-ACNC: 0.2 E.U./DL
WBC #/AREA URNS AUTO: ABNORMAL /HPF

## 2022-02-09 PROCEDURE — 87186 SC STD MICRODIL/AGAR DIL: CPT | Performed by: FAMILY MEDICINE

## 2022-02-09 PROCEDURE — 90714 TD VACC NO PRESV 7 YRS+ IM: CPT | Performed by: FAMILY MEDICINE

## 2022-02-09 PROCEDURE — 99213 OFFICE O/P EST LOW 20 MIN: CPT | Mod: 25 | Performed by: FAMILY MEDICINE

## 2022-02-09 PROCEDURE — 81001 URINALYSIS AUTO W/SCOPE: CPT | Performed by: FAMILY MEDICINE

## 2022-02-09 PROCEDURE — 90471 IMMUNIZATION ADMIN: CPT | Performed by: FAMILY MEDICINE

## 2022-02-09 PROCEDURE — 87086 URINE CULTURE/COLONY COUNT: CPT | Performed by: FAMILY MEDICINE

## 2022-02-09 RX ORDER — SULFAMETHOXAZOLE/TRIMETHOPRIM 800-160 MG
1 TABLET ORAL 2 TIMES DAILY
Qty: 6 TABLET | Refills: 0 | Status: SHIPPED | OUTPATIENT
Start: 2022-02-09 | End: 2022-02-12

## 2022-02-09 ASSESSMENT — PAIN SCALES - GENERAL: PAINLEVEL: MILD PAIN (3)

## 2022-02-09 ASSESSMENT — MIFFLIN-ST. JEOR: SCORE: 1084.63

## 2022-02-09 NOTE — PATIENT INSTRUCTIONS
Our Clinic hours are:  Mondays    7:20 am - 7 pm  Tues -  Fri  7:20 am - 5 pm    Clinic Phone: [phone]    The clinic lab opens at 7:30 am Mon - Fri and appointments are required.    [facility]. [phone]  Monday  8 am - 7pm  Tues - Fri 8 am - 5:30 pm          No

## 2022-02-09 NOTE — PROGRESS NOTES
"  Assessment & Plan   Urinary tract infection, acute  As she was just treated with macrobid (bacteriostatic) and there is still evidence of a UTI, I will treat her with bactrim     - sulfamethoxazole-trimethoprim (BACTRIM DS) 800-160 MG tablet; Take 1 tablet by mouth 2 times daily for 3 days    Dysuria     - UA with Microscopic reflex to Culture - Clinic Collect  - Urine Microscopic  - Urine Culture    Need for vaccination     - TD PRSERV FREE >=7 YRS ADS IM [[id number]]          No follow-ups on file.    Eli Sommer MD  [facility]   Mariah is a 76 year old who presents for the following health issues     HPI     Genitourinary - Female  Onset/Duration: Patient was on medication for UTI X 5 days and then noticed it starting again. This morning noticed symptoms returning  Description:   Painful urination (Dysuria): YES- mild           Frequency: YES  Blood in urine (Hematuria): no  Delay in urine (Hesitency): YES  Intensity: mild  Progression of Symptoms:  Was worse earlier   Accompanying Signs & Symptoms:  Fever/chills: no  Flank pain: no  Nausea and vomiting: YES- with last medication  Vaginal symptoms: none  Abdominal/Pelvic Pain: no  History:   History of frequent UTI s: YES  History of kidney stones: no  Sexually Active: no  Possibility of pregnancy: No  Precipitating or alleviating factors: None  Therapies tried and outcome: Increase fluid intake        Review of Systems   Constitutional, HEENT, cardiovascular, pulmonary, gi and gu systems are negative, except as otherwise noted.      Objective    /76   Pulse 71   Temp 98.6  F (37  C) (Tympanic)   Resp 16   Ht 1.6 m (5' 3\")   Wt 63 kg (139 lb)   SpO2 99%   Breastfeeding No   BMI 24.62 kg/m    Body mass index is 24.62 kg/m .  Physical Exam       Results for orders placed or performed in visit on 02/09/22 (from the past 24 hour(s))   Urine Microscopic   Result Value Ref Range    Bacteria Urine Few (A) None " Seen /HPF    RBC Urine 0-2 0-2 /HPF /HPF    WBC Urine 10-25 (A) 0-5 /HPF /HPF

## 2022-02-11 LAB — BACTERIA UR CULT: ABNORMAL

## 2022-05-02 ENCOUNTER — OFFICE VISIT (OUTPATIENT)
Dept: FAMILY MEDICINE | Facility: CLINIC | Age: 77
End: 2022-05-02
Payer: COMMERCIAL

## 2022-05-02 VITALS
SYSTOLIC BLOOD PRESSURE: 122 MMHG | OXYGEN SATURATION: 99 % | WEIGHT: 142 LBS | RESPIRATION RATE: 14 BRPM | HEIGHT: 63 IN | HEART RATE: 67 BPM | BODY MASS INDEX: 25.16 KG/M2 | DIASTOLIC BLOOD PRESSURE: 62 MMHG | TEMPERATURE: 97.2 F

## 2022-05-02 DIAGNOSIS — R30.0 DYSURIA: Primary | ICD-10-CM

## 2022-05-02 DIAGNOSIS — N95.2 ATROPHIC VAGINITIS: ICD-10-CM

## 2022-05-02 LAB
ALBUMIN UR-MCNC: NEGATIVE MG/DL
APPEARANCE UR: ABNORMAL
BACTERIA #/AREA URNS HPF: ABNORMAL /HPF
BILIRUB UR QL STRIP: NEGATIVE
COLOR UR AUTO: YELLOW
GLUCOSE UR STRIP-MCNC: NEGATIVE MG/DL
HGB UR QL STRIP: ABNORMAL
KETONES UR STRIP-MCNC: NEGATIVE MG/DL
LEUKOCYTE ESTERASE UR QL STRIP: ABNORMAL
NITRATE UR QL: NEGATIVE
PH UR STRIP: 6.5 [PH] (ref 5–7)
RBC #/AREA URNS AUTO: ABNORMAL /HPF
SP GR UR STRIP: 1.01 (ref 1–1.03)
UROBILINOGEN UR STRIP-ACNC: 0.2 E.U./DL
WBC #/AREA URNS AUTO: ABNORMAL /HPF

## 2022-05-02 PROCEDURE — 87086 URINE CULTURE/COLONY COUNT: CPT | Performed by: FAMILY MEDICINE

## 2022-05-02 PROCEDURE — 81001 URINALYSIS AUTO W/SCOPE: CPT | Performed by: FAMILY MEDICINE

## 2022-05-02 PROCEDURE — 99213 OFFICE O/P EST LOW 20 MIN: CPT | Performed by: FAMILY MEDICINE

## 2022-05-02 RX ORDER — ESTRADIOL 0.1 MG/G
CREAM VAGINAL
Qty: 43 G | Refills: 4 | Status: SHIPPED | OUTPATIENT
Start: 2022-05-02 | End: 2023-08-23

## 2022-05-02 ASSESSMENT — PAIN SCALES - GENERAL: PAINLEVEL: MILD PAIN (3)

## 2022-05-02 NOTE — PROGRESS NOTES
"  Assessment & Plan     Dysuria  UA doesn't look particularly infected today  Will wait for UC given her improvement in symptoms today  - UA with Microscopic reflex to Culture - Clinic Collect  - Urine Microscopic  - Urine Culture Aerobic Bacterial - lab collect; Future    Atrophic vaginitis  Needs refill  - estradiol (ESTRACE) 0.1 MG/GM vaginal cream; Place vaginally twice a week 1 g  twice weekly at bedtime             BMI:   Estimated body mass index is 25.15 kg/m  as calculated from the following:    Height as of this encounter: 1.6 m (5' 3\").    Weight as of this encounter: 64.4 kg (142 lb).           No follow-ups on file.    Eli Sommer MD  [facility]   Mariah is a 76 year old who presents for the following health issues     HPI   Chief Complaint   Patient presents with     Urinary Problem     Yesterday noticed a intense discomfort. Patient has slight symptoms today.     Medication Reconciliation     Would like to know if she still needs to take acidophilus       Genitourinary - Female  Onset/Duration: Saturday  Description:   Painful urination (Dysuria): YES- yesterday she noticed pain           Frequency: YES  Blood in urine (Hematuria): no  Delay in urine (Hesitency): YES  Intensity: moderate  Progression of Symptoms:  improving  Accompanying Signs & Symptoms:  Fever/chills: no  Flank pain: no  Nausea and vomiting: no  Vaginal symptoms: none  Abdominal/Pelvic Pain: no  History:   History of frequent UTI s: YES  History of kidney stones: no  Sexually Active: no  Possibility of pregnancy: No  Precipitating or alleviating factors: None  Therapies tried and outcome: Increase fluid intake, estradiol X 2 a week, wipes front to back, when she has a BM she uses wipes, takes baths every night, always washes hands    Symptoms worse yesterday, feeling a bit better today    Review of Systems   Constitutional, HEENT, cardiovascular, pulmonary, gi and gu systems are " "negative, except as otherwise noted.      Objective    /62   Pulse 67   Temp 97.2  F (36.2  C) (Tympanic)   Resp 14   Ht 1.6 m (5' 3\")   Wt 64.4 kg (142 lb)   SpO2 99%   Breastfeeding No   BMI 25.15 kg/m    Body mass index is 25.15 kg/m .  Physical Exam   GENERAL APPEARANCE: healthy, alert and no distress    Results for orders placed or performed in visit on 05/02/22   UA with Microscopic reflex to Culture - Clinic Collect     Status: Abnormal    Specimen: Urine, Clean Catch   Result Value Ref Range    Color Urine Yellow Colorless, Straw, Light Yellow, Yellow    Appearance Urine Slightly Cloudy (A) Clear    Glucose Urine Negative Negative mg/dL    Bilirubin Urine Negative Negative    Ketones Urine Negative Negative mg/dL    Specific Gravity Urine 1.015 1.003 - 1.035    Blood Urine Trace (A) Negative    pH Urine 6.5 5.0 - 7.0    Protein Albumin Urine Negative Negative mg/dL    Urobilinogen Urine 0.2 0.2, 1.0 E.U./dL    Nitrite Urine Negative Negative    Leukocyte Esterase Urine Small (A) Negative   Urine Microscopic     Status: Abnormal   Result Value Ref Range    Bacteria Urine Few (A) None Seen /HPF    RBC Urine 0-2 0-2 /HPF /HPF    WBC Urine 0-5 0-5 /HPF /HPF    Narrative    Urine Culture not indicated                 "

## 2022-05-02 NOTE — PATIENT INSTRUCTIONS
Our Clinic hours are:  Mondays    7:20 am - 7 pm  Tues -  Fri  7:20 am - 5 pm    Clinic Phone: [phone]    The clinic lab opens at 7:30 am Mon - Fri and appointments are required.    [facility]. [phone]  Monday  8 am - 7pm  Tues - Fri 8 am - 5:30 pm

## 2022-05-03 RX ORDER — ESTRADIOL 0.1 MG/G
CREAM VAGINAL
Qty: 43 G | Refills: 0 | OUTPATIENT
Start: 2022-05-03

## 2022-05-04 ENCOUNTER — TELEPHONE (OUTPATIENT)
Dept: FAMILY MEDICINE | Facility: CLINIC | Age: 77
End: 2022-05-04
Payer: COMMERCIAL

## 2022-05-04 NOTE — TELEPHONE ENCOUNTER
Reason for Call:  Request for results:    Name of test or procedure: Urine Culture - Pt saw Dr. Jerry 5/2 and she was expecting the UC results yesterday?  Pt still has UTI symptoms.  Please call patient and advise.      Date of test of procedure: 5/2    Location of the test or procedure: [facility]    OK to leave the result message on voice mail or with a family member? YES    Phone number Patient can be reached at:  Home number on file [phone] (home)    Additional comments:     Call taken on 5/4/2022 at 11:46 AM by Judith Beltrán

## 2022-05-04 NOTE — TELEPHONE ENCOUNTER
The culture is still in progress and there is no growth yet.  Generally takes 48 hours (I saw her Monday afternoon), so either late this afternoon or by tomorrow morning we should have results.    Eli Sommer M.D.

## 2022-05-05 LAB — BACTERIA UR CULT: NORMAL

## 2022-05-13 ENCOUNTER — NURSE TRIAGE (OUTPATIENT)
Dept: FAMILY MEDICINE | Facility: CLINIC | Age: 77
End: 2022-05-13
Payer: COMMERCIAL

## 2022-05-13 NOTE — TELEPHONE ENCOUNTER
"    Reason for Disposition    New or worsening falling    Additional Information    Negative: [1] Difficult to awaken or acting confused (e.g., disoriented, slurred speech) AND [2] present now AND [3] new onset AND [4] has diabetes (diabetes mellitus)    Negative: [1] Difficult to awaken or acting confused (e.g., disoriented, slurred speech) AND [2] present now AND [3] new onset    Negative: [1] Weakness of the face, arm, or leg on one side of the body AND [2] new onset    Negative: [1] Numbness of the face, arm, or leg on one side of the body AND [2] new onset    Negative: [1] Loss of speech or garbled speech AND [2] new onset    Negative: Shock suspected (e.g., cold/pale/clammy skin, too weak to stand, low BP, rapid pulse)    Negative: Sounds like a life-threatening emergency to the triager    Negative: [1] New onset confusion AND [2] no prior diagnosis of dementia    Negative: Swallowing problems    Negative: Weakness or fatigue is main concern    Negative: Anxiety or panic attack is the main concern    Negative: Depression is the main concern    Negative: Severe agitation or behavior problem (e.g., patient endangering self or others)    Negative: [1] Worsening confusion AND [2] new onset (hours to 3 days)    Negative: [1] Seeing, hearing, or feeling things that are not there (i.e., visual, auditory, or tactile hallucinations) AND [2] new or worsening    Negative: Fever > 100.4 F (38.0 C)    Negative: Patient sounds very sick or weak to the triager    Negative: [1] Caller has URGENT question (includes prescribed medication questions) AND [2] triager unable to answer question    Negative: [1] Worsening confusion AND [2] gradual onset (days to weeks)    Negative: New or worsening agitation or behavior problem (e.g., change from patient's normal pattern)    Answer Assessment - Initial Assessment Questions  1. MAIN CONCERN OR SYMPTOM:  \"What is your main concern right now?\" \"What questions do you have?\" \"What's the " "main symptom you're worried about?\" (e.g., confusion, memory loss)      Some focus and memory problems  2. ONSET:  \"When did the symptom start (or worsen)?\" (minutes, hours, days, weeks)      Over the last week or more, maybe since Monday  3. BETTER-SAME-WORSE: \"Are you getting better, staying the same, or getting worse compared to the day you were discharged?\"      Patient is just realizing symptoms  4.  DIAGNOSIS: \"Was patient diagnosed with dementia by a doctor?\" If yes, \"When?\" (e.g., days, months, years ago)      no  5.  MEDICATION: \"Has there been any change in medications recently?\" (e.g., narcotics, antihistamines, benzodiazepines, etc.)      no  6.  OTHER SYMPTOMS: \"Are there any other symptoms?\" (e.g., fever, cough, pain, falling)      no  7. SUPPORT: Document living circumstances and support (e.g., family, nursing home)      Live with son    Protocols used: DEMENTIA SYMPTOMS AND [id number]      "

## 2022-05-13 NOTE — TELEPHONE ENCOUNTER
Patient called and stated that she has had a few memory lapses in the last few weeks.    Patient has lost a card that she should not have lost.  Patient was cooking today and reached for a pyrex bowl in the cupboard to find out that it was full of pudding she had made on Mother's day. So she had made the pudding and placed the bowl back into the cupboard instead of the fridge.  Patient lives at home with her son.  Triaged appropriate for provider appt within 3 days.  Scheduled Mon 16th 1020 am with Dr Sommer.    [facility]

## 2022-05-13 NOTE — TELEPHONE ENCOUNTER
Reason for Call:   Forgetful.    Detailed comments:  Patient feeling flustered.  Went to get a bowl out of her cupboard and found a bowl of pudding she had made earlier in the week and does not know how it got there.  Quite upset and crying that she would do that.      Phone Number Patient can be reached at: [phone]    Can we leave a detailed message on this number? Yes    Call taken on 5/13/2022 at 1:42 PM by Rachel Villalobos

## 2022-05-16 ENCOUNTER — OFFICE VISIT (OUTPATIENT)
Dept: FAMILY MEDICINE | Facility: CLINIC | Age: 77
End: 2022-05-16
Payer: COMMERCIAL

## 2022-05-16 VITALS
TEMPERATURE: 97.6 F | HEIGHT: 63 IN | WEIGHT: 137 LBS | RESPIRATION RATE: 14 BRPM | SYSTOLIC BLOOD PRESSURE: 132 MMHG | BODY MASS INDEX: 24.27 KG/M2 | HEART RATE: 79 BPM | DIASTOLIC BLOOD PRESSURE: 76 MMHG | OXYGEN SATURATION: 97 %

## 2022-05-16 DIAGNOSIS — R30.0 DYSURIA: Primary | ICD-10-CM

## 2022-05-16 DIAGNOSIS — R41.3 MEMORY DIFFICULTIES: ICD-10-CM

## 2022-05-16 LAB
ALBUMIN UR-MCNC: NEGATIVE MG/DL
APPEARANCE UR: CLEAR
BACTERIA #/AREA URNS HPF: ABNORMAL /HPF
BILIRUB UR QL STRIP: NEGATIVE
COLOR UR AUTO: YELLOW
GLUCOSE UR STRIP-MCNC: NEGATIVE MG/DL
HGB UR QL STRIP: NEGATIVE
KETONES UR STRIP-MCNC: NEGATIVE MG/DL
LEUKOCYTE ESTERASE UR QL STRIP: NEGATIVE
NITRATE UR QL: NEGATIVE
PH UR STRIP: 7 [PH] (ref 5–7)
RBC #/AREA URNS AUTO: ABNORMAL /HPF
SP GR UR STRIP: 1.01 (ref 1–1.03)
SQUAMOUS #/AREA URNS AUTO: ABNORMAL /LPF
TSH SERPL DL<=0.005 MIU/L-ACNC: 0.87 MU/L (ref 0.4–4)
UROBILINOGEN UR STRIP-ACNC: 0.2 E.U./DL
VIT B12 SERPL-MCNC: 3795 PG/ML (ref 193–986)
WBC #/AREA URNS AUTO: ABNORMAL /HPF

## 2022-05-16 PROCEDURE — 81001 URINALYSIS AUTO W/SCOPE: CPT | Performed by: FAMILY MEDICINE

## 2022-05-16 PROCEDURE — 99214 OFFICE O/P EST MOD 30 MIN: CPT | Performed by: FAMILY MEDICINE

## 2022-05-16 PROCEDURE — 84443 ASSAY THYROID STIM HORMONE: CPT | Performed by: FAMILY MEDICINE

## 2022-05-16 PROCEDURE — 82607 VITAMIN B-12: CPT | Performed by: FAMILY MEDICINE

## 2022-05-16 PROCEDURE — 36415 COLL VENOUS BLD VENIPUNCTURE: CPT | Performed by: FAMILY MEDICINE

## 2022-05-16 ASSESSMENT — PAIN SCALES - GENERAL: PAINLEVEL: NO PAIN (0)

## 2022-05-16 NOTE — PROGRESS NOTES
"  Assessment & Plan     Dysuria   no sign of infection  - UA with Microscopic reflex to Culture - Clinic Collect  - Urine Microscopic    Memory difficulties  I suspect this was a \"one off\" type episode of having split concentration with a busy household at her Mother's Day celebration.  Seems to be doing quite well managing finances, playing cards a few times a week, driving, etc.   Discussed working word puzzles, staying active  Will get some basic labs    - TSH with free T4 reflex; Future  - Vitamin B12; Future  - TSH with free T4 reflex  - Vitamin B12                 No follow-ups on file.    Eli Sommer MD  [facility]    Nakia Lemus is a 76 year old who presents for the following health issues   Chief Complaint   Patient presents with     Memory Loss     Patient is concerned that she is developing memory loss. Patient used the example of needing something from the frig and going to cupboard. Patient put her pudding in the cupboard instead of frig.      Medication Reconciliation     Medication Question     Patient wondering if lactobacillus is still needed       History of Present Illness       Reason for visit:  Concentration  Symptom onset:  3-7 days ago  Symptoms include:  Concentration  Symptom intensity:  Mild  Symptom progression:  Improving  Had these symptoms before:  No  What makes it worse:  No  What makes it better:  No    She eats 2-3 servings of fruits and vegetables daily.She consumes 0 sweetened beverage(s) daily.She exercises with enough effort to increase her heart rate 20 to 29 minutes per day.  She exercises with enough effort to increase her heart rate 3 or less days per week.   She is taking medications regularly.       Chief Complaint   Patient presents with     Memory Loss     Patient is concerned that she is developing memory loss. Patient used the example of needing something from the frig and going to cupboard. Patient put her pudding in the " "cupboard instead of frig.      Medication Reconciliation     Medication Question     Patient wondering if lactobacillus is still needed     Cognitive Screening   1) Repeat 3 items (Leader, Season, Table)    2) Clock draw: NORMAL  3) 3 item recall: Recalls 3 objects  Results: 3 items recalled: COGNITIVE IMPAIRMENT LESS LIKELY    Mini-CogTM Copyright GARY Khoury. Licensed by the author for use in [facility]; reprinted with permission (baron@[email or web address]). All rights reserved.        Patient concerned that she at times will walk into a room and forget why she went there.  On Mother's day (with a full house) she served pudding over cake and then placed it in the cupboard without thinking.  A few days later she spilled it taking it out of the cupboard because she didn't know it was in there.    Driving without getting lost. Managing her finances well.  Family has not been concerned with her cognition.           Review of Systems   Constitutional, HEENT, cardiovascular, pulmonary, gi and gu systems are negative, except as otherwise noted.      Objective    /76   Pulse 79   Temp 97.6  F (36.4  C) (Tympanic)   Resp 14   Ht 1.6 m (5' 3\")   Wt 62.1 kg (137 lb)   SpO2 97%   Breastfeeding No   BMI 24.27 kg/m    Body mass index is 24.27 kg/m .  Physical Exam   GENERAL APPEARANCE: healthy, alert and no distress  NEURO: Normal strength and tone, mentation intact and speech normal    Mini-cog above normal             "

## 2022-05-16 NOTE — PATIENT INSTRUCTIONS
Our Clinic hours are:  Mondays    7:20 am - 7 pm  Tues -  Fri  7:20 am - 5 pm    Clinic Phone: [phone]    The clinic lab opens at 7:30 am Mon - Fri and appointments are required.    [facility]. [phone]  Monday  8 am - 7pm  Tues - Fri 8 am - 5:30 pm

## 2022-06-15 ENCOUNTER — TELEPHONE (OUTPATIENT)
Dept: FAMILY MEDICINE | Facility: CLINIC | Age: 77
End: 2022-06-15
Payer: COMMERCIAL

## 2022-06-15 NOTE — TELEPHONE ENCOUNTER
RN called patient and their is a long list of questions that really needs an appointment. RN took same day spot for tomorrow for her to come in and discuss this further. Routing to Provider for GENEVA AhujaN, RN, PHN

## 2022-06-15 NOTE — TELEPHONE ENCOUNTER
Care team - please call patient.      See note from physical therapist below.      Patient reached out to PT regarding pain.  If pain is ongoing, it may be beneficial to have a consultation with orthopedics.     If the pain had resolved with PT and is now back again, repeating PT may be helpful.     Let me know so I can sign the appropriate referral.    Eli Sommer M.D.

## 2022-06-15 NOTE — TELEPHONE ENCOUNTER
----- Message from Jazlyn Santiago, PT sent at 6/15/2022  1:05 PM CDT -----  Regarding: (R) Shoulder Pain  Hello Mariah Rios called and left a voicemail on my phone today.  She says her shoulder is still painful and she is getting pain down the arm as well.  I have not seen her since 12/2021.  I am wondering if she would benefit from an appt with Dr Ferrell/Francois/Leila/Ortho consult?   She is asking me if she needs an MRI or what.  I have not seen her recently at all.  Could you or your staff call her to see if she simply needs to restart PT or if a visit with you or Ortho is warranted? If you decide she just needs to restart PT, she will need a new order. Let me know if there is anything more you would like me to do on this.    Thank you,  ~Tia

## 2022-06-16 ENCOUNTER — OFFICE VISIT (OUTPATIENT)
Dept: FAMILY MEDICINE | Facility: CLINIC | Age: 77
End: 2022-06-16
Payer: COMMERCIAL

## 2022-06-16 VITALS
SYSTOLIC BLOOD PRESSURE: 130 MMHG | OXYGEN SATURATION: 98 % | HEART RATE: 69 BPM | DIASTOLIC BLOOD PRESSURE: 60 MMHG | WEIGHT: 138 LBS | TEMPERATURE: 98 F | BODY MASS INDEX: 24.45 KG/M2 | RESPIRATION RATE: 16 BRPM | HEIGHT: 63 IN

## 2022-06-16 DIAGNOSIS — G89.29 CHRONIC RIGHT SHOULDER PAIN: Primary | ICD-10-CM

## 2022-06-16 DIAGNOSIS — M25.511 CHRONIC RIGHT SHOULDER PAIN: Primary | ICD-10-CM

## 2022-06-16 PROCEDURE — 99214 OFFICE O/P EST MOD 30 MIN: CPT | Performed by: FAMILY MEDICINE

## 2022-06-16 NOTE — PROGRESS NOTES
"  Assessment & Plan     Chronic right shoulder pain  Does have some cervical DDD based on MRI last fall  There is some weakness of the supraspinatous and based on history and exam, I'm concerned about atraumatic tear of the rotator cuff.   Had xray at ortho already in the fall, will not repeat today  - MR Shoulder Right w/o Contrast; Future                 No follow-ups on file.    Eli Sommer MD  [facility]   Mariah is a 76 year old, presenting for the following health issues:  Musculoskeletal Problem      HPI     Pain History:  When did you first notice your pain? - Post-Acute Pain   Have you seen any provider previously for this issue? Yes   How has your pain affected your ability to work? Not applicable  Where in your body do you have pain? Musculoskeletal problem/pain  Onset/Duration: 1 year   Description  Location: shoulder - right  Joint Swelling: no  Redness: no  Pain: YES- constant ache   Warmth: no  Intensity:  8/10  Progression of Symptoms:  same  Accompanying signs and symptoms:   Fevers: no  Numbness/tingling/weakness: no  History  Trauma to the area: no  Recent illness:  no  Previous similar problem: YES  Previous evaluation:  YES  Precipitating or alleviating factors:  Aggravating factors include: lifting, overuse and driving   Therapies tried and outcome: acetaminophen and physical therapy-not effective           Review of Systems         Objective    /60   Pulse 69   Temp 98  F (36.7  C) (Tympanic)   Resp 16   Ht 1.6 m (5' 3\")   Wt 62.6 kg (138 lb)   SpO2 98%   BMI 24.45 kg/m    Body mass index is 24.45 kg/m .  Physical Exam   GENERAL APPEARANCE: healthy, alert and no distress  ORTHO:   SHOULDER Exam-Right   Inspection: no swelling, no bruising, no discoloration, no obvious deformity, no asymmetry, no glenohumeral joint anterior bulge, no distal clavicle elevation, no muscle atrophy, no scapular winging   Tenderness of: SC joint- no , " clavicle(prox-mid)- no , clavicle-(mid-distal)- no , AC joint- no , acromion- no , anterior capsule- no , prox bicep tendon- no , greater tuberosity- no , prox humerus- no , supraspinatous- YES, infraspinatous- no , superior trapezious- no , rhomboids- no    Range of Motion: Active- forward flexion- normal, abduction- normal, external rotation- normal, internal rotation- normal  Range of Motion: Passive- forward flexion- normal, abduction- normal, external rotation- normal, internal rotation- normal   Strength: forward flexion- 5/5, 4-/5, abduction- 5/5, internal rotation- 5/5, external rotation- 5/5 and bicep- full               EMG done through [facility]: normal, no cervical radiculopathy    MRI CERVICAL SPINE WITHOUT CONTRAST 8/19/2021 1:20 PM      HISTORY: Radicular pain of right upper extremity      TECHNIQUE: Multiplanar, multisequence MRI of the cervical spine  without contrast.      COMPARISON: Cervical spine MRI dated 9/5/2015.      FINDINGS: Normal vertebral body heights. Straightening of the normal  cervical lordosis. Minimal anterolisthesis of C3 on C4 and C4 on C5 is  unchanged from prior. Focal area of intrinsic T1 hyperintense signal  in the C2 vertebral body/odontoid process which is unchanged and may  represent an intraosseous hemangioma versus focal fatty marrow.  Otherwise unremarkable bone marrow signal. No abnormal spinal cord  signal. Visualized paraspinous soft tissues are unremarkable.     Segmental analysis:  Craniovertebral Junction/C1-C2: Degenerative changes of the  atlantoaxial and atlantooccipital joints. This appears grossly  unchanged. Otherwise unremarkable.     C2-C3: Normal disc height. No herniation. Mild to moderate bilateral  facet arthropathy. No spinal canal stenosis. No significant neural  foraminal stenosis. Overall, no change.     C3-C4: Normal disc height. No herniation. Moderate bilateral facet  arthropathy. No spinal canal stenosis. Mild left neural foraminal  stenosis. The  right neural foramen is patent. Overall, no significant  change.     C4-C5: Mild disc height loss. There appears to be a shallow central  protrusion, as before. Moderate to severe right and moderate left  facet arthropathy. No spinal canal stenosis. No significant neural  foraminal stenosis. Overall, no significant change.     C5-C6: Mild to moderate disc height loss. Disc bulge slightly  eccentric to the right. Prominent right uncovertebral arthropathy.  Left uncovertebral arthropathy to a lesser degree. Mild-to-moderate  facet arthropathy. Ligamentum flavum thickening. Mild spinal canal  stenosis. Moderate to severe right and moderate left neural foraminal  stenosis. The degree of right neural foraminal stenosis is similar to  slightly more pronounced than on the prior examination. Otherwise, no  significant change.     C6-C7: Mild disc height loss. No herniation. Moderate left and mild  right facet arthropathy. No spinal canal or neural foraminal stenosis.  Overall, unchanged.     C7-T1: Normal disc height. No herniation. Moderate facet arthropathy.  No spinal canal or neural foraminal stenosis. Overall, no change.                                                                      IMPRESSION:  1. Multilevel degenerative disc disease and uncovertebral and facet  arthropathy of the cervical spine, as described.  2. Mild spinal canal stenosis at C5-C6.  3. Moderate to severe right C5-C6 neural foraminal stenosis. This is  similar to slightly increased compared to the prior MRI of 9/5/2015.  There is also moderate left C5-C6 neural foraminal stenosis. Lesser  degrees of neural foraminal narrowing elsewhere. Please see the body  of the report for additional details.     JEANCARLOS RIDER MD                             .  ..

## 2022-06-20 ENCOUNTER — HOSPITAL ENCOUNTER (OUTPATIENT)
Dept: MRI IMAGING | Facility: CLINIC | Age: 77
Discharge: HOME | End: 2022-06-20
Attending: FAMILY MEDICINE | Admitting: FAMILY MEDICINE
Payer: COMMERCIAL

## 2022-06-20 ENCOUNTER — TELEPHONE (OUTPATIENT)
Dept: FAMILY MEDICINE | Facility: CLINIC | Age: 77
End: 2022-06-20

## 2022-06-20 DIAGNOSIS — M25.511 CHRONIC RIGHT SHOULDER PAIN: ICD-10-CM

## 2022-06-20 DIAGNOSIS — M75.111 NONTRAUMATIC INCOMPLETE TEAR OF RIGHT ROTATOR CUFF: Primary | ICD-10-CM

## 2022-06-20 DIAGNOSIS — M67.819 TENDINOSIS OF ROTATOR CUFF: ICD-10-CM

## 2022-06-20 DIAGNOSIS — G89.29 CHRONIC RIGHT SHOULDER PAIN: ICD-10-CM

## 2022-06-20 PROCEDURE — 73221 MRI JOINT UPR EXTREM W/O DYE: CPT | Mod: 26 | Performed by: RADIOLOGY

## 2022-06-20 PROCEDURE — 73221 MRI JOINT UPR EXTREM W/O DYE: CPT | Mod: RT

## 2022-06-29 ENCOUNTER — OFFICE VISIT (OUTPATIENT)
Dept: DERMATOLOGY | Facility: CLINIC | Age: 77
End: 2022-06-29
Payer: COMMERCIAL

## 2022-06-29 DIAGNOSIS — L82.1 SEBORRHEIC KERATOSIS: ICD-10-CM

## 2022-06-29 DIAGNOSIS — Z85.828 HISTORY OF SKIN CANCER: Primary | ICD-10-CM

## 2022-06-29 DIAGNOSIS — L60.3 NAIL DYSTROPHY: ICD-10-CM

## 2022-06-29 DIAGNOSIS — D23.9 DERMAL NEVUS: ICD-10-CM

## 2022-06-29 DIAGNOSIS — L81.4 LENTIGO: ICD-10-CM

## 2022-06-29 DIAGNOSIS — D18.01 ANGIOMA OF SKIN: ICD-10-CM

## 2022-06-29 PROCEDURE — 87101 SKIN FUNGI CULTURE: CPT | Performed by: DERMATOLOGY

## 2022-06-29 PROCEDURE — 99213 OFFICE O/P EST LOW 20 MIN: CPT | Mod: 25 | Performed by: DERMATOLOGY

## 2022-06-29 PROCEDURE — 11719 TRIM NAIL(S) ANY NUMBER: CPT | Performed by: DERMATOLOGY

## 2022-06-29 ASSESSMENT — PAIN SCALES - GENERAL: PAINLEVEL: NO PAIN (0)

## 2022-06-29 NOTE — PROGRESS NOTES
Mariah Jacinto is an extremely pleasant 76 year old year old female patient here today for spots on arms and r thumbnail changs.   .   Patient states this has been present for a while.  Patient reports the following symptoms:  none.  Patient reports the following previous treatments none.  These treatments did not work.  Patient reports the following modifying factors none.  Associated symptoms: none.  Patient has no other skin complaints today.  Remainder of the HPI, Meds, PMH, Allergies, FH, and SH was reviewed in chart.      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    celexa caused spinning side effect      MENOPAUSE --ERT      OSTEOPENIA      Squamous cell carcinoma        Past Surgical History:   Procedure Laterality Date     ABLATE VEIN VARICOSE RADIO FREQUENCY WITHOUT PHLEBECTOMY MULTIPLE STAB  3/28/2013    Procedure: ABLATE VEIN VARICOSE RADIO FREQUENCY WITHOUT PHLEBECTOMY MULTIPLE STAB;  Bilateral ablation of varicose veins legs-to ultrasound at 0930  ;  Surgeon: Noam Soliman MD;  Location: [facility]     COLONOSCOPY  8/20/2013    Procedure: COLONOSCOPY;  Colonoscopy;  Surgeon: Yuliya Nathan MD;  Location: [facility]     ESOPHAGOSCOPY, GASTROSCOPY, DUODENOSCOPY (EGD), COMBINED N/A 5/12/2015    Procedure: COMBINED ESOPHAGOSCOPY, GASTROSCOPY, DUODENOSCOPY (EGD), BIOPSY SINGLE OR MULTIPLE;  Surgeon: Yuliya Nathan MD;  Location: [facility]     ESOPHAGOSCOPY, GASTROSCOPY, DUODENOSCOPY (EGD), COMBINED N/A 1/31/2017    Procedure: COMBINED ESOPHAGOSCOPY, GASTROSCOPY, DUODENOSCOPY (EGD), BIOPSY SINGLE OR MULTIPLE;  Surgeon: Qasim Bowie MD;  Location: [facility]     EXCISE MASS FINGER Right 2/19/2019    Procedure: Excision Right Ring Finger Volar Middle  Phalanx Mass;  Surgeon: Jake Viveros MD;  Location: [facility]     HYSTERECTOMY, PAP NO LONGER INDICATED      has both ovaries out also      HYSTERECTOMY, VAGINAL      Hysterectomy, oophorectomy     PHACOEMULSIFICATION WITH STANDARD INTRAOCULAR LENS IMPLANT Left 3/18/2019    Procedure: Cataract Removal with Implant;  Surgeon: Chapito Phillips MD;  Location: [facility]     PHACOEMULSIFICATION WITH STANDARD INTRAOCULAR LENS IMPLANT Right 4/10/2019    Procedure: Cataract Removal with Implant;  Surgeon: Chapito Phillips MD;  Location: [facility]     RELEASE TRIGGER FINGER Right 2017    Procedure: RELEASE TRIGGER FINGER;  Right Thumb A1 Pulley Release;  Surgeon: Jake Viveros MD;  Location: [facility]     SURGICAL HISTORY OF -       right retromastoid craniectomy and decompression trigeminal nerve     TONSILLECTOMY & ADENOIDECTOMY  child    T&A      ZZC ANESTH,LOWER ARM SURGERY      ulnar nerve decompression - right        Family History   Problem Relation Age of Onset     Alzheimer Disease Mother          at age 85     Osteoporosis Mother      Arthritis Mother      Alcohol/Drug Father      Respiratory Father      Eye Disorder Maternal Grandfather         Macular degneration     C.A.D. Brother         Tripple bypass age 57     Cardiovascular Brother      Cancer Brother         leukemia (ALL)     Cardiovascular Brother      Cardiovascular Brother      Neurologic Disorder Son      Heart Disease Son      Melanoma No family hx of      Skin Cancer No family hx of        Social History     Socioeconomic History     Marital status:      Spouse name: Not on file     Number of children: Not on file     Years of education: Not on file     Highest education level: Not on file   Occupational History     Not on file   Tobacco Use     Smoking status: Never Smoker     Smokeless tobacco: Never Used   Vaping Use     Vaping Use: Never used   Substance and Sexual Activity     Alcohol use: No     Drug  use: No     Sexual activity: Yes     Birth control/protection: Surgical     Comment: complete hysterectomy 1988   Other Topics Concern     Parent/sibling w/ CABG, MI or angioplasty before 65F 55M? No   Social History Narrative     Not on file     Social Determinants of Health     Financial Resource Strain: Not on file   Food Insecurity: Not on file   Transportation Needs: Not on file   Physical Activity: Not on file   Stress: Not on file   Social Connections: Not on file   Intimate Partner Violence: Not on file   Housing Stability: Not on file       Outpatient Encounter Medications as of 6/29/2022   Medication Sig Dispense Refill     acetaminophen (TYLENOL) 325 MG tablet Take 325 mg by mouth once       Ascorbic Acid (VITAMIN C ER PO) Take 1 tablet by mouth daily        estradiol (ESTRACE) 0.1 MG/GM vaginal cream Place vaginally twice a week 1 g  twice weekly at bedtime 43 g 4     melatonin 5 MG CAPS Take 5 mg by mouth At Bedtime 1 capsule 0     MULTI-VITAMIN OR TABS 1 TABLET DAILY       Omega-3 Fatty Acids (OMEGA-3 FISH OIL PO) Take 1 capsule by mouth daily       omeprazole (PRILOSEC) 40 MG DR capsule Take 1 capsule by mouth once daily 90 capsule 3     simvastatin (ZOCOR) 20 MG tablet Take 1 tablet (20 mg) by mouth At Bedtime 90 tablet 3     VITAMIN D OR 1 DAILY       No facility-administered encounter medications on file as of 6/29/2022.             O:   NAD, WDWN, Alert & Oriented, Mood & Affect wnl, Vitals stable   Here today alone   There were no vitals taken for this visit.   General appearance normal   Vitals stable   Alert, oriented and in no acute distress     R thumbnail white and ridged  Arms stuck on papules    Stuck on papules and brown macules on trunk and ext   Red papules on trunk  Flesh colored papules on trunk     The remainder of the full exam was normal; the following areas were examined:  conjunctiva/lids, oral mucosa, neck, peripheral vascular system, abdomen, lymph nodes, digits/nails, eccrine  and apocrine glands, scalp/hair, face, neck, chest, abdomen, buttocks, back, RUE, LUE, RLE, LLE       Eyes: Conjunctivae/lids:Normal     ENT: Lips, buccal mucosa, tongue: normal    MSK:Normal    Cardiovascular: peripheral edema none    Pulm: Breathing Normal    Lymph Nodes: No Head and Neck Lymphadenopathy     Neuro/Psych: Orientation:Alert and Orientedx3 ; Mood/Affect:normal       A/P:  1. Seborrheic keratosis, lentigo, angioma, dermal nevus, h x of non-melanoma skin cancer  2. R thumbnail clip for fungal culture  It was a pleasure speaking to Mariah Jacinto today.  Previous clinic notes and pertinent laboratory tests were reviewed prior to Mariah Jacinto's visit.  Patient encouraged to perform monthly skin exams.  UV precautions reviewed with patient.  Return to clinic pending culture

## 2022-06-29 NOTE — LETTER
6/29/2022         RE: Mariah Jacinto  [address] [place] [address]        Dear Colleague,    Thank you for referring your patient, Mariah Jacinto, to the [facility]. Please see a copy of my visit note below.    Mariah Jacinto is an extremely pleasant 76 year old year old female patient here today for spots on arms and r thumbnail changs.   .   Patient states this has been present for a while.  Patient reports the following symptoms:  none.  Patient reports the following previous treatments none.  These treatments did not work.  Patient reports the following modifying factors none.  Associated symptoms: none.  Patient has no other skin complaints today.  Remainder of the HPI, Meds, PMH, Allergies, FH, and SH was reviewed in chart.      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    celexa caused spinning side effect      MENOPAUSE --ERT      OSTEOPENIA      Squamous cell carcinoma        Past Surgical History:   Procedure Laterality Date     ABLATE VEIN VARICOSE RADIO FREQUENCY WITHOUT PHLEBECTOMY MULTIPLE STAB  3/28/2013    Procedure: ABLATE VEIN VARICOSE RADIO FREQUENCY WITHOUT PHLEBECTOMY MULTIPLE STAB;  Bilateral ablation of varicose veins legs-to ultrasound at 0930  ;  Surgeon: Noam Soliman MD;  Location: [facility]     COLONOSCOPY  8/20/2013    Procedure: COLONOSCOPY;  Colonoscopy;  Surgeon: Yuliya Nathan MD;  Location: [facility]     ESOPHAGOSCOPY, GASTROSCOPY, DUODENOSCOPY (EGD), COMBINED N/A 5/12/2015    Procedure: COMBINED ESOPHAGOSCOPY, GASTROSCOPY, DUODENOSCOPY (EGD), BIOPSY SINGLE OR MULTIPLE;  Surgeon: Yuliya Nathan MD;  Location: [facility]     ESOPHAGOSCOPY, GASTROSCOPY, DUODENOSCOPY (EGD), COMBINED N/A  2017    Procedure: COMBINED ESOPHAGOSCOPY, GASTROSCOPY, DUODENOSCOPY (EGD), BIOPSY SINGLE OR MULTIPLE;  Surgeon: Qasim Bowie MD;  Location: [facility]     EXCISE MASS FINGER Right 2019    Procedure: Excision Right Ring Finger Volar Middle Phalanx Mass;  Surgeon: Jake Viveros MD;  Location: [facility]     HYSTERECTOMY, PAP NO LONGER INDICATED      has both ovaries out also      HYSTERECTOMY, VAGINAL  1988    Hysterectomy, oophorectomy     PHACOEMULSIFICATION WITH STANDARD INTRAOCULAR LENS IMPLANT Left 3/18/2019    Procedure: Cataract Removal with Implant;  Surgeon: Chapito Phillips MD;  Location: [facility]     PHACOEMULSIFICATION WITH STANDARD INTRAOCULAR LENS IMPLANT Right 4/10/2019    Procedure: Cataract Removal with Implant;  Surgeon: Chapito Phillips MD;  Location: [facility]     RELEASE TRIGGER FINGER Right 2017    Procedure: RELEASE TRIGGER FINGER;  Right Thumb A1 Pulley Release;  Surgeon: Jake Viveros MD;  Location: [facility]     SURGICAL HISTORY OF -       right retromastoid craniectomy and decompression trigeminal nerve     TONSILLECTOMY & ADENOIDECTOMY  child    T&A      ZZC ANESTH,LOWER ARM SURGERY      ulnar nerve decompression - right        Family History   Problem Relation Age of Onset     Alzheimer Disease Mother          at age 85     Osteoporosis Mother      Arthritis Mother      Alcohol/Drug Father      Respiratory Father      Eye Disorder Maternal Grandfather         Macular degneration     C.A.D. Brother         Tripple bypass age 57     Cardiovascular Brother      Cancer Brother         leukemia (ALL)     Cardiovascular Brother      Cardiovascular Brother      Neurologic Disorder Son      Heart Disease Son      Melanoma No family hx of      Skin Cancer No family hx of        Social History     Socioeconomic History     Marital status:      Spouse name: Not on file     Number of children: Not on file     Years of education: Not on file     Highest  education level: Not on file   Occupational History     Not on file   Tobacco Use     Smoking status: Never Smoker     Smokeless tobacco: Never Used   Vaping Use     Vaping Use: Never used   Substance and Sexual Activity     Alcohol use: No     Drug use: No     Sexual activity: Yes     Birth control/protection: Surgical     Comment: complete hysterectomy 1988   Other Topics Concern     Parent/sibling w/ CABG, MI or angioplasty before 65F 55M? No   Social History Narrative     Not on file     Social Determinants of Health     Financial Resource Strain: Not on file   Food Insecurity: Not on file   Transportation Needs: Not on file   Physical Activity: Not on file   Stress: Not on file   Social Connections: Not on file   Intimate Partner Violence: Not on file   Housing Stability: Not on file       Outpatient Encounter Medications as of 6/29/2022   Medication Sig Dispense Refill     acetaminophen (TYLENOL) 325 MG tablet Take 325 mg by mouth once       Ascorbic Acid (VITAMIN C ER PO) Take 1 tablet by mouth daily        estradiol (ESTRACE) 0.1 MG/GM vaginal cream Place vaginally twice a week 1 g  twice weekly at bedtime 43 g 4     melatonin 5 MG CAPS Take 5 mg by mouth At Bedtime 1 capsule 0     MULTI-VITAMIN OR TABS 1 TABLET DAILY       Omega-3 Fatty Acids (OMEGA-3 FISH OIL PO) Take 1 capsule by mouth daily       omeprazole (PRILOSEC) 40 MG DR capsule Take 1 capsule by mouth once daily 90 capsule 3     simvastatin (ZOCOR) 20 MG tablet Take 1 tablet (20 mg) by mouth At Bedtime 90 tablet 3     VITAMIN D OR 1 DAILY       No facility-administered encounter medications on file as of 6/29/2022.             O:   NAD, WDWN, Alert & Oriented, Mood & Affect wnl, Vitals stable   Here today alone   There were no vitals taken for this visit.   General appearance normal   Vitals stable   Alert, oriented and in no acute distress     R thumbnail white and ridged  Arms stuck on papules    Stuck on papules and brown macules on trunk and  ext   Red papules on trunk  Flesh colored papules on trunk     The remainder of the full exam was normal; the following areas were examined:  conjunctiva/lids, oral mucosa, neck, peripheral vascular system, abdomen, lymph nodes, digits/nails, eccrine and apocrine glands, scalp/hair, face, neck, chest, abdomen, buttocks, back, RUE, LUE, RLE, LLE       Eyes: Conjunctivae/lids:Normal     ENT: Lips, buccal mucosa, tongue: normal    MSK:Normal    Cardiovascular: peripheral edema none    Pulm: Breathing Normal    Lymph Nodes: No Head and Neck Lymphadenopathy     Neuro/Psych: Orientation:Alert and Orientedx3 ; Mood/Affect:normal       A/P:  1. Seborrheic keratosis, lentigo, angioma, dermal nevus, h x of non-melanoma skin cancer  2. R thumbnail clip for fungal culture  It was a pleasure speaking to Mariah Jacinto today.  Previous clinic notes and pertinent laboratory tests were reviewed prior to Mariah Jacinto's visit.  Patient encouraged to perform monthly skin exams.  UV precautions reviewed with patient.  Return to clinic pending culture        Again, thank you for allowing me to participate in the care of your patient.        Sincerely,        Chapito Franco MD

## 2022-07-05 ENCOUNTER — TRANSFERRED RECORDS (OUTPATIENT)
Dept: FAMILY MEDICINE | Facility: CLINIC | Age: 77
End: 2022-07-05

## 2022-07-12 NOTE — TELEPHONE ENCOUNTER
New prescription for Estrace cream sent.    Eli Sommer M.D.    
[facility] [place] calling stating pt is waiting to fill Premarin, it is not covered by patient's Insurance Company alternative medication would be Estrace, ok for new medication?       
Unknown

## 2022-07-27 LAB — BACTERIA SPEC CULT: NO GROWTH

## 2022-08-15 ENCOUNTER — TELEPHONE (OUTPATIENT)
Dept: DERMATOLOGY | Facility: CLINIC | Age: 77
End: 2022-08-15

## 2022-08-15 NOTE — TELEPHONE ENCOUNTER
[facility] Call Center    Phone Message    May a detailed message be left on voicemail: yes     Reason for Call: Other: Pt states she hasn't had a call back to discuss the test results from her 6/29/22 visit. Please call Pt back to discuss. Thank you.      Action Taken: Other: [facility]    Travel Screening: Not Applicable

## 2022-08-16 NOTE — TELEPHONE ENCOUNTER
Unable to reach patient. Detailed message left per patient request. Call if further questions.    Angelic Plascencia RN

## 2022-08-16 NOTE — TELEPHONE ENCOUNTER
"Spoke to patient and advised of 6-29-22 nail culture result of no growth.     She denies injury to the nail bed and states:     \"It is growing out.. It is not painful and it is only the one nail. I had surgery on that same thumb-it would stick in place and I had to push it back in line, but now it glides nicely and doesn't stick...\"    I did advise that I suspect the nail bed may have been injured during thumb surgery and it may take 6 to 12 months to grow out.    Please advise. Do you agree with potential nail bed injury during thumb surgery or any other advice for her?...     Angelic Plascencia RN            "

## 2022-09-01 ENCOUNTER — HOSPITAL ENCOUNTER (OUTPATIENT)
Dept: MAMMOGRAPHY | Facility: CLINIC | Age: 77
Discharge: HOME | End: 2022-09-01
Attending: FAMILY MEDICINE | Admitting: FAMILY MEDICINE
Payer: COMMERCIAL

## 2022-09-01 DIAGNOSIS — Z12.31 VISIT FOR SCREENING MAMMOGRAM: ICD-10-CM

## 2022-09-01 PROCEDURE — 77067 SCR MAMMO BI INCL CAD: CPT

## 2022-09-08 ENCOUNTER — OFFICE VISIT (OUTPATIENT)
Dept: FAMILY MEDICINE | Facility: CLINIC | Age: 77
End: 2022-09-08
Payer: COMMERCIAL

## 2022-09-08 VITALS
DIASTOLIC BLOOD PRESSURE: 80 MMHG | HEART RATE: 72 BPM | SYSTOLIC BLOOD PRESSURE: 122 MMHG | RESPIRATION RATE: 14 BRPM | OXYGEN SATURATION: 97 % | TEMPERATURE: 98.4 F | HEIGHT: 63 IN | BODY MASS INDEX: 24.32 KG/M2 | WEIGHT: 137.25 LBS

## 2022-09-08 DIAGNOSIS — K21.9 GASTROESOPHAGEAL REFLUX DISEASE WITHOUT ESOPHAGITIS: ICD-10-CM

## 2022-09-08 DIAGNOSIS — Z00.00 ENCOUNTER FOR MEDICARE ANNUAL WELLNESS EXAM: Primary | ICD-10-CM

## 2022-09-08 DIAGNOSIS — E28.39 ESTROGEN DEFICIENCY: ICD-10-CM

## 2022-09-08 DIAGNOSIS — E78.5 HYPERLIPIDEMIA LDL GOAL <160: ICD-10-CM

## 2022-09-08 LAB
ANION GAP SERPL CALCULATED.3IONS-SCNC: 11 MMOL/L (ref 7–15)
BUN SERPL-MCNC: 14 MG/DL (ref 8–23)
CALCIUM SERPL-MCNC: 9 MG/DL (ref 8.8–10.2)
CHLORIDE SERPL-SCNC: 105 MMOL/L (ref 98–107)
CHOLEST SERPL-MCNC: 193 MG/DL
CREAT SERPL-MCNC: 0.72 MG/DL (ref 0.51–0.95)
DEPRECATED HCO3 PLAS-SCNC: 26 MMOL/L (ref 22–29)
GFR SERPL CREATININE-BSD FRML MDRD: 86 ML/MIN/1.73M2
GLUCOSE SERPL-MCNC: 102 MG/DL (ref 70–99)
HDLC SERPL-MCNC: 60 MG/DL
LDLC SERPL CALC-MCNC: 105 MG/DL
NONHDLC SERPL-MCNC: 133 MG/DL
POTASSIUM SERPL-SCNC: 4.6 MMOL/L (ref 3.4–5.3)
SODIUM SERPL-SCNC: 142 MMOL/L (ref 136–145)
TRIGL SERPL-MCNC: 138 MG/DL

## 2022-09-08 PROCEDURE — G0439 PPPS, SUBSEQ VISIT: HCPCS | Performed by: FAMILY MEDICINE

## 2022-09-08 PROCEDURE — 80048 BASIC METABOLIC PNL TOTAL CA: CPT | Performed by: FAMILY MEDICINE

## 2022-09-08 PROCEDURE — 36415 COLL VENOUS BLD VENIPUNCTURE: CPT | Performed by: FAMILY MEDICINE

## 2022-09-08 PROCEDURE — 80061 LIPID PANEL: CPT | Performed by: FAMILY MEDICINE

## 2022-09-08 PROCEDURE — 99213 OFFICE O/P EST LOW 20 MIN: CPT | Mod: 25 | Performed by: FAMILY MEDICINE

## 2022-09-08 RX ORDER — SIMVASTATIN 20 MG
20 TABLET ORAL AT BEDTIME
Qty: 90 TABLET | Refills: 3 | Status: SHIPPED | OUTPATIENT
Start: 2022-09-08 | End: 2023-08-23

## 2022-09-08 RX ORDER — OMEPRAZOLE 40 MG/1
CAPSULE, DELAYED RELEASE ORAL
Qty: 90 CAPSULE | Refills: 3 | Status: SHIPPED | OUTPATIENT
Start: 2022-09-08 | End: 2023-08-23

## 2022-09-08 ASSESSMENT — ENCOUNTER SYMPTOMS
ABDOMINAL PAIN: 0
CONSTIPATION: 0
HEMATOCHEZIA: 0
HEMATURIA: 0
SORE THROAT: 0
EYE PAIN: 0
DIARRHEA: 0
DYSURIA: 0
WEAKNESS: 0
COUGH: 0
JOINT SWELLING: 0
HEARTBURN: 0
ARTHRALGIAS: 0
FEVER: 0
DIZZINESS: 0
MYALGIAS: 0
PALPITATIONS: 0
NERVOUS/ANXIOUS: 0
CHILLS: 0
SHORTNESS OF BREATH: 0
BREAST MASS: 0
FREQUENCY: 0
PARESTHESIAS: 0
HEADACHES: 0
NAUSEA: 0

## 2022-09-08 ASSESSMENT — ACTIVITIES OF DAILY LIVING (ADL): CURRENT_FUNCTION: NO ASSISTANCE NEEDED

## 2022-09-08 ASSESSMENT — PAIN SCALES - GENERAL: PAINLEVEL: NO PAIN (0)

## 2022-09-08 NOTE — LETTER
September 8, 2022      Mariah [facility] [place]  [address] [address]        Dear ,    We are writing to inform you of your test results.    These are normal/acceptable.     Resulted Orders   Lipid panel reflex to direct LDL Non-fasting   Result Value Ref Range    Cholesterol 193 <200 mg/dL    Triglycerides 138 <150 mg/dL    Direct Measure HDL 60 >=50 mg/dL    LDL Cholesterol Calculated 105 (H) <=100 mg/dL    Non HDL Cholesterol 133 (H) <130 mg/dL    Narrative    Cholesterol  Desirable:  <200 mg/dL    Triglycerides  Normal:  Less than 150 mg/dL  Borderline High:  150-199 mg/dL  High:  200-499 mg/dL  Very High:  Greater than or equal to 500 mg/dL    Direct Measure HDL  Female:  Greater than or equal to 50 mg/dL   Male:  Greater than or equal to 40 mg/dL    LDL Cholesterol  Desirable:  <100mg/dL  Above Desirable:  100-129 mg/dL   Borderline High:  130-159 mg/dL   High:  160-189 mg/dL   Very High:  >= 190 mg/dL    Non HDL Cholesterol  Desirable:  130 mg/dL  Above Desirable:  130-159 mg/dL  Borderline High:  160-189 mg/dL  High:  190-219 mg/dL  Very High:  Greater than or equal to 220 mg/dL   Basic metabolic panel  (Ca, Cl, CO2, Creat, Gluc, K, Na, BUN)   Result Value Ref Range    Creatinine 0.72 0.51 - 0.95 mg/dL    Sodium 142 136 - 145 mmol/L    Potassium 4.6 3.4 - 5.3 mmol/L    Urea Nitrogen 14.0 8.0 - 23.0 mg/dL    Chloride 105 98 - 107 mmol/L    Carbon Dioxide (CO2) 26 22 - 29 mmol/L    Anion Gap 11 7 - 15 mmol/L    Glucose 102 (H) 70 - 99 mg/dL    GFR Estimate 86 >60 mL/min/1.73m2      Comment:      Effective December 21, 2021 eGFRcr in adults is calculated using the 2021 CKD-EPI creatinine equation which includes age and gender (Amy et al., NEJM, DOI: 10.1056/DZWDcb1709971)    Calcium 9.0 8.8 - 10.2 mg/dL       If you have any questions or concerns, please call the clinic at the number listed above.       Sincerely,      Eli Sommer MD

## 2022-09-08 NOTE — PROGRESS NOTES
"SUBJECTIVE:   Mariah Jacinto is a 76 year old female who presents for Preventive Visit.      Patient has been advised of split billing requirements and indicates understanding: Yes  Are you in the first 12 months of your Medicare coverage?  No    Healthy Habits:     In general, how would you rate your overall health?  Good    Frequency of exercise:  1 day/week    Duration of exercise:  15-30 minutes    Do you usually eat at least 4 servings of fruit and vegetables a day, include whole grains    & fiber and avoid regularly eating high fat or \"junk\" foods?  Yes    Taking medications regularly:  Yes    Medication side effects:  None    Ability to successfully perform activities of daily living:  No assistance needed    Home Safety:  No safety concerns identified    Hearing Impairment:  No hearing concerns    In the past 6 months, have you been bothered by leaking of urine?  No    In general, how would you rate your overall mental or emotional health?  Good      PHQ-2 Total Score: 0    Additional concerns today:  Yes    Do you feel safe in your environment? Yes    Have you ever done Advance Care Planning? (For example, a Health Directive, POLST, or a discussion with a medical provider or your loved ones about your wishes): Yes, advance care planning is on file.       Fall risk  Fallen 2 or more times in the past year?: No  Any fall with injury in the past year?: No    Cognitive Screening   1) Repeat 3 items (Leader, Season, Table)    2) Clock draw: NORMAL  3) 3 item recall: Recalls 3 objects  Results: 3 items recalled: COGNITIVE IMPAIRMENT LESS LIKELY    Mini-CogTM Copyright GARY Khoury. Licensed by the author for use in [facility]; reprinted with permission (baron@.[email or web address]). All rights reserved.      Do you have sleep apnea, excessive snoring or daytime drowsiness?: no    Reviewed and updated as needed this visit by clinical staff   Tobacco  Allergies  Meds                Reviewed and updated as needed " "this visit by Provider                   Social History     Tobacco Use     Smoking status: Never Smoker     Smokeless tobacco: Never Used   Substance Use Topics     Alcohol use: No     If you drink alcohol do you typically have >3 drinks per day or >7 drinks per week? No    Alcohol Use 9/8/2022   Prescreen: >3 drinks/day or >7 drinks/week? No   Prescreen: >3 drinks/day or >7 drinks/week? -     - She will be [facility] flu vaccine [facility] at [facility].    Hyperlipidemia Follow-Up  Simvastatin 20mg qd    Are you regularly taking any medication or supplement to lower your cholesterol?   Yes- statin    Are you having muscle aches or other side effects that you think could be caused by your cholesterol lowering medication?  No      Current providers sharing in care for this patient include:   Patient Care Team:  Eli Sommer MD as PCP - General (Family Practice)  Eli Sommer MD as Assigned PCP  Chapito Franco MD as Assigned Surgical Provider    The following health maintenance items are reviewed in Epic and correct as of today:  Health Maintenance Due   Topic Date Due     INFLUENZA VACCINE (1) 09/01/2022     Lab work is in process  Mammogram Screening:  [facility]    Mammogram Screening - Patient over age 75, has elected to continue with screening.  Pertinent mammograms are reviewed under the imaging tab.    Review of Systems  Constitutional, HEENT, cardiovascular, pulmonary, gi and gu systems are negative, except as otherwise noted.    OBJECTIVE:   /80   Pulse 72   Temp 98.4  F (36.9  C) (Tympanic)   Resp 14   Ht 1.6 m (5' 3\")   Wt 62.3 kg (137 lb 4 oz)   LMP  (LMP Unknown)   SpO2 97%   Breastfeeding No   BMI 24.31 kg/m   Estimated body mass index is 24.31 kg/m  as calculated from the following:    Height as of this encounter: 1.6 m (5' 3\").    Weight as of this encounter: 62.3 kg (137 lb 4 oz).  Physical Exam  GENERAL: healthy, alert and no distress  NECK: no adenopathy, no asymmetry, masses, or " "scars and thyroid normal to palpation  RESP: lungs clear to auscultation - no rales, rhonchi or wheezes  CV: regular rate and rhythm, normal S1 S2, no S3 or S4, no murmur, click or rub, no peripheral edema and peripheral pulses strong  ABDOMEN: soft, nontender, no hepatosplenomegaly, no masses and bowel sounds normal  MS: no gross musculoskeletal defects noted, no edema  Psych: normal moods        ASSESSMENT / PLAN:   (Z00.00) Encounter for Medicare annual wellness exam  (primary encounter diagnosis)  Comment:    Plan: Basic metabolic panel  (Ca, Cl, CO2, Creat,         Gluc, K, Na, BUN)             (E78.5) Hyperlipidemia LDL goal <160  Comment:  Stable on medication  Plan: simvastatin (ZOCOR) 20 MG tablet, OFFICE/OUTPT         VISIT,EST,LEVL III, Lipid panel reflex to         direct LDL Non-fasting             (K21.9) Gastroesophageal reflux disease without esophagitis  Comment:  Without the omeprazole gets return of reflux/waterbrash symptoms  Plan: omeprazole (PRILOSEC) 40 MG DR capsule,         OFFICE/OUTPT VISIT,EST,LEVL III, Basic         metabolic panel  (Ca, Cl, CO2, Creat, Gluc, K,         Na, BUN)             (E28.39) Estrogen deficiency  Comment:  Plan: DX Hip/Pelvis/Spine         Last dexa 2010      Patient has been advised of split billing requirements and indicates understanding: Yes     Patient knows it has not been 365+ days for her AWE- understands that this is the medicare rule and said \"I can have a physical once a calendar year\" she believes per her insurance.  I did tell her I would document our conversation    COUNSELING:  Reviewed preventive health counseling, as reflected in patient instructions       Regular exercise       Healthy diet/nutrition    Estimated body mass index is 24.31 kg/m  as calculated from the following:    Height as of this encounter: 1.6 m (5' 3\").    Weight as of this encounter: 62.3 kg (137 lb 4 oz).        She reports that she has never smoked. She has never used " smokeless tobacco.      Appropriate preventive services were discussed with this patient, including applicable screening as appropriate for cardiovascular disease, diabetes, osteopenia/osteoporosis, and glaucoma.  As appropriate for age/gender, discussed screening for colorectal cancer, prostate cancer, breast cancer, and cervical cancer. Checklist reviewing preventive services available has been given to the patient.    Reviewed patients plan of care and provided an AVS. The Basic Care Plan (routine screening as documented in Health Maintenance) for Mariah meets the Care Plan requirement. This Care Plan has been established and reviewed with the Patient.    Counseling Resources:  ATP IV Guidelines  Pooled Cohorts Equation Calculator  Breast Cancer Risk Calculator  Breast Cancer: Medication to Reduce Risk  FRAX Risk Assessment  ICSI Preventive Guidelines  Dietary Guidelines for Americans, 2010  [employer] MyPlate  ASA Prophylaxis  Lung CA Screening    Eli Sommer MD  [facility]    Identified Health Risks:

## 2022-09-08 NOTE — PATIENT INSTRUCTIONS
Patient Education   Personalized Prevention Plan  You are due for the preventive services outlined below.  Your care team is available to assist you in scheduling these services.  If you have already completed any of these items, please share that information with your care team to update in your medical record.  Health Maintenance Due   Topic Date Due     Flu Vaccine (1) 09/01/2022     FALL RISK ASSESSMENT  09/22/2022

## 2022-09-13 NOTE — TELEPHONE ENCOUNTER
Pt has many questions, will schedule appt in Sept.  Is wanting refill of cheaper one/estriol powder.  Pt states she received this med [facility] mail [facility]  Advise.  Nii   Home

## 2022-10-05 ENCOUNTER — TRANSCRIBE ORDERS (OUTPATIENT)
Dept: OTHER | Age: 77
End: 2022-10-05

## 2022-10-05 DIAGNOSIS — M79.601 RIGHT ARM PAIN: Primary | ICD-10-CM

## 2022-10-13 ENCOUNTER — HOSPITAL ENCOUNTER (OUTPATIENT)
Dept: BONE DENSITY | Facility: CLINIC | Age: 77
Discharge: HOME | End: 2022-10-13
Attending: FAMILY MEDICINE | Admitting: FAMILY MEDICINE
Payer: COMMERCIAL

## 2022-10-13 DIAGNOSIS — E28.39 ESTROGEN DEFICIENCY: ICD-10-CM

## 2022-10-13 PROCEDURE — 77080 DXA BONE DENSITY AXIAL: CPT

## 2022-10-21 ENCOUNTER — HOSPITAL ENCOUNTER (OUTPATIENT)
Dept: PHYSICAL THERAPY | Facility: CLINIC | Age: 77
Setting detail: THERAPIES SERIES
Discharge: HOME | End: 2022-10-21
Attending: ORTHOPAEDIC SURGERY
Payer: COMMERCIAL

## 2022-10-21 PROCEDURE — 97162 PT EVAL MOD COMPLEX 30 MIN: CPT | Mod: GP | Performed by: PHYSICAL THERAPIST

## 2022-10-21 PROCEDURE — 97110 THERAPEUTIC EXERCISES: CPT | Mod: GP | Performed by: PHYSICAL THERAPIST

## 2022-10-24 NOTE — PROGRESS NOTES
PHYSICAL THERAPY INITIAL EVALUATION  10/21/22 1000   General Information   Type of Visit Initial OP Ortho PT Evaluation   Start of Care Date 10/21/22   Referring Physician Dr. Gonsales   Patient/Family Goals Statement decrease pain   Orders Evaluate and Treat   Orders Comment 12 visits   Date of Order 10/05/22   Certification Required? Yes   Medical Diagnosis right arm pain   Surgical/Medical history reviewed Yes   Precautions/Limitations no known precautions/limitations   Body Part(s)   Body Part(s) Shoulder   Presentation and Etiology   Pertinent history of current problem (include personal factors and/or comorbidities that impact the POC) Has had symptoms for over a year. Did PT last year, doesn't feel there was much improvement with this. Still does some of the exercises but not helping. Has had no relief with chiropractor appointments. Feels like the right arm is being squeezes and tightening everything up around it. States it is a constant pain in these areaa. Radiates down the arm, can go into the 4th and 5th fingers but this is intermittent. Denies numbness and tingling. Taking tylenol and doesn't change her symtoms. Has tried voltaren gel without relief. Wondering if being less active over COVID started all of this. Hx of a ulnar decompression surgery in 1999.   Impairments A. Pain   Functional Limitations perform activities of daily living   Symptom Location R shoulder pain   How/Where did it occur From insidious onset   Onset date of current episode/exacerbation 10/21/21   Chronicity Chronic   Pain rating (0-10 point scale) Best (/10);Worst (/10)   Best (/10) 6   Worst (/10) 6   Pain quality C. Aching   Frequency of pain/symptoms A. Constant   Pain/symptoms exacerbated by F. Nothing   Pain/symptoms eased by D. Nothing   Progression of symptoms since onset: Unchanged   Current / Previous Interventions   Diagnostic Tests: MRI   MRI Results Results   MRI results Shoulder 2022: IMPRESSION:  1. 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  2. Tendinosis of the right shoulder supraspinatus and infraspinatus  tendons, with a focal area of high-grade to near full-thickness  tearing of the junctional fibers of the posterior  supraspinatus/anterior infraspinatus tendon at the footprint.  3. Tendinosis of the right shoulder subscapularis tendon without  full-thickness tear or tendon retraction.  4. Normal muscle bulk of the right shoulder rotator cuff musculature.  5. Normal appearing biceps tendon.  6. Degenerative changes in the labrum. Neck 2021: IMPRESSION:  1. Multilevel degenerative disc disease and uncovertebral and facet  arthropathy of the cervical spine, as described.  2. Mild spinal canal stenosis at C5-C6.  3. Moderate to severe right C5-C6 neural foraminal stenosis. This is  similar to slightly increased compared to the prior MRI of 9/5/2015.  There is also moderate left C5-C6 neural foraminal stenosis. Lesser  degrees of neural foraminal narrowing elsewhere. Please see the body  of the report for additional details.   Prior Level of Function   Prior Level of Function-Mobility independent   Prior Level of Function-ADLs independent   Current Level of Function   Patient role/employment history F. Retired   Fall Risk Screen   Fall screen completed by PT   Have you fallen 2 or more times in the past year? No   Have you fallen and had an injury in the past year? No   Is patient a fall risk? No   Functional Scales   Functional Scales Other   Other Scales  SPADI: 72   Shoulder Objective Findings   Side (if bilateral, select both right and left) Right;Left   Scapulothoracic Rhythm R decreased upward rotation and posterior tilt, increased scapular elevation   Pec Minor (supine) Flexibility R 4 fingerwidths L 3 fingerwidths   Neer's Test -   Moses-Jax Test mild -   Coracoid Test -   Bursa Test -   Load and Shift Test -   Sulcus Test -   Crossover Test -   Shoulder Special  Tests Comments - full can for pain but + for weakness   Palpation tender muscle belly of triceps, muscle bellies of infraspintus and teres minor, lat   Observation R hand dominant   Posture R shoulder depressed, forward shoulders B with R > L   Right Shoulder Flexion AROM 138, pain with lowering down   Right Shoulder Flexion PROM 150, empty end feel   Left Shoulder Flexion AROM 138   Right Shoulder Abduction AROM WNL   Right Shoulder Abduction PROM 120, empty end feel   Left Shoulder Abduction AROM WNL   Right Shoulder ER AROM 50   Right Shoulder ER PROM 80   Left Shoulder ER AROM 50   Right Shoulder IR AROM T6   Right Shoulder IR PROM 80   Left Shoulder IR AROM T6   Right Shoulder Flexion Strength 4, painful, audible pop in the shoulder   Left Shoulder Flexion Strength 5   Right Shoulder Abduction Strength 4, painful   Left Shoulder Abduction Strength 5   Right Shoulder ER Strength 4   Left Shoulder ER Strength 5   Right Shoulder IR Strength 5-/5   Left Shoulder IR Strength 5   Planned Therapy Interventions   Planned Therapy Interventions joint mobilization;manual therapy;neuromuscular re-education;ROM;strengthening;stretching   Planned Modality Interventions   Planned Modality Interventions TENS   Clinical Impression   Criteria for Skilled Therapeutic Interventions Met yes, treatment indicated   PT Diagnosis R shoulder pain   Influenced by the following impairments pain, decreased ROM, decreased strength, decreased flexibility   Functional limitations due to impairments reaching, lifting, carrying, dressing   Clinical Presentation Evolving/Changing   Clinical Presentation Rationale chronicity, intermittent radiating symptoms   Clinical Decision Making (Complexity) Moderate complexity   Therapy Frequency 1 time/week   Predicted Duration of Therapy Intervention (days/wks) 12 weeks   Risk & Benefits of therapy have been explained Yes   Patient, Family & other staff in agreement with plan of care Yes   Education  Assessment   Preferred Learning Style Listening;Demonstration;Pictures/video   Barriers to Learning No barriers   ORTHO GOALS   PT Ortho Eval Goals 1;2;3;4   Ortho Goal 1   Goal Identifier 1   Goal Description Patient will be able to reach to and from top shelf without increased pain.   Target Date 01/16/23   Ortho Goal 2   Goal Identifier 2   Goal Description Patient will demonstrate 5/5 on all shoulder MMT without increased pain to be able to perform lifting necessary for household activities.   Target Date 01/16/23   Ortho Goal 3   Goal Identifier 3   Goal Description Patient will be independent and consistent with HEP 5x a week to aid functional recovery.   Target Date 01/16/23   Total Evaluation Time   PT Eval, Moderate Complexity Minutes (15146) 30   Therapy Certification   Certification date from 10/21/22   Certification date to 01/16/22   Medical Diagnosis right arm pain       Please contact me with any questions or concerns.  Thank you for your referral.    Sherron Schuler, PT, DPT, OCS  Physical Therapist, Orthopedic Certified Specialist    [address] [address]  [email or web address]   Office: [phone]   Employed by [facility]

## 2022-10-24 NOTE — PROGRESS NOTES
[facility]    OUTPATIENT PHYSICAL THERAPY ORTHOPEDIC EVALUATION  PLAN OF TREATMENT FOR OUTPATIENT REHABILITATION  (COMPLETE FOR INITIAL CLAIMS ONLY)  Patient's Last Name, First Name, M.I.  YOB: 1945  Mariah Jacinto    Provider s Name:  [facility]   Medical Record No.  [id number]   Start of Care Date:  10/21/22   Onset Date:  10/21/21   Type:     _X__PT   ___OT   ___SLP Medical Diagnosis:  right arm pain     PT Diagnosis:  R shoulder pain   Visits from SOC:  1      _________________________________________________________________________________  Plan of Treatment/Functional Goals:  joint mobilization, manual therapy, neuromuscular re-education, ROM, strengthening, stretching     TENS     Goals  Goal Identifier: 1  Goal Description: Patient will be able to reach to and from top shelf without increased pain.  Target Date: 01/16/23    Goal Identifier: 2  Goal Description: Patient will demonstrate 5/5 on all shoulder MMT without increased pain to be able to perform lifting necessary for household activities.  Target Date: 01/16/23    Goal Identifier: 3  Goal Description: Patient will be independent and consistent with HEP 5x a week to aid functional recovery.  Target Date: 01/16/23           Therapy Frequency:  1 time/week  Predicted Duration of Therapy Intervention:  12 weeks    Sherron Schuler, PT                 I CERTIFY THE NEED FOR THESE SERVICES FURNISHED UNDER        THIS PLAN OF TREATMENT AND WHILE UNDER MY CARE .             Physician Signature               Date    X_____________________________________________________                             Certification Date From:  10/21/22   Certification Date To:  01/16/23    Referring Provider:  Dr. Gonsales    Initial Assessment        See Epic Evaluation Start of Care Date: 10/21/22

## 2022-11-01 ENCOUNTER — HOSPITAL ENCOUNTER (OUTPATIENT)
Dept: PHYSICAL THERAPY | Facility: CLINIC | Age: 77
Setting detail: THERAPIES SERIES
Discharge: HOME | End: 2022-11-01
Attending: ORTHOPAEDIC SURGERY
Payer: COMMERCIAL

## 2022-11-01 PROCEDURE — 97140 MANUAL THERAPY 1/> REGIONS: CPT | Mod: GP | Performed by: PHYSICAL THERAPIST

## 2022-11-01 PROCEDURE — 97110 THERAPEUTIC EXERCISES: CPT | Mod: GP | Performed by: PHYSICAL THERAPIST

## 2022-11-02 ENCOUNTER — TELEPHONE (OUTPATIENT)
Dept: FAMILY MEDICINE | Facility: CLINIC | Age: 77
End: 2022-11-02

## 2022-11-02 NOTE — TELEPHONE ENCOUNTER
Writer called patient and left a message requesting a call back to the clinic.    First attempt.    Fantasma ENGEL [facility]

## 2022-11-02 NOTE — TELEPHONE ENCOUNTER
Reason for Call:  Other prescription    Detailed comments: Pt states insurance will no longer cover omeprazole in 2023, famotidine would be covered but patient thinks she has taken this in the past and it was not effective, are there other recommendations or can she just take tums? Please advise.     Phone Number Patient can be reached at: Home number on file [phone] (home)    Best Time: any    Can we leave a detailed message on this number? YES    Call taken on 11/2/2022 at 11:25 AM by Sherron Jefferson

## 2022-11-02 NOTE — TELEPHONE ENCOUNTER
If famotidine doesn't work, tums will likely not be enough.    I would recommend Signin up for an account on [facility] (would need to go online to do this or have someone help her do this).  They do not use insurance but charge only $6.60 for a 90 day supply of the omeprazole.     Send me a message or call my care team regarding having a prescription sent to them after the account is created.          Alternatively could try over the counter omeprazole which is only 20 mg and may be enough to help control symptoms.      Eli Sommer M.D.

## 2022-11-02 NOTE — TELEPHONE ENCOUNTER
Notified and information given for [facility], patient will call back with any further questions.

## 2022-11-08 ENCOUNTER — HOSPITAL ENCOUNTER (OUTPATIENT)
Dept: PHYSICAL THERAPY | Facility: CLINIC | Age: 77
Setting detail: THERAPIES SERIES
Discharge: HOME | End: 2022-11-08
Attending: ORTHOPAEDIC SURGERY
Payer: COMMERCIAL

## 2022-11-08 PROCEDURE — 97140 MANUAL THERAPY 1/> REGIONS: CPT | Mod: GP | Performed by: PHYSICAL THERAPIST

## 2022-11-08 PROCEDURE — 97110 THERAPEUTIC EXERCISES: CPT | Mod: GP | Performed by: PHYSICAL THERAPIST

## 2022-11-15 ENCOUNTER — TELEPHONE (OUTPATIENT)
Dept: FAMILY MEDICINE | Facility: CLINIC | Age: 77
End: 2022-11-15

## 2022-11-15 ENCOUNTER — HOSPITAL ENCOUNTER (OUTPATIENT)
Dept: PHYSICAL THERAPY | Facility: CLINIC | Age: 77
Setting detail: THERAPIES SERIES
Discharge: HOME | End: 2022-11-15
Attending: ORTHOPAEDIC SURGERY
Payer: COMMERCIAL

## 2022-11-15 PROCEDURE — 97110 THERAPEUTIC EXERCISES: CPT | Mod: GP | Performed by: PHYSICAL THERAPIST

## 2022-11-15 PROCEDURE — 97140 MANUAL THERAPY 1/> REGIONS: CPT | Mod: GP | Performed by: PHYSICAL THERAPIST

## 2022-11-15 NOTE — TELEPHONE ENCOUNTER
Reason for call:  Patient reporting a symptom    Symptom or request: Pt stated that she has been waiting all day for a nurse to get back to her to see if she can be worked into Dr. Sommer's schedule tomorrow?  Please call patient and advise.      Duration (how long have symptoms been present): ongoing    Have you been treated for this before? Yes    Additional comments:     Phone Number patient can be reached at:  Home number on file [phone] (home)    Best Time:  any    Can we leave a detailed message on this number:  YES    Call taken on 11/15/2022 at 2:53 PM by Judith Beltrán

## 2022-11-15 NOTE — TELEPHONE ENCOUNTER
Writer called patient.  Patient was scheduled at 4pm tomorrow with Dr Kemal Meek RN [facility]

## 2022-11-15 NOTE — TELEPHONE ENCOUNTER
Received call from patient requesting an appointment this morning in [place] as she will be at [place] for PT.  Checked schedules, no appointments available at [place], [facility] [facility] or [place] today.    Patient states she has pain,heaviness, twinges under her left arm pit extending down her left side.   This started yesterday and patient reports this is continuous discomfort and twinges at times.  Denies heat, is more uncomfortable when touching area.  Denies edema.  Afebrile.  Denies recent illness.  Denies injury to area.  Denies feeling SOA or Chest pain.  Patient states she keeps thinking about it and it makes her worried.  Will forward to Dr Sommer as patient would like to be seen as soon as able(note same day appointment tomorrow).  Tameka Traore RN

## 2022-11-16 ENCOUNTER — OFFICE VISIT (OUTPATIENT)
Dept: FAMILY MEDICINE | Facility: CLINIC | Age: 77
End: 2022-11-16
Payer: COMMERCIAL

## 2022-11-16 VITALS
RESPIRATION RATE: 16 BRPM | OXYGEN SATURATION: 99 % | DIASTOLIC BLOOD PRESSURE: 74 MMHG | BODY MASS INDEX: 24.27 KG/M2 | HEIGHT: 63 IN | SYSTOLIC BLOOD PRESSURE: 138 MMHG | HEART RATE: 72 BPM | WEIGHT: 137 LBS | TEMPERATURE: 97.5 F

## 2022-11-16 DIAGNOSIS — R07.89 CHEST WALL PAIN: Primary | ICD-10-CM

## 2022-11-16 PROCEDURE — 99213 OFFICE O/P EST LOW 20 MIN: CPT | Performed by: FAMILY MEDICINE

## 2022-11-16 ASSESSMENT — PAIN SCALES - GENERAL: PAINLEVEL: MILD PAIN (2)

## 2022-11-16 NOTE — PROGRESS NOTES
"  Assessment & Plan     Chest wall pain  Origin of pain unclear  NSAIDS temporarily  Ice/heat  Gentle stretching    Mammogram up to date <3 months ago  And exam is normal, no indication to repeat imaging at this time.       No follow-ups on file.    Eli Sommer MD  [facility]   Mariah is a 77 year old, presenting for the following health issues:  Flank Pain      History of Present Illness       Reason for visit:  SIDE PAIN-  LEFT  Symptom onset:  1-3 days ago  Symptoms include:  LEFT SIDE PAIN  Symptom intensity:  Mild  Symptom progression:  Staying the same  Had these symptoms before:  Yes  Has tried/received treatment for these symptoms:  No  What makes it worse:  No  What makes it better:  No    She eats 2-3 servings of fruits and vegetables daily.She consumes 0 sweetened beverage(s) daily.She exercises with enough effort to increase her heart rate 9 or less minutes per day.  She exercises with enough effort to increase her heart rate 3 or less days per week.   She is taking medications regularly.             Review of Systems   Constitutional, HEENT, cardiovascular, pulmonary, gi and gu systems are negative, except as otherwise noted.      Objective    /74   Pulse 72   Temp 97.5  F (36.4  C) (Tympanic)   Resp 16   Ht 1.6 m (5' 3\")   Wt 62.1 kg (137 lb)   LMP  (LMP Unknown)   SpO2 99%   BMI 24.27 kg/m    Body mass index is 24.27 kg/m .  Physical Exam   GENERAL APPEARANCE: healthy, alert and no distress  BREAST: normal without masses, tenderness or nipple discharge and no palpable axillary masses or adenopathy  Chest wall: tender to palpation in mid axillary line - no masses or deformity                    "

## 2022-11-16 NOTE — NURSING NOTE
"Initial /74   Pulse 72   Temp 97.5  F (36.4  C) (Tympanic)   Resp 16   Ht 1.6 m (5' 3\")   Wt 62.1 kg (137 lb)   LMP  (LMP Unknown)   SpO2 99%   BMI 24.27 kg/m   Estimated body mass index is 24.27 kg/m  as calculated from the following:    Height as of this encounter: 1.6 m (5' 3\").    Weight as of this encounter: 62.1 kg (137 lb). .      "

## 2022-11-22 ENCOUNTER — HOSPITAL ENCOUNTER (OUTPATIENT)
Dept: PHYSICAL THERAPY | Facility: CLINIC | Age: 77
Setting detail: THERAPIES SERIES
Discharge: HOME | End: 2022-11-22
Attending: ORTHOPAEDIC SURGERY
Payer: COMMERCIAL

## 2022-11-22 PROCEDURE — 97110 THERAPEUTIC EXERCISES: CPT | Mod: GP | Performed by: PHYSICAL THERAPIST

## 2022-11-22 PROCEDURE — 97140 MANUAL THERAPY 1/> REGIONS: CPT | Mod: GP | Performed by: PHYSICAL THERAPIST

## 2022-12-14 ENCOUNTER — LAB (OUTPATIENT)
Dept: FAMILY MEDICINE | Facility: CLINIC | Age: 77
End: 2022-12-14
Payer: COMMERCIAL

## 2022-12-14 DIAGNOSIS — Z20.822 ENCOUNTER FOR LABORATORY TESTING FOR COVID-19 VIRUS: ICD-10-CM

## 2022-12-14 LAB — SARS-COV-2 RNA RESP QL NAA+PROBE: NEGATIVE

## 2022-12-14 PROCEDURE — U0005 INFEC AGEN DETEC AMPLI PROBE: HCPCS

## 2022-12-14 PROCEDURE — U0003 INFECTIOUS AGENT DETECTION BY NUCLEIC ACID (DNA OR RNA); SEVERE ACUTE RESPIRATORY SYNDROME CORONAVIRUS 2 (SARS-COV-2) (CORONAVIRUS DISEASE [COVID-19]), AMPLIFIED PROBE TECHNIQUE, MAKING USE OF HIGH THROUGHPUT TECHNOLOGIES AS DESCRIBED BY CMS-2020-01-R: HCPCS

## 2022-12-14 PROCEDURE — 99207 PR NO CHARGE LOS: CPT

## 2022-12-15 ENCOUNTER — TELEPHONE (OUTPATIENT)
Dept: FAMILY MEDICINE | Facility: CLINIC | Age: 77
End: 2022-12-15

## 2022-12-20 ENCOUNTER — HOSPITAL ENCOUNTER (OUTPATIENT)
Dept: PHYSICAL THERAPY | Facility: CLINIC | Age: 77
Setting detail: THERAPIES SERIES
Discharge: HOME | End: 2022-12-20
Attending: ORTHOPAEDIC SURGERY
Payer: COMMERCIAL

## 2022-12-20 PROCEDURE — 97140 MANUAL THERAPY 1/> REGIONS: CPT | Mod: GP | Performed by: PHYSICAL THERAPIST

## 2022-12-20 PROCEDURE — 97110 THERAPEUTIC EXERCISES: CPT | Mod: GP | Performed by: PHYSICAL THERAPIST

## 2023-01-06 ENCOUNTER — HOSPITAL ENCOUNTER (OUTPATIENT)
Dept: PHYSICAL THERAPY | Facility: CLINIC | Age: 78
Setting detail: THERAPIES SERIES
Discharge: HOME | End: 2023-01-06
Attending: ORTHOPAEDIC SURGERY
Payer: COMMERCIAL

## 2023-01-06 PROCEDURE — 97110 THERAPEUTIC EXERCISES: CPT | Mod: GP | Performed by: PHYSICAL THERAPIST

## 2023-01-06 PROCEDURE — 97140 MANUAL THERAPY 1/> REGIONS: CPT | Mod: GP | Performed by: PHYSICAL THERAPIST

## 2023-01-12 ENCOUNTER — HOSPITAL ENCOUNTER (OUTPATIENT)
Dept: PHYSICAL THERAPY | Facility: CLINIC | Age: 78
Setting detail: THERAPIES SERIES
Discharge: HOME | End: 2023-01-12
Attending: ORTHOPAEDIC SURGERY
Payer: COMMERCIAL

## 2023-01-12 PROCEDURE — 97140 MANUAL THERAPY 1/> REGIONS: CPT | Mod: GP | Performed by: PHYSICAL THERAPIST

## 2023-01-12 PROCEDURE — 97110 THERAPEUTIC EXERCISES: CPT | Mod: GP | Performed by: PHYSICAL THERAPIST

## 2023-01-25 ENCOUNTER — TELEPHONE (OUTPATIENT)
Dept: FAMILY MEDICINE | Facility: CLINIC | Age: 78
End: 2023-01-25
Payer: COMMERCIAL

## 2023-01-26 ENCOUNTER — OFFICE VISIT (OUTPATIENT)
Dept: FAMILY MEDICINE | Facility: CLINIC | Age: 78
End: 2023-01-26
Payer: COMMERCIAL

## 2023-01-26 ENCOUNTER — HOSPITAL ENCOUNTER (OUTPATIENT)
Dept: PHYSICAL THERAPY | Facility: CLINIC | Age: 78
Setting detail: THERAPIES SERIES
Discharge: HOME | End: 2023-01-26
Attending: ORTHOPAEDIC SURGERY
Payer: COMMERCIAL

## 2023-01-26 VITALS
BODY MASS INDEX: 24.27 KG/M2 | SYSTOLIC BLOOD PRESSURE: 136 MMHG | RESPIRATION RATE: 16 BRPM | OXYGEN SATURATION: 97 % | TEMPERATURE: 97.2 F | DIASTOLIC BLOOD PRESSURE: 72 MMHG | HEIGHT: 63 IN | WEIGHT: 137 LBS | HEART RATE: 75 BPM

## 2023-01-26 DIAGNOSIS — N30.00 ACUTE CYSTITIS WITHOUT HEMATURIA: Primary | ICD-10-CM

## 2023-01-26 LAB
ALBUMIN UR-MCNC: NEGATIVE MG/DL
APPEARANCE UR: CLEAR
BACTERIA #/AREA URNS HPF: ABNORMAL /HPF
BILIRUB UR QL STRIP: NEGATIVE
COLOR UR AUTO: YELLOW
GLUCOSE UR STRIP-MCNC: NEGATIVE MG/DL
HGB UR QL STRIP: NEGATIVE
KETONES UR STRIP-MCNC: NEGATIVE MG/DL
LEUKOCYTE ESTERASE UR QL STRIP: ABNORMAL
NITRATE UR QL: NEGATIVE
PH UR STRIP: 6.5 [PH] (ref 5–7)
RBC #/AREA URNS AUTO: ABNORMAL /HPF
SP GR UR STRIP: 1.01 (ref 1–1.03)
SQUAMOUS #/AREA URNS AUTO: ABNORMAL /LPF
UROBILINOGEN UR STRIP-ACNC: 0.2 E.U./DL
WBC #/AREA URNS AUTO: ABNORMAL /HPF

## 2023-01-26 PROCEDURE — 97140 MANUAL THERAPY 1/> REGIONS: CPT | Mod: GP | Performed by: PHYSICAL THERAPIST

## 2023-01-26 PROCEDURE — 99213 OFFICE O/P EST LOW 20 MIN: CPT | Performed by: NURSE PRACTITIONER

## 2023-01-26 PROCEDURE — 81001 URINALYSIS AUTO W/SCOPE: CPT | Performed by: NURSE PRACTITIONER

## 2023-01-26 PROCEDURE — 87086 URINE CULTURE/COLONY COUNT: CPT | Performed by: NURSE PRACTITIONER

## 2023-01-26 PROCEDURE — 97110 THERAPEUTIC EXERCISES: CPT | Mod: GP | Performed by: PHYSICAL THERAPIST

## 2023-01-26 RX ORDER — SULFAMETHOXAZOLE/TRIMETHOPRIM 800-160 MG
1 TABLET ORAL 2 TIMES DAILY
Qty: 10 TABLET | Refills: 0 | Status: SHIPPED | OUTPATIENT
Start: 2023-01-26 | End: 2023-01-31

## 2023-01-26 RX ORDER — CEFADROXIL 500 MG/1
500 CAPSULE ORAL 2 TIMES DAILY
Qty: 14 CAPSULE | Refills: 0 | Status: SHIPPED | OUTPATIENT
Start: 2023-01-26 | End: 2023-01-26

## 2023-01-26 ASSESSMENT — PAIN SCALES - GENERAL: PAINLEVEL: MILD PAIN (2)

## 2023-01-26 NOTE — PROGRESS NOTES
Assessment & Plan     Acute cystitis without hematuria  UA suggestive of UTI. Culture pending. No fevers, CVAT to suggest pyelo. Requests Bactrim as this has been given to her by her PCP in the past and she feels it works better. Follow up if not improving.  - UA macro with reflex to Microscopic and Culture - Clinc Collect  - Urine Microscopic  - Urine Culture  - sulfamethoxazole-trimethoprim (BACTRIM DS) 800-160 MG tablet; Take 1 tablet by mouth 2 times daily for 5 days      Patient Instructions   Your urine test shows evidence of a urinary tract infection.    We will treat you with an antibiotic, bactrim.  I sent this to your pharmacy. Please take as directed for 7 days. Please take a probiotic or eat a daily Greek yogurt while you are on the antibiotic.    A urine culture is still in process, it usually takes about 2 days and identifies the bacteria causing the infection.  It also tests antibiotics to make sure what we use will be effective for your infection.  We will only call you if the results of this test show a need to change your treatment plan.    If developing high fevers, vomiting, abdominal pain, or any other new, concerning symptoms, come back immediately. If no improvement in symptoms by the end of your antibiotic treatment, follow up with your primary care doctor.    Otherwise, simply follow up as needed.        Urinary Tract Infections in Women  Urinary tract infections (UTIs) are most often caused by bacteria (germs). These bacteria enter the urinary tract. The bacteria may come from outside the body. Or they may travel from the skin outside the rectum or vagina into the urethra. Female anatomy makes it easier for bacteria from the bowel to enter a woman's urinary tract, which is the most common source of UTI. This means women develop UTIs more often than men. Pain in or around the urinary tract is a common UTI symptom. But the only way to know for sure if you have a UTI for the health care  provider to test your urine. The two tests that may be done are the urinalysis and urine culture.  Types of UTIs    Cystitis: A bladder infection (cystitis) is the most common UTI in women. You may have urgent or frequent urination. You may also have pain, burning when you urinate, and bloody urine.    Urethritis: This is an inflamed urethra, which is the tube that carries urine from the bladder to outside the body. You may have lower stomach or back pain. You may also have urgent or frequent urination.    Pyelonephritis: This is a kidney infection. If not treated, it can be serious and damage your kidneys. In severe cases, you may be hospitalized. You may have a fever and lower back pain.  Medications to treat a UTI  Most UTIs are treated with antibiotics. These kill the bacteria. The length of time you need to take them depends on the type of infection. It may be as short as 3 days. If you have repeated UTIs, a low-dose antibiotic may be needed for several months. Take antibiotics exactly as directed. Don't stop taking them until all of the medication is gone. If you stop taking the antibiotic too soon, the infection may not go away, and you may develop a resistance to the antibiotic. This can make it much harder to treat.  Lifestyle changes to treat and prevent UTIs  The lifestyle changes below will help get rid of your UTI. They may also help prevent future UTIs.    Drink plenty of fluids. This includes water, juice, or other caffeine-free drinks. Fluids help flush bacteria out of your body.    Empty your bladder. Always empty your bladder when you feel the urge to urinate. And always urinate before going to sleep. Urine that stays in your bladder can lead to infection. Try to urinate before and after sex as well.    Practice good personal hygiene. Wipe yourself from front to back after using the toilet. This helps keep bacteria from getting into the urethra.    Use condoms during sex. These help prevent UTIs  "caused by sexually transmitted bacteria. Also, avoid using spermicides during sex. These can increase the risk of UTIs. Choose other forms of birth control instead. For women who tend to get UTIs after sex, a low-dose of a preventive antibiotic may be used. Be sure to discuss this option with your health care provider.    Follow up with your health care provider as directed. He or she may test to make sure the infection has cleared. If necessary, additional treatment may be started.    [phone] [employer]. [address] [place] [address]. All rights reserved. This information is not intended as a substitute for professional medical care. Always follow your healthcare professional's instructions.        Return in about 1 week (around 2/2/2023) for worsening or continued symptoms.    VICKY Hay CNP  [facility]us is a 77 year old, presenting for the following health issues:  UTI      HPI     Concern - UTI  Onset: about 5 days  Description: started with painful urination, now has frequency, some left sided flank pain  Intensity: moderate  Progression of Symptoms:  improving  Accompanying Signs & Symptoms: none  Previous history of similar problem: has had UTIs in the past  Precipitating factors:        Worsened by: none  Alleviating factors:        Improved by: used OTC UTI medication  Therapies tried and outcome: None    Above HPI reviewed. Additionally, started AZO and felt a little better but now symptoms are returning. Had one instance of mild left flank pain now resolved. No fever or chills. No abd pain. No vaginal symptoms.      Review of Systems   Constitutional, HEENT, cardiovascular, pulmonary, gi and gu systems are negative, except as otherwise noted.      Objective    /72   Pulse 75   Temp 97.2  F (36.2  C) (Tympanic)   Resp 16   Ht 1.6 m (5' 3\")   Wt 62.1 kg (137 lb)   LMP  (LMP Unknown)   SpO2 97%   BMI 24.27 " kg/m    Body mass index is 24.27 kg/m .  Physical Exam  Vitals and nursing note reviewed.   Constitutional:       General: She is not in acute distress.     Appearance: Normal appearance.   HENT:      Head: Normocephalic and atraumatic.      Mouth/Throat:      Mouth: Mucous membranes are moist.   Cardiovascular:      Rate and Rhythm: Normal rate.   Pulmonary:      Effort: Pulmonary effort is normal.   Abdominal:      Tenderness: There is no right CVA tenderness or left CVA tenderness.   Musculoskeletal:      Cervical back: Neck supple.   Skin:     General: Skin is warm and dry.   Neurological:      General: No focal deficit present.      Mental Status: She is alert.   Psychiatric:         Mood and Affect: Mood normal.         Behavior: Behavior normal.            Results for orders placed or performed in visit on 01/26/23 (from the past 24 hour(s))   UA macro with reflex to Microscopic and Culture - Clinc Collect    Specimen: Urine, Clean Catch   Result Value Ref Range    Color Urine Yellow Colorless, Straw, Light Yellow, Yellow    Appearance Urine Clear Clear    Glucose Urine Negative Negative mg/dL    Bilirubin Urine Negative Negative    Ketones Urine Negative Negative mg/dL    Specific Gravity Urine 1.010 1.003 - 1.035    Blood Urine Negative Negative    pH Urine 6.5 5.0 - 7.0    Protein Albumin Urine Negative Negative mg/dL    Urobilinogen Urine 0.2 0.2, 1.0 E.U./dL    Nitrite Urine Negative Negative    Leukocyte Esterase Urine Small (A) Negative   Urine Microscopic   Result Value Ref Range    Bacteria Urine Few (A) None Seen /HPF    RBC Urine 0-2 0-2 /HPF /HPF    WBC Urine 10-25 (A) 0-5 /HPF /HPF    Squamous Epithelials Urine Few (A) None Seen /LPF

## 2023-01-26 NOTE — PATIENT INSTRUCTIONS
Your urine test shows evidence of a urinary tract infection.    We will treat you with an antibiotic, bactrim.  I sent this to your pharmacy. Please take as directed for 7 days. Please take a probiotic or eat a daily Greek yogurt while you are on the antibiotic.    A urine culture is still in process, it usually takes about 2 days and identifies the bacteria causing the infection.  It also tests antibiotics to make sure what we use will be effective for your infection.  We will only call you if the results of this test show a need to change your treatment plan.    If developing high fevers, vomiting, abdominal pain, or any other new, concerning symptoms, come back immediately. If no improvement in symptoms by the end of your antibiotic treatment, follow up with your primary care doctor.    Otherwise, simply follow up as needed.        Urinary Tract Infections in Women  Urinary tract infections (UTIs) are most often caused by bacteria (germs). These bacteria enter the urinary tract. The bacteria may come from outside the body. Or they may travel from the skin outside the rectum or vagina into the urethra. Female anatomy makes it easier for bacteria from the bowel to enter a woman's urinary tract, which is the most common source of UTI. This means women develop UTIs more often than men. Pain in or around the urinary tract is a common UTI symptom. But the only way to know for sure if you have a UTI for the health care provider to test your urine. The two tests that may be done are the urinalysis and urine culture.  Types of UTIs  Cystitis: A bladder infection (cystitis) is the most common UTI in women. You may have urgent or frequent urination. You may also have pain, burning when you urinate, and bloody urine.  Urethritis: This is an inflamed urethra, which is the tube that carries urine from the bladder to outside the body. You may have lower stomach or back pain. You may also have urgent or frequent  urination.  Pyelonephritis: This is a kidney infection. If not treated, it can be serious and damage your kidneys. In severe cases, you may be hospitalized. You may have a fever and lower back pain.  Medications to treat a UTI  Most UTIs are treated with antibiotics. These kill the bacteria. The length of time you need to take them depends on the type of infection. It may be as short as 3 days. If you have repeated UTIs, a low-dose antibiotic may be needed for several months. Take antibiotics exactly as directed. Don't stop taking them until all of the medication is gone. If you stop taking the antibiotic too soon, the infection may not go away, and you may develop a resistance to the antibiotic. This can make it much harder to treat.  Lifestyle changes to treat and prevent UTIs  The lifestyle changes below will help get rid of your UTI. They may also help prevent future UTIs.  Drink plenty of fluids. This includes water, juice, or other caffeine-free drinks. Fluids help flush bacteria out of your body.  Empty your bladder. Always empty your bladder when you feel the urge to urinate. And always urinate before going to sleep. Urine that stays in your bladder can lead to infection. Try to urinate before and after sex as well.  Practice good personal hygiene. Wipe yourself from front to back after using the toilet. This helps keep bacteria from getting into the urethra.  Use condoms during sex. These help prevent UTIs caused by sexually transmitted bacteria. Also, avoid using spermicides during sex. These can increase the risk of UTIs. Choose other forms of birth control instead. For women who tend to get UTIs after sex, a low-dose of a preventive antibiotic may be used. Be sure to discuss this option with your health care provider.  Follow up with your health care provider as directed. He or she may test to make sure the infection has cleared. If necessary, additional treatment may be started.    5564-8497 The  [employer]. [address] [place] [address], [place] [address]. All rights reserved. This information is not intended as a substitute for professional medical care. Always follow your healthcare professional's instructions.

## 2023-01-26 NOTE — PROGRESS NOTES
[facility]    OUTPATIENT PHYSICAL THERAPY  PLAN OF TREATMENT FOR OUTPATIENT REHABILITATION AND PROGRESS NOTE           Patient's Last Name, First Name, Mariah Boyd Date of Birth  1945   Provider's Name  [facility] Medical Record No.  [id number]    Onset Date  10/21/21 Start of Care Date  10/21/22   Type:     _X_PT   ___OT   ___SLP Medical Diagnosis  right arm pain   PT Diagnosis  R shoulder pain Plan of Treatment  Frequency/Duration: 1x every 7-14 days  Certification date from 1/16/23 to 4/16/23     Goals:  Goal Identifier 1   Goal Description Patient will be able to reach to and from top shelf without increased pain.   Target Date 04/16/23   Date Met      Progress (detail required for progress note): Progressing - more painful with lowering arm down from shelf. Tolerating increased weight from below 90 degrees flexion.     Goal Identifier 2   Goal Description Patient will demonstrate 5/5 on all shoulder MMT without increased pain to be able to perform lifting necessary for household activities.   Target Date 04/16/23   Date Met      Progress (detail required for progress note): Progressing - Current: Abduction 4/5, Flexion 4/5 pain,  ER 4/5, IR 5/5. Improved from initial evaluation, abduction was previously painful, IR improved from a 5-/5 to a full 5/5.     Goal Identifier 3   Goal Description Patient will be independent and consistent with HEP 5x a week to aid functional recovery.   Target Date 01/16/23   Date Met  01/26/23   Progress (detail required for progress note): Doing exercises 7x a week           Beginning/End Dates of Progress Note Reporting Period:  10/21/22 to 1/26/23    Progress Toward Goals:   Progress this reporting period: Mariah has attended 9 physical therapy visits with a month gap in appointments between November and December  due to a trip to [place] and weather. Overall, she has reported less pain since starting PT but reports the pain is variable depending on the day. Objectively, her strength tests are improving and they are less painful than when starting PT. Concentric motions of the shoulder are now no longer painful but she still has pain with eccentric movements when starting above 90 degrees flexion. Mariah continues to slowly make progress and skilled physical therapy is still appropriate to progress her tolerance to increasing weight and motions above shoulder height.     Client Self (Subjective) Report for Progress Note Reporting Period: Shoulder is not doing well, HEP is going well      Objective Measurements:   Objective Measure: R Shoulder MMT  Details: Abduction 4/5, Flexion 4/5 pain,  ER 4/5, IR 5/5  Objective Measure: Shoulder Flexion  Details: R 135 pain with lowering arm down,  L 143                   I CERTIFY THE NEED FOR THESE SERVICES FURNISHED UNDER        THIS PLAN OF TREATMENT AND WHILE UNDER MY CARE .             Physician Signature               Date    X_____________________________________________________                      Referring Provider: Dr. Dru Schuler, PT

## 2023-01-27 LAB — BACTERIA UR CULT: NORMAL

## 2023-02-09 ENCOUNTER — HOSPITAL ENCOUNTER (OUTPATIENT)
Dept: PHYSICAL THERAPY | Facility: CLINIC | Age: 78
Setting detail: THERAPIES SERIES
Discharge: HOME | End: 2023-02-09
Attending: ORTHOPAEDIC SURGERY
Payer: COMMERCIAL

## 2023-02-09 PROCEDURE — 97110 THERAPEUTIC EXERCISES: CPT | Mod: GP | Performed by: PHYSICAL THERAPIST

## 2023-02-09 PROCEDURE — 97140 MANUAL THERAPY 1/> REGIONS: CPT | Mod: GP | Performed by: PHYSICAL THERAPIST

## 2023-03-02 ENCOUNTER — HOSPITAL ENCOUNTER (OUTPATIENT)
Dept: PHYSICAL THERAPY | Facility: CLINIC | Age: 78
Setting detail: THERAPIES SERIES
Discharge: HOME | End: 2023-03-02
Attending: ORTHOPAEDIC SURGERY
Payer: COMMERCIAL

## 2023-03-02 PROCEDURE — 97110 THERAPEUTIC EXERCISES: CPT | Mod: GP | Performed by: PHYSICAL THERAPIST

## 2023-03-03 ENCOUNTER — TELEPHONE (OUTPATIENT)
Dept: FAMILY MEDICINE | Facility: CLINIC | Age: 78
End: 2023-03-03
Payer: COMMERCIAL

## 2023-03-03 NOTE — TELEPHONE ENCOUNTER
Central Prior Authorization Team   Phone: [phone]      Prior Authorization Approval    Authorization Effective Date:    Authorization Expiration Date:    Medication: omeprazole (PRILOSEC) 40 MG DR capsule - TIER EXCEPTION - APPROVED  Approved Dose/Quantity:   Reference #: [id number]   Insurance Company: [employer] - Phone [phone] Fax [phone]  Expected CoPay:       CoPay Card Available: No    Foundation Assistance Needed:    Which Pharmacy is filling the prescription (Not needed for infusion/clinic administered): [facility] [address] [place] [address] - [address]   Pharmacy Notified: Yes - Called the pharmacy and they will get it ready as she would need the medication in about a week.  The co-pay is zero dollars.  Patient Notified: Yes - Called and spoke with the patient. Just let her know that a Tier Exception was approved.

## 2023-03-03 NOTE — TELEPHONE ENCOUNTER
Central Prior Authorization Team   Phone: [phone]      PA Initiation    Medication: omeprazole (PRILOSEC) 40 MG DR capsule - Tier Exception   Insurance Company: [employer] - Phone [phone] Fax [phone]  Pharmacy Filling the Rx: [id number] - [place], [place] - [address]  Filling Pharmacy Phone: [phone]  Filling Pharmacy Fax: [phone]  Start Date: 3/3/2023    Anahy LEMA calling from [employer] in regards to a Tier Exception

## 2023-03-23 ENCOUNTER — HOSPITAL ENCOUNTER (OUTPATIENT)
Dept: PHYSICAL THERAPY | Facility: CLINIC | Age: 78
Setting detail: THERAPIES SERIES
Discharge: HOME | End: 2023-03-23
Attending: ORTHOPAEDIC SURGERY
Payer: COMMERCIAL

## 2023-03-23 PROCEDURE — 97110 THERAPEUTIC EXERCISES: CPT | Mod: GP | Performed by: PHYSICAL THERAPIST

## 2023-03-23 PROCEDURE — 97140 MANUAL THERAPY 1/> REGIONS: CPT | Mod: GP | Performed by: PHYSICAL THERAPIST

## 2023-04-11 ENCOUNTER — TELEPHONE (OUTPATIENT)
Dept: ORTHOPEDICS | Facility: CLINIC | Age: 78
End: 2023-04-11

## 2023-04-11 ENCOUNTER — OFFICE VISIT (OUTPATIENT)
Dept: ORTHOPEDICS | Facility: CLINIC | Age: 78
End: 2023-04-11
Payer: COMMERCIAL

## 2023-04-11 VITALS
BODY MASS INDEX: 24.63 KG/M2 | HEIGHT: 63 IN | WEIGHT: 139 LBS | DIASTOLIC BLOOD PRESSURE: 83 MMHG | SYSTOLIC BLOOD PRESSURE: 152 MMHG

## 2023-04-11 DIAGNOSIS — M75.81 ROTATOR CUFF TENDINITIS, RIGHT: ICD-10-CM

## 2023-04-11 DIAGNOSIS — M25.511 CHRONIC RIGHT SHOULDER PAIN: Primary | ICD-10-CM

## 2023-04-11 DIAGNOSIS — G89.29 CHRONIC RIGHT SHOULDER PAIN: Primary | ICD-10-CM

## 2023-04-11 PROCEDURE — 20611 DRAIN/INJ JOINT/BURSA W/US: CPT | Mod: RT | Performed by: FAMILY MEDICINE

## 2023-04-11 PROCEDURE — 99204 OFFICE O/P NEW MOD 45 MIN: CPT | Mod: 25 | Performed by: FAMILY MEDICINE

## 2023-04-11 RX ORDER — TRIAMCINOLONE ACETONIDE 40 MG/ML
40 INJECTION, SUSPENSION INTRA-ARTICULAR; INTRAMUSCULAR
Status: DISCONTINUED | OUTPATIENT
Start: 2023-04-11 | End: 2023-05-09 | Stop reason: ALTCHOICE

## 2023-04-11 RX ORDER — ROPIVACAINE HYDROCHLORIDE 5 MG/ML
3 INJECTION, SOLUTION EPIDURAL; INFILTRATION; PERINEURAL
Status: DISCONTINUED | OUTPATIENT
Start: 2023-04-11 | End: 2023-05-09 | Stop reason: ALTCHOICE

## 2023-04-11 RX ADMIN — TRIAMCINOLONE ACETONIDE 40 MG: 40 INJECTION, SUSPENSION INTRA-ARTICULAR; INTRAMUSCULAR at 12:25

## 2023-04-11 RX ADMIN — ROPIVACAINE HYDROCHLORIDE 3 ML: 5 INJECTION, SOLUTION EPIDURAL; INFILTRATION; PERINEURAL at 12:25

## 2023-04-11 NOTE — PATIENT INSTRUCTIONS
We had a long discussion today about neck versus shoulder pain  Based on my exam it seems like your shoulder is the primary pain generator  To test this theory we tried a cortisone shot around the rotator cuff  If your pain improves then we know that this is the primary pain generator  If your pain does not improve we will continue to look at different treatment options  Consider return to physical therapy in 2 weeks if your symptoms have improved so that you can progress your home exercises  You can continue the home exercises you have for now as long as they are pain-free  Contact me in 2 to 3 weeks if you have had no improvement in your pain, so that we can discuss other options for a follow-up visit

## 2023-04-11 NOTE — PROGRESS NOTES
Mariah Jacinto  :  1945  DOS: 2023  MRN: [id number]    Sports Medicine Clinic Visit    PCP: Eli Sommer    Mariah Jacinto is a 77 year old Right hand dominant female who is seen in consultation at the request of Sherron Schuler DPT presenting with chronic right shoulder pain.    Injury: Gradual onset of pain over the past 2+ years.  Pain located over right posterior lateral shoulder, radiating to right upper arm & elbow.  Additional Features:  Positive: weakness and constant aching pain.  Symptoms are better with Rest.  Symptoms are worse with: shoulder flexion/abduction, reaching overhead, lying on right shoulder, lifting.  Other evaluation and/or treatments so far consists of: Ice, Heat, Tylenol, Ibuprofen, Rest and topical analgesics, physical therapy, Ortho Surgery Consult ([facility]).  Recent imaging completed: X-rays completed 2021([facility]), MRI completed 2022.  Prior History of related problems: history of intermittent chronic radiating neck and shoulder issues over the past several years.    Social History: retired    Review of Systems  Musculoskeletal: as above  Remainder of review of systems is negative including constitutional, CV, pulmonary, GI, Skin and Neurologic except as noted in HPI or medical history.    Past Medical History:   Diagnosis Date     Adhesive capsulitis of shoulder     Right shoulder tendonitis     Basal cell carcinoma      Displacement of cervical intervertebral disc without myelopathy      Esophageal reflux      Major depression in complete remission (H) 2009    celexa caused spinning side effect      MENOPAUSE --ERT      OSTEOPENIA      Squamous cell carcinoma      Past Surgical History:   Procedure Laterality Date     ABLATE VEIN VARICOSE RADIO FREQUENCY WITHOUT PHLEBECTOMY MULTIPLE STAB  3/28/2013    Procedure: ABLATE VEIN VARICOSE RADIO FREQUENCY WITHOUT PHLEBECTOMY MULTIPLE STAB;  Bilateral ablation of varicose veins legs-to ultrasound at  0930  ;  Surgeon: Noam Soliman MD;  Location: [facility]     COLONOSCOPY  2013    Procedure: COLONOSCOPY;  Colonoscopy;  Surgeon: Yuliya Nathan MD;  Location: [facility]     ESOPHAGOSCOPY, GASTROSCOPY, DUODENOSCOPY (EGD), COMBINED N/A 2015    Procedure: COMBINED ESOPHAGOSCOPY, GASTROSCOPY, DUODENOSCOPY (EGD), BIOPSY SINGLE OR MULTIPLE;  Surgeon: Yuliya Nathan MD;  Location: [facility]     ESOPHAGOSCOPY, GASTROSCOPY, DUODENOSCOPY (EGD), COMBINED N/A 2017    Procedure: COMBINED ESOPHAGOSCOPY, GASTROSCOPY, DUODENOSCOPY (EGD), BIOPSY SINGLE OR MULTIPLE;  Surgeon: Qasim Bowie MD;  Location: [facility]     EXCISE MASS FINGER Right 2019    Procedure: Excision Right Ring Finger Volar Middle Phalanx Mass;  Surgeon: Jake Viveros MD;  Location: [facility]     HYSTERECTOMY, PAP NO LONGER INDICATED      has both ovaries out also      HYSTERECTOMY, VAGINAL  1988    Hysterectomy, oophorectomy     PHACOEMULSIFICATION WITH STANDARD INTRAOCULAR LENS IMPLANT Left 3/18/2019    Procedure: Cataract Removal with Implant;  Surgeon: Chapito Phillips MD;  Location: [facility]     PHACOEMULSIFICATION WITH STANDARD INTRAOCULAR LENS IMPLANT Right 4/10/2019    Procedure: Cataract Removal with Implant;  Surgeon: Chapito Phillips MD;  Location: [facility]     RELEASE TRIGGER FINGER Right 2017    Procedure: RELEASE TRIGGER FINGER;  Right Thumb A1 Pulley Release;  Surgeon: Jake Viveros MD;  Location: [facility]     SURGICAL HISTORY OF -   2009    right retromastoid craniectomy and decompression trigeminal nerve     TONSILLECTOMY & ADENOIDECTOMY  child    T&A      ZZC ANESTH,LOWER ARM SURGERY      ulnar nerve decompression - right     Family History   Problem Relation Age of Onset     Alzheimer Disease Mother          at age 85     Osteoporosis Mother      Arthritis Mother      Alcohol/Drug Father      Respiratory Father      Eye Disorder Maternal Grandfather         Macular  "degneration     CPREET Brother         Tripple bypass age 57     Cardiovascular Brother      Cancer Brother         leukemia (ALL)     Cardiovascular Brother      Cardiovascular Brother      Neurologic Disorder Son      Heart Disease Son      Melanoma No family hx of      Skin Cancer No family hx of        Objective  BP (!) 152/83   Ht 1.6 m (5' 3\")   Wt 63 kg (139 lb)   LMP  (LMP Unknown)   BMI 24.62 kg/m        General: healthy, alert and in no distress      HEENT: no scleral icterus or conjunctival erythema     Skin: no suspicious lesions or rash. No jaundice.     CV: regular rhythm by palpation, 2+ distal pulses, no pedal edema      Resp: normal respiratory effort without conversational dyspnea     Psych: normal mood and affect      Gait: nonantalgic, appropriate coordination and balance     Neuro: normal light touch sensory exam of the extremities. Motor strength as noted below     Right Shoulder exam    ROM:        Full active and passive ROM with flexion, extension, abduction, internal and external rotation.       Painful midrange abduction, painful terminal ROM in all directions    Tender:        subacromial space       Lateral deltoid    Non Tender:       remainder of shoulder       sternoclavicular joint       acromioclavicular joint       posterior shoulder    Strength:        abduction 5-/5       internal rotation 5/5       external rotation 5-/5       adduction 5/5    Impingement testing:        positive (+) Neer       positive (+) Moses       positive (+) empty can       neg (-) crossover       neg (-) crank    Stability testing:       neg (-) anterior glide       neg (-) sulcus sign    Skin:       no visible deformities       well perfused       capillary refill brisk    Sensation:        normal sensation over shoulder and upper extremity       Radiology  Narrative & Impression   Exam: MRI of the right shoulder dated 6/20/2022.     COMPARISON: None.     CLINICAL HISTORY: Shoulder " pain.     TECHNIQUE: Multiplanar, multisequence MR imaging of the right shoulder  was obtained using standard sequences in 3 orthogonal planes without  the use of intravenous or intra-articular gadolinium contrast.     FINDINGS:     No significant joint effusion at the right shoulder glenohumeral  joint. Small amount of fluid in the subacromial subdeltoid bursa.     Moderate degenerative changes of the acromioclavicular joint. No os  acromiale. The coracohumeral ligament is intact. Preserved subcoracoid  fat. The coracoclavicular or coracoacromial ligaments are intact.     Marked tendinosis of the supraspinatus and infraspinatus tendons with  a focal area of high-grade to full-thickness tearing of the junctional  fibers of the posterior supraspinatus/anterior infraspinatus tendon.  The teres minor tendon is intact. The subscapularis tendon is intact  without full-thickness tear or tendon retraction.     The muscle bulk of the rotator cuff musculature is intact without  significant atrophy or edema.     The biceps tendon is normal within the bicipital groove. The  intra-articular portion of the biceps tendon is intact.     Humeral head is well aligned with the glenoid. No Hill-Sachs lesion.  No full-thickness cartilage loss. Degenerative changes in the labrum.                                                                      IMPRESSION:  1. 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  2. Tendinosis of the right shoulder supraspinatus and infraspinatus  tendons, with a focal area of high-grade to near full-thickness  tearing of the junctional fibers of the posterior  supraspinatus/anterior infraspinatus tendon at the footprint.  3. Tendinosis of the right shoulder subscapularis tendon without  full-thickness tear or tendon retraction.  4. Normal muscle bulk of the right shoulder rotator cuff musculature.  5. Normal appearing biceps tendon.  6. Degenerative changes in the  labrum.        Large Joint Injection/Arthocentesis: R subacromial bursa    Date/Time: 4/11/2023 12:25 PM    Performed by: Alfredo Ferrell DO  Authorized by: Alfredo Ferrell DO    Indications:  Pain  Needle Size:  22 G  Guidance: ultrasound    Approach:  Lateral  Location:  Shoulder      Site:  R subacromial bursa  Medications:  3 mL ropivacaine 5 MG/ML; 40 mg triamcinolone 40 MG/ML  Outcome:  Tolerated well, no immediate complications  Procedure discussed: discussed risks, benefits, and alternatives    Consent Given by:  Patient  Timeout: timeout called immediately prior to procedure    Prep: patient was prepped and draped in usual sterile fashion     Ultrasound images of procedure were permanently stored.           Assessment:  1. Chronic right shoulder pain    2. Rotator cuff tendinitis, right        Plan:  Discussed the assessment with the patient.  Follow up: 2-3 weeks if needed based on clinical progress  Chronic right shoulder pain not improved with physical therapy or other conservative measures  MR images independently visualized and reviewed with patient today in clinic  Significant tendinopathy in the rotator cuff on MRI  Other degenerative changes well which is not as clinically significant  Has had work-up for potential cervical causes of pain, seems less likely based on exam today  Can continue to follow-up with spine providers as needed  Trial of diagnostic and hopefully therapeutic ultrasound-guided corticosteroid injection to the subacromial space today  Reviewed relative risks and goals of corticosteroid injection, will not heal chronic tendinopathy  Ongoing physical therapy options reviewed  Recommend following up with physical therapy in about 2 weeks to revisit exercise program, potentially advance the program based on clinical progress  Can return to orthopedic surgery in the future if needed, hopeful to avoid  Oral Tylenol and topical Voltaren gel reviewed as safe OTC options,  reviewed safe dosing strategies  Expectations and goals of CSI reviewed  Often 2-3 days for steroid effect, and can take up to two weeks for maximum effect  We discussed modified progressive pain-free activity as tolerated  Do not overuse in first two weeks if feeling better due to concern for vulnerability while steroid is working  Supportive care reviewed  All questions were answered today  Contact us with additional questions or concerns  Signs and sx of concern reviewed      Alfredo Ferrell DO, OSMANI  Sports Medicine Physician  [facility]      Time spent in chart review, one-on-one evaluation, discussion with patient regarding: nature of problem, clinical course, prior treatments, therapeutic options, shared-decision making, potential procedures and referrals, and charting related to the visit: 32 minutes.  If applicable, time does not include time spent performing any procedure.          Disclaimer: This note consists of symbols derived from keyboarding, dictation and/or voice recognition software. As a result, there may be errors in the script that have gone undetected. Please consider this when interpreting information found in this chart.

## 2023-04-11 NOTE — LETTER
2023         RE: Mariah Jacinto  [address] [place] [address]        Dear Colleague,    Thank you for referring your patient, Mariah Jacinto, to the [facility] [place]. Please see a copy of my visit note below.    Mariah Jacinto  :  1945  DOS: 2023  MRN: [id number]    Sports Medicine Clinic Visit    PCP: Eli Sommer    Mariah Jaicnto is a 77 year old Right hand dominant female who is seen in consultation at the request of Sherron Schuler DPT presenting with chronic right shoulder pain.    Injury: Gradual onset of pain over the past 2+ years.  Pain located over right posterior lateral shoulder, radiating to right upper arm & elbow.  Additional Features:  Positive: weakness and constant aching pain.  Symptoms are better with Rest.  Symptoms are worse with: shoulder flexion/abduction, reaching overhead, lying on right shoulder, lifting.  Other evaluation and/or treatments so far consists of: Ice, Heat, Tylenol, Ibuprofen, Rest and topical analgesics, physical therapy, Ortho Surgery Consult ([facility] - Srikanth Gonsales).  Recent imaging completed: X-rays completed 2021([facility]), MRI completed 2022.  Prior History of related problems: history of intermittent chronic radiating neck and shoulder issues over the past several years.    Social History: retired    Review of Systems  Musculoskeletal: as above  Remainder of review of systems is negative including constitutional, CV, pulmonary, GI, Skin and Neurologic except as noted in HPI or medical history.    Past Medical History:   Diagnosis Date     Adhesive capsulitis of shoulder     Right shoulder tendonitis     Basal cell carcinoma      Displacement of cervical intervertebral disc without myelopathy      Esophageal reflux      Major depression in complete remission (H) 2009    celexa caused spinning side effect      MENOPAUSE --ERT      OSTEOPENIA      Squamous cell carcinoma      Past Surgical  History:   Procedure Laterality Date     ABLATE VEIN VARICOSE RADIO FREQUENCY WITHOUT PHLEBECTOMY MULTIPLE STAB  3/28/2013    Procedure: ABLATE VEIN VARICOSE RADIO FREQUENCY WITHOUT PHLEBECTOMY MULTIPLE STAB;  Bilateral ablation of varicose veins legs-to ultrasound at 0930  ;  Surgeon: Noam Soliman MD;  [facility]: [facility]     COLONOSCOPY  8/20/2013    Procedure: COLONOSCOPY;  Colonoscopy;  Surgeon: Yuliya Nathan MD;  [facility]: [facility]     ESOPHAGOSCOPY, GASTROSCOPY, DUODENOSCOPY (EGD), COMBINED N/A 5/12/2015    Procedure: COMBINED ESOPHAGOSCOPY, GASTROSCOPY, DUODENOSCOPY (EGD), BIOPSY SINGLE OR MULTIPLE;  Surgeon: Yuliya Nathan MD;  [facility]: [facility]     ESOPHAGOSCOPY, GASTROSCOPY, DUODENOSCOPY (EGD), COMBINED N/A 1/31/2017    Procedure: COMBINED ESOPHAGOSCOPY, GASTROSCOPY, DUODENOSCOPY (EGD), BIOPSY SINGLE OR MULTIPLE;  Surgeon: Qasim Bowie MD;  [facility]: [facility]     EXCISE MASS FINGER Right 2/19/2019    Procedure: Excision Right Ring Finger Volar Middle Phalanx Mass;  Surgeon: Jake Viveros MD;  [facility]: [facility]     HYSTERECTOMY, PAP NO LONGER INDICATED      has both ovaries out also      HYSTERECTOMY, VAGINAL  1988    Hysterectomy, oophorectomy     PHACOEMULSIFICATION WITH STANDARD INTRAOCULAR LENS IMPLANT Left 3/18/2019    Procedure: Cataract Removal with Implant;  Surgeon: Chapito Phillips MD;  [facility]: [facility]     PHACOEMULSIFICATION WITH STANDARD INTRAOCULAR LENS IMPLANT Right 4/10/2019    Procedure: Cataract Removal with Implant;  Surgeon: Chapito Phillips MD;  [facility]: [facility]     RELEASE TRIGGER FINGER Right 6/23/2017    Procedure: RELEASE TRIGGER FINGER;  Right Thumb A1 Pulley Release;  Surgeon: Jake Viveros MD;  [facility]: [facility]     SURGICAL HISTORY OF -   2009    right retromastoid craniectomy and decompression trigeminal nerve     TONSILLECTOMY & ADENOIDECTOMY  child    T&A      ZZC ANESTH,LOWER ARM SURGERY  1999    ulnar nerve decompression -  "right     Family History   Problem Relation Age of Onset     Alzheimer Disease Mother          at age 85     Osteoporosis Mother      Arthritis Mother      Alcohol/Drug Father      Respiratory Father      Eye Disorder Maternal Grandfather         Macular degneration     C.A.D. Brother         Tripple bypass age 57     Cardiovascular Brother      Cancer Brother         leukemia (ALL)     Cardiovascular Brother      Cardiovascular Brother      Neurologic Disorder Son      Heart Disease Son      Melanoma No family hx of      Skin Cancer No family hx of        Objective  BP (!) 152/83   Ht 1.6 m (5' 3\")   Wt 63 kg (139 lb)   LMP  (LMP Unknown)   BMI 24.62 kg/m        General: healthy, alert and in no distress      HEENT: no scleral icterus or conjunctival erythema     Skin: no suspicious lesions or rash. No jaundice.     CV: regular rhythm by palpation, 2+ distal pulses, no pedal edema      Resp: normal respiratory effort without conversational dyspnea     Psych: normal mood and affect      Gait: nonantalgic, appropriate coordination and balance     Neuro: normal light touch sensory exam of the extremities. Motor strength as noted below     Right Shoulder exam    ROM:        Full active and passive ROM with flexion, extension, abduction, internal and external rotation.       Painful midrange abduction, painful terminal ROM in all directions    Tender:        subacromial space       Lateral deltoid    Non Tender:       remainder of shoulder       sternoclavicular joint       acromioclavicular joint       posterior shoulder    Strength:        abduction 5-/5       internal rotation 5/5       external rotation 5-/5       adduction 5/5    Impingement testing:        positive (+) Neer       positive (+) Moses       positive (+) empty can       neg (-) crossover       neg (-) crank    Stability testing:       neg (-) anterior glide       neg (-) sulcus sign    Skin:       no visible deformities       well perfused   "     capillary refill brisk    Sensation:        normal sensation over shoulder and upper extremity       Radiology  Narrative & Impression   Exam: MRI of the right shoulder dated 6/20/2022.     COMPARISON: None.     CLINICAL HISTORY: Shoulder pain.     TECHNIQUE: Multiplanar, multisequence MR imaging of the right shoulder  was obtained using standard sequences in 3 orthogonal planes without  the use of intravenous or intra-articular gadolinium contrast.     FINDINGS:     No significant joint effusion at the right shoulder glenohumeral  joint. Small amount of fluid in the subacromial subdeltoid bursa.     Moderate degenerative changes of the acromioclavicular joint. No os  acromiale. The coracohumeral ligament is intact. Preserved subcoracoid  fat. The coracoclavicular or coracoacromial ligaments are intact.     Marked tendinosis of the supraspinatus and infraspinatus tendons with  a focal area of high-grade to full-thickness tearing of the junctional  fibers of the posterior supraspinatus/anterior infraspinatus tendon.  The teres minor tendon is intact. The subscapularis tendon is intact  without full-thickness tear or tendon retraction.     The muscle bulk of the rotator cuff musculature is intact without  significant atrophy or edema.     The biceps tendon is normal within the bicipital groove. The  intra-articular portion of the biceps tendon is intact.     Humeral head is well aligned with the glenoid. No Hill-Sachs lesion.  No full-thickness cartilage loss. Degenerative changes in the labrum.                                                                      IMPRESSION:  1. 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  2. Tendinosis of the right shoulder supraspinatus and infraspinatus  tendons, with a focal area of high-grade to near full-thickness  tearing of the junctional fibers of the posterior  supraspinatus/anterior infraspinatus tendon at the footprint.  3.  Tendinosis of the right shoulder subscapularis tendon without  full-thickness tear or tendon retraction.  4. Normal muscle bulk of the right shoulder rotator cuff musculature.  5. Normal appearing biceps tendon.  6. Degenerative changes in the labrum.        Large Joint Injection/Arthocentesis: R subacromial bursa    Date/Time: 4/11/2023 12:25 PM    Performed by: Alfredo Ferrell DO  Authorized by: Alfredo Ferrell DO    Indications:  Pain  Needle Size:  22 G  Guidance: ultrasound    Approach:  Lateral  Location:  Shoulder      Site:  R subacromial bursa  Medications:  3 mL ropivacaine 5 MG/ML; 40 mg triamcinolone 40 MG/ML  Outcome:  Tolerated well, no immediate complications  Procedure discussed: discussed risks, benefits, and alternatives    Consent Given by:  Patient  Timeout: timeout called immediately prior to procedure    Prep: patient was prepped and draped in usual sterile fashion     Ultrasound images of procedure were permanently stored.           Assessment:  1. Chronic right shoulder pain    2. Rotator cuff tendinitis, right        Plan:  Discussed the assessment with the patient.  Follow up: 2-3 weeks if needed based on clinical progress  Chronic right shoulder pain not improved with physical therapy or other conservative measures  MR images independently visualized and reviewed with patient today in clinic  Significant tendinopathy in the rotator cuff on MRI  Other degenerative changes well which is not as clinically significant  Has had work-up for potential cervical causes of pain, seems less likely based on exam today  Can continue to follow-up with spine providers as needed  Trial of diagnostic and hopefully therapeutic ultrasound-guided corticosteroid injection to the subacromial space today  Reviewed relative risks and goals of corticosteroid injection, will not heal chronic tendinopathy  Ongoing physical therapy options reviewed  Recommend following up with physical therapy in  about 2 weeks to revisit exercise program, potentially advance the program based on clinical progress  Can return to orthopedic surgery in the future if needed, hopeful to avoid  Oral Tylenol and topical Voltaren gel reviewed as safe OTC options, reviewed safe dosing strategies  Expectations and goals of CSI reviewed  Often 2-3 days for steroid effect, and can take up to two weeks for maximum effect  We discussed modified progressive pain-free activity as tolerated  Do not overuse in first two weeks if feeling better due to concern for vulnerability while steroid is working  Supportive care reviewed  All questions were answered today  Contact us with additional questions or concerns  Signs and sx of concern reviewed      Alfredo Ferrell DO, OSMANI  Sports Medicine Physician  [facility]      Time spent in chart review, one-on-one evaluation, discussion with patient regarding: nature of problem, clinical course, prior treatments, therapeutic options, shared-decision making, potential procedures and referrals, and charting related to the visit: 32 minutes.  If applicable, time does not include time spent performing any procedure.          Disclaimer: This note consists of symbols derived from keyboarding, dictation and/or voice recognition software. As a result, there may be errors in the script that have gone undetected. Please consider this when interpreting information found in this chart.      Again, thank you for allowing me to participate in the care of your patient.        Sincerely,        Alfredo Ferrell DO

## 2023-04-11 NOTE — TELEPHONE ENCOUNTER
[facility] Call Center    Phone Message    May a detailed message be left on voicemail: yes     Reason for Call: Other: Patient called in to see if she can have an after visit summary sent to her because she didn't receive one today/     Action Taken: Other: [id number]    Travel Screening: Not Applicable

## 2023-05-09 ENCOUNTER — OFFICE VISIT (OUTPATIENT)
Dept: ORTHOPEDICS | Facility: CLINIC | Age: 78
End: 2023-05-09
Payer: COMMERCIAL

## 2023-05-09 VITALS
HEIGHT: 63 IN | WEIGHT: 138 LBS | SYSTOLIC BLOOD PRESSURE: 169 MMHG | BODY MASS INDEX: 24.45 KG/M2 | DIASTOLIC BLOOD PRESSURE: 78 MMHG

## 2023-05-09 DIAGNOSIS — M25.511 CHRONIC RIGHT SHOULDER PAIN: ICD-10-CM

## 2023-05-09 DIAGNOSIS — M75.81 ROTATOR CUFF TENDINITIS, RIGHT: ICD-10-CM

## 2023-05-09 DIAGNOSIS — M50.30 DDD (DEGENERATIVE DISC DISEASE), CERVICAL: Primary | ICD-10-CM

## 2023-05-09 DIAGNOSIS — M47.812 FACET ARTHRITIS OF CERVICAL REGION: ICD-10-CM

## 2023-05-09 DIAGNOSIS — G89.29 CHRONIC RIGHT SHOULDER PAIN: ICD-10-CM

## 2023-05-09 PROCEDURE — 99214 OFFICE O/P EST MOD 30 MIN: CPT | Performed by: FAMILY MEDICINE

## 2023-05-09 NOTE — LETTER
2023         RE: Mariah Jacinto  [address] [place] [address]        Dear Colleague,    Thank you for referring your patient, Mariah Jacinto, to the [facility] [place]. Please see a copy of my visit note below.    Mariah Jacinto  :  1945  DOS: 23  MRN: [id number]    Sports Medicine Clinic Visit    PCP: Eli Sommer    Mariah Jacinto is a 77 year old Right hand dominant female who is seen in consultation at the request of Sherron Schuler DPT presenting with chronic right shoulder pain.    Injury: Gradual onset of pain over the past 2+ years.  Pain located over right posterior lateral shoulder, radiating to right upper arm & elbow.  Additional Features:  Positive: weakness and constant aching pain.  Symptoms are better with Rest.  Symptoms are worse with: shoulder flexion/abduction, reaching overhead, lying on right shoulder, lifting.  Other evaluation and/or treatments so far consists of: Ice, Heat, Tylenol, Ibuprofen, Rest and topical analgesics, physical therapy, Ortho Surgery Consult ([facility] - Srikanth Gonsales).  Recent imaging completed: X-rays completed 2021 ([facility]), MRI completed 2022.  Prior History of related problems: history of intermittent chronic radiating neck and shoulder issues over the past several years.    Social History: retired     Interim History - May 9, 2023  Since last visit on 2023 patient has moderate-severe right shoulder pain.  Right shoulder subacromial steroid injection completed on 23 provided initial relief the day of procedure, but minimal relief after that time.  Patient describes worsening radiating pain to right lower c-spine, upper trapezius, and lateral upper arm.  Minimal relief with tylenol.  No new injury in the interim.      Review of Systems  Musculoskeletal: as above  Remainder of review of systems is negative including constitutional, CV, pulmonary, GI, Skin and Neurologic except as noted in  HPI or medical history.    Past Medical History:   Diagnosis Date     Adhesive capsulitis of shoulder     Right shoulder tendonitis     Basal cell carcinoma      Displacement of cervical intervertebral disc without myelopathy      Esophageal reflux      Major depression in complete remission (H) 06/22/2009    celexa caused spinning side effect      MENOPAUSE --ERT      OSTEOPENIA      Squamous cell carcinoma      Past Surgical History:   Procedure Laterality Date     ABLATE VEIN VARICOSE RADIO FREQUENCY WITHOUT PHLEBECTOMY MULTIPLE STAB  3/28/2013    Procedure: ABLATE VEIN VARICOSE RADIO FREQUENCY WITHOUT PHLEBECTOMY MULTIPLE STAB;  Bilateral ablation of varicose veins legs-to ultrasound at 0930  ;  Surgeon: Noam Soliman MD;  Location: [facility]     COLONOSCOPY  8/20/2013    Procedure: COLONOSCOPY;  Colonoscopy;  Surgeon: Yuliya Nathan MD;  [facility]: [facility]     ESOPHAGOSCOPY, GASTROSCOPY, DUODENOSCOPY (EGD), COMBINED N/A 5/12/2015    Procedure: COMBINED ESOPHAGOSCOPY, GASTROSCOPY, DUODENOSCOPY (EGD), BIOPSY SINGLE OR MULTIPLE;  Surgeon: Yuliya Nathan MD;  Location: [facility]     ESOPHAGOSCOPY, GASTROSCOPY, DUODENOSCOPY (EGD), COMBINED N/A 1/31/2017    Procedure: COMBINED ESOPHAGOSCOPY, GASTROSCOPY, DUODENOSCOPY (EGD), BIOPSY SINGLE OR MULTIPLE;  Surgeon: Qasim Bowie MD;  Location: [facility]     EXCISE MASS FINGER Right 2/19/2019    Procedure: Excision Right Ring Finger Volar Middle Phalanx Mass;  Surgeon: Jake Viveros MD;  Location: [facility]     HYSTERECTOMY, PAP NO LONGER INDICATED      has both ovaries out also      HYSTERECTOMY, VAGINAL  1988    Hysterectomy, oophorectomy     PHACOEMULSIFICATION WITH STANDARD INTRAOCULAR LENS IMPLANT Left 3/18/2019    Procedure: Cataract Removal with Implant;  Surgeon: Chapito Phillips MD;  [facility]: [facility]     PHACOEMULSIFICATION WITH STANDARD INTRAOCULAR LENS IMPLANT Right 4/10/2019    Procedure: Cataract Removal with Implant;  Surgeon:  "Chapito Phillips MD;  Location: [facility]     RELEASE TRIGGER FINGER Right 2017    Procedure: RELEASE TRIGGER FINGER;  Right Thumb A1 Pulley Release;  Surgeon: Jake Viveros MD;  Location: [facility]     SURGICAL HISTORY OF -       right retromastoid craniectomy and decompression trigeminal nerve     TONSILLECTOMY & ADENOIDECTOMY  child    T&A      ZZC ANESTH,LOWER ARM SURGERY      ulnar nerve decompression - right     Family History   Problem Relation Age of Onset     Alzheimer Disease Mother          at age 85     Osteoporosis Mother      Arthritis Mother      Alcohol/Drug Father      Respiratory Father      Eye Disorder Maternal Grandfather         Macular degneration     C.A.D. Brother         Tripple bypass age 57     Cardiovascular Brother      Cancer Brother         leukemia (ALL)     Cardiovascular Brother      Cardiovascular Brother      Neurologic Disorder Son      Heart Disease Son      Melanoma No family hx of      Skin Cancer No family hx of        Objective  BP (!) 169/78   Ht 1.6 m (5' 3\")   Wt 62.6 kg (138 lb)   LMP  (LMP Unknown)   BMI 24.45 kg/m        General: healthy, alert and in no distress      HEENT: no scleral icterus or conjunctival erythema     Skin: no suspicious lesions or rash. No jaundice.     CV: regular rhythm by palpation, 2+ distal pulses, no pedal edema      Resp: normal respiratory effort without conversational dyspnea     Psych: normal mood and affect      Gait: nonantalgic, appropriate coordination and balance     Neuro: normal light touch sensory exam of the extremities. Motor strength as noted below     Right Shoulder exam    ROM:        Full active and passive ROM with flexion, extension, abduction, internal and external rotation.       Painful midrange abduction, painful terminal ROM in all directions    Tender:        subacromial space       Lateral deltoid    Non Tender:       remainder of shoulder       sternoclavicular joint       " acromioclavicular joint       posterior shoulder    Strength:        abduction 5-/5       internal rotation 5/5       external rotation 5-/5       adduction 5/5    Impingement testing:       mildly positive (+) Neer       positive (+) Moses       Weakly positive (+) empty can       neg (-) crossover       Mildly painful crank    Stability testing:       neg (-) anterior glide       neg (-) sulcus sign    Skin:       no visible deformities       well perfused       capillary refill brisk    Sensation:        normal sensation over shoulder and upper extremity     Cervical spine Exam  Inspection: normal cervical lordosis  Tender:  medial border of scapula, superior angle of scapula on the right, right paracervical muscles, right trapezius muscles  Non-tender:  spinous processes  Range of Motion:  Mildly limited terminal ROM of cervical spine, pain with most movements, R>>L  Strength: Full strength of all neck muscles  Special tests:  Spurling's - negative - left, Spurling's - mild positive - right, neg adson's, painful facet compression on the right    Radiology  Narrative & Impression   Exam: MRI of the right shoulder dated 6/20/2022.     COMPARISON: None.     CLINICAL HISTORY: Shoulder pain.     TECHNIQUE: Multiplanar, multisequence MR imaging of the right shoulder  was obtained using standard sequences in 3 orthogonal planes without  the use of intravenous or intra-articular gadolinium contrast.     FINDINGS:     No significant joint effusion at the right shoulder glenohumeral  joint. Small amount of fluid in the subacromial subdeltoid bursa.     Moderate degenerative changes of the acromioclavicular joint. No os  acromiale. The coracohumeral ligament is intact. Preserved subcoracoid  fat. The coracoclavicular or coracoacromial ligaments are intact.     Marked tendinosis of the supraspinatus and infraspinatus tendons with  a focal area of high-grade to full-thickness tearing of the junctional  fibers of the  posterior supraspinatus/anterior infraspinatus tendon.  The teres minor tendon is intact. The subscapularis tendon is intact  without full-thickness tear or tendon retraction.     The muscle bulk of the rotator cuff musculature is intact without  significant atrophy or edema.     The biceps tendon is normal within the bicipital groove. The  intra-articular portion of the biceps tendon is intact.     Humeral head is well aligned with the glenoid. No Hill-Sachs lesion.  No full-thickness cartilage loss. Degenerative changes in the labrum.                                                                      IMPRESSION:  1. 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  2. Tendinosis of the right shoulder supraspinatus and infraspinatus  tendons, with a focal area of high-grade to near full-thickness  tearing of the junctional fibers of the posterior  supraspinatus/anterior infraspinatus tendon at the footprint.  3. Tendinosis of the right shoulder subscapularis tendon without  full-thickness tear or tendon retraction.  4. Normal muscle bulk of the right shoulder rotator cuff musculature.  5. Normal appearing biceps tendon.  6. Degenerative changes in the labrum.          Assessment:  1. DDD (degenerative disc disease), cervical    2. Rotator cuff tendinitis, right    3. Chronic right shoulder pain    4. Facet arthritis of cervical region        Plan:  Discussed the assessment with the patient.  Follow up: 2-3 weeks if needed based on clinical progress  Chronic right shoulder pain not improved with physical therapy or other conservative measures  Prior MR images independently visualized and reviewed with patient today in clinic  Significant tendinopathy in the rotator cuff on MRI, less sx in shoulder today on exam, but overall she is feeling worse or at there very least no better  Has had work-up for potential cervical causes of pain, seemed less likely based on exam last visit, much  more painful cervical exam today  Can continue to follow-up with spine providers as needed, was offered injection trial from Dr Mccormack of [facility] previously  Reviewed option for diagnostic and hopefully therapeutic ultrasound-guided corticosteroid injection to the glenohumeral joint, given ongoing pain in that location  Defer shoulder procedure for now given significant increase in cervical pain  Ongoing physical therapy options reviewed, order placed today to help focus more on spine care  Can return to orthopedic surgery in the future if needed, hopeful to avoid  Oral Tylenol and topical Voltaren gel reviewed as safe OTC options, reviewed safe dosing strategies  Supportive care reviewed  All questions were answered today  Contact us with additional questions or concerns  Signs and sx of concern reviewed      Alfredo Ferrell DO, CAQ  Sports Medicine Physician  [facility]      Time spent in chart review, one-on-one evaluation, discussion with patient regarding: nature of problem, clinical course, prior treatments, therapeutic options, shared-decision making, potential procedures and referrals, and charting related to the visit: 34 minutes.  If applicable, time does not include time spent performing any procedure.          Disclaimer: This note consists of symbols derived from keyboarding, dictation and/or voice recognition software. As a result, there may be errors in the script that have gone undetected. Please consider this when interpreting information found in this chart.      Again, thank you for allowing me to participate in the care of your patient.        Sincerely,        Alfredo Ferrell DO

## 2023-05-09 NOTE — PROGRESS NOTES
Mariah Jacinto  :  1945  DOS: 23  MRN: [id number]    Sports Medicine Clinic Visit    PCP: Eli Sommer    Mariah Jacinto is a 77 year old Right hand dominant female who is seen in consultation at the request of Sherron Schuler DPT presenting with chronic right shoulder pain.    Injury: Gradual onset of pain over the past 2+ years.  Pain located over right posterior lateral shoulder, radiating to right upper arm & elbow.  Additional Features:  Positive: weakness and constant aching pain.  Symptoms are better with Rest.  Symptoms are worse with: shoulder flexion/abduction, reaching overhead, lying on right shoulder, lifting.  Other evaluation and/or treatments so far consists of: Ice, Heat, Tylenol, Ibuprofen, Rest and topical analgesics, physical therapy, Ortho Surgery Consult ([facility] - Srikanth Gonsales).  Recent imaging completed: X-rays completed 2021 ([facility]), MRI completed 2022.  Prior History of related problems: history of intermittent chronic radiating neck and shoulder issues over the past several years.    Social History: retired     Interim History - May 9, 2023  Since last visit on 2023 patient has moderate-severe right shoulder pain.  Right shoulder subacromial steroid injection completed on 23 provided initial relief the day of procedure, but minimal relief after that time.  Patient describes worsening radiating pain to right lower c-spine, upper trapezius, and lateral upper arm.  Minimal relief with tylenol.  No new injury in the interim.      Review of Systems  Musculoskeletal: as above  Remainder of review of systems is negative including constitutional, CV, pulmonary, GI, Skin and Neurologic except as noted in HPI or medical history.    Past Medical History:   Diagnosis Date     Adhesive capsulitis of shoulder     Right shoulder tendonitis     Basal cell carcinoma      Displacement of cervical intervertebral disc without myelopathy      Esophageal reflux      Major  depression in complete remission (H) 06/22/2009    celexa caused spinning side effect      MENOPAUSE --ERT      OSTEOPENIA      Squamous cell carcinoma      Past Surgical History:   Procedure Laterality Date     ABLATE VEIN VARICOSE RADIO FREQUENCY WITHOUT PHLEBECTOMY MULTIPLE STAB  3/28/2013    Procedure: ABLATE VEIN VARICOSE RADIO FREQUENCY WITHOUT PHLEBECTOMY MULTIPLE STAB;  Bilateral ablation of varicose veins legs-to ultrasound at 0930  ;  Surgeon: Noam Soliman MD;  Location: [facility]     COLONOSCOPY  8/20/2013    Procedure: COLONOSCOPY;  Colonoscopy;  Surgeon: Yuliya Nathan MD;  Location: [facility]     ESOPHAGOSCOPY, GASTROSCOPY, DUODENOSCOPY (EGD), COMBINED N/A 5/12/2015    Procedure: COMBINED ESOPHAGOSCOPY, GASTROSCOPY, DUODENOSCOPY (EGD), BIOPSY SINGLE OR MULTIPLE;  Surgeon: Yuliya Nathan MD;  Location: [facility]     ESOPHAGOSCOPY, GASTROSCOPY, DUODENOSCOPY (EGD), COMBINED N/A 1/31/2017    Procedure: COMBINED ESOPHAGOSCOPY, GASTROSCOPY, DUODENOSCOPY (EGD), BIOPSY SINGLE OR MULTIPLE;  Surgeon: Qasim Bowie MD;  Location: [facility]     EXCISE MASS FINGER Right 2/19/2019    Procedure: Excision Right Ring Finger Volar Middle Phalanx Mass;  Surgeon: Jake Viveros MD;  Location: [facility]     HYSTERECTOMY, PAP NO LONGER INDICATED      has both ovaries out also      HYSTERECTOMY, VAGINAL  1988    Hysterectomy, oophorectomy     PHACOEMULSIFICATION WITH STANDARD INTRAOCULAR LENS IMPLANT Left 3/18/2019    Procedure: Cataract Removal with Implant;  Surgeon: Chapito Phillips MD;  Location: [facility]     PHACOEMULSIFICATION WITH STANDARD INTRAOCULAR LENS IMPLANT Right 4/10/2019    Procedure: Cataract Removal with Implant;  Surgeon: Chapito Phillips MD;  Location: [facility]     RELEASE TRIGGER FINGER Right 6/23/2017    Procedure: RELEASE TRIGGER FINGER;  Right Thumb A1 Pulley Release;  Surgeon: Jake Viveros MD;  Location: [facility]     SURGICAL HISTORY OF -   2009    right  "retromastoid craniectomy and decompression trigeminal nerve     TONSILLECTOMY & ADENOIDECTOMY  child    T&A      ZZC ANESTH,LOWER ARM SURGERY      ulnar nerve decompression - right     Family History   Problem Relation Age of Onset     Alzheimer Disease Mother          at age 85     Osteoporosis Mother      Arthritis Mother      Alcohol/Drug Father      Respiratory Father      Eye Disorder Maternal Grandfather         Macular degneration     C.A.D. Brother         Tripple bypass age 57     Cardiovascular Brother      Cancer Brother         leukemia (ALL)     Cardiovascular Brother      Cardiovascular Brother      Neurologic Disorder Son      Heart Disease Son      Melanoma No family hx of      Skin Cancer No family hx of        Objective  BP (!) 169/78   Ht 1.6 m (5' 3\")   Wt 62.6 kg (138 lb)   LMP  (LMP Unknown)   BMI 24.45 kg/m        General: healthy, alert and in no distress      HEENT: no scleral icterus or conjunctival erythema     Skin: no suspicious lesions or rash. No jaundice.     CV: regular rhythm by palpation, 2+ distal pulses, no pedal edema      Resp: normal respiratory effort without conversational dyspnea     Psych: normal mood and affect      Gait: nonantalgic, appropriate coordination and balance     Neuro: normal light touch sensory exam of the extremities. Motor strength as noted below     Right Shoulder exam    ROM:        Full active and passive ROM with flexion, extension, abduction, internal and external rotation.       Painful midrange abduction, painful terminal ROM in all directions    Tender:        subacromial space       Lateral deltoid    Non Tender:       remainder of shoulder       sternoclavicular joint       acromioclavicular joint       posterior shoulder    Strength:        abduction 5-/5       internal rotation 5/5       external rotation 5-/5       adduction 5/5    Impingement testing:       mildly positive (+) Neer       positive (+) Moses       Weakly positive " (+) empty can       neg (-) crossover       Mildly painful crank    Stability testing:       neg (-) anterior glide       neg (-) sulcus sign    Skin:       no visible deformities       well perfused       capillary refill brisk    Sensation:        normal sensation over shoulder and upper extremity     Cervical spine Exam  Inspection: normal cervical lordosis  Tender:  medial border of scapula, superior angle of scapula on the right, right paracervical muscles, right trapezius muscles  Non-tender:  spinous processes  Range of Motion:  Mildly limited terminal ROM of cervical spine, pain with most movements, R>>L  Strength: Full strength of all neck muscles  Special tests:  Spurling's - negative - left, Spurling's - mild positive - right, neg adson's, painful facet compression on the right    Radiology  Narrative & Impression   Exam: MRI of the right shoulder dated 6/20/2022.     COMPARISON: None.     CLINICAL HISTORY: Shoulder pain.     TECHNIQUE: Multiplanar, multisequence MR imaging of the right shoulder  was obtained using standard sequences in 3 orthogonal planes without  the use of intravenous or intra-articular gadolinium contrast.     FINDINGS:     No significant joint effusion at the right shoulder glenohumeral  joint. Small amount of fluid in the subacromial subdeltoid bursa.     Moderate degenerative changes of the acromioclavicular joint. No os  acromiale. The coracohumeral ligament is intact. Preserved subcoracoid  fat. The coracoclavicular or coracoacromial ligaments are intact.     Marked tendinosis of the supraspinatus and infraspinatus tendons with  a focal area of high-grade to full-thickness tearing of the junctional  fibers of the posterior supraspinatus/anterior infraspinatus tendon.  The teres minor tendon is intact. The subscapularis tendon is intact  without full-thickness tear or tendon retraction.     The muscle bulk of the rotator cuff musculature is intact without  significant atrophy or  edema.     The biceps tendon is normal within the bicipital groove. The  intra-articular portion of the biceps tendon is intact.     Humeral head is well aligned with the glenoid. No Hill-Sachs lesion.  No full-thickness cartilage loss. Degenerative changes in the labrum.                                                                      IMPRESSION:  1. 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  2. Tendinosis of the right shoulder supraspinatus and infraspinatus  tendons, with a focal area of high-grade to near full-thickness  tearing of the junctional fibers of the posterior  supraspinatus/anterior infraspinatus tendon at the footprint.  3. Tendinosis of the right shoulder subscapularis tendon without  full-thickness tear or tendon retraction.  4. Normal muscle bulk of the right shoulder rotator cuff musculature.  5. Normal appearing biceps tendon.  6. Degenerative changes in the labrum.          Assessment:  1. DDD (degenerative disc disease), cervical    2. Rotator cuff tendinitis, right    3. Chronic right shoulder pain    4. Facet arthritis of cervical region        Plan:  Discussed the assessment with the patient.  Follow up: 2-3 weeks if needed based on clinical progress  Chronic right shoulder pain not improved with physical therapy or other conservative measures  Prior MR images independently visualized and reviewed with patient today in clinic  Significant tendinopathy in the rotator cuff on MRI, less sx in shoulder today on exam, but overall she is feeling worse or at there very least no better  Has had work-up for potential cervical causes of pain, seemed less likely based on exam last visit, much more painful cervical exam today  Can continue to follow-up with spine providers as needed, was offered injection trial from Dr Mccormack of [facility] previously  Reviewed option for diagnostic and hopefully therapeutic ultrasound-guided corticosteroid injection to the  glenohumeral joint, given ongoing pain in that location  Defer shoulder procedure for now given significant increase in cervical pain  Ongoing physical therapy options reviewed, order placed today to help focus more on spine care  Can return to orthopedic surgery in the future if needed, hopeful to avoid  Oral Tylenol and topical Voltaren gel reviewed as safe OTC options, reviewed safe dosing strategies  Supportive care reviewed  All questions were answered today  Contact us with additional questions or concerns  Signs and sx of concern reviewed      Alfredo Ferrell DO, CAОЛЬГА  Sports Medicine Physician  [facility]      Time spent in chart review, one-on-one evaluation, discussion with patient regarding: nature of problem, clinical course, prior treatments, therapeutic options, shared-decision making, potential procedures and referrals, and charting related to the visit: 34 minutes.  If applicable, time does not include time spent performing any procedure.          Disclaimer: This note consists of symbols derived from keyboarding, dictation and/or voice recognition software. As a result, there may be errors in the script that have gone undetected. Please consider this when interpreting information found in this chart.

## 2023-05-16 ENCOUNTER — TRANSFERRED RECORDS (OUTPATIENT)
Dept: HEALTH INFORMATION MANAGEMENT | Facility: CLINIC | Age: 78
End: 2023-05-16

## 2023-06-23 ENCOUNTER — THERAPY VISIT (OUTPATIENT)
Dept: PHYSICAL THERAPY | Facility: CLINIC | Age: 78
End: 2023-06-23
Attending: ORTHOPAEDIC SURGERY
Payer: COMMERCIAL

## 2023-06-23 DIAGNOSIS — M25.511 CHRONIC RIGHT SHOULDER PAIN: ICD-10-CM

## 2023-06-23 DIAGNOSIS — G89.29 CHRONIC RIGHT SHOULDER PAIN: ICD-10-CM

## 2023-06-23 PROCEDURE — 97140 MANUAL THERAPY 1/> REGIONS: CPT | Mod: GP | Performed by: PHYSICAL THERAPIST

## 2023-06-23 PROCEDURE — 97110 THERAPEUTIC EXERCISES: CPT | Mod: GP | Performed by: PHYSICAL THERAPIST

## 2023-06-27 PROBLEM — M25.511 CHRONIC RIGHT SHOULDER PAIN: Status: ACTIVE | Noted: 2023-06-27

## 2023-06-27 PROBLEM — G89.29 CHRONIC RIGHT SHOULDER PAIN: Status: ACTIVE | Noted: 2023-06-27

## 2023-06-27 NOTE — PROGRESS NOTES
[facility]                                                                                   OUTPATIENT PHYSICAL THERAPY    PLAN OF TREATMENT FOR OUTPATIENT REHABILITATION   Patient's Last Name, First Name, Mariah Boyd YOB: 1945   Provider's Name   [facility]   Medical Record No.  [id number]     Onset Date: 10/21/21  Start of Care Date: 10/21/22     Medical Diagnosis:  DDD (degenerative disc disease), cervical (M50.30)  - Primary     Rotator cuff tendinitis, right (M75.81)     Chronic right shoulder pain (M25.511, G89.29)     Facet arthritis of cervical region (M47.812)      PT Treatment Diagnosis:  R shoulder pain, neck pain Plan of Treatment  Frequency/Duration: 1x a week/ 12 weeks    Certification date from 04/16/23 to 07/15/23         See note for plan of treatment details and functional goals     Sherron Schuler, PT                         I CERTIFY THE NEED FOR THESE SERVICES FURNISHED UNDER        THIS PLAN OF TREATMENT AND WHILE UNDER MY CARE .             Physician Signature               Date    X_____________________________________________________                    Referring Provider:  Dr. Ferrell      Initial Assessment  See Epic Evaluation- Start of Care Date: 10/21/22           06/23/23 0500   Appointment Info   Signing clinician's name / credentials Sherron Schuler, PT, DPT, OCS   Total/Authorized Visits 365   Visits Used 13   Medical Diagnosis DDD (degenerative disc disease), cervical (M50.30)  - Primary     Rotator cuff tendinitis, right (M75.81)     Chronic right shoulder pain (M25.511, G89.29)     Facet arthritis of cervical region (M47.812)   PT Tx Diagnosis R shoulder pain, neck pain   Quick Adds Certification   Progress Note/Certification   Start of Care Date 10/21/22   Onset of illness/injury or Date of Surgery 10/21/21   Therapy Frequency 1x a week   Predicted Duration 12 weeks   Certification date  "from 04/16/23   Certification date to 07/15/23   Progress Note Due Date 07/15/23   GOALS   PT Goals 2;3   PT Goal 1   Goal Identifier 1   Goal Description Patient will be able to reach to and from top shelf without increased pain.   Target Date 07/15/23   PT Goal 2   Goal Identifier 2   Goal Description Patient will demonstrate 5/5 on all shoulder MMT without increased pain to be able to perform lifting necessary for household activities.   Target Date 07/15/23   PT Goal 3   Goal Identifier 3   Goal Description Patient will demonstrate 5/5 on all shoulder MMT without increased pain to be able to perform lifting necessary for household activities.   Target Date 07/15/23   Subjective Report   Subjective Report Patient reports going to see the doctor in April due to lack of improvement in PT, had an injection in the shoulder and she reports worsening pain since. She reports neck pain that started after the injection as well but his has improved since. Patient reports that pain is worse than when she first started going to PT. Has tried to continue the exercises but states that she can't do them anymore. Getting a catch in the shoulder now when doing certain things or in certain positions.   Objective Measures   Objective Measures Objective Measure 1;Objective Measure 2;Objective Measure 3;Objective Measure 4   Objective Measure 1   Objective Measure R Shoulder MMT   Details 3+/5 all due to pain   Objective Measure 2   Objective Measure Right Shoulder AROM   Details Flexion 122 pain lowering arm, Abduction 85 \"catching\" with lowering, ER 45, IR to greater trochanter and pain limiting   Objective Measure 3   Objective Measure R shoulder PROM   Details Flexion 150, Abduction 130, ER(60) 70, IR 40 pain   Objective Measure 4   Objective Measure Cervical AROM   Details Rotation 75% pulling, SB 25% b pulling, Flexion WNL, Extension 50% pulling   Treatment Interventions (PT)   Interventions Therapeutic " Procedure/Exercise;Manual Therapy   Therapeutic Procedure/Exercise   Therapeutic Procedures: strength, endurance, ROM, flexibillity minutes (91268) 20   Skilled Intervention HEP instruction, strengthening   Patient Response/Progress see below   Ther Proc 1 - Details Shoulder ROM and strength reassessment. review and modification of current HEP, instr pt to eliminate all weight with all exercises and focus on ROM and stretching only, ok to do very light bands if no pain.   Manual Therapy   Manual Therapy: Mobilization, MFR, MLD, friction massage minutes (59371) 10   Skilled Intervention improve mobility and decrease pain   Patient Response/Progress decreased pain   Manual Therapy 1 - Details AP GH mob grade 3.  discatraction with oscillation. gentle stretching abduction and flexion. CFM supraspinatus tendon and pec   Education   Learner/Method Patient   Plan   Homework PTRX- printed handouts   Home program updated HEP to reflect current status   Updates to plan of care pt referred back to PT with new referral for shoulder and neck by Dr. Ferrell, will resume 1x a week   Plan for next session resume 1x a week PT to decrease pain and improve ROM   Total Session Time   Timed Code Treatment Minutes 30   Total Treatment Time (sum of timed and untimed services) 30   Medicare Claim Information   Medical Diagnosis right arm pain   PT Diagnosis R shoulder pain   Start of Care Date 10/21/22   Onset date of current episode/exacerbation 10/21/21   Certification date from 10/21/22   Ortho Goal 1   Goal Identifier 1   Goal Description Patient will be able to reach to and from top shelf without increased pain.   Goal Progress no change since last visit   Target Date 04/16/23   Ortho Goal 2   Goal Identifier 2   Goal Description Patient will demonstrate 5/5 on all shoulder MMT without increased pain to be able to perform lifting necessary for household activities.   Goal Progress NT today   Target Date 04/16/23   Ortho Goal 3   Goal  Identifier 3   Goal Description Patient will be independent and consistent with HEP 5x a week to aid functional recovery.   Goal Progress Doing exercises 7x a week   Target Date 01/16/23   Date Met 01/26/23   Session Number   Authorization status navin Ellis required       Please contact me with any questions or concerns.  Thank you for your referral.    Sherron Schuler, PT, DPT, OCS  Physical Therapist, Orthopedic Certified Specialist    [facility]  [address] [place] [address]  [email or web address]   Office: [phone]   Employed by [employer]

## 2023-06-28 ENCOUNTER — OFFICE VISIT (OUTPATIENT)
Dept: DERMATOLOGY | Facility: CLINIC | Age: 78
End: 2023-06-28
Payer: COMMERCIAL

## 2023-06-28 DIAGNOSIS — D23.9 DERMAL NEVUS: ICD-10-CM

## 2023-06-28 DIAGNOSIS — L82.1 SEBORRHEIC KERATOSIS: ICD-10-CM

## 2023-06-28 DIAGNOSIS — Z85.828 HISTORY OF SKIN CANCER: ICD-10-CM

## 2023-06-28 DIAGNOSIS — L81.4 LENTIGO: Primary | ICD-10-CM

## 2023-06-28 DIAGNOSIS — D18.01 ANGIOMA OF SKIN: ICD-10-CM

## 2023-06-28 PROCEDURE — 99213 OFFICE O/P EST LOW 20 MIN: CPT | Performed by: DERMATOLOGY

## 2023-06-28 ASSESSMENT — PAIN SCALES - GENERAL: PAINLEVEL: NO PAIN (0)

## 2023-06-28 NOTE — NURSING NOTE
Mariah Jacinto's chief complaint for this visit includes:  Chief Complaint   Patient presents with     Skin Check     Patient has concerns about right side of nose and right elbow.      PCP: Eli Sommer    Referring Provider:  Referred Self, MD  No address on file    LMP  (LMP Unknown)   No Pain (0)        Allergies   Allergen Reactions     Biaxin [Clarithromycin]      per pt       Celebrex [Bob-2 Inhibitors]      per pt     Cipro [Ciprofloxacin]      per pt     Darvocet [Propoxyphene N-Apap]      Fleet Phospho Soda [Sodium Phosphate/Biphosphate]      nausea per pt     Morphine      Neurontin [Gabapentin]      per pt     Nortriptyline      per pt     Percocet [Oxycodone-Acetaminophen]      Serzone [Nefazodone Hydrochloride]      per pt     Tegretol [Carbamazepine]      Vicodin [Acetaminophen]      per pt-dizziness     Vioxx      per pt     Vivactil [Protriptyline Hcl]      per pt     Wellbutrin [Bupropion Hcl]      Zithromax [Azithromycin Dihydrate]          Do you need any medication refills at today's visit? No    Sherron Freedman MA

## 2023-06-28 NOTE — PROGRESS NOTES
Mariah Jacinto is an extremely pleasant 77 year old year old female patient here today for hx of non-melanoma skin cancer. She denies any new or changing skin lesions.  Patient has no other skin complaints today.  Remainder of the HPI, Meds, PMH, Allergies, FH, and SH was reviewed in chart.      Past Medical History:   Diagnosis Date     Adhesive capsulitis of shoulder     Right shoulder tendonitis     Basal cell carcinoma      Displacement of cervical intervertebral disc without myelopathy      Esophageal reflux      Major depression in complete remission (H) 06/22/2009    celexa caused spinning side effect      MENOPAUSE --ERT      OSTEOPENIA      Squamous cell carcinoma        Past Surgical History:   Procedure Laterality Date     ABLATE VEIN VARICOSE RADIO FREQUENCY WITHOUT PHLEBECTOMY MULTIPLE STAB  3/28/2013    Procedure: ABLATE VEIN VARICOSE RADIO FREQUENCY WITHOUT PHLEBECTOMY MULTIPLE STAB;  Bilateral ablation of varicose veins legs-to ultrasound at 0930  ;  Surgeon: Noam Soliman MD;  [facility]     COLONOSCOPY  8/20/2013    Procedure: COLONOSCOPY;  Colonoscopy;  Surgeon: Yuliya Nathan MD;  [facility]     ESOPHAGOSCOPY, GASTROSCOPY, DUODENOSCOPY (EGD), COMBINED N/A 5/12/2015    Procedure: COMBINED ESOPHAGOSCOPY, GASTROSCOPY, DUODENOSCOPY (EGD), BIOPSY SINGLE OR MULTIPLE;  Surgeon: Yuliya Nathan MD;  [facility]     ESOPHAGOSCOPY, GASTROSCOPY, DUODENOSCOPY (EGD), COMBINED N/A 1/31/2017    Procedure: COMBINED ESOPHAGOSCOPY, GASTROSCOPY, DUODENOSCOPY (EGD), BIOPSY SINGLE OR MULTIPLE;  Surgeon: Qasim Bowie MD;  [facility]     EXCISE MASS FINGER Right 2/19/2019    Procedure: Excision Right Ring Finger Volar Middle Phalanx Mass;  Surgeon: Jake Viveros MD;  [facility]     HYSTERECTOMY, PAP NO LONGER INDICATED      has both ovaries out also      HYSTERECTOMY, VAGINAL  1988    Hysterectomy, oophorectomy     PHACOEMULSIFICATION WITH STANDARD  INTRAOCULAR LENS IMPLANT Left 3/18/2019    Procedure: Cataract Removal with Implant;  Surgeon: Chapito Phillips MD;  [facility]     PHACOEMULSIFICATION WITH STANDARD INTRAOCULAR LENS IMPLANT Right 4/10/2019    Procedure: Cataract Removal with Implant;  Surgeon: Chapito Phillips MD;  [facility]     RELEASE TRIGGER FINGER Right 2017    Procedure: RELEASE TRIGGER FINGER;  Right Thumb A1 Pulley Release;  Surgeon: Jake Viveros MD;  [facility]     SURGICAL HISTORY OF -       right retromastoid craniectomy and decompression trigeminal nerve     TONSILLECTOMY & ADENOIDECTOMY  child    T&A      ZZC ANESTH,LOWER ARM SURGERY      ulnar nerve decompression - right        Family History   Problem Relation Age of Onset     Alzheimer Disease Mother          at age 85     Osteoporosis Mother      Arthritis Mother      Alcohol/Drug Father      Respiratory Father      Eye Disorder Maternal Grandfather         Macular degneration     C.A.D. Brother         Tripple bypass age 57     Cardiovascular Brother      Cancer Brother         leukemia (ALL)     Cardiovascular Brother      Cardiovascular Brother      Neurologic Disorder Son      Heart Disease Son      Melanoma No family hx of      Skin Cancer No family hx of        Social History     Socioeconomic History     Marital status:      Spouse name: Not on file     Number of children: Not on file     Years of education: Not on file     Highest education level: Not on file   Occupational History     Not on file   Tobacco Use     Smoking status: Never     Smokeless tobacco: Never   Vaping Use     Vaping Use: Never used   Substance and Sexual Activity     Alcohol use: No     Drug use: No     Sexual activity: Yes     Birth control/protection: Surgical     Comment: complete hysterectomy    Other Topics Concern     Parent/sibling w/ CABG, MI or angioplasty before 65F 55M? No   Social History Narrative     Not on file     Social  Determinants of Health     Financial Resource Strain: Not on file   Food Insecurity: Not on file   Transportation Needs: Not on file   Physical Activity: Not on file   Stress: Not on file   Social Connections: Not on file   Intimate Partner Violence: Not on file   Housing Stability: Not on file       Outpatient Encounter Medications as of 6/28/2023   Medication Sig Dispense Refill     acetaminophen (TYLENOL) 325 MG tablet Take 325 mg by mouth once       estradiol (ESTRACE) 0.1 MG/GM vaginal cream Place vaginally twice a week 1 g  twice weekly at bedtime 43 g 4     melatonin 5 MG CAPS Take 5 mg by mouth At Bedtime 1 capsule 0     MULTI-VITAMIN OR TABS 1 TABLET DAILY       omeprazole (PRILOSEC) 40 MG DR capsule Take 1 capsule by mouth once daily 90 capsule 3     simvastatin (ZOCOR) 20 MG tablet Take 1 tablet (20 mg) by mouth At Bedtime 90 tablet 3     VITAMIN D OR 1 DAILY       No facility-administered encounter medications on file as of 6/28/2023.             O:   NAD, WDWN, Alert & Oriented, Mood & Affect wnl, Vitals stable   Here today alone   General appearance normal   Vitals stable   Alert, oriented and in no acute distress      Following lymph nodes palpated: Occipital, Cervical, Supraclavicular no lad      Stuck on papules and brown macules on trunk and ext   Red papules on trunk  Flesh colored papules on trunk     The remainder of the full exam was normal; the following areas were examined:  conjunctiva/lids, , neck, peripheral vascular system, abdomen, lymph nodes, digits/nails, eccrine and apocrine glands, scalp/hair, face, neck, chest, abdomen, buttocks, back, RUE, LUE, RLE, LLE       Eyes: Conjunctivae/lids:Normal     ENT: Lips, buccal mucosa, tongue: normal    MSK:Normal    Cardiovascular: peripheral edema none    Pulm: Breathing Normal    Lymph Nodes: No Head and Neck Lymphadenopathy     Neuro/Psych: Orientation:Alert and Orientedx3 ; Mood/Affect:normal       A/P:  1. Seborrheic keratosis, lentigo,  angioma, dermal nevus, hx of non-melanoma skin cancer   It was a pleasure speaking to Mariah Jacinto today.  Previous clinic notes and pertinent laboratory tests were reviewed prior to Mariah Jacinto's visit.  Signs and Symptoms of skin cancer discussed with patient.  Patient encouraged to perform monthly skin exams.  UV precautions reviewed with patient.  Risks of non-melanoma skin cancer discussed with patient   Return to clinic 12 months

## 2023-06-28 NOTE — LETTER
6/28/2023         RE: Mariah Jacinto  [address] [place] [address]        Dear Colleague,    Thank you for referring your patient, Mariah Jacinto, to the [facility]. Please see a copy of my visit note below.    Mariah Jacinto is an extremely pleasant 77 year old year old female patient here today for hx of non-melanoma skin cancer. She denies any new or changing skin lesions.  Patient has no other skin complaints today.  Remainder of the HPI, Meds, PMH, Allergies, FH, and SH was reviewed in chart.      Past Medical History:   Diagnosis Date     Adhesive capsulitis of shoulder     Right shoulder tendonitis     Basal cell carcinoma      Displacement of cervical intervertebral disc without myelopathy      Esophageal reflux      Major depression in complete remission (H) 06/22/2009    celexa caused spinning side effect      MENOPAUSE --ERT      OSTEOPENIA      Squamous cell carcinoma        Past Surgical History:   Procedure Laterality Date     ABLATE VEIN VARICOSE RADIO FREQUENCY WITHOUT PHLEBECTOMY MULTIPLE STAB  3/28/2013    Procedure: ABLATE VEIN VARICOSE RADIO FREQUENCY WITHOUT PHLEBECTOMY MULTIPLE STAB;  Bilateral ablation of varicose veins legs-to ultrasound at 0930  ;  Surgeon: Noam Soliman MD;  [facility]     COLONOSCOPY  8/20/2013    Procedure: COLONOSCOPY;  Colonoscopy;  Surgeon: Yuilya Nathan MD;  [facility]     ESOPHAGOSCOPY, GASTROSCOPY, DUODENOSCOPY (EGD), COMBINED N/A 5/12/2015    Procedure: COMBINED ESOPHAGOSCOPY, GASTROSCOPY, DUODENOSCOPY (EGD), BIOPSY SINGLE OR MULTIPLE;  Surgeon: Yuliya Nathan MD;  [facility]     ESOPHAGOSCOPY, GASTROSCOPY, DUODENOSCOPY (EGD), COMBINED N/A 1/31/2017    Procedure: COMBINED ESOPHAGOSCOPY, GASTROSCOPY, DUODENOSCOPY (EGD), BIOPSY SINGLE OR MULTIPLE;  Surgeon: Qasim Bowie MD;  [facility]     EXCISE MASS FINGER Right 2/19/2019    Procedure: Excision Right Ring Finger  Volar Middle Phalanx Mass;  Surgeon: Jake Viveros MD;  [facility]     HYSTERECTOMY, PAP NO LONGER INDICATED      has both ovaries out also      HYSTERECTOMY, VAGINAL  1988    Hysterectomy, oophorectomy     PHACOEMULSIFICATION WITH STANDARD INTRAOCULAR LENS IMPLANT Left 3/18/2019    Procedure: Cataract Removal with Implant;  Surgeon: Chapito Phillips MD;  [facility]     PHACOEMULSIFICATION WITH STANDARD INTRAOCULAR LENS IMPLANT Right 4/10/2019    Procedure: Cataract Removal with Implant;  Surgeon: Chapito Phillips MD;  [facility]     RELEASE TRIGGER FINGER Right 2017    Procedure: RELEASE TRIGGER FINGER;  Right Thumb A1 Pulley Release;  Surgeon: Jake Viveros MD;  [facility]     SURGICAL HISTORY OF -       right retromastoid craniectomy and decompression trigeminal nerve     TONSILLECTOMY & ADENOIDECTOMY  child    T&A      ZZC ANESTH,LOWER ARM SURGERY      ulnar nerve decompression - right        Family History   Problem Relation Age of Onset     Alzheimer Disease Mother          at age 85     Osteoporosis Mother      Arthritis Mother      Alcohol/Drug Father      Respiratory Father      Eye Disorder Maternal Grandfather         Macular degneration     C.A.D. Brother         Tripple bypass age 57     Cardiovascular Brother      Cancer Brother         leukemia (ALL)     Cardiovascular Brother      Cardiovascular Brother      Neurologic Disorder Son      Heart Disease Son      Melanoma No family hx of      Skin Cancer No family hx of        Social History     Socioeconomic History     Marital status:      Spouse name: Not on file     Number of children: Not on file     Years of education: Not on file     Highest education level: Not on file   Occupational History     Not on file   Tobacco Use     Smoking status: Never     Smokeless tobacco: Never   Vaping Use     Vaping Use: Never used   Substance and Sexual Activity     Alcohol use: No     Drug  use: No     Sexual activity: Yes     Birth control/protection: Surgical     Comment: complete hysterectomy 1988   Other Topics Concern     Parent/sibling w/ CABG, MI or angioplasty before 65F 55M? No   Social History Narrative     Not on file     Social Determinants of Health     Financial Resource Strain: Not on file   Food Insecurity: Not on file   Transportation Needs: Not on file   Physical Activity: Not on file   Stress: Not on file   Social Connections: Not on file   Intimate Partner Violence: Not on file   Housing Stability: Not on file       Outpatient Encounter Medications as of 6/28/2023   Medication Sig Dispense Refill     acetaminophen (TYLENOL) 325 MG tablet Take 325 mg by mouth once       estradiol (ESTRACE) 0.1 MG/GM vaginal cream Place vaginally twice a week 1 g  twice weekly at bedtime 43 g 4     melatonin 5 MG CAPS Take 5 mg by mouth At Bedtime 1 capsule 0     MULTI-VITAMIN OR TABS 1 TABLET DAILY       omeprazole (PRILOSEC) 40 MG DR capsule Take 1 capsule by mouth once daily 90 capsule 3     simvastatin (ZOCOR) 20 MG tablet Take 1 tablet (20 mg) by mouth At Bedtime 90 tablet 3     VITAMIN D OR 1 DAILY       No facility-administered encounter medications on file as of 6/28/2023.             O:   NAD, WDWN, Alert & Oriented, Mood & Affect wnl, Vitals stable   Here today alone   General appearance normal   Vitals stable   Alert, oriented and in no acute distress      Following lymph nodes palpated: Occipital, Cervical, Supraclavicular no lad      Stuck on papules and brown macules on trunk and ext   Red papules on trunk  Flesh colored papules on trunk     The remainder of the full exam was normal; the following areas were examined:  conjunctiva/lids, , neck, peripheral vascular system, abdomen, lymph nodes, digits/nails, eccrine and apocrine glands, scalp/hair, face, neck, chest, abdomen, buttocks, back, RUE, LUE, RLE, LLE       Eyes: Conjunctivae/lids:Normal     ENT: Lips, buccal mucosa, tongue:  normal    MSK:Normal    Cardiovascular: peripheral edema none    Pulm: Breathing Normal    Lymph Nodes: No Head and Neck Lymphadenopathy     Neuro/Psych: Orientation:Alert and Orientedx3 ; Mood/Affect:normal       A/P:  1. Seborrheic keratosis, lentigo, angioma, dermal nevus, hx of non-melanoma skin cancer   It was a pleasure speaking to Mariah Jacinto today.  Previous clinic notes and pertinent laboratory tests were reviewed prior to Mariah Jacinto's visit.  Signs and Symptoms of skin cancer discussed with patient.  Patient encouraged to perform monthly skin exams.  UV precautions reviewed with patient.  Risks of non-melanoma skin cancer discussed with patient   Return to clinic 12 months        Again, thank you for allowing me to participate in the care of your patient.        Sincerely,        Chapito Franco MD

## 2023-07-05 ENCOUNTER — OFFICE VISIT (OUTPATIENT)
Dept: FAMILY MEDICINE | Facility: CLINIC | Age: 78
End: 2023-07-05
Payer: COMMERCIAL

## 2023-07-05 VITALS
BODY MASS INDEX: 24.27 KG/M2 | TEMPERATURE: 97.9 F | DIASTOLIC BLOOD PRESSURE: 70 MMHG | WEIGHT: 137 LBS | OXYGEN SATURATION: 98 % | RESPIRATION RATE: 18 BRPM | SYSTOLIC BLOOD PRESSURE: 132 MMHG | HEART RATE: 72 BPM | HEIGHT: 63 IN

## 2023-07-05 DIAGNOSIS — G89.29 CHRONIC RIGHT SHOULDER PAIN: ICD-10-CM

## 2023-07-05 DIAGNOSIS — M25.511 CHRONIC RIGHT SHOULDER PAIN: ICD-10-CM

## 2023-07-05 DIAGNOSIS — M54.12 CERVICAL RADICULOPATHY: Primary | ICD-10-CM

## 2023-07-05 PROCEDURE — 99214 OFFICE O/P EST MOD 30 MIN: CPT | Performed by: FAMILY MEDICINE

## 2023-07-05 RX ORDER — PREGABALIN 25 MG/1
25 CAPSULE ORAL 2 TIMES DAILY
Qty: 60 CAPSULE | Refills: 3 | Status: SHIPPED | OUTPATIENT
Start: 2023-07-05 | End: 2023-08-21

## 2023-07-05 ASSESSMENT — PAIN SCALES - GENERAL: PAINLEVEL: EXTREME PAIN (8)

## 2023-07-05 ASSESSMENT — ANXIETY QUESTIONNAIRES
5. BEING SO RESTLESS THAT IT IS HARD TO SIT STILL: NOT AT ALL
GAD7 TOTAL SCORE: 0
3. WORRYING TOO MUCH ABOUT DIFFERENT THINGS: NOT AT ALL
6. BECOMING EASILY ANNOYED OR IRRITABLE: NOT AT ALL
GAD7 TOTAL SCORE: 0
2. NOT BEING ABLE TO STOP OR CONTROL WORRYING: NOT AT ALL
7. FEELING AFRAID AS IF SOMETHING AWFUL MIGHT HAPPEN: NOT AT ALL
1. FEELING NERVOUS, ANXIOUS, OR ON EDGE: NOT AT ALL

## 2023-07-05 ASSESSMENT — PATIENT HEALTH QUESTIONNAIRE - PHQ9
5. POOR APPETITE OR OVEREATING: NOT AT ALL
SUM OF ALL RESPONSES TO PHQ QUESTIONS 1-9: 0

## 2023-07-05 NOTE — PROGRESS NOTES
"  Assessment & Plan     Cervical radiculopathy     - pregabalin (LYRICA) 25 MG capsule; Take 1 capsule (25 mg) by mouth 2 times daily    Chronic right shoulder pain     Right shoulder/arm pain - uncertain if coming from the shoulder itself or if from more cervical radiculopathy C5-C6 distribution.  Has failed conservative management with PT/HEP/injection (shoulder).  Has not tried c spine injection and not sure she wants to as previous injections in other locations have not worked in the past and has in fact made some of the pain worse.     ?need for diagnostic scope of the mirtha Sommer MD  [facility]   Mariah is a 77 year old, presenting for the following health issues:  Shoulder Pain      HPI     Pain History:  When did you first notice your pain? 2 years   Have you seen this provider for your pain in the past?   Yes   Where in your body do you have pain? Right shoulder  Are you seeing anyone else for your pain? Yes - sports medicine and orthopedics              9/10/2014     9:40 AM 5/6/2015     3:00 PM 7/5/2023     9:28 AM   PHQ-9 SCORE   PHQ-9 Total Score 1 1    PHQ-9 Total Score   0         1/11/2012    12:06 PM 5/6/2015     3:51 PM 7/5/2023     9:28 AM   DARIUS-7 SCORE   Total Score 2 0    Total Score   0         7/5/2023     9:28 AM   PEG Score   PEG Total Score 4.33       Chronic Pain Follow Up:    Location of pain: Right shoulder pain  Analgesia/pain control:    - Recent changes:  Pain worsened after steroid injection. She notes a constant aching in shoulder that is now radiating to neck and is worse if more active. She notes a \"catching\" feeling   - Overall control: Inadequate pain control    - Current treatments: PT, steroid injection   Adherence:     - Do you ever take more pain medicine than prescribed? N/A    - When did you take your last dose of pain medicine?  N/A   Adverse effects: N/A   PDMP Review     None        Last CSA Agreement:   CSA " "-- Patient Level:    CSA: None found at the patient level.       Last UDS:             [facility] visit 7/5/2022 with Dr. Srikanth Gonsales          4/11/2023 - Dr. Ferrell - did a CSI of the right shoulder - made the pain worse    Has been offered C spine injection from [facility] spine doctor        Review of Systems   Constitutional, HEENT, cardiovascular, pulmonary, gi and gu systems are negative, except as otherwise noted.      Objective    /70   Pulse 72   Temp 97.9  F (36.6  C) (Tympanic)   Resp 18   Ht 1.6 m (5' 3\")   Wt 62.1 kg (137 lb)   LMP  (LMP Unknown)   SpO2 98%   BMI 24.27 kg/m    Body mass index is 24.27 kg/m .  Physical Exam   GENERAL APPEARANCE: healthy, alert and mild distress                    "

## 2023-07-13 ENCOUNTER — THERAPY VISIT (OUTPATIENT)
Dept: PHYSICAL THERAPY | Facility: CLINIC | Age: 78
End: 2023-07-13
Attending: ORTHOPAEDIC SURGERY
Payer: COMMERCIAL

## 2023-07-13 DIAGNOSIS — M25.511 CHRONIC RIGHT SHOULDER PAIN: Primary | ICD-10-CM

## 2023-07-13 DIAGNOSIS — G89.29 CHRONIC RIGHT SHOULDER PAIN: Primary | ICD-10-CM

## 2023-07-13 PROCEDURE — 999N000104 HC STATISTIC NO CHARGE: Performed by: PHYSICAL THERAPIST

## 2023-07-13 NOTE — PROGRESS NOTES
DISCHARGE  Reason for Discharge: No further expectation of progress.    Equipment Issued: theraband    Discharge Plan: Patient to continue home program.    Referring Provider:  Srikanth Gonsales     07/13/23 0500   Appointment Info   Signing clinician's name / credentials Sherron Schuler, PT, DPT, OCS   Total/Authorized Visits 365   Visits Used 14   Medical Diagnosis DDD (degenerative disc disease), cervical (M50.30)  - Primary     Rotator cuff tendinitis, right (M75.81)     Chronic right shoulder pain (M25.511, G89.29)     Facet arthritis of cervical region (M47.812)   PT Tx Diagnosis R shoulder pain, neck pain   Quick Adds Certification   Progress Note/Certification   Start of Care Date 10/21/22   Onset of illness/injury or Date of Surgery 10/21/21   Therapy Frequency 1x a week   Predicted Duration 12 weeks   Certification date from 04/16/23   Certification date to 07/15/23   Progress Note Due Date 07/15/23   GOALS   PT Goals 2;3   PT Goal 1   Goal Identifier 1   Goal Description Patient will be able to reach to and from top shelf without increased pain.   Target Date 07/15/23   PT Goal 2   Goal Identifier 2   Goal Description Patient will demonstrate 5/5 on all shoulder MMT without increased pain to be able to perform lifting necessary for household activities.   Target Date 07/15/23   PT Goal 3   Goal Identifier 3   Goal Description Patient will demonstrate 5/5 on all shoulder MMT without increased pain to be able to perform lifting necessary for household activities.   Target Date 07/15/23   Subjective Report   Subjective Report Went to primary doctor last week, was prescribed a nerve pain medication. Was supposed to start with 1 pill and day and increase to 2, has not done the 2 yet. Some days feels lightheaded in the morning and other days not since starting the med. No change in her symptoms still. Having a hard time even doing any of the exercises anymore due to the pain.   Objective Measures    Objective Measures Objective Measure 1;Objective Measure 2;Objective Measure 3;Objective Measure 4   Objective Measure 1   Objective Measure R Shoulder MMT   Objective Measure 2   Objective Measure Right Shoulder AROM   Objective Measure 3   Objective Measure R shoulder PROM   Objective Measure 4   Objective Measure Cervical AROM   Treatment Interventions (PT)   Interventions Therapeutic Procedure/Exercise;Manual Therapy   Education   Learner/Method Patient   Plan   Homework PTRX- printed handouts   Home program updated HEP to reflect current status   Updates to plan of care discussed with patient lack of improvement and difficulty doing any of the exercises anymore. Primary recommended she return to see the surgeon to discuss surgical options. Given she is having trouble even participating in therapy now, agree that she should hold off on any further PT visits until she sees the surgeon again.   Plan for next session discharge   Comments   Comments no charge for visit today as just discussed no longer continuing with PT   Medicare Claim Information   Medical Diagnosis right arm pain   PT Diagnosis R shoulder pain   Start of Care Date 10/21/22   Onset date of current episode/exacerbation 10/21/21   Certification date from 10/21/22   Ortho Goal 1   Goal Identifier 1   Goal Description Patient will be able to reach to and from top shelf without increased pain.   Goal Progress no change since last visit   Target Date 04/16/23   Ortho Goal 2   Goal Identifier 2   Goal Description Patient will demonstrate 5/5 on all shoulder MMT without increased pain to be able to perform lifting necessary for household activities.   Goal Progress NT today   Target Date 04/16/23   Ortho Goal 3   Goal Identifier 3   Goal Description Patient will be independent and consistent with HEP 5x a week to aid functional recovery.   Goal Progress Doing exercises 7x a week   Target Date 01/16/23   Date Met 01/26/23   Session Number    Authorization status [employer], cert required         Please contact me with any questions or concerns.  Thank you for your referral.    Sherron Schuler, PT, DPT, OCS  Physical Therapist, Orthopedic Certified Specialist    [facility]  [address] [place] [id number]  [email or web address]   Office: [phone]   Employed by [facility]

## 2023-07-19 ENCOUNTER — OFFICE VISIT (OUTPATIENT)
Dept: FAMILY MEDICINE | Facility: CLINIC | Age: 78
End: 2023-07-19
Payer: COMMERCIAL

## 2023-07-19 VITALS
SYSTOLIC BLOOD PRESSURE: 112 MMHG | DIASTOLIC BLOOD PRESSURE: 66 MMHG | OXYGEN SATURATION: 97 % | HEART RATE: 72 BPM | BODY MASS INDEX: 24.59 KG/M2 | RESPIRATION RATE: 16 BRPM | TEMPERATURE: 98.3 F | HEIGHT: 63 IN | WEIGHT: 138.8 LBS

## 2023-07-19 DIAGNOSIS — K08.89 PAIN IN A TOOTH OR TEETH: ICD-10-CM

## 2023-07-19 DIAGNOSIS — J32.9 SINUSITIS, UNSPECIFIED CHRONICITY, UNSPECIFIED LOCATION: Primary | ICD-10-CM

## 2023-07-19 PROCEDURE — 99213 OFFICE O/P EST LOW 20 MIN: CPT | Performed by: FAMILY MEDICINE

## 2023-07-19 ASSESSMENT — PAIN SCALES - GENERAL: PAINLEVEL: NO PAIN (0)

## 2023-07-19 NOTE — PROGRESS NOTES
"  Assessment & Plan     Sinusitis, unspecified chronicity, unspecified location  Pain in a tooth or teeth  Discussed conservative cares. Discussed using flonase. She does not wish to use flonase or other intranasal drugs. Wishes to have an antibiotic. Treat with Augmentin twice daily for 7 days. Follow up if worsening or not improving.   - amoxicillin-clavulanate (AUGMENTIN) 875-125 MG tablet  Dispense: 14 tablet; Refill: 0    The risks, benefits and treatment options of prescribed medications or other treatments have been discussed with the patient. The patient verbalized their understanding and should call or follow up if no improvement or if they develop further problems.      Chip Berry, [facility] CHUY Lemus is a 77 year old, presenting for the following health issues:  Sinus Problem      HPI     Acute Illness  Acute illness concerns: Teeth are hurting on left side, also having some pain/pressure on left side of face. Patient states that last time she had a sinus infection, she has these same symptoms.  Onset/Duration: x3-4 days  Symptoms:  Fever: No  Chills/Sweats: No  Headache (location?): YES  Sinus Pressure: YES- Only on the left side  Conjunctivitis:  No  Ear Pain: no  Rhinorrhea: No  Congestion: No  Sore Throat: No  Cough: no  Wheeze: No  Decreased Appetite: No  Nausea: No  Vomiting: No  Diarrhea: No  Dysuria/Freq.: No  Dysuria or Hematuria: No  Fatigue/Achiness: No  Sick/Strep Exposure: No  Therapies tried and outcome: Tylenol, last night    Ongoing for the last 4 days.     Does not wish to use nasal sprays.   Wishes to have an antibiotic as for similar pains in the past this was helpful.     Review of Systems   Constitutional, HEENT, cardiovascular, pulmonary, gi and gu systems are negative, except as otherwise noted.      Objective    /66   Pulse 72   Temp 98.3  F (36.8  C) (Tympanic)   Resp 16   Ht 1.6 m (5' 3\")   Wt 63 kg (138 lb 12.8 oz)   LMP  " (LMP Unknown)   SpO2 97%   BMI 24.59 kg/m    Body mass index is 24.59 kg/m .  Physical Exam   General: Alert, cooperative, no acute distress  Head: Normocephalic, atraumatic   Eyes: PERRL, no scleral icterus, no conjunctival injection   Ears: External ear without deformity, external canals patent, TM intact with no signs of infection   Nose: nares patent  Sinuses: Tender to palpation over the left maxillary sinus   Teeth: teeth tap test without any pain or discomfort.   Throat: Oropharynx clear, non-erythematous, tonsils non-enlarged   Neck: Non-tender to palpation, no cervical lymphadenopathy, thyroid non-enlarged  CV: RRR, no murmur   Resp: non-labored breathing, clear to auscultation, no wheezing or rales.

## 2023-07-19 NOTE — PATIENT INSTRUCTIONS
Take Augmentin twice daily for 7 days.     Follow up if not improving or worsening.     Consider seeing a dentist for the tooth pain.

## 2023-08-03 ENCOUNTER — TRANSFERRED RECORDS (OUTPATIENT)
Dept: HEALTH INFORMATION MANAGEMENT | Facility: CLINIC | Age: 78
End: 2023-08-03
Payer: COMMERCIAL

## 2023-08-06 ENCOUNTER — HOSPITAL ENCOUNTER (OUTPATIENT)
Dept: MRI IMAGING | Facility: CLINIC | Age: 78
Discharge: HOME | End: 2023-08-06
Attending: ORTHOPAEDIC SURGERY | Admitting: ORTHOPAEDIC SURGERY
Payer: COMMERCIAL

## 2023-08-06 DIAGNOSIS — M25.519 PAIN IN SHOULDER: ICD-10-CM

## 2023-08-06 PROCEDURE — 73221 MRI JOINT UPR EXTREM W/O DYE: CPT | Mod: RT

## 2023-08-10 ENCOUNTER — TRANSFERRED RECORDS (OUTPATIENT)
Dept: HEALTH INFORMATION MANAGEMENT | Facility: CLINIC | Age: 78
End: 2023-08-10
Payer: COMMERCIAL

## 2023-08-11 ENCOUNTER — TELEPHONE (OUTPATIENT)
Dept: FAMILY MEDICINE | Facility: CLINIC | Age: 78
End: 2023-08-11
Payer: COMMERCIAL

## 2023-08-11 NOTE — TELEPHONE ENCOUNTER
Patient calling to see if her upcoming visit for a pre op would be able to also count for her annual wellness exam and she could cancel the 9/14/23 annual wellness exam.     Writer informed patient she can ask at the pre op but typically for billing for coverage of her procedure the insurance company would not allow the visits to be combined and that puts a strain on Dr. Sommer because of the time that is required for each visit.    Agnes Pelayo RN on 8/11/2023 at 12:37 PM

## 2023-08-21 NOTE — PROVIDER NOTIFICATION
"   08/21/23 1005   General Information   Contact made? Yes   Which attempt is this? 1   Call date:  08/21/23   Call time: 1006   Communication Assessment   Patient / Family communication style spoken language (English or Bilingual)   Pre-op Phone Call   How to be Addressed mono   Primary Caregiver Self   Notified of Need to Arrange Ride and Caregiver Yes   Stated Reason for Admission shoulder surgery   Surgery Time Verified Yes   Arrival Time Verified 0925   Facility Location Verified Yes   NPO Status Reinforced Yes   Solids 0225   Clear Liquids 0725   Physician's Name arely   Date of Physical 08/23/23   Patient Knows to Bring List of Pre-op Medications Yes   Patient Reminded to Bring Eye Drops for Day of Surgery N/A   Do you have any of the following Medical Devices? Not Applicable   Patient/Family states an understanding of: Pre-op shower with antiseptic soap x2 instructions;Removing jewelry/ body piercings before surgery;No cosmetics including fragrances;Need to bring insurance card;Plan/Resources/Equipment needed for discharge;Need to leave valuables at home   Patient Contact Information    Contact (Name and Relationship) - Surgery/Procedure jesse   Pre-op Implants (USE \"DEVICES\" GROUP TO DOCUMENT ASSESSMENT OF IMPLANT ON DOS)   Electronic Implant No   Advance Directives   Scanned docmt in ACP Activity? Yes, scanned ACP docmt   Pt confirms current doc scanned Current scanned   Pt offered more information Pt declined   Malnutrition Screening Tool (MST)   Have you recently lost weight without trying? 0   Have you been eating poorly because of a decreased appetite? 0   Spiritual Beliefs   May our Spiritual Health Services staff meet with you or contact someone on your behalf? not at this time   Disability/Function   Hearing Difficulty or Deaf yes   Describe hearing loss bilateral hearing loss   Use of hearing assistive devices right hearing aid;left hearing aid   Were auxiliary aids offered? no   Wear Glasses " or Blind yes   Vision Management glasses   Concentrating, Remembering or Making Decisions Difficulty no   Difficulty Communicating no   Difficulty Eating/Swallowing no   Walking or Climbing Stairs Difficulty no   Dressing/Bathing Difficulty no   Toileting issues no   Doing Errands Independently Difficulty (such as shopping) no   Equipment Currently Used at Home grab bar, tub/shower;shower chair;raised toilet seat   Fall history within last six months no   Change in Functional Status Since Onset of Current Illness/Injury no   Discharge Planning   People in Home child(ivan), adult   Support Systems Family Members   Type of Residence Private Residence   Patient/Family Anticipates Transition to home with family   Coping/Stress/Tolerance   Major Change/Loss/Stressor/Fears denies

## 2023-08-23 ENCOUNTER — LAB (OUTPATIENT)
Dept: LAB | Facility: CLINIC | Age: 78
End: 2023-08-23
Payer: COMMERCIAL

## 2023-08-23 ENCOUNTER — OFFICE VISIT (OUTPATIENT)
Dept: FAMILY MEDICINE | Facility: CLINIC | Age: 78
End: 2023-08-23
Payer: COMMERCIAL

## 2023-08-23 VITALS
SYSTOLIC BLOOD PRESSURE: 126 MMHG | DIASTOLIC BLOOD PRESSURE: 70 MMHG | HEIGHT: 63 IN | OXYGEN SATURATION: 98 % | TEMPERATURE: 98.3 F | RESPIRATION RATE: 20 BRPM | HEART RATE: 72 BPM | BODY MASS INDEX: 24.27 KG/M2 | WEIGHT: 137 LBS

## 2023-08-23 DIAGNOSIS — Z01.818 PREOP GENERAL PHYSICAL EXAM: ICD-10-CM

## 2023-08-23 DIAGNOSIS — H90.5 SENSORINEURAL HEARING LOSS (SNHL), UNSPECIFIED LATERALITY: ICD-10-CM

## 2023-08-23 DIAGNOSIS — K21.9 GASTROESOPHAGEAL REFLUX DISEASE WITHOUT ESOPHAGITIS: ICD-10-CM

## 2023-08-23 DIAGNOSIS — E78.5 HYPERLIPIDEMIA LDL GOAL <160: ICD-10-CM

## 2023-08-23 DIAGNOSIS — N95.2 ATROPHIC VAGINITIS: ICD-10-CM

## 2023-08-23 DIAGNOSIS — M19.011 PRIMARY OSTEOARTHRITIS OF RIGHT SHOULDER: ICD-10-CM

## 2023-08-23 DIAGNOSIS — Z01.818 PREOP GENERAL PHYSICAL EXAM: Primary | ICD-10-CM

## 2023-08-23 LAB
ANION GAP SERPL CALCULATED.3IONS-SCNC: 10 MMOL/L (ref 7–15)
BUN SERPL-MCNC: 17.2 MG/DL (ref 8–23)
CALCIUM SERPL-MCNC: 9.4 MG/DL (ref 8.8–10.2)
CHLORIDE SERPL-SCNC: 104 MMOL/L (ref 98–107)
CHOLEST SERPL-MCNC: 179 MG/DL
CREAT SERPL-MCNC: 0.78 MG/DL (ref 0.51–0.95)
DEPRECATED HCO3 PLAS-SCNC: 25 MMOL/L (ref 22–29)
ERYTHROCYTE [DISTWIDTH] IN BLOOD BY AUTOMATED COUNT: 14.7 % (ref 10–15)
GFR SERPL CREATININE-BSD FRML MDRD: 77 ML/MIN/1.73M2
GLUCOSE SERPL-MCNC: 106 MG/DL (ref 70–99)
HCT VFR BLD AUTO: 42.1 % (ref 35–47)
HDLC SERPL-MCNC: 61 MG/DL
HGB BLD-MCNC: 13.5 G/DL (ref 11.7–15.7)
LDLC SERPL CALC-MCNC: 97 MG/DL
MCH RBC QN AUTO: 29.8 PG (ref 26.5–33)
MCHC RBC AUTO-ENTMCNC: 32.1 G/DL (ref 31.5–36.5)
MCV RBC AUTO: 93 FL (ref 78–100)
NONHDLC SERPL-MCNC: 118 MG/DL
PLATELET # BLD AUTO: 229 10E3/UL (ref 150–450)
POTASSIUM SERPL-SCNC: 4.8 MMOL/L (ref 3.4–5.3)
RBC # BLD AUTO: 4.53 10E6/UL (ref 3.8–5.2)
SODIUM SERPL-SCNC: 139 MMOL/L (ref 136–145)
TRIGL SERPL-MCNC: 104 MG/DL
WBC # BLD AUTO: 7.6 10E3/UL (ref 4–11)

## 2023-08-23 PROCEDURE — 99214 OFFICE O/P EST MOD 30 MIN: CPT | Performed by: FAMILY MEDICINE

## 2023-08-23 PROCEDURE — 80048 BASIC METABOLIC PNL TOTAL CA: CPT

## 2023-08-23 PROCEDURE — 85027 COMPLETE CBC AUTOMATED: CPT

## 2023-08-23 PROCEDURE — 80061 LIPID PANEL: CPT

## 2023-08-23 PROCEDURE — 36415 COLL VENOUS BLD VENIPUNCTURE: CPT

## 2023-08-23 RX ORDER — SIMVASTATIN 20 MG
20 TABLET ORAL AT BEDTIME
Qty: 90 TABLET | Refills: 3 | Status: SHIPPED | OUTPATIENT
Start: 2023-08-23 | End: 2024-02-05

## 2023-08-23 RX ORDER — OMEPRAZOLE 40 MG/1
CAPSULE, DELAYED RELEASE ORAL
Qty: 90 CAPSULE | Refills: 3 | Status: SHIPPED | OUTPATIENT
Start: 2023-08-23 | End: 2024-02-05

## 2023-08-23 RX ORDER — ESTRADIOL 0.1 MG/G
CREAM VAGINAL
Qty: 43 G | Refills: 4 | Status: SHIPPED | OUTPATIENT
Start: 2023-08-24 | End: 2024-08-19

## 2023-08-23 ASSESSMENT — PAIN SCALES - GENERAL: PAINLEVEL: SEVERE PAIN (7)

## 2023-08-23 NOTE — H&P (VIEW-ONLY)
[facility]  [address]  [facility] [address]  Phone: [phone]  Primary Provider: Eli Brody  Pre-op Performing Provider: ELI BRODY    PREOPERATIVE EVALUATION:  Today's date: 8/23/2023    Mariah Jacinto is a 77 year old female who presents for a preoperative evaluation.      8/23/2023     1:38 PM   Additional Questions   Roomed by Vanessa SANCHEZ CMA   Accompanied by Self       Surgical Information:  Surgery/Procedure: Reverse total right shoulder arthroplasty  Surgery Location: [facility]  Surgeon: Dru  Surgery Date: 8/25/2023  Time of Surgery: 11:25AM  Where patient plans to recover: At home with family  Fax number for surgical facility: Note does not need to be faxed, will be available electronically in Epic.    Assessment & Plan     The proposed surgical procedure is considered INTERMEDIATE risk.    Preop general physical exam     - Basic metabolic panel  (Ca, Cl, CO2, Creat, Gluc, K, Na, BUN); Future  - CBC with platelets; Future    Primary osteoarthritis of right shoulder       Hyperlipidemia LDL goal <160     - Lipid panel reflex to direct LDL Non-fasting; Future  - simvastatin (ZOCOR) 20 MG tablet; Take 1 tablet (20 mg) by mouth At Bedtime    Sensorineural hearing loss (SNHL), unspecified laterality       Gastroesophageal reflux disease without esophagitis     - omeprazole (PRILOSEC) 40 MG DR capsule; Take 1 capsule by mouth once daily    Atrophic vaginitis     - estradiol (ESTRACE) 0.1 MG/GM vaginal cream; Place vaginally twice a week 1 g  twice weekly at bedtime            - No identified additional risk factors other than previously addressed    Antiplatelet or Anticoagulation Medication Instructions:   - Patient is on no antiplatelet or anticoagulation medications.    Additional Medication Instructions:  Patient is to take all scheduled medications on the day of surgery    RECOMMENDATION:  APPROVAL GIVEN to proceed with proposed  procedure, without further diagnostic evaluation.            Subjective       HPI related to upcoming procedure: 78 yo female with hearing loss, hyperlipidemia and GERD who has right shoulder OA that has failed conservative management.          8/23/2023     1:42 PM   Preop Questions   1. Have you ever had a heart attack or stroke? No   2. Have you ever had surgery on your heart or blood vessels, such as a stent placement, a coronary artery bypass, or surgery on an artery in your head, neck, heart, or legs? No   3. Do you have chest pain with activity? No   4. Do you have a history of  heart failure? No   5. Do you currently have a cold, bronchitis or symptoms of other infection? No   6. Do you have a cough, shortness of breath, or wheezing? No   7. Do you or anyone in your family have previous history of blood clots? No   8. Do you or does anyone in your family have a serious bleeding problem such as prolonged bleeding following surgeries or cuts? No   9. Have you ever had problems with anemia or been told to take iron pills? YES - in early 20s   10. Have you had any abnormal blood loss such as black, tarry or bloody stools, or abnormal vaginal bleeding? No   11. Have you ever had a blood transfusion? No   12. Are you willing to have a blood transfusion if it is medically needed before, during, or after your surgery? Yes   13. Have you or any of your relatives ever had problems with anesthesia? No   14. Do you have sleep apnea, excessive snoring or daytime drowsiness? No   15. Do you have any artifical heart valves or other implanted medical devices like a pacemaker, defibrillator, or continuous glucose monitor? No   16. Do you have artificial joints? No   17. Are you allergic to latex? No     Health Care Directive:  Patient has a Health Care Directive on file      Preoperative Review of :   reviewed - no record of controlled substances prescribed.      Status of Chronic Conditions:  See problem list for  active medical problems.  Problems all longstanding and stable, except as noted/documented.  See ROS for pertinent symptoms related to these conditions.    Review of Systems  CONSTITUTIONAL: NEGATIVE for fever, chills, change in weight  ENT/MOUTH: NEGATIVE for ear, mouth and throat problems  RESP: NEGATIVE for significant cough or SOB  CV: NEGATIVE for chest pain, palpitations or peripheral edema    Patient Active Problem List    Diagnosis Date Noted    Chronic right shoulder pain 06/27/2023     Priority: Medium    Dense breast tissue on mammogram 09/27/2017     Priority: Medium    Chronic constipation 06/01/2017     Priority: Medium    Gastroesophageal reflux disease without esophagitis 11/17/2016     Priority: Medium    Subjective tinnitus, bilateral 04/04/2016     Priority: Medium    Trochanteric bursitis 09/25/2013     Priority: Medium    Health Care Home 08/13/2013     Priority: Medium     Keturah Granados RN-PHN   [phone]  FPA / [facility] Care Coordinator /                  Intercostal neuralgia 10/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 Click on Code in the patient header to view. 10/4/2012  Health care directive scanned under the media tab.    Patient has completed an Advance/Health Care Directive (HCD), scanned into Epic Media tab, entry date of 10/4/2012.    Angelita Sommer  March 21, 2019        Hyperlipidemia LDL goal <160 10/31/2010     Priority: Medium    Trigeminal Neuralgia right  06/22/2009     Priority: Medium     Surgery 8/3/09 at    Right retromastoid craniectomy and microvascular decompression of the trigeminal nerve.   DX 2005       Right hip pain 12/15/2008     Priority: Medium    Sensorineural hearing loss 03/01/2007     Priority: Medium     Problem list name updated by automated process. Provider to review      Enthesopathy of hip region 10/03/2006      Priority: Medium    Disorder of bone and cartilage 04/25/2005     Priority: Medium     Problem list name updated by automated process. Provider to review        Past Medical History:   Diagnosis Date    Adhesive capsulitis of shoulder     Right shoulder tendonitis    Basal cell carcinoma     Displacement of cervical intervertebral disc without myelopathy     Esophageal reflux     Major depression in complete remission (H) 06/22/2009    celexa caused spinning side effect     MENOPAUSE --ERT     OSTEOPENIA     Squamous cell carcinoma      Past Surgical History:   Procedure Laterality Date    ABLATE VEIN VARICOSE RADIO FREQUENCY WITHOUT PHLEBECTOMY MULTIPLE STAB  3/28/2013    Procedure: ABLATE VEIN VARICOSE RADIO FREQUENCY WITHOUT PHLEBECTOMY MULTIPLE STAB;  Bilateral ablation of varicose veins legs-to ultrasound at 0930  ;  Surgeon: Noam Soliman MD;  Location: [facility]    COLONOSCOPY  8/20/2013    Procedure: COLONOSCOPY;  Colonoscopy;  Surgeon: Yuliya Nathan MD;  Location: [facility]    ESOPHAGOSCOPY, GASTROSCOPY, DUODENOSCOPY (EGD), COMBINED N/A 5/12/2015    Procedure: COMBINED ESOPHAGOSCOPY, GASTROSCOPY, DUODENOSCOPY (EGD), BIOPSY SINGLE OR MULTIPLE;  Surgeon: Yuliya Nathan MD;  Location: [facility]    ESOPHAGOSCOPY, GASTROSCOPY, DUODENOSCOPY (EGD), COMBINED N/A 1/31/2017    Procedure: COMBINED ESOPHAGOSCOPY, GASTROSCOPY, DUODENOSCOPY (EGD), BIOPSY SINGLE OR MULTIPLE;  Surgeon: Qasim Bowie MD;  Location: [facility]    EXCISE MASS FINGER Right 2/19/2019    Procedure: Excision Right Ring Finger Volar Middle Phalanx Mass;  Surgeon: Jake Viveros MD;  Location: [facility]    HYSTERECTOMY, PAP NO LONGER INDICATED      has both ovaries out also     HYSTERECTOMY, VAGINAL  1988    Hysterectomy, oophorectomy    PHACOEMULSIFICATION WITH STANDARD INTRAOCULAR LENS IMPLANT Left 3/18/2019    Procedure: Cataract Removal with Implant;  Surgeon: Chapito Phillips MD;  Location: [facility]     PHACOEMULSIFICATION WITH STANDARD INTRAOCULAR LENS IMPLANT Right 4/10/2019    Procedure: Cataract Removal with Implant;  Surgeon: Chapito Phillips MD;  Location: [facility]    RELEASE TRIGGER FINGER Right 6/23/2017    Procedure: RELEASE TRIGGER FINGER;  Right Thumb A1 Pulley Release;  Surgeon: Jake Viveros MD;  Location: [facility]    SURGICAL HISTORY OF -   2009    right retromastoid craniectomy and decompression trigeminal nerve    TONSILLECTOMY & ADENOIDECTOMY  child    T&A     ZZC ANESTH,LOWER ARM SURGERY  1999    ulnar nerve decompression - right     Current Outpatient Medications   Medication Sig Dispense Refill    estradiol (ESTRACE) 0.1 MG/GM vaginal cream Place vaginally twice a week 1 g  twice weekly at bedtime 43 g 4    melatonin 5 MG CAPS Take 5 mg by mouth At Bedtime 1 capsule 0    MULTI-VITAMIN OR TABS 1 TABLET DAILY      omeprazole (PRILOSEC) 40 MG DR capsule Take 1 capsule by mouth once daily 90 capsule 3    simvastatin (ZOCOR) 20 MG tablet Take 1 tablet (20 mg) by mouth At Bedtime 90 tablet 3    VITAMIN D OR 1 DAILY      acetaminophen (TYLENOL) 325 MG tablet Take 325 mg by mouth once         Allergies   Allergen Reactions    Biaxin [Clarithromycin]      per pt      Celebrex [Bob-2 Inhibitors]      per pt    Cipro [Ciprofloxacin]      per pt    Darvocet [Propoxyphene N-Apap]     Fleet Phospho Soda [Sodium Phosphate/Biphosphate]      nausea per pt    Morphine     Neurontin [Gabapentin]      per pt    Nortriptyline      per pt    Percocet [Oxycodone-Acetaminophen]     Serzone [Nefazodone Hydrochloride]      per pt    Tegretol [Carbamazepine]     Vicodin [Acetaminophen]      per pt-dizziness    Vioxx      per pt    Vivactil [Protriptyline Hcl]      per pt    Wellbutrin [Bupropion Hcl]     Zithromax [Azithromycin Dihydrate]         Social History     Tobacco Use    Smoking status: Never    Smokeless tobacco: Never   Substance Use Topics    Alcohol use: No     Family History   Problem Relation Age of  "Onset    Alzheimer Disease Mother          at age 85    Osteoporosis Mother     Arthritis Mother     Alcohol/Drug Father     Respiratory Father     Eye Disorder Maternal Grandfather         Macular degneration    C.A.D. Brother         Tripple bypass age 57    Cardiovascular Brother     Cancer Brother         leukemia (ALL)    Cardiovascular Brother     Cardiovascular Brother     Neurologic Disorder Son     Heart Disease Son     Melanoma No family hx of     Skin Cancer No family hx of      History   Drug Use No         Objective     /70   Pulse 72   Temp 98.3  F (36.8  C) (Tympanic)   Resp 20   Ht 1.6 m (5' 3\")   Wt 62.1 kg (137 lb)   LMP  (LMP Unknown)   SpO2 98%   BMI 24.27 kg/m      Physical Exam  GENERAL APPEARANCE: healthy, alert and no distress  HENT: ear canals and TM's normal and nose and mouth without ulcers or lesions  RESP: lungs clear to auscultation - no rales, rhonchi or wheezes  CV: regular rate and rhythm, normal S1 S2, no S3 or S4 and no murmur, click or rub   ABDOMEN: soft, nontender, no HSM or masses and bowel sounds normal  NEURO: Normal strength and tone, sensory exam grossly normal, mentation intact and speech normal    Recent Labs   Lab Test 22  1023 21  1141    138   POTASSIUM 4.6 4.5   CR 0.72 0.77        Diagnostics:  Labs pending at this time.  Results will be reviewed when available.   No EKG required, no history of coronary heart disease, significant arrhythmia, peripheral arterial disease or other structural heart disease.    Revised Cardiac Risk Index (RCRI):  The patient has the following serious cardiovascular risks for perioperative complications:   - No serious cardiac risks = 0 points     RCRI Interpretation: 0 points: Class I (very low risk - 0.4% complication rate)         Signed Electronically by: Eli Sommer MD  Copy of this evaluation report is provided to requesting physician.      "

## 2023-08-23 NOTE — PROGRESS NOTES
[facility]  [address]  [facility] [address]  Phone: [phone]  Primary Provider: Eli Brody  Pre-op Performing Provider: ELI BRODY    PREOPERATIVE EVALUATION:  Today's date: 8/23/2023    Mariah Jacinto is a 77 year old female who presents for a preoperative evaluation.      8/23/2023     1:38 PM   Additional Questions   Roomed by Vanessa SANCHEZ CMA   Accompanied by Self       Surgical Information:  Surgery/Procedure: Reverse total right shoulder arthroplasty  Surgery Location: [facility]  Surgeon: Dru  Surgery Date: 8/25/2023  Time of Surgery: 11:25AM  Where patient plans to recover: At home with family  Fax number for surgical facility: Note does not need to be faxed, will be available electronically in Epic.    Assessment & Plan     The proposed surgical procedure is considered INTERMEDIATE risk.    Preop general physical exam     - Basic metabolic panel  (Ca, Cl, CO2, Creat, Gluc, K, Na, BUN); Future  - CBC with platelets; Future    Primary osteoarthritis of right shoulder       Hyperlipidemia LDL goal <160     - Lipid panel reflex to direct LDL Non-fasting; Future  - simvastatin (ZOCOR) 20 MG tablet; Take 1 tablet (20 mg) by mouth At Bedtime    Sensorineural hearing loss (SNHL), unspecified laterality       Gastroesophageal reflux disease without esophagitis     - omeprazole (PRILOSEC) 40 MG DR capsule; Take 1 capsule by mouth once daily    Atrophic vaginitis     - estradiol (ESTRACE) 0.1 MG/GM vaginal cream; Place vaginally twice a week 1 g  twice weekly at bedtime            - No identified additional risk factors other than previously addressed    Antiplatelet or Anticoagulation Medication Instructions:   - Patient is on no antiplatelet or anticoagulation medications.    Additional Medication Instructions:  Patient is to take all scheduled medications on the day of surgery    RECOMMENDATION:  APPROVAL GIVEN to proceed with proposed  procedure, without further diagnostic evaluation.            Subjective       HPI related to upcoming procedure: 76 yo female with hearing loss, hyperlipidemia and GERD who has right shoulder OA that has failed conservative management.          8/23/2023     1:42 PM   Preop Questions   1. Have you ever had a heart attack or stroke? No   2. Have you ever had surgery on your heart or blood vessels, such as a stent placement, a coronary artery bypass, or surgery on an artery in your head, neck, heart, or legs? No   3. Do you have chest pain with activity? No   4. Do you have a history of  heart failure? No   5. Do you currently have a cold, bronchitis or symptoms of other infection? No   6. Do you have a cough, shortness of breath, or wheezing? No   7. Do you or anyone in your family have previous history of blood clots? No   8. Do you or does anyone in your family have a serious bleeding problem such as prolonged bleeding following surgeries or cuts? No   9. Have you ever had problems with anemia or been told to take iron pills? YES - in early 20s   10. Have you had any abnormal blood loss such as black, tarry or bloody stools, or abnormal vaginal bleeding? No   11. Have you ever had a blood transfusion? No   12. Are you willing to have a blood transfusion if it is medically needed before, during, or after your surgery? Yes   13. Have you or any of your relatives ever had problems with anesthesia? No   14. Do you have sleep apnea, excessive snoring or daytime drowsiness? No   15. Do you have any artifical heart valves or other implanted medical devices like a pacemaker, defibrillator, or continuous glucose monitor? No   16. Do you have artificial joints? No   17. Are you allergic to latex? No     Health Care Directive:  Patient has a Health Care Directive on file      Preoperative Review of :   reviewed - no record of controlled substances prescribed.      Status of Chronic Conditions:  See problem list for  active medical problems.  Problems all longstanding and stable, except as noted/documented.  See ROS for pertinent symptoms related to these conditions.    Review of Systems  CONSTITUTIONAL: NEGATIVE for fever, chills, change in weight  ENT/MOUTH: NEGATIVE for ear, mouth and throat problems  RESP: NEGATIVE for significant cough or SOB  CV: NEGATIVE for chest pain, palpitations or peripheral edema    Patient Active Problem List    Diagnosis Date Noted    Chronic right shoulder pain 06/27/2023     Priority: Medium    Dense breast tissue on mammogram 09/27/2017     Priority: Medium    Chronic constipation 06/01/2017     Priority: Medium    Gastroesophageal reflux disease without esophagitis 11/17/2016     Priority: Medium    Subjective tinnitus, bilateral 04/04/2016     Priority: Medium    Trochanteric bursitis 09/25/2013     Priority: Medium    Health Care Home 08/13/2013     Priority: Medium     Keturah Granados RN-PHN   [phone]  FPA / [facility] Care Coordinator /                  Intercostal neuralgia 10/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 Click on Code in the patient header to view. 10/4/2012  Health care directive scanned under the media tab.    Patient has completed an Advance/Health Care Directive (HCD), scanned into Epic Media tab, entry date of 10/4/2012.    Angelita Sommer  March 21, 2019        Hyperlipidemia LDL goal <160 10/31/2010     Priority: Medium    Trigeminal Neuralgia right  06/22/2009     Priority: Medium     Surgery 8/3/09 at    Right retromastoid craniectomy and microvascular decompression of the trigeminal nerve.   DX 2005       Right hip pain 12/15/2008     Priority: Medium    Sensorineural hearing loss 03/01/2007     Priority: Medium     Problem list name updated by automated process. Provider to review      Enthesopathy of hip region 10/03/2006      Priority: Medium    Disorder of bone and cartilage 04/25/2005     Priority: Medium     Problem list name updated by automated process. Provider to review        Past Medical History:   Diagnosis Date    Adhesive capsulitis of shoulder     Right shoulder tendonitis    Basal cell carcinoma     Displacement of cervical intervertebral disc without myelopathy     Esophageal reflux     Major depression in complete remission (H) 06/22/2009    celexa caused spinning side effect     MENOPAUSE --ERT     OSTEOPENIA     Squamous cell carcinoma      Past Surgical History:   Procedure Laterality Date    ABLATE VEIN VARICOSE RADIO FREQUENCY WITHOUT PHLEBECTOMY MULTIPLE STAB  3/28/2013    Procedure: ABLATE VEIN VARICOSE RADIO FREQUENCY WITHOUT PHLEBECTOMY MULTIPLE STAB;  Bilateral ablation of varicose veins legs-to ultrasound at 0930  ;  Surgeon: Noam Soliman MD;  Location: [facility]    COLONOSCOPY  8/20/2013    Procedure: COLONOSCOPY;  Colonoscopy;  Surgeon: Yuliya Nathan MD;  Location: [facility]    ESOPHAGOSCOPY, GASTROSCOPY, DUODENOSCOPY (EGD), COMBINED N/A 5/12/2015    Procedure: COMBINED ESOPHAGOSCOPY, GASTROSCOPY, DUODENOSCOPY (EGD), BIOPSY SINGLE OR MULTIPLE;  Surgeon: Yuliya Nathan MD;  Location: [facility]    ESOPHAGOSCOPY, GASTROSCOPY, DUODENOSCOPY (EGD), COMBINED N/A 1/31/2017    Procedure: COMBINED ESOPHAGOSCOPY, GASTROSCOPY, DUODENOSCOPY (EGD), BIOPSY SINGLE OR MULTIPLE;  Surgeon: Qasim Bowie MD;  Location: [facility]    EXCISE MASS FINGER Right 2/19/2019    Procedure: Excision Right Ring Finger Volar Middle Phalanx Mass;  Surgeon: Jake Viveros MD;  Location: [facility]    HYSTERECTOMY, PAP NO LONGER INDICATED      has both ovaries out also     HYSTERECTOMY, VAGINAL  1988    Hysterectomy, oophorectomy    PHACOEMULSIFICATION WITH STANDARD INTRAOCULAR LENS IMPLANT Left 3/18/2019    Procedure: Cataract Removal with Implant;  Surgeon: Chapito Phillips MD;  Location: [facility]     PHACOEMULSIFICATION WITH STANDARD INTRAOCULAR LENS IMPLANT Right 4/10/2019    Procedure: Cataract Removal with Implant;  Surgeon: Chapito Phillips MD;  Location: [facility]    RELEASE TRIGGER FINGER Right 6/23/2017    Procedure: RELEASE TRIGGER FINGER;  Right Thumb A1 Pulley Release;  Surgeon: Jake Viveros MD;  Location: [facility]    SURGICAL HISTORY OF -   2009    right retromastoid craniectomy and decompression trigeminal nerve    TONSILLECTOMY & ADENOIDECTOMY  child    T&A     ZZC ANESTH,LOWER ARM SURGERY  1999    ulnar nerve decompression - right     Current Outpatient Medications   Medication Sig Dispense Refill    estradiol (ESTRACE) 0.1 MG/GM vaginal cream Place vaginally twice a week 1 g  twice weekly at bedtime 43 g 4    melatonin 5 MG CAPS Take 5 mg by mouth At Bedtime 1 capsule 0    MULTI-VITAMIN OR TABS 1 TABLET DAILY      omeprazole (PRILOSEC) 40 MG DR capsule Take 1 capsule by mouth once daily 90 capsule 3    simvastatin (ZOCOR) 20 MG tablet Take 1 tablet (20 mg) by mouth At Bedtime 90 tablet 3    VITAMIN D OR 1 DAILY      acetaminophen (TYLENOL) 325 MG tablet Take 325 mg by mouth once         Allergies   Allergen Reactions    Biaxin [Clarithromycin]      per pt      Celebrex [Bob-2 Inhibitors]      per pt    Cipro [Ciprofloxacin]      per pt    Darvocet [Propoxyphene N-Apap]     Fleet Phospho Soda [Sodium Phosphate/Biphosphate]      nausea per pt    Morphine     Neurontin [Gabapentin]      per pt    Nortriptyline      per pt    Percocet [Oxycodone-Acetaminophen]     Serzone [Nefazodone Hydrochloride]      per pt    Tegretol [Carbamazepine]     Vicodin [Acetaminophen]      per pt-dizziness    Vioxx      per pt    Vivactil [Protriptyline Hcl]      per pt    Wellbutrin [Bupropion Hcl]     Zithromax [Azithromycin Dihydrate]         Social History     Tobacco Use    Smoking status: Never    Smokeless tobacco: Never   Substance Use Topics    Alcohol use: No     Family History   Problem Relation Age of  "Onset    Alzheimer Disease Mother          at age 85    Osteoporosis Mother     Arthritis Mother     Alcohol/Drug Father     Respiratory Father     Eye Disorder Maternal Grandfather         Macular degneration    C.A.D. Brother         Tripple bypass age 57    Cardiovascular Brother     Cancer Brother         leukemia (ALL)    Cardiovascular Brother     Cardiovascular Brother     Neurologic Disorder Son     Heart Disease Son     Melanoma No family hx of     Skin Cancer No family hx of      History   Drug Use No         Objective     /70   Pulse 72   Temp 98.3  F (36.8  C) (Tympanic)   Resp 20   Ht 1.6 m (5' 3\")   Wt 62.1 kg (137 lb)   LMP  (LMP Unknown)   SpO2 98%   BMI 24.27 kg/m      Physical Exam  GENERAL APPEARANCE: healthy, alert and no distress  HENT: ear canals and TM's normal and nose and mouth without ulcers or lesions  RESP: lungs clear to auscultation - no rales, rhonchi or wheezes  CV: regular rate and rhythm, normal S1 S2, no S3 or S4 and no murmur, click or rub   ABDOMEN: soft, nontender, no HSM or masses and bowel sounds normal  NEURO: Normal strength and tone, sensory exam grossly normal, mentation intact and speech normal    Recent Labs   Lab Test 22  1023 21  1141    138   POTASSIUM 4.6 4.5   CR 0.72 0.77        Diagnostics:  Labs pending at this time.  Results will be reviewed when available.   No EKG required, no history of coronary heart disease, significant arrhythmia, peripheral arterial disease or other structural heart disease.    Revised Cardiac Risk Index (RCRI):  The patient has the following serious cardiovascular risks for perioperative complications:   - No serious cardiac risks = 0 points     RCRI Interpretation: 0 points: Class I (very low risk - 0.4% complication rate)         Signed Electronically by: Eli Sommer MD  Copy of this evaluation report is provided to requesting physician.      "

## 2023-08-23 NOTE — PATIENT INSTRUCTIONS
For informational purposes only. Not to replace the advice of your health care provider. Copyright   2003,  [facility]. All rights reserved. Clinically reviewed by Diamond Sanches MD. [employer] [id number] - REV .  Preparing for Your Surgery  Getting started  A nurse will call you to review your health history and instructions. They will give you an arrival time based on your scheduled surgery time. Please be ready to share:  Your doctor's clinic name and phone number  Your medical, surgical, and anesthesia history  A list of allergies and sensitivities  A list of medicines, including herbal treatments and over-the-counter drugs  Whether the patient has a legal guardian (ask how to send us the papers in advance)  Please tell us if you're pregnant--or if there's any chance you might be pregnant. Some surgeries may injure a fetus (unborn baby), so they require a pregnancy test. Surgeries that are safe for a fetus don't always need a test, and you can choose whether to have one.   If you have a child who's having surgery, please ask for a copy of Preparing for Your Child's Surgery.    Preparing for surgery  Within 10 to 30 days of surgery: Have a pre-op exam (sometimes called an H&P, or History and Physical). This can be done at a clinic or pre-operative center.  If you're having a , you may not need this exam. Talk to your care team.  At your pre-op exam, talk to your care team about all medicines you take. If you need to stop any medicines before surgery, ask when to start taking them again.  We do this for your safety. Many medicines can make you bleed too much during surgery. Some change how well surgery (anesthesia) drugs work.  Call your insurance company to let them know you're having surgery. (If you don't have insurance, call [phone].)  Call your clinic if there's any change in your health. This includes signs of a cold or flu (sore throat, runny nose, cough, rash, fever). It also  includes a scrape or scratch near the surgery site.  If you have questions on the day of surgery, call your hospital or surgery center.  Eating and drinking guidelines  For your safety: Unless your surgeon tells you otherwise, follow the guidelines below.  Eat and drink as usual until 8 hours before you arrive for surgery. After that, no food or milk.  Drink clear liquids until 2 hours before you arrive. These are liquids you can see through, like water, Gatorade, and Propel Water. They also include plain black coffee and tea (no cream or milk), candy, and breath mints. You can spit out gum when you arrive.  If you drink alcohol: Stop drinking it the night before surgery.  If your care team tells you to take medicine on the morning of surgery, it's okay to take it with a sip of water.  Preventing infection  Shower or bathe the night before and morning of your surgery. Follow the instructions your clinic gave you. (If no instructions, use regular soap.)  Don't shave or clip hair near your surgery site. We'll remove the hair if needed.  Don't smoke or vape the morning of surgery. You may chew nicotine gum up to 2 hours before surgery. A nicotine patch is okay.  Note: Some surgeries require you to completely quit smoking and nicotine. Check with your surgeon.  Your care team will make every effort to keep you safe from infection. We will:  Clean our hands often with soap and water (or an alcohol-based hand rub).  Clean the skin at your surgery site with a special soap that kills germs.  Give you a special gown to keep you warm. (Cold raises the risk of infection.)  Wear special hair covers, masks, gowns and gloves during surgery.  Give antibiotic medicine, if prescribed. Not all surgeries need antibiotics.  What to bring on the day of surgery  Photo ID and insurance card  Copy of your health care directive, if you have one  Glasses and hearing aids (bring cases)  You can't wear contacts during surgery  Inhaler and eye  drops, if you use them (tell us about these when you arrive)  CPAP machine or breathing device, if you use them  A few personal items, if spending the night  If you have . . .  A pacemaker, ICD (cardiac defibrillator) or other implant: Bring the ID card.  An implanted stimulator: Bring the remote control.  A legal guardian: Bring a copy of the certified (court-stamped) guardianship papers.  Please remove any jewelry, including body piercings. Leave jewelry and other valuables at home.  If you're going home the day of surgery  You must have a responsible adult drive you home. They should stay with you overnight as well.  If you don't have someone to stay with you, and you aren't safe to go home alone, we may keep you overnight. Insurance often won't pay for this.  After surgery  If it's hard to control your pain or you need more pain medicine, please call your surgeon's office.  Questions?   If you have any questions for your care team, list them here: _________________________________________________________________________________________________________________________________________________________________________ ____________________________________ ____________________________________ ____________________________________    How to Take Your Medication Before Surgery

## 2023-08-23 NOTE — LETTER
August 24, 2023      Mariah [facility] [place]  [address] [place] [place] [address]        Dear ,    We are writing to inform you of your test results.    Your test results fall within the expected range(s) or remain unchanged from previous results.  Please continue with current treatment plan.    Resulted Orders   Basic metabolic panel  (Ca, Cl, CO2, Creat, Gluc, K, Na, BUN)   Result Value Ref Range    Sodium 139 136 - 145 mmol/L    Potassium 4.8 3.4 - 5.3 mmol/L    Chloride 104 98 - 107 mmol/L    Carbon Dioxide (CO2) 25 22 - 29 mmol/L    Anion Gap 10 7 - 15 mmol/L    Urea Nitrogen 17.2 8.0 - 23.0 mg/dL    Creatinine 0.78 0.51 - 0.95 mg/dL    Calcium 9.4 8.8 - 10.2 mg/dL    Glucose 106 (H) 70 - 99 mg/dL    GFR Estimate 77 >60 mL/min/1.73m2   CBC with platelets   Result Value Ref Range    WBC Count 7.6 4.0 - 11.0 10e3/uL    RBC Count 4.53 3.80 - 5.20 10e6/uL    Hemoglobin 13.5 11.7 - 15.7 g/dL    Hematocrit 42.1 35.0 - 47.0 %    MCV 93 78 - 100 fL    MCH 29.8 26.5 - 33.0 pg    MCHC 32.1 31.5 - 36.5 g/dL    RDW 14.7 10.0 - 15.0 %    Platelet Count 229 150 - 450 10e3/uL   Lipid panel reflex to direct LDL Non-fasting   Result Value Ref Range    Cholesterol 179 <200 mg/dL    Triglycerides 104 <150 mg/dL    Direct Measure HDL 61 >=50 mg/dL    LDL Cholesterol Calculated 97 <=100 mg/dL    Non HDL Cholesterol 118 <130 mg/dL    Narrative    Cholesterol  Desirable:  <200 mg/dL    Triglycerides  Normal:  Less than 150 mg/dL  Borderline High:  150-199 mg/dL  High:  200-499 mg/dL  Very High:  Greater than or equal to 500 mg/dL    Direct Measure HDL  Female:  Greater than or equal to 50 mg/dL   Male:  Greater than or equal to 40 mg/dL    LDL Cholesterol  Desirable:  <100mg/dL  Above Desirable:  100-129 mg/dL   Borderline High:  130-159 mg/dL   High:  160-189 mg/dL   Very High:  >= 190 mg/dL    Non HDL Cholesterol  Desirable:  130 mg/dL  Above Desirable:  130-159 mg/dL  Borderline High:  160-189 mg/dL  High:  190-219  mg/dL  Very High:  Greater than or equal to 220 mg/dL       If you have any questions or concerns, please call the clinic at the number listed above.       Sincerely,      Eli Sommer MD

## 2023-08-24 ENCOUNTER — ANESTHESIA EVENT (OUTPATIENT)
Dept: SURGERY | Facility: CLINIC | Age: 78
End: 2023-08-24
Payer: COMMERCIAL

## 2023-08-24 ENCOUNTER — HOSPITAL ENCOUNTER (OUTPATIENT)
Dept: MAMMOGRAPHY | Facility: CLINIC | Age: 78
Discharge: HOME | End: 2023-08-24
Attending: FAMILY MEDICINE | Admitting: FAMILY MEDICINE
Payer: COMMERCIAL

## 2023-08-24 DIAGNOSIS — Z12.31 VISIT FOR SCREENING MAMMOGRAM: ICD-10-CM

## 2023-08-24 PROCEDURE — 77067 SCR MAMMO BI INCL CAD: CPT

## 2023-08-24 NOTE — ANESTHESIA PREPROCEDURE EVALUATION
Anesthesia Pre-Procedure Evaluation    Patient: Mariah Jacinto   MRN: [id number] : 1945        Procedure : Procedure(s):  Reverse total shoulder arthroplasty          Past Medical History:   Diagnosis Date    Adhesive capsulitis of shoulder     Right shoulder tendonitis    Basal cell carcinoma     Displacement of cervical intervertebral disc without myelopathy     Esophageal reflux     Major depression in complete remission (H) 2009    celexa caused spinning side effect     MENOPAUSE --ERT     OSTEOPENIA     Squamous cell carcinoma       Past Surgical History:   Procedure Laterality Date    ABLATE VEIN VARICOSE RADIO FREQUENCY WITHOUT PHLEBECTOMY MULTIPLE STAB  3/28/2013    Procedure: ABLATE VEIN VARICOSE RADIO FREQUENCY WITHOUT PHLEBECTOMY MULTIPLE STAB;  Bilateral ablation of varicose veins legs-to ultrasound at 0930  ;  Surgeon: Noam Soliman MD;  Location: [facility]    COLONOSCOPY  2013    Procedure: COLONOSCOPY;  Colonoscopy;  Surgeon: Yuliya Nathan MD;  Location: [facility]    ESOPHAGOSCOPY, GASTROSCOPY, DUODENOSCOPY (EGD), COMBINED N/A 2015    Procedure: COMBINED ESOPHAGOSCOPY, GASTROSCOPY, DUODENOSCOPY (EGD), BIOPSY SINGLE OR MULTIPLE;  Surgeon: Yuliya Nathan MD;  Location: [facility]    ESOPHAGOSCOPY, GASTROSCOPY, DUODENOSCOPY (EGD), COMBINED N/A 2017    Procedure: COMBINED ESOPHAGOSCOPY, GASTROSCOPY, DUODENOSCOPY (EGD), BIOPSY SINGLE OR MULTIPLE;  Surgeon: Qasim Bowie MD;  Location: [facility]    EXCISE MASS FINGER Right 2019    Procedure: Excision Right Ring Finger Volar Middle Phalanx Mass;  Surgeon: Jake Viveros MD;  Location: [facility]    HYSTERECTOMY, PAP NO LONGER INDICATED      has both ovaries out also     HYSTERECTOMY, VAGINAL  1988    Hysterectomy, oophorectomy    PHACOEMULSIFICATION WITH STANDARD INTRAOCULAR LENS IMPLANT Left 3/18/2019    Procedure: Cataract Removal with Implant;  Surgeon: Chapito Phillips MD;  Location: [facility]     PHACOEMULSIFICATION WITH STANDARD INTRAOCULAR LENS IMPLANT Right 4/10/2019    Procedure: Cataract Removal with Implant;  Surgeon: Chapito Phillips MD;  Location: [facility]    RELEASE TRIGGER FINGER Right 6/23/2017    Procedure: RELEASE TRIGGER FINGER;  Right Thumb A1 Pulley Release;  Surgeon: Jake Viveros MD;  Location: [facility]    SURGICAL HISTORY OF -   2009    right retromastoid craniectomy and decompression trigeminal nerve    TONSILLECTOMY & ADENOIDECTOMY  child    T&A     ZZC ANESTH,LOWER ARM SURGERY  1999    ulnar nerve decompression - right      Allergies   Allergen Reactions    Biaxin [Clarithromycin]      per pt      Celebrex [Bob-2 Inhibitors]      per pt    Cipro [Ciprofloxacin]      per pt    Darvocet [Propoxyphene N-Apap]     Fleet Phospho Soda [Sodium Phosphate/Biphosphate]      nausea per pt    Morphine     Neurontin [Gabapentin]      per pt    Nortriptyline      per pt    Percocet [Oxycodone-Acetaminophen]     Serzone [Nefazodone Hydrochloride]      per pt    Tegretol [Carbamazepine]     Vicodin [Acetaminophen]      per pt-dizziness    Vioxx      per pt    Vivactil [Protriptyline Hcl]      per pt    Wellbutrin [Bupropion Hcl]     Zithromax [Azithromycin Dihydrate]       Social History     Tobacco Use    Smoking status: Never    Smokeless tobacco: Never   Substance Use Topics    Alcohol use: No      Wt Readings from Last 1 Encounters:   08/23/23 62.1 kg (137 lb)        Anesthesia Evaluation   Pt has had prior anesthetic. Type: General and MAC.        ROS/MED HX  ENT/Pulmonary:  - neg pulmonary ROS     Neurologic:  - neg neurologic ROS     Cardiovascular:     (+) Dyslipidemia - -   -  - -                                      METS/Exercise Tolerance:     Hematologic:  - neg hematologic  ROS     Musculoskeletal:   (+)  arthritis,             GI/Hepatic:     (+) GERD,                   Renal/Genitourinary:  - neg Renal ROS     Endo:       Psychiatric/Substance Use:     (+) psychiatric history  depression       Infectious Disease:  - neg infectious disease ROS     Malignancy:       Other:            Physical Exam    Airway        Mallampati: II   TM distance: > 3 FB   Neck ROM: full   Mouth opening: > 3 cm    Respiratory Devices and Support         Dental           Cardiovascular   cardiovascular exam normal          Pulmonary   pulmonary exam normal                OUTSIDE LABS:  CBC:   Lab Results   Component Value Date    WBC 7.6 08/23/2023    WBC 5.8 05/06/2015    HGB 13.5 08/23/2023    HGB 14.4 05/06/2015    HCT 42.1 08/23/2023    HCT 42.9 05/06/2015     08/23/2023     05/06/2015     BMP:   Lab Results   Component Value Date     08/23/2023     09/08/2022    POTASSIUM 4.8 08/23/2023    POTASSIUM 4.6 09/08/2022    CHLORIDE 104 08/23/2023    CHLORIDE 105 09/08/2022    CO2 25 08/23/2023    CO2 26 09/08/2022    BUN 17.2 08/23/2023    BUN 14.0 09/08/2022    CR 0.78 08/23/2023    CR 0.72 09/08/2022     (H) 08/23/2023     (H) 09/08/2022     COAGS:   Lab Results   Component Value Date    PTT 27 08/03/2009    INR 0.98 08/03/2009     POC: No results found for: BGM, HCG, HCGS  HEPATIC:   Lab Results   Component Value Date    ALBUMIN 3.8 05/06/2015    PROTTOTAL 6.9 05/06/2015    ALT 27 05/06/2015    AST 17 05/06/2015    ALKPHOS 84 05/06/2015    BILITOTAL 0.4 05/06/2015     OTHER:   Lab Results   Component Value Date    A1C 5.7 04/04/2016    KARELY 9.4 08/23/2023    PHOS 3.7 01/11/2012    MAG 2.5 (H) 03/09/2010    LIPASE 114 02/12/2012    AMYLASE 70 12/23/2011    TSH 0.87 05/16/2022    SED 8 05/02/2016       Anesthesia Plan    ASA Status:  2       Anesthesia Type: General.     - Airway: ETT   Induction: Intravenous.           Consents    Anesthesia Plan(s) and associated risks, benefits, and realistic alternatives discussed. Questions answered and patient/representative(s) expressed understanding.     - Discussed: Risks, Benefits and Alternatives for the PROCEDURE were  discussed     - Discussed with:  Patient            Postoperative Care    Pain management: IV analgesics, Oral pain medications, Peripheral nerve block (Single Shot).   PONV prophylaxis: Ondansetron (or other 5HT-3), Dexamethasone or Solumedrol     Comments:                VICKY Greenberg CRNA

## 2023-08-25 ENCOUNTER — HOSPITAL ENCOUNTER (OUTPATIENT)
Facility: CLINIC | Age: 78
Discharge: HOME | End: 2023-08-26
Attending: ORTHOPAEDIC SURGERY | Admitting: ORTHOPAEDIC SURGERY
Payer: COMMERCIAL

## 2023-08-25 ENCOUNTER — ANESTHESIA (OUTPATIENT)
Dept: SURGERY | Facility: CLINIC | Age: 78
End: 2023-08-25
Payer: COMMERCIAL

## 2023-08-25 ENCOUNTER — APPOINTMENT (OUTPATIENT)
Dept: GENERAL RADIOLOGY | Facility: CLINIC | Age: 78
End: 2023-08-25
Attending: ORTHOPAEDIC SURGERY
Payer: COMMERCIAL

## 2023-08-25 ENCOUNTER — ANCILLARY PROCEDURE (OUTPATIENT)
Dept: ULTRASOUND IMAGING | Facility: CLINIC | Age: 78
End: 2023-08-25
Attending: NURSE ANESTHETIST, CERTIFIED REGISTERED
Payer: COMMERCIAL

## 2023-08-25 DIAGNOSIS — Z96.611 STATUS POST REVERSE TOTAL REPLACEMENT OF RIGHT SHOULDER: Primary | ICD-10-CM

## 2023-08-25 DIAGNOSIS — K59.09 CHRONIC CONSTIPATION: ICD-10-CM

## 2023-08-25 DIAGNOSIS — Z96.619 S/P REVERSE TOTAL SHOULDER ARTHROPLASTY, UNSPECIFIED LATERALITY: ICD-10-CM

## 2023-08-25 PROCEDURE — 258N000003 HC RX IP 258 OP 636

## 2023-08-25 PROCEDURE — 250N000013 HC RX MED GY IP 250 OP 250 PS 637

## 2023-08-25 PROCEDURE — 250N000011 HC RX IP 250 OP 636: Performed by: ORTHOPAEDIC SURGERY

## 2023-08-25 PROCEDURE — 999N000141 HC STATISTIC PRE-PROCEDURE NURSING ASSESSMENT: Performed by: ORTHOPAEDIC SURGERY

## 2023-08-25 PROCEDURE — 250N000025 HC SEVOFLURANE, PER MIN: Performed by: ORTHOPAEDIC SURGERY

## 2023-08-25 PROCEDURE — 271N000001 HC OR GENERAL SUPPLY NON-STERILE: Performed by: ORTHOPAEDIC SURGERY

## 2023-08-25 PROCEDURE — 250N000009 HC RX 250: Performed by: ORTHOPAEDIC SURGERY

## 2023-08-25 PROCEDURE — C1776 JOINT DEVICE (IMPLANTABLE): HCPCS | Performed by: ORTHOPAEDIC SURGERY

## 2023-08-25 PROCEDURE — 250N000013 HC RX MED GY IP 250 OP 250 PS 637: Performed by: ORTHOPAEDIC SURGERY

## 2023-08-25 PROCEDURE — 370N000017 HC ANESTHESIA TECHNICAL FEE, PER MIN: Performed by: ORTHOPAEDIC SURGERY

## 2023-08-25 PROCEDURE — 250N000009 HC RX 250

## 2023-08-25 PROCEDURE — 360N000077 HC SURGERY LEVEL 4, PER MIN: Performed by: ORTHOPAEDIC SURGERY

## 2023-08-25 PROCEDURE — C1713 ANCHOR/SCREW BN/BN,TIS/BN: HCPCS | Performed by: ORTHOPAEDIC SURGERY

## 2023-08-25 PROCEDURE — 250N000009 HC RX 250: Performed by: NURSE ANESTHETIST, CERTIFIED REGISTERED

## 2023-08-25 PROCEDURE — C9290 INJ, BUPIVACAINE LIPOSOME: HCPCS | Performed by: NURSE ANESTHETIST, CERTIFIED REGISTERED

## 2023-08-25 PROCEDURE — 272N000001 HC OR GENERAL SUPPLY STERILE: Performed by: ORTHOPAEDIC SURGERY

## 2023-08-25 PROCEDURE — 250N000011 HC RX IP 250 OP 636: Performed by: NURSE ANESTHETIST, CERTIFIED REGISTERED

## 2023-08-25 PROCEDURE — 999N000065 XR SHOULDER RIGHT PORT G/E 2 VIEWS: Mod: RT

## 2023-08-25 PROCEDURE — 250N000011 HC RX IP 250 OP 636

## 2023-08-25 PROCEDURE — 250N000011 HC RX IP 250 OP 636: Mod: JZ | Performed by: NURSE ANESTHETIST, CERTIFIED REGISTERED

## 2023-08-25 PROCEDURE — 258N000003 HC RX IP 258 OP 636: Performed by: ORTHOPAEDIC SURGERY

## 2023-08-25 PROCEDURE — 710N000009 HC RECOVERY PHASE 1, LEVEL 1, PER MIN: Performed by: ORTHOPAEDIC SURGERY

## 2023-08-25 DEVICE — IMPLANTABLE DEVICE
Type: IMPLANTABLE DEVICE | Site: SHOULDER | Status: FUNCTIONAL
Brand: TORNIER FLEX SHOULDER SYSTEM

## 2023-08-25 DEVICE — IMPLANTABLE DEVICE
Type: IMPLANTABLE DEVICE | Site: SHOULDER | Status: FUNCTIONAL
Brand: TORNIER PERFORM® REVERSED GLENOID

## 2023-08-25 DEVICE — SCREW PERIPHERAL 5.0X30MM DWJ330: Type: IMPLANTABLE DEVICE | Site: SHOULDER | Status: FUNCTIONAL

## 2023-08-25 DEVICE — SCREW BSPLT 35MM 6.5MM AQLS PRFRM GLND RVRS CNTR NS DWJ135: Type: IMPLANTABLE DEVICE | Site: SHOULDER | Status: FUNCTIONAL

## 2023-08-25 DEVICE — SCREW PERIPHERAL 38MM: Type: IMPLANTABLE DEVICE | Site: SHOULDER | Status: FUNCTIONAL

## 2023-08-25 RX ORDER — HYDROMORPHONE HYDROCHLORIDE 2 MG/1
2-4 TABLET ORAL EVERY 4 HOURS PRN
Qty: 28 TABLET | Refills: 0 | Status: SHIPPED | OUTPATIENT
Start: 2023-08-25 | End: 2023-09-14

## 2023-08-25 RX ORDER — HYDROMORPHONE HCL IN WATER/PF 6 MG/30 ML
0.4 PATIENT CONTROLLED ANALGESIA SYRINGE INTRAVENOUS EVERY 5 MIN PRN
Status: DISCONTINUED | OUTPATIENT
Start: 2023-08-25 | End: 2023-08-25 | Stop reason: HOSPADM

## 2023-08-25 RX ORDER — HYDROXYZINE HYDROCHLORIDE 25 MG/1
25 TABLET, FILM COATED ORAL EVERY 6 HOURS PRN
Qty: 30 TABLET | Refills: 0 | Status: SHIPPED | OUTPATIENT
Start: 2023-08-25 | End: 2023-09-14

## 2023-08-25 RX ORDER — ACETAMINOPHEN 325 MG/1
975 TABLET ORAL EVERY 8 HOURS
Status: DISCONTINUED | OUTPATIENT
Start: 2023-08-25 | End: 2023-08-26 | Stop reason: HOSPADM

## 2023-08-25 RX ORDER — NALOXONE HYDROCHLORIDE 0.4 MG/ML
0.2 INJECTION, SOLUTION INTRAMUSCULAR; INTRAVENOUS; SUBCUTANEOUS
Status: DISCONTINUED | OUTPATIENT
Start: 2023-08-25 | End: 2023-08-25

## 2023-08-25 RX ORDER — SIMVASTATIN 20 MG
20 TABLET ORAL AT BEDTIME
Status: DISCONTINUED | OUTPATIENT
Start: 2023-08-25 | End: 2023-08-26 | Stop reason: HOSPADM

## 2023-08-25 RX ORDER — BISACODYL 10 MG
10 SUPPOSITORY, RECTAL RECTAL DAILY PRN
Status: DISCONTINUED | OUTPATIENT
Start: 2023-08-25 | End: 2023-08-26 | Stop reason: HOSPADM

## 2023-08-25 RX ORDER — CEFAZOLIN SODIUM/WATER 2 G/20 ML
2 SYRINGE (ML) INTRAVENOUS SEE ADMIN INSTRUCTIONS
Status: DISCONTINUED | OUTPATIENT
Start: 2023-08-25 | End: 2023-08-25 | Stop reason: HOSPADM

## 2023-08-25 RX ORDER — PROPOFOL 10 MG/ML
INJECTION, EMULSION INTRAVENOUS PRN
Status: DISCONTINUED | OUTPATIENT
Start: 2023-08-25 | End: 2023-08-25

## 2023-08-25 RX ORDER — ALBUTEROL SULFATE 0.83 MG/ML
2.5 SOLUTION RESPIRATORY (INHALATION) EVERY 4 HOURS PRN
Status: DISCONTINUED | OUTPATIENT
Start: 2023-08-25 | End: 2023-08-25 | Stop reason: HOSPADM

## 2023-08-25 RX ORDER — FENTANYL CITRATE 50 UG/ML
50 INJECTION, SOLUTION INTRAMUSCULAR; INTRAVENOUS EVERY 5 MIN PRN
Status: DISCONTINUED | OUTPATIENT
Start: 2023-08-25 | End: 2023-08-25 | Stop reason: HOSPADM

## 2023-08-25 RX ORDER — ASPIRIN 81 MG/1
81 TABLET ORAL 2 TIMES DAILY
Status: DISCONTINUED | OUTPATIENT
Start: 2023-08-25 | End: 2023-08-26 | Stop reason: HOSPADM

## 2023-08-25 RX ORDER — HYDROMORPHONE HYDROCHLORIDE 2 MG/1
2 TABLET ORAL EVERY 4 HOURS PRN
Status: DISCONTINUED | OUTPATIENT
Start: 2023-08-25 | End: 2023-08-26 | Stop reason: HOSPADM

## 2023-08-25 RX ORDER — MAGNESIUM SULFATE HEPTAHYDRATE 40 MG/ML
2 INJECTION, SOLUTION INTRAVENOUS
Status: DISCONTINUED | OUTPATIENT
Start: 2023-08-25 | End: 2023-08-25 | Stop reason: HOSPADM

## 2023-08-25 RX ORDER — MEPERIDINE HYDROCHLORIDE 25 MG/ML
12.5 INJECTION INTRAMUSCULAR; INTRAVENOUS; SUBCUTANEOUS EVERY 5 MIN PRN
Status: DISCONTINUED | OUTPATIENT
Start: 2023-08-25 | End: 2023-08-25 | Stop reason: HOSPADM

## 2023-08-25 RX ORDER — ACETAMINOPHEN 325 MG/1
975 TABLET ORAL ONCE
Status: COMPLETED | OUTPATIENT
Start: 2023-08-25 | End: 2023-08-25

## 2023-08-25 RX ORDER — HYDROXYZINE HYDROCHLORIDE 25 MG/1
25 TABLET, FILM COATED ORAL EVERY 6 HOURS PRN
Status: DISCONTINUED | OUTPATIENT
Start: 2023-08-25 | End: 2023-08-26 | Stop reason: HOSPADM

## 2023-08-25 RX ORDER — CEFAZOLIN SODIUM 1 G/3ML
1 INJECTION, POWDER, FOR SOLUTION INTRAMUSCULAR; INTRAVENOUS EVERY 8 HOURS
Status: COMPLETED | OUTPATIENT
Start: 2023-08-25 | End: 2023-08-26

## 2023-08-25 RX ORDER — FENTANYL CITRATE 50 UG/ML
25-100 INJECTION, SOLUTION INTRAMUSCULAR; INTRAVENOUS
Status: DISCONTINUED | OUTPATIENT
Start: 2023-08-25 | End: 2023-08-25 | Stop reason: HOSPADM

## 2023-08-25 RX ORDER — PROCHLORPERAZINE MALEATE 5 MG
5 TABLET ORAL EVERY 6 HOURS PRN
Status: DISCONTINUED | OUTPATIENT
Start: 2023-08-25 | End: 2023-08-26 | Stop reason: HOSPADM

## 2023-08-25 RX ORDER — HYDROMORPHONE HYDROCHLORIDE 2 MG/1
4 TABLET ORAL EVERY 4 HOURS PRN
Status: DISCONTINUED | OUTPATIENT
Start: 2023-08-25 | End: 2023-08-26 | Stop reason: HOSPADM

## 2023-08-25 RX ORDER — HYDROMORPHONE HCL IN WATER/PF 6 MG/30 ML
0.2 PATIENT CONTROLLED ANALGESIA SYRINGE INTRAVENOUS
Status: DISCONTINUED | OUTPATIENT
Start: 2023-08-25 | End: 2023-08-26 | Stop reason: HOSPADM

## 2023-08-25 RX ORDER — HYDRALAZINE HYDROCHLORIDE 20 MG/ML
2.5-5 INJECTION INTRAMUSCULAR; INTRAVENOUS EVERY 10 MIN PRN
Status: DISCONTINUED | OUTPATIENT
Start: 2023-08-25 | End: 2023-08-25 | Stop reason: HOSPADM

## 2023-08-25 RX ORDER — SODIUM CHLORIDE, SODIUM LACTATE, POTASSIUM CHLORIDE, CALCIUM CHLORIDE 600; 310; 30; 20 MG/100ML; MG/100ML; MG/100ML; MG/100ML
INJECTION, SOLUTION INTRAVENOUS CONTINUOUS
Status: DISCONTINUED | OUTPATIENT
Start: 2023-08-25 | End: 2023-08-25 | Stop reason: HOSPADM

## 2023-08-25 RX ORDER — ONDANSETRON 2 MG/ML
4 INJECTION INTRAMUSCULAR; INTRAVENOUS EVERY 30 MIN PRN
Status: DISCONTINUED | OUTPATIENT
Start: 2023-08-25 | End: 2023-08-25 | Stop reason: HOSPADM

## 2023-08-25 RX ORDER — BUPIVACAINE HYDROCHLORIDE AND EPINEPHRINE 5; 5 MG/ML; UG/ML
INJECTION, SOLUTION PERINEURAL
Status: COMPLETED | OUTPATIENT
Start: 2023-08-25 | End: 2023-08-25

## 2023-08-25 RX ORDER — KETAMINE HYDROCHLORIDE 10 MG/ML
INJECTION INTRAMUSCULAR; INTRAVENOUS PRN
Status: DISCONTINUED | OUTPATIENT
Start: 2023-08-25 | End: 2023-08-25

## 2023-08-25 RX ORDER — ONDANSETRON 2 MG/ML
4 INJECTION INTRAMUSCULAR; INTRAVENOUS EVERY 6 HOURS PRN
Status: DISCONTINUED | OUTPATIENT
Start: 2023-08-25 | End: 2023-08-26 | Stop reason: HOSPADM

## 2023-08-25 RX ORDER — NALOXONE HYDROCHLORIDE 0.4 MG/ML
0.4 INJECTION, SOLUTION INTRAMUSCULAR; INTRAVENOUS; SUBCUTANEOUS
Status: DISCONTINUED | OUTPATIENT
Start: 2023-08-25 | End: 2023-08-25

## 2023-08-25 RX ORDER — HYDROXYZINE HYDROCHLORIDE 25 MG/1
25 TABLET, FILM COATED ORAL EVERY 6 HOURS PRN
Status: DISCONTINUED | OUTPATIENT
Start: 2023-08-25 | End: 2023-08-25 | Stop reason: HOSPADM

## 2023-08-25 RX ORDER — ACETAMINOPHEN 325 MG/1
650 TABLET ORAL EVERY 4 HOURS PRN
Status: DISCONTINUED | OUTPATIENT
Start: 2023-08-28 | End: 2023-08-26 | Stop reason: HOSPADM

## 2023-08-25 RX ORDER — TRANEXAMIC ACID 650 MG/1
1950 TABLET ORAL ONCE
Status: COMPLETED | OUTPATIENT
Start: 2023-08-25 | End: 2023-08-25

## 2023-08-25 RX ORDER — AMOXICILLIN 250 MG
1-2 CAPSULE ORAL 2 TIMES DAILY PRN
Qty: 15 TABLET | Refills: 0 | Status: SHIPPED | OUTPATIENT
Start: 2023-08-25 | End: 2023-09-14

## 2023-08-25 RX ORDER — PANTOPRAZOLE SODIUM 40 MG/1
40 TABLET, DELAYED RELEASE ORAL
Status: DISCONTINUED | OUTPATIENT
Start: 2023-08-26 | End: 2023-08-26 | Stop reason: HOSPADM

## 2023-08-25 RX ORDER — LIDOCAINE 40 MG/G
CREAM TOPICAL
Status: DISCONTINUED | OUTPATIENT
Start: 2023-08-25 | End: 2023-08-26 | Stop reason: HOSPADM

## 2023-08-25 RX ORDER — DEXAMETHASONE SODIUM PHOSPHATE 4 MG/ML
4 INJECTION, SOLUTION INTRA-ARTICULAR; INTRALESIONAL; INTRAMUSCULAR; INTRAVENOUS; SOFT TISSUE
Status: DISCONTINUED | OUTPATIENT
Start: 2023-08-25 | End: 2023-08-25 | Stop reason: HOSPADM

## 2023-08-25 RX ORDER — SODIUM CHLORIDE, SODIUM LACTATE, POTASSIUM CHLORIDE, CALCIUM CHLORIDE 600; 310; 30; 20 MG/100ML; MG/100ML; MG/100ML; MG/100ML
INJECTION, SOLUTION INTRAVENOUS CONTINUOUS
Status: DISCONTINUED | OUTPATIENT
Start: 2023-08-25 | End: 2023-08-26 | Stop reason: HOSPADM

## 2023-08-25 RX ORDER — AMOXICILLIN 250 MG
1 CAPSULE ORAL 2 TIMES DAILY
Status: DISCONTINUED | OUTPATIENT
Start: 2023-08-25 | End: 2023-08-26 | Stop reason: HOSPADM

## 2023-08-25 RX ORDER — HYDROMORPHONE HYDROCHLORIDE 2 MG/1
2-4 TABLET ORAL EVERY 4 HOURS PRN
Qty: 28 TABLET | Refills: 0 | Status: SHIPPED | OUTPATIENT
Start: 2023-08-25 | End: 2023-08-25

## 2023-08-25 RX ORDER — ASPIRIN 81 MG/1
81 TABLET ORAL 2 TIMES DAILY
Qty: 60 TABLET | Refills: 0 | Status: SHIPPED | OUTPATIENT
Start: 2023-08-25 | End: 2024-06-14

## 2023-08-25 RX ORDER — ONDANSETRON 4 MG/1
4 TABLET, ORALLY DISINTEGRATING ORAL EVERY 30 MIN PRN
Status: DISCONTINUED | OUTPATIENT
Start: 2023-08-25 | End: 2023-08-25 | Stop reason: HOSPADM

## 2023-08-25 RX ORDER — KETOROLAC TROMETHAMINE 15 MG/ML
15 INJECTION, SOLUTION INTRAMUSCULAR; INTRAVENOUS EVERY 6 HOURS
Status: COMPLETED | OUTPATIENT
Start: 2023-08-25 | End: 2023-08-26

## 2023-08-25 RX ORDER — GLYCOPYRROLATE 0.2 MG/ML
INJECTION, SOLUTION INTRAMUSCULAR; INTRAVENOUS PRN
Status: DISCONTINUED | OUTPATIENT
Start: 2023-08-25 | End: 2023-08-25

## 2023-08-25 RX ORDER — ONDANSETRON 4 MG/1
4 TABLET, ORALLY DISINTEGRATING ORAL EVERY 6 HOURS PRN
Status: DISCONTINUED | OUTPATIENT
Start: 2023-08-25 | End: 2023-08-26 | Stop reason: HOSPADM

## 2023-08-25 RX ORDER — ONDANSETRON 2 MG/ML
INJECTION INTRAMUSCULAR; INTRAVENOUS PRN
Status: DISCONTINUED | OUTPATIENT
Start: 2023-08-25 | End: 2023-08-25

## 2023-08-25 RX ORDER — LIDOCAINE 40 MG/G
CREAM TOPICAL
Status: DISCONTINUED | OUTPATIENT
Start: 2023-08-25 | End: 2023-08-25 | Stop reason: HOSPADM

## 2023-08-25 RX ORDER — CEFAZOLIN SODIUM/WATER 2 G/20 ML
2 SYRINGE (ML) INTRAVENOUS
Status: DISCONTINUED | OUTPATIENT
Start: 2023-08-25 | End: 2023-08-25

## 2023-08-25 RX ORDER — POLYETHYLENE GLYCOL 3350 17 G/17G
17 POWDER, FOR SOLUTION ORAL DAILY
Status: DISCONTINUED | OUTPATIENT
Start: 2023-08-26 | End: 2023-08-26 | Stop reason: HOSPADM

## 2023-08-25 RX ORDER — DEXAMETHASONE SODIUM PHOSPHATE 4 MG/ML
INJECTION, SOLUTION INTRA-ARTICULAR; INTRALESIONAL; INTRAMUSCULAR; INTRAVENOUS; SOFT TISSUE PRN
Status: DISCONTINUED | OUTPATIENT
Start: 2023-08-25 | End: 2023-08-25

## 2023-08-25 RX ORDER — HYDROMORPHONE HCL IN WATER/PF 6 MG/30 ML
0.4 PATIENT CONTROLLED ANALGESIA SYRINGE INTRAVENOUS
Status: DISCONTINUED | OUTPATIENT
Start: 2023-08-25 | End: 2023-08-26 | Stop reason: HOSPADM

## 2023-08-25 RX ORDER — EPHEDRINE SULFATE 50 MG/ML
INJECTION, SOLUTION INTRAMUSCULAR; INTRAVENOUS; SUBCUTANEOUS PRN
Status: DISCONTINUED | OUTPATIENT
Start: 2023-08-25 | End: 2023-08-25

## 2023-08-25 RX ORDER — FENTANYL CITRATE 50 UG/ML
INJECTION, SOLUTION INTRAMUSCULAR; INTRAVENOUS PRN
Status: DISCONTINUED | OUTPATIENT
Start: 2023-08-25 | End: 2023-08-25

## 2023-08-25 RX ADMIN — PROPOFOL 100 MG: 10 INJECTION, EMULSION INTRAVENOUS at 11:31

## 2023-08-25 RX ADMIN — PROPOFOL 100 MCG/KG/MIN: 10 INJECTION, EMULSION INTRAVENOUS at 12:00

## 2023-08-25 RX ADMIN — Medication 2 G: at 11:20

## 2023-08-25 RX ADMIN — KETOROLAC TROMETHAMINE 15 MG: 15 INJECTION, SOLUTION INTRAMUSCULAR; INTRAVENOUS at 14:48

## 2023-08-25 RX ADMIN — ONDANSETRON 4 MG: 2 INJECTION INTRAMUSCULAR; INTRAVENOUS at 11:31

## 2023-08-25 RX ADMIN — SIMVASTATIN 20 MG: 20 TABLET, FILM COATED ORAL at 22:03

## 2023-08-25 RX ADMIN — MIDAZOLAM 2 MG: 1 INJECTION INTRAMUSCULAR; INTRAVENOUS at 10:33

## 2023-08-25 RX ADMIN — ASPIRIN 81 MG: 81 TABLET, COATED ORAL at 19:15

## 2023-08-25 RX ADMIN — LIDOCAINE HYDROCHLORIDE 0.2 ML: 10 INJECTION, SOLUTION EPIDURAL; INFILTRATION; INTRACAUDAL; PERINEURAL at 10:05

## 2023-08-25 RX ADMIN — TRANEXAMIC ACID 1950 MG: 650 TABLET ORAL at 09:51

## 2023-08-25 RX ADMIN — DEXAMETHASONE SODIUM PHOSPHATE 4 MG: 4 INJECTION, SOLUTION INTRA-ARTICULAR; INTRALESIONAL; INTRAMUSCULAR; INTRAVENOUS; SOFT TISSUE at 11:31

## 2023-08-25 RX ADMIN — CEFAZOLIN 1 G: 1 INJECTION, POWDER, FOR SOLUTION INTRAMUSCULAR; INTRAVENOUS at 19:15

## 2023-08-25 RX ADMIN — BUPIVACAINE HYDROCHLORIDE AND EPINEPHRINE BITARTRATE 10 ML: 5; .005 INJECTION, SOLUTION PERINEURAL at 10:49

## 2023-08-25 RX ADMIN — SODIUM CHLORIDE, POTASSIUM CHLORIDE, SODIUM LACTATE AND CALCIUM CHLORIDE: 600; 310; 30; 20 INJECTION, SOLUTION INTRAVENOUS at 12:00

## 2023-08-25 RX ADMIN — ACETAMINOPHEN 975 MG: 325 TABLET, FILM COATED ORAL at 09:50

## 2023-08-25 RX ADMIN — SODIUM CHLORIDE, POTASSIUM CHLORIDE, SODIUM LACTATE AND CALCIUM CHLORIDE: 600; 310; 30; 20 INJECTION, SOLUTION INTRAVENOUS at 10:04

## 2023-08-25 RX ADMIN — ROCURONIUM BROMIDE 50 MG: 50 INJECTION, SOLUTION INTRAVENOUS at 11:31

## 2023-08-25 RX ADMIN — GLYCOPYRROLATE 0.2 MG: 0.2 INJECTION, SOLUTION INTRAMUSCULAR; INTRAVENOUS at 11:31

## 2023-08-25 RX ADMIN — SUGAMMADEX 200 MG: 100 INJECTION, SOLUTION INTRAVENOUS at 12:30

## 2023-08-25 RX ADMIN — BUPIVACAINE 10 ML: 13.3 INJECTION, SUSPENSION, LIPOSOMAL INFILTRATION at 10:49

## 2023-08-25 RX ADMIN — Medication 25 MG: at 12:17

## 2023-08-25 RX ADMIN — KETAMINE HYDROCHLORIDE 50 MG: 10 INJECTION INTRAMUSCULAR; INTRAVENOUS at 11:53

## 2023-08-25 RX ADMIN — KETOROLAC TROMETHAMINE 15 MG: 15 INJECTION, SOLUTION INTRAMUSCULAR; INTRAVENOUS at 20:13

## 2023-08-25 RX ADMIN — SODIUM CHLORIDE, POTASSIUM CHLORIDE, SODIUM LACTATE AND CALCIUM CHLORIDE: 600; 310; 30; 20 INJECTION, SOLUTION INTRAVENOUS at 14:56

## 2023-08-25 RX ADMIN — SENNOSIDES AND DOCUSATE SODIUM 1 TABLET: 50; 8.6 TABLET ORAL at 17:32

## 2023-08-25 RX ADMIN — ACETAMINOPHEN 975 MG: 325 TABLET, FILM COATED ORAL at 17:31

## 2023-08-25 RX ADMIN — FENTANYL CITRATE 100 MCG: 50 INJECTION, SOLUTION INTRAMUSCULAR; INTRAVENOUS at 10:35

## 2023-08-25 ASSESSMENT — ACTIVITIES OF DAILY LIVING (ADL)
ADLS_ACUITY_SCORE: 22

## 2023-08-25 NOTE — ANESTHESIA PROCEDURE NOTES
Interscalene Procedure Note    Pre-Procedure   Staff -        CRNA: Yusra Rod APRN CRNA       Other Anesthesia Staff: Lara Lenz       Performed By: CRNA       Location: pre-op       Procedure Start/Stop Times: 8/25/2023 10:26 AM and 8/25/2023 10:54 AM       Pre-Anesthestic Checklist: patient identified, IV checked, site marked, risks and benefits discussed, informed consent, monitors and equipment checked, pre-op evaluation, at physician/surgeon's request and post-op pain management  Timeout:       Correct Patient: Yes        Correct Procedure: Yes        Correct Site: Yes        Correct Position: Yes        Correct Laterality: Yes        Site Marked: Yes  Procedure Documentation  Procedure: interscalene       Diagnosis: BRACHIAL PLEXUS BLOCK VIA THE INTERSCALENE APPROACH       Laterality: right       Patient Position: sitting       Patient Prep/Sterile Barriers: sterile gloves, mask, patient draped       Skin prep: Chloraprep       Local skin infiltrated with 1 mL of 1% lidocaine.        Needle Type: insulated and short bevel       Needle Gauge: 22.        Needle Length (Inches): 2        Ultrasound guided       1. Ultrasound was used to identify targeted nerve, plexus, vascular marker, or fascial plane and place a needle adjacent to it in real-time.       2. Ultrasound was used to visualize the spread of anesthetic in close proximity to the above referenced structure.       3. A permanent image is entered into the patient's record.       4. The visualized anatomic structures appeared normal.       5. There were no apparent abnormal pathologic findings.       Nerve Stim: Initial Level 1 mA.  Lowest motor response 0.4 mA.    Assessment/Narrative         The placement was negative for: blood aspirated, painful injection and site bleeding       Paresthesias: No.       Test dose of mL at.         Test dose negative, 3 minutes after injection, for signs of intravascular, subdural, or intrathecal  "injection.       Bolus given via needle. no blood aspirated via catheter.        Secured via.        Insertion/Infusion Method: Single Shot       Complications: none       Injection made incrementally with aspirations every 5 mL.    Medication(s) Administered   Bupivacaine 0.5% w/ 1:200K Epi (Other) - Other   10 mL - 8/25/2023 10:49:00 AM  Bupivacaine liposome (Exparel) 1.3% LA inj susp (Infiltration) - Infiltration   10 mL - 8/25/2023 10:49:00 AM  Medication Administration Time: 8/25/2023 10:26 AM      FOR [facility] ONLY:   Pain Team Contact information: please page the Pain Team Via [employer]. Search \"Pain\". During daytime hours, please page the attending first. At night please page the resident first.      "

## 2023-08-25 NOTE — PLAN OF CARE
Goal Outcome Evaluation:      Plan of Care Reviewed With: patient    Overall Patient Progress: improvingOverall Patient Progress: improving  Pt using tylenol/ice and Toradol for discomfort-rating pain a 2.  Pt able to void/walked in jordan and tolerating food and fluids.  Pulses and cms good.  Incision has Aquacel/ intact.  Family has been here.  Alert and oriented x4.

## 2023-08-25 NOTE — PROVIDER NOTIFICATION
08/25/23 0955   Discharge Planning   Patient/Family Anticipates Transition to home with family   Concerns to be Addressed no discharge needs identified   Living Arrangements   People in Home child(ivan), adult   Type of Residence Private Residence   Is your private residence a single family home or apartment? Single family home   Number of Stairs, Within Home, Primary two   Stair Railings, Within Home, Primary railings safe and in good condition   Once home, are you able to live on one level? Yes   Which level? Main Level   Which rooms are not on the main level? Bedroom;Bathroom;Kitchen   Bathroom Shower/Tub Tub/Shower unit   Equipment Currently Used at Home grab bar, tub/shower;shower chair;raised toilet seat   Support System   Support Systems Family Members   Do you have someone available to stay with you one or two nights once you are home? Yes   Medical Clearance   Date of Physical 08/23/23   It is recommended that you call and check with any specialty providers before surgery to see if you need surgical clearance.  Do you see any specialty providers outside of your primary care provider? No   Blood   Known Bleeding Disorder or Coagulopathy No   Does the patient have any Buddhism/cultural preferences related to blood products? No   Education   Has the patient scheduled or completed pre-op total joint education, either in class or online, in the last 12 months? No   Relationship/Living Environment   Name(s) of People in Home jesse

## 2023-08-25 NOTE — ANESTHESIA CARE TRANSFER NOTE
Patient: Mariah Jacinto    Procedure: Procedure(s):  Right Reverse total shoulder arthroplasty       Diagnosis: Arthritis of shoulder [M19.019]  Diagnosis Additional Information: No value filed.    Anesthesia Type:   General     Note:  Anesthesia Care Transfer Notewriter  Vitals:  Vitals Value Taken Time   /59 08/25/23 1330   Temp 36.3  C (97.4  F) 08/25/23 1330   Pulse 65 08/25/23 1341   Resp 29 08/25/23 1341   SpO2 96 % 08/25/23 1341   Vitals shown include unvalidated device data.    Electronically Signed By: VICKY Oleary CRNA  August 25, 2023  1:58 PM

## 2023-08-25 NOTE — PROGRESS NOTES
"[facility] [place]    Patient admitted to room 2208 at approximately 1405 via cart from surgery. Patient was accompanied by son.     Verbal SBAR report received from MAREN Garza prior to patient arrival.     Patient trasferred to bed via air norma. Patient alert and oriented X 3. Pain is controlled with current analgesics.  Medication(s) being used: acetaminophen and prescription NSAID's including Toradol.  . Admission vital signs: Blood pressure (!) 149/55, pulse 60, temperature 97.1  F (36.2  C), temperature source Oral, resp. rate 20, height 1.6 m (5' 3\"), weight 62.1 kg (137 lb), SpO2 96 %, not currently breastfeeding. Patient was oriented to plan of care, call light, bed controls, tv, telephone, bathroom, and visiting hours.     Risk Assessment    The following safety risks were identified during admission: fall. Yellow risk band applied: YES.     Skin Initial Assessment    This writer admitted this patient and completed a full skin assessment and Delroy score in the Adult PCS flowsheet. Appropriate interventions initiated as needed.     Secondary skin check completed by Not completed, passed on to April, MAREN oncoming nurse.         Education    Patient has a Olden to Observation order: No  Observation education completed and documented: N/A      Sherron Hines RN      "

## 2023-08-25 NOTE — ANESTHESIA POSTPROCEDURE EVALUATION
Patient: Mariah Jacinto    Procedure: Procedure(s):  Right Reverse total shoulder arthroplasty       Anesthesia Type:  General    Note:  Disposition: Inpatient   Postop Pain Control: Uneventful   PONV:    Neuro/Psych: Uneventful            Sign Out: Acceptable/Baseline neuro status   Airway/Respiratory: Uneventful            Sign Out: Acceptable/Baseline resp. status   CV/Hemodynamics: Uneventful            Sign Out: Acceptable CV status; No obvious hypovolemia; No obvious fluid overload   Other NRE: NONE   DID A NON-ROUTINE EVENT OCCUR? No           Last vitals:  Vitals Value Taken Time   /59 08/25/23 1330   Temp 36.3  C (97.4  F) 08/25/23 1330   Pulse 65 08/25/23 1341   Resp 29 08/25/23 1341   SpO2 96 % 08/25/23 1341   Vitals shown include unvalidated device data.    Electronically Signed By: VICKY Oleary CRNA  August 25, 2023  1:59 PM

## 2023-08-25 NOTE — PROCEDURES
August 25, 2023    PREOPERATIVE DIAGNOSIS:  Right shoulder cuff tear arthopathy       POSTOPERATIVE DIAGNOSIS:  Right shoulder cuff tear arthropathy       PROCEDURES:  Reverse right total shoulder replacement.       SURGEON:  Srikanth Gonsales MD       ASSISTANT:  Mary Molina PA-C.  The presence of a PA was necessary for position, retraction, and safe progression of the case.       ANESTHESIA:  Interscalene block with general.       ESTIMATED BLOOD LOSS:  300 mL.       COMPLICATIONS:  None apparent.       DISPOSITION:  Stable to PACU.       IMPLANTS USED:      Tornier Aequalis reverse total shoulder arthroplasty system:                         3B humeral stem, +0 high eccentricity humeral tray with a 36mm x +6mm  polyethylene bearing                         25mm standard baseplate with a 6.5mm central screw and two 5.0mm locking screws peripherally, 36mm concentric glenosphere.       INDICATIONS: Mariah is a 77 year old female with a long history of right shoulder pain..  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  Due to ongoing pain and dysfunction, she decided to proceed with a reverse total shoulder arthroplasty in order to improve pain and function.  She understood the risks of infection, damage to vessels and nerves, ongoing pain, loosening, acromial fracture, instability, need for further surgery.       DESCRIPTION OF PROCEDURE:  Mariah was met in the preoperative holding area; the right shoulder was marked.  Consent was reviewed.  She had an interscalene block placed by Anesthesia which was tolerated well.  Following this, she was transferred to the operating theater.  After smooth induction of general anesthesia, she was positioned in a beach chair position with all bony prominences well padded.  A timeout was performed verifying the correct patient, surgery, location.  She received preoperative antibiotics.  The right upper extremity was then prepped and draped in  standard sterile fashion.       We started making an incision in line with a standard deltopectoral approach to the shoulder.  Dissection was sharply carried down through the skin and subcutaneous tissue.  The cephalic vein was identified and retracted medially.  We then used a Silvestre to free up the subdeltoid space.  We incised the clavicpectoral fascia and placed a Kolbel retractor.  We identified the bicipital groove and released this all the way up through the rotator interval to the glenoid.  The subscapularis was intact.  The capsule was released off inferior humeral head with progressive external rotation, and we were able to dislocate the shoulder.  There was grade II-III chondral changes over the humeral head and glenoid with  arthritis with a articular and bursal sided cuff tearing.      We then drilled a hole on top of the humerus to gain access to the canal.  We use an intramedullary guide to make a humeral neck cut in 135 deg of inclination and 30 of retroversion, with the superior portion of the cut exiting laterally near the mid portion of the greater tuberosity.  We then sounds and broached to a size 3B where we had good fit.  We then placed a metal protective cap on top of the broach and placed retractors to expose the glenoid.  We put a guide pin in the glenoid, cheating slightly inferiorly to minimize risk of scapular notching.  We then used the centralized glenoid reamer over the guide pin and reamed down to a bed of healthy bone for our baseplate.  Following this, we drilled our central boss for the baseplate.  We inserted a standard 25mm lateralized baseplate and secured it with a 30mm x 6.5mm screw.   We then placed 5.0mm locking screws in the superior and inferior holes and 5.0 non locking screws in the anterior and posterior holes. Following this, we placed a standard glenosphere was placed, utilizing the set screw to engage the sutherland taper.  This was then impacted in place.  We then  turned our attention back to the humerus.  We placed a high eccentricity trial humeral tray, maximizing coverage over the osteotomized head, with a 36 mm standard bearing and then reduced the shoulder. Motion was external rotation to 40, with the arm abducted 90 degrees internal rotation to about 60, and forward flexion to about 165.  We then placed our final humeral stem with a very good press-fit.  We placed our final humeral tray with the 36 mm standard bearing.  The shoulder was again reduced.  Subscapularis was repaired with #5 ethibond through drill holes in the lesser tuberosity.  The wound was then soaked in dilute betadine and thoroughly irrigated.  We loosely closed the deltopectoral interval, marking it with a FiberWire suture and then running a Vicryl.  Subcutaneous layer was closed with 2-0 Vicryl and skin with staples.  A sterile compressive dressing followed by a sling was applied and Mariah was awoken from anesthesia and transferred to the PACU in stable condition.       POSTOPERATIVE PLAN:   1.  Admit to hospital for pain control, PT, and IV antibiotics.   2.  Discharge home in 1 to 2 days when able.   3.  Sling for the next 2 weeks, but may do pendulums as able.  Start PT with reverse TSA protocol at 2 weeks postop          ANTONIO HERNNADEZ MD

## 2023-08-26 ENCOUNTER — APPOINTMENT (OUTPATIENT)
Dept: OCCUPATIONAL THERAPY | Facility: CLINIC | Age: 78
End: 2023-08-26
Attending: ORTHOPAEDIC SURGERY
Payer: COMMERCIAL

## 2023-08-26 VITALS
HEART RATE: 73 BPM | DIASTOLIC BLOOD PRESSURE: 55 MMHG | TEMPERATURE: 98.2 F | RESPIRATION RATE: 20 BRPM | OXYGEN SATURATION: 96 % | SYSTOLIC BLOOD PRESSURE: 116 MMHG | HEIGHT: 63 IN | BODY MASS INDEX: 24.27 KG/M2 | WEIGHT: 137 LBS

## 2023-08-26 LAB
GLUCOSE BLDC GLUCOMTR-MCNC: 169 MG/DL (ref 70–99)
HGB BLD-MCNC: 12.1 G/DL (ref 11.7–15.7)
HOLD SPECIMEN: NORMAL

## 2023-08-26 PROCEDURE — 250N000013 HC RX MED GY IP 250 OP 250 PS 637: Performed by: ORTHOPAEDIC SURGERY

## 2023-08-26 PROCEDURE — 99214 OFFICE O/P EST MOD 30 MIN: CPT | Performed by: STUDENT IN AN ORGANIZED HEALTH CARE EDUCATION/TRAINING PROGRAM

## 2023-08-26 PROCEDURE — 97165 OT EVAL LOW COMPLEX 30 MIN: CPT | Mod: GO

## 2023-08-26 PROCEDURE — 85018 HEMOGLOBIN: CPT | Performed by: ORTHOPAEDIC SURGERY

## 2023-08-26 PROCEDURE — 82962 GLUCOSE BLOOD TEST: CPT

## 2023-08-26 PROCEDURE — 36415 COLL VENOUS BLD VENIPUNCTURE: CPT | Performed by: ORTHOPAEDIC SURGERY

## 2023-08-26 PROCEDURE — 97530 THERAPEUTIC ACTIVITIES: CPT | Mod: GO

## 2023-08-26 PROCEDURE — 250N000011 HC RX IP 250 OP 636: Performed by: ORTHOPAEDIC SURGERY

## 2023-08-26 RX ORDER — POLYETHYLENE GLYCOL 3350 17 G/17G
17 POWDER, FOR SOLUTION ORAL DAILY PRN
Qty: 85 G | Refills: 0 | Status: SHIPPED | OUTPATIENT
Start: 2023-08-26 | End: 2023-08-31

## 2023-08-26 RX ADMIN — ACETAMINOPHEN 975 MG: 325 TABLET, FILM COATED ORAL at 10:14

## 2023-08-26 RX ADMIN — ACETAMINOPHEN 975 MG: 325 TABLET, FILM COATED ORAL at 02:26

## 2023-08-26 RX ADMIN — PANTOPRAZOLE SODIUM 40 MG: 40 TABLET, DELAYED RELEASE ORAL at 06:43

## 2023-08-26 RX ADMIN — POLYETHYLENE GLYCOL 3350 17 G: 17 POWDER, FOR SOLUTION ORAL at 08:11

## 2023-08-26 RX ADMIN — ASPIRIN 81 MG: 81 TABLET, COATED ORAL at 08:12

## 2023-08-26 RX ADMIN — KETOROLAC TROMETHAMINE 15 MG: 15 INJECTION, SOLUTION INTRAMUSCULAR; INTRAVENOUS at 02:27

## 2023-08-26 RX ADMIN — KETOROLAC TROMETHAMINE 15 MG: 15 INJECTION, SOLUTION INTRAMUSCULAR; INTRAVENOUS at 08:11

## 2023-08-26 RX ADMIN — CEFAZOLIN 1 G: 1 INJECTION, POWDER, FOR SOLUTION INTRAMUSCULAR; INTRAVENOUS at 02:30

## 2023-08-26 RX ADMIN — SENNOSIDES AND DOCUSATE SODIUM 1 TABLET: 50; 8.6 TABLET ORAL at 08:11

## 2023-08-26 ASSESSMENT — ACTIVITIES OF DAILY LIVING (ADL)
ADLS_ACUITY_SCORE: 22
PREVIOUS_RESPONSIBILITIES: MEAL PREP;HOUSEKEEPING;LAUNDRY;SHOPPING;MEDICATION MANAGEMENT;FINANCES
ADLS_ACUITY_SCORE: 22
ADLS_ACUITY_SCORE: 22
ADLS_ACUITY_SCORE: 25
ADLS_ACUITY_SCORE: 22

## 2023-08-26 NOTE — CONSULTS
Care Management:      Referral received for discharge planning.  The Patient had a right TSA.    Chart reviewed and plan of care discussed in Interdisciplinary Rounds.  Met with the Patient.  She lives with her son independently in the community.  Discussed home care.  Referral placed for [facility] Intake/Referral (Phone: [phone]) RN, OT and HHA.  [employer] [facility] Phone: [phone] Fax: [phone] has accepted the Patient.    Transportation will be provided by son.    Referral placed for PCP Clinic regarding the discharge plan.    Plan:  Home with [facility] Care, [facility] RN, OT and HHA services.       Iliana Meade RN, Care Coordinator [phone]

## 2023-08-26 NOTE — DISCHARGE SUMMARY
"Physician Discharge Summary     Patient ID:  Mariah Jacinto  [id number]  77 year old  1945    Admit date: 8/25/2023    Discharge date and time: 8/26/2023     Admitting Physician: Srikanth Gonsales MD     Discharge Physician: Jimmy Maria    Admission Diagnoses: Arthritis of shoulder [M19.019]  S/P reverse total shoulder arthroplasty, unspecified laterality [Z96.619]    Discharge Diagnoses: status post right reverse TSA    Admission Condition: good    Discharged Condition: good    Indication for Admission: Right total shoulder arthroplasty    Hospital Course: Patient was admitted to the hospital for a right reverse total shoulder arthroplasty.  Her operative and postoperative course was uncomplicated.  She recovered in the general floor where care was uncomplicated.  She was mobilizing, her pain was controlled, and she met all discharge criteria.  At which point, she was discharged from the hospital.    Consults: none    Significant Diagnostic Studies: Hg 12.1 8/26/23    Treatments: surgery: Right reverse TSA    Discharge Exam:  /52 (BP Location: Left arm)   Pulse 73   Temp 97.5  F (36.4  C) (Oral)   Resp 22   Ht 1.6 m (5' 3\")   Wt 62.1 kg (137 lb)   LMP  (LMP Unknown)   SpO2 96%   BMI 24.27 kg/m       No acute distress  Non-labored respirations  UE: dressing c,d,i. Regional block still in effect +2 radial pulse     Disposition: home    Patient Instructions:   Current Discharge Medication List        START taking these medications    Details   aspirin 81 MG EC tablet Take 1 tablet (81 mg) by mouth 2 times daily  Qty: 60 tablet, Refills: 0    Associated Diagnoses: Status post reverse total replacement of right shoulder      HYDROmorphone (DILAUDID) 2 MG tablet Take 1-2 tablets (2-4 mg) by mouth every 4 hours as needed for moderate to severe pain  Qty: 28 tablet, Refills: 0    Comments: Max 8/day  Associated Diagnoses: Status post reverse total replacement of right shoulder      hydrOXYzine " (ATARAX) 25 MG tablet Take 1 tablet (25 mg) by mouth every 6 hours as needed for itching or anxiety (with pain, moderate pain)  Qty: 30 tablet, Refills: 0    Associated Diagnoses: Status post reverse total replacement of right shoulder      senna-docusate (SENOKOT-S/PERICOLACE) 8.6-50 MG tablet Take 1-2 tablets by mouth 2 times daily as needed for constipation Take while on oral narcotics to prevent or treat constipation.  Qty: 15 tablet, Refills: 0    Comments: While taking narcotics  Associated Diagnoses: Status post reverse total replacement of right shoulder           CONTINUE these medications which have NOT CHANGED    Details   acetaminophen (TYLENOL) 325 MG tablet Take 325 mg by mouth once      melatonin 5 MG CAPS Take 5 mg by mouth At Bedtime  Qty: 1 capsule, Refills: 0      MULTI-VITAMIN OR TABS 1 TABLET DAILY      omeprazole (PRILOSEC) 40 MG DR capsule Take 1 capsule by mouth once daily  Qty: 90 capsule, Refills: 3    Associated Diagnoses: Gastroesophageal reflux disease without esophagitis      simvastatin (ZOCOR) 20 MG tablet Take 1 tablet (20 mg) by mouth At Bedtime  Qty: 90 tablet, Refills: 3    Associated Diagnoses: Hyperlipidemia LDL goal <160      VITAMIN D OR 1 DAILY      estradiol (ESTRACE) 0.1 MG/GM vaginal cream Place vaginally twice a week 1 g  twice weekly at bedtime  Qty: 43 g, Refills: 4    Associated Diagnoses: Atrophic vaginitis           Activity: Sling for the next 2 weeks, but may do pendulums as able.  Start PT with reverse TSA protocol at 2 weeks postop  Diet: regular diet  Wound Care: keep wound clean and dry    Follow-up with Dr. Gonsales in 2 weeks.    Signed:  Jimmy Maria MD  8/26/2023  8:47 AM

## 2023-08-26 NOTE — PROGRESS NOTES
[facility] DISCHARGE NOTE    Patient discharged to home at 1:53 PM via ambulation. Accompanied by son and staff. Discharge instructions reviewed with patient and son, opportunity offered to ask questions. Prescriptions sent to patients preferred pharmacy. All belongings sent with patient.    Sherron Hines RN

## 2023-08-26 NOTE — PROGRESS NOTES
08/26/23 1000   Appointment Info   Signing Clinician's Name / Credentials (OT) Beryl Stone OTR/L   Quick Adds   Quick Adds Certification   Living Environment   People in Home child(ivan), adult   Current Living Arrangements house   Home Accessibility stairs to enter home   Number of Stairs, Main Entrance 2   Stair Railings, Main Entrance railings safe and in good condition   Transportation Anticipated family or friend will provide   Living Environment Comments Lives with son in a single level house. 2 steps to enter then all needs met on main level. Has tub/shower and walk in shower.   Self-Care   Usual Activity Tolerance excellent   Current Activity Tolerance moderate   Equipment Currently Used at Home grab bar, tub/shower;shower chair;raised toilet seat   Fall history within last six months no   Activity/Exercise/Self-Care Comment Prev ind ADLs/IADLs   Instrumental Activities of Daily Living (IADL)   Previous Responsibilities meal prep;housekeeping;laundry;shopping;medication management;finances   General Information   Onset of Illness/Injury or Date of Surgery 08/25/23   Referring Physician Dr. Gonsales   Patient/Family Therapy Goal Statement (OT) to go home today   Additional Occupational Profile Info/Pertinent History of Current Problem POD 1 s/p reverse TSA   Existing Precautions/Restrictions shoulder   Right Upper Extremity (Weight-bearing Status) non weight-bearing (NWB)   General Observations and Info Pleasant and cooperative. Block has not worn off.   Cognitive Status Examination   Orientation Status orientation to person, place and time   Visual Perception   Visual Impairment/Limitations corrective lenses full-time   Sensory   Sensory Comments RUE sensation diminished as block has not worn off yet   Pain Assessment   Patient Currently in Pain Yes, see Vital Sign flowsheet  (1/10)   Posture   Posture forward head position   Range of Motion Comprehensive   Comment, General Range of Motion LUE WNL, R  elbow/wrist/hand WNL   Strength Comprehensive (MMT)   Comment, General Manual Muscle Testing (MMT) Assessment LUE WNL, RUE NT   Coordination   Upper Extremity Coordination No deficits were identified   Transfers   Transfer Comments SBA no AD needed.   Activities of Daily Living   BADL Assessment/Intervention upper body dressing;grooming   Upper Body Dressing Assessment/Training   Position (Upper Body Dressing) supported sitting   Comment, (Upper Body Dressing) educated on nu technique for maintaining TSA precautions   Mildred Level (Upper Body Dressing) moderate assist (50% patient effort)   Grooming Assessment/Training   Mildred Level (Grooming) set up   Clinical Impression   Criteria for Skilled Therapeutic Interventions Met (OT) Yes, treatment indicated   OT Diagnosis decreased functional ind   OT Problem List-Impairments impacting ADL post-surgical precautions   Assessment of Occupational Performance 1-3 Performance Deficits   Identified Performance Deficits dressing, toileting, bathing   Clinical Decision Making Complexity (OT) low complexity   Risk & Benefits of therapy have been explained evaluation/treatment results reviewed;care plan/treatment goals reviewed;risks/benefits reviewed;current/potential barriers reviewed;participants voiced agreement with care plan;participants included;patient   Clinical Impression Comments Pt will benefit from ongoing OT to maximize safety and ind with ADLs/transfers within post surgical precautions.   OT Total Evaluation Time   OT Eval, Low Complexity Minutes (05646) 10   Therapy Certification   Medical Diagnosis reverse total shoulder arthroplasty   Start of Care Date 08/25/23   Certification date from 08/25/23   Certification date to 09/01/23   OT Goals   Therapy Frequency (OT) Daily   OT Predicted Duration/Target Date for Goal Attainment 09/01/23   OT Goals Upper Body Dressing;Lower Body Dressing;Toilet Transfer/Toileting;OT Goal 1  (including sling)   OT:  Upper Body Dressing Supervision/stand-by assist;within precautions   OT: Lower Body Dressing Supervision/stand-by assist;within precautions   OT: Toilet Transfer/Toileting Supervision/stand-by assist;toilet transfer;cleaning and garment management;within precautions   OT: Goal 1 Pt will complete RUE HEP with SBA by time of dc.   Interventions   Interventions Quick Adds Self-Care/Home Management;Therapeutic Activity   Self-Care/Home Management   Self-Care/Home Mgmt/ADL, Compensatory, Meal Prep Minutes (81619) 8   Symptoms Noted During/After Treatment (Meal Preparation/Planning Training) none   Treatment Detail/Skilled Intervention therapist educated on nu strategy for ADL completion. Pt reporting she has been practicing completing ADLs with LUE for a couple weeks in preparation for surgery. Provided handout with pictures of technique for donning/doffing shirt. Also educated on how to don/doff sling, as well as proper positioning of sling. Pt req mod A today.   Therapeutic Activities   Therapeutic Activity Minutes (18649) 10   Symptoms noted during/after treatment none   Treatment Detail/Skilled Intervention Therapist initiated RUE HEP including shoulder pendulum exercises, elbow flex/ext, forearm supination/pronation, wrist flex/ext, wrist ulnar/radial deviation, and hand grasp/release. Pt needs mod A today, as nerve block has not worn off and she does not have full use/sensation of RUE yet.   OT Discharge Planning   OT Plan practice UE/LE dressing, toileting, RUE HEP   OT Discharge Recommendation (DC Rec) home with assist;home with home care occupational therapy   OT Rationale for DC Rec Lives with adult son, but he is not home during the day. Will likely need ongoing training for ADLs/IADLs within precautions.   OT Brief overview of current status mod A UE dressing, mod A L elbow/wrist/hand ROM   Total Session Time   Timed Code Treatment Minutes 18   Total Session Time (sum of timed and untimed services) 28         [facility]ENNA [facility]  OUTPATIENT OCCUPATIONAL THERAPY  EVALUATION  PLAN OF TREATMENT FOR OUTPATIENT REHABILITATION  (COMPLETE FOR INITIAL CLAIMS ONLY)  Patient's Last Name, First Name, M.I.  YOB: 1945  Mariah Jacinto                          Provider's Name  [facility] Medical Record No.  [id number]                             Onset Date:  08/25/23   Start of Care Date:  08/25/23   Type:     ___PT   _X_OT   ___SLP Medical Diagnosis:  reverse total shoulder arthroplasty                    OT Diagnosis:  decreased functional ind Visits from SOC:  1     See note for plan of treatment, functional goals and certification details    I CERTIFY THE NEED FOR THESE SERVICES FURNISHED UNDER        THIS PLAN OF TREATMENT AND WHILE UNDER MY CARE     (Physician co-signature of this document indicates review and certification of the therapy plan).

## 2023-08-26 NOTE — DISCHARGE SUMMARY
[facility]  Hospitalist Discharge Summary      Date of Admission:  8/25/2023  Date of Discharge:  8/26/2023  Discharging Provider: Doron Roberts MD  Discharge Service: Hospitalist Service    Discharge Diagnoses   Transfer of the right shoulder status post arthroplasty  Hyperlipidemia  History of GERD    Clinically Significant Risk Factors          Follow-ups Needed After Discharge   Follow-up Appointments     Follow Up Care      Follow up with Dr. Gonsales's PA in 2 weeks  This appointment has been scheduled  Call  with questions        Follow-up and recommended labs and tests       Dr. Gonsales in 2 weeks        Additional follow-up instructions/to-do's for PCP    : routine care     Unresulted Labs Ordered in the Past 30 Days of this Admission       No orders found for last 31 day(s).        These results will be followed  Dr. Gonsales in 2 weeks     Discharge Disposition   Discharged to home  Condition at discharge: Stable    Hospital Course   Patient is 77-year-old female past medical history significant for arthritis of the right shoulder, GERD and hyperlipidemia was admitted for elective arthroplasty underwent right total shoulder arthroplasty and improved and discharged to follow-up with orthopedics as outpatient    Consultations This Hospital Stay   OCCUPATIONAL THERAPY ADULT IP CONSULT  HOSPITALIST IP CONSULT  CARE MANAGEMENT / SOCIAL WORK IP CONSULT  PHYSICAL THERAPY ADULT IP CONSULT    Code Status   Full Code    Time Spent on this Encounter   I, Doron Roberts MD, personally saw the patient today and spent less than or equal to 30 minutes discharging this patient.       Doron Roberts MD  [facility] MEDICAL SURGICAL  [address] [address]  Phone: [phone]  Fax: [phone]  ______________________________________________________________________    Physical Exam   Vital Signs: Temp: 98.2  F (36.8  C) Temp src: Oral BP: 116/55 Pulse: 73    "Resp: 20 SpO2: 96 % O2 Device: None (Room air)    Weight: 137 lbs 0 oz  General Appearance: Comfortable  Respiratory: Comparable bilateral air entry  Cardiovascular:  S2 well heard no murmur  GI: Soft, BS+  KALEE- Rt shoulder Sling , intact sensation and capillary fill   Skin: no bruises   Other: CNS- AOX3, no focal deficit        Primary Care Physician   Eli Sommer    Discharge Orders      Home Care Referral      Reason for your hospital stay    Shoulder replacement     When to call - Contact Surgeon Team    You may experience symptoms that require follow-up before your scheduled appointment. Refer to the \"Stoplight Tool\" for instructions on when to contact your Surgeon Team if you are concerned about pain control, blood clots, constipation, or if you are unable to urinate.     When to call - Reach out to Urgent Care    If you are not able to reach your Surgeon Team and you need immediate care, go to the Orthopedic Walk-in Clinic or Urgent Care at your Surgeon's office.  Do NOT go to the Emergency Room unless you have shortness of breath, chest pain, or other signs of a medical emergency.     When to call - Reasons to Call 911    Call 911 immediately if you experience sudden-onset chest pain, arm weakness/numbness, slurred speech, or shortness of breath     Discharge Instruction - Breathing exercises    Perform breathing exercises using your Incentive Spirometer 10 times per hour while awake for 2 weeks.     Symptoms - Fever Management    A low grade fever can be expected after surgery.  Use acetaminophen (TYLENOL) as needed for fever management.  Contact your Surgeon Team if you have a fever greater than 101.5 F, chills, and/or night sweats.     Symptoms - Constipation management    Constipation (hard, dry bowel movements) is expected after surgery due to the combination of being less active, the anesthetic, and the opioid pain medication.  You can do the following to help reduce constipation:  ~  FLUIDS:  " Drink clear liquids (water or Gatorade), or juice (apple/prune).  ~  DIET:  Eat a fiber rich diet.    ~  ACTIVITY:  Get up and move around several times a day.  Increase your activity as you are able.  MEDICATIONS:  Reduce the risk of constipation by starting medications before you are constipated.  You can take Miralax   (1 packet as directed) and/or a stool softener (Senokot 1-2 tablets 1-2 times a day).  If you already have constipation and these medications are not working, you can get magnesium citrate and use as directed.  If you continue to have constipation you can try an over the counter suppository or enema.  Call your Surgeon Team if it has been greater than 3 days since your last bowel movement.     Symptoms - Reduced Urine Output    Changes in the amount of fluids you drank before and after surgery may result in problems urinating.  It is important to stay well-hydrated after surgery and drink plenty of water. If it has been greater than 8 hours since you have urinated despite drinking plenty of water, call your Surgeon Team.     Medication instructions -  Anticoagulation - aspirin    Take the aspirin as prescribed for a total of four weeks after surgery.  This is given to help minimize your risk of blood clot.     Activity - Exercises to prevent blood clots    Unless otherwise directed by your Surgeon team, perform the following exercises at least three times per day for the first four weeks after surgery to prevent blood clots in your legs: 1) Point and flex your feet (Ankle Pumps), 2) Move your ankle around in big circles, 3) Wiggle your toes, 4) Walk, even for short distances, several times a day, will help decrease the risk of blood clots.     Comfort and Pain Management - Pain after Surgery    Pain after surgery is normal and expected.  You will have some amount of pain for several weeks after surgery.  Your pain will improve with time.  There are several things you can do to help reduce your pain  including: rest, ice, elevation, and using pain medications as needed. Contact your Surgeon Team if you have pain that persists or worsens after surgery despite rest, ice, elevation, and taking your medication(s) as prescribed. Contact your Surgeon Team if you have new numbness, tingling, or weakness in your operative extremity.     Comfort and Pain Management - Swelling after Surgery    Swelling and/or bruising of the surgical extremity is common and may persist for several months after surgery. In addition to frequent icing and elevation, gentle compressive support with an ACE wrap or tubigrip may help with swelling. Apply compression regularly, removing at least twice daily to perform skin checks. Contact your Surgeon Team if your swelling increases and is NOT associated with an increase in your activity level, or if your swelling increases and is associated with redness and pain.     Comfort and Pain Management - UPPER extremity Elevation    Swelling is expected for several months after surgery. This type of swelling is usually associated with gravity and activity, and can be improved with elevation.   The best way to do this is to get your hand above your heart by sitting down, resting your elbow on a pillow or arm rest, with your hand in the air. Perform this elevation as often as possible especially for the first two weeks after surgery     Comfort and Pain Management - Cold therapy    Ice can be used to control swelling and discomfort after surgery. Place a thin towel over your operative site and apply the ice pack overtop. Leave ice pack in place for 20 minutes, then remove for 20 minutes. Repeat this 20 minutes on/20 minutes off routine as often as tolerated.     Medication Instructions - Acetaminophen (TYLENOL) Instructions    You were discharged with acetaminophen (TYLENOL) for pain management after surgery. Acetaminophen most effectively manages pain symptoms when it is taken on a schedule without  missing doses (every four, six, or eight hours). Your Provider will prescribe a safe daily dose between 3000 - 4000 mg.  Do NOT exceed this daily dose. Most patients use acetaminophen for pain control for the first four weeks after surgery.  You can wean from this medication as your pain decreases.     Medication Instructions - NSAID Instructions    You were discharged with an anti-inflammatory medication for pain management to use in combination with acetaminophen (TYLENOL) and the narcotic pain medication.  Take this medication exactly as directed.  You should only take one anti-inflammatory at a time.  Some common anti-inflammatories include: ibuprofen (ADVIL, MOTRIN), naproxen (ALEVE, NAPROSYN), celecoxib (CELEBREX), meloxicam (MOBIC), ketorolac (TORADOL).  Take this medication with food and water.     Medication Instructions - Opioids - Tapering Instructions    In the first three days following surgery, your symptoms may warrant use of the narcotic pain medication every four to six hours as prescribed. This is normal. As your pain symptoms improve, focus your efforts on decreasing (tapering) use of narcotic medications. The most successful tapering strategy is to first, decrease the number of tablets you take every 4-6 hours to the minimum prescribed. Then, increase the amount of time between doses.  For example:  First, taper to   or 1 tablet every 4-6 hours.  Then, taper to   or 1 tablet every 6-8 hours.  Then, taper to   or 1 tablet every 8-10 hours.  Then, taper to   or 1 tablet every 10-12 hours.  Then, taper to   or 1 tablet at bedtime.  The bedtime dose can help with comfort during sleep and is typically the last dose to be discontinued after surgery.     Follow Up Care    Follow up with Dr. Gonsales's PA in 2 weeks  This appointment has been scheduled  Call  with questions     Codman's Pendulum Exercise    Perform Codman's pendulum exercises as instructed by your Physical Therapist.      Return to Driving    Return to driving - Driving is NOT permitted until directed by your provider. Under no circumstance are you permitted to drive while using narcotic pain medications.     NO weight bearing    No weight bearing on your operative extremity.     Dressing / Wound Care - Wound    No dressing change  Dressing will be removed at 2 weeks postoperatively     Dressing Wound Care - Shower with wound/dressing NOT covered    OK to shower  Dressing is waterproof.  OK to get dressing wet  Gently pat dry     Dressing / Wound Care - NO Tub Bathing    Tub bathing, swimming, or any other activities that will cause your incision to be submerged in water should be avoided until allowed by your Surgeon.     Medication Instructions - Opioid Instructions (Greater than or equal to 65 years)    You were discharged with an opioid medication (hydromorphone, oxycodone, hydrocodone, or tramadol). This medication should only be taken for breakthrough pain that is not controlled with acetaminophen (TYLENOL). If you rate your pain less than 3 you do not need this medication.  Pain rating 0-3:  You do not need this medication  Pain rating 4-6:  Take 1/2 tablet every 4-6 hours as needed  Pain rating 7-10:  Take 1 tablet every 4-6 hours as needed  Do not exceed 4 tablets per day     Reason for your hospital stay    Rt shoulder arthroplasty     Activity    Your activity upon discharge: activity as tolerated     Follow-up and recommended labs and tests     Dr. Gonsales in 2 weeks     Discharge Instruction - Regular Diet Adult    Return to your pre-surgery diet unless instructed otherwise       Significant Results and Procedures   Results for orders placed or performed during the hospital encounter of 08/25/23   XR Shoulder Right Port G/E 2 Views    Narrative    XR SHOULDER RIGHT PORT G/E 2 VIEWS   8/25/2023 1:46 PM     HISTORY: Status post surgery  COMPARISON: None.       Impression    IMPRESSION: There are immediate  postoperative changes from right  reverse total shoulder arthroplasty, in standard alignment. No  displaced periprosthetic fracture is seen. There is moderate right  acromioclavicular osteoarthritis. There are multilevel degenerative  changes in the spine. Calcific thoracic aortic atherosclerosis is  noted.    LISA OLIVARES MD         SYSTEM ID:  [id number]       Discharge Medications   Current Discharge Medication List        START taking these medications    Details   aspirin 81 MG EC tablet Take 1 tablet (81 mg) by mouth 2 times daily  Qty: 60 tablet, Refills: 0    Associated Diagnoses: Status post reverse total replacement of right shoulder      HYDROmorphone (DILAUDID) 2 MG tablet Take 1-2 tablets (2-4 mg) by mouth every 4 hours as needed for moderate to severe pain  Qty: 28 tablet, Refills: 0    Comments: Max 8/day  Associated Diagnoses: Status post reverse total replacement of right shoulder      hydrOXYzine (ATARAX) 25 MG tablet Take 1 tablet (25 mg) by mouth every 6 hours as needed for itching or anxiety (with pain, moderate pain)  Qty: 30 tablet, Refills: 0    Associated Diagnoses: Status post reverse total replacement of right shoulder      polyethylene glycol (MIRALAX) 17 GM/Dose powder Take 17 g by mouth daily as needed for constipation  Qty: 85 g, Refills: 0    Associated Diagnoses: Chronic constipation      senna-docusate (SENOKOT-S/PERICOLACE) 8.6-50 MG tablet Take 1-2 tablets by mouth 2 times daily as needed for constipation Take while on oral narcotics to prevent or treat constipation.  Qty: 15 tablet, Refills: 0    Comments: While taking narcotics  Associated Diagnoses: Status post reverse total replacement of right shoulder           CONTINUE these medications which have NOT CHANGED    Details   acetaminophen (TYLENOL) 325 MG tablet Take 325 mg by mouth once      melatonin 5 MG CAPS Take 5 mg by mouth At Bedtime  Qty: 1 capsule, Refills: 0      MULTI-VITAMIN OR TABS 1 TABLET DAILY       omeprazole (PRILOSEC) 40 MG DR capsule Take 1 capsule by mouth once daily  Qty: 90 capsule, Refills: 3    Associated Diagnoses: Gastroesophageal reflux disease without esophagitis      simvastatin (ZOCOR) 20 MG tablet Take 1 tablet (20 mg) by mouth At Bedtime  Qty: 90 tablet, Refills: 3    Associated Diagnoses: Hyperlipidemia LDL goal <160      VITAMIN D OR 1 DAILY      estradiol (ESTRACE) 0.1 MG/GM vaginal cream Place vaginally twice a week 1 g  twice weekly at bedtime  Qty: 43 g, Refills: 4    Associated Diagnoses: Atrophic vaginitis           Allergies   Allergies   Allergen Reactions    Biaxin [Clarithromycin]      per pt      Celebrex [Bob-2 Inhibitors]      per pt    Cipro [Ciprofloxacin]      per pt    Darvocet [Propoxyphene N-Apap]     Fleet Phospho Soda [Sodium Phosphate/Biphosphate]      nausea per pt    Morphine     Neurontin [Gabapentin]      per pt    Nortriptyline      per pt    Percocet [Oxycodone-Acetaminophen]     Serzone [Nefazodone Hydrochloride]      per pt    Tegretol [Carbamazepine]     Vicodin [Acetaminophen]      per pt-dizziness    Vioxx      per pt    Vivactil [Protriptyline Hcl]      per pt    Wellbutrin [Bupropion Hcl]     Zithromax [Azithromycin Dihydrate]

## 2023-08-26 NOTE — PLAN OF CARE
Intervention: Provide Person-Centered Care   choices provided   emotional support provided   empathic listening provided   questions answered   questions encouraged   reassurance provided   thoughts/feelings acknowledged      Problem: Pain Acute  Goal: Optimal Pain Control and Function  Outcome: Progressing   Has a should block. Denies pain at this time. Arm and hand are still numb. Patient is in a sling.       Problem: Oral Intake Inadequate  Goal: Improved Oral Intake  Outcome: Progressing   Patient is eating and drinking without difficulty.  IVF stopped.

## 2023-08-26 NOTE — PROGRESS NOTES
End Of Shift Note    Situation: Patient has a long history of right shoulder pain..  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  Due to ongoing pain and dysfunction, she decided to proceed with a reverse total shoulder arthroplasty in order to improve pain and function.  Today is POD #1.     Plan: 1.  Admit to hospital for pain control, PT, and IV antibiotics.   2.  Discharge home in 1 to 2 days when able.   3.  Sling for the next 2 weeks, but may do pendulums as able.  Start PT with reverse TSA protocol at 2 weeks postop.     Subjective/Objective:    Neuro: WNL    Cardiac: VSS     Resp: LSCTA    GI/: Up to BR to void with SBA. Needs help with wiping. No BM this shift.     MSK: Steady. SBA. Sling to R arm. Walking in the halls.     Skin: WNL    LDAs: PIV x1 SL     Denies pain. Has a shoulder block. Able to slightly move fingers. Does report some tingling but arm and hand are still numb. Ice to shoulder.

## 2023-08-26 NOTE — PROGRESS NOTES
"Orthopedics Progress Note    Procedure: Right Reverse TSA    S: Patient seen this morning. Doing well. Pain controlled, tolerating PO intake, no nausea or vomiting. No chest pain or shortness of breath.     O: /52 (BP Location: Left arm)   Pulse 73   Temp 97.5  F (36.4  C) (Oral)   Resp 22   Ht 1.6 m (5' 3\")   Wt 62.1 kg (137 lb)   LMP  (LMP Unknown)   SpO2 96%   BMI 24.27 kg/m      No acute distress  Non-labored respirations  UE: dressing c,d,i. Regional block still in effect +2 radial pulse     XR: implant in good position with no immediate complication noted    A/P:   Mariah Jacinto is a 77 year old female s/p right RSA    1.   PT, and IV antibiotics x 24 hrs  2.  Discharge home today vs tomorrow.  3.  Sling for the next 2 weeks, but may do pendulums as able.  Start PT with reverse TSA protocol at 2 weeks postop    "

## 2023-08-27 ENCOUNTER — MEDICAL CORRESPONDENCE (OUTPATIENT)
Dept: HEALTH INFORMATION MANAGEMENT | Facility: CLINIC | Age: 78
End: 2023-08-27

## 2023-08-28 ENCOUNTER — PATIENT OUTREACH (OUTPATIENT)
Dept: CARE COORDINATION | Facility: CLINIC | Age: 78
End: 2023-08-28
Payer: COMMERCIAL

## 2023-08-28 SDOH — ECONOMIC STABILITY: TRANSPORTATION INSECURITY
IN THE PAST 12 MONTHS, HAS THE LACK OF TRANSPORTATION KEPT YOU FROM MEDICAL APPOINTMENTS OR FROM GETTING MEDICATIONS?: NO

## 2023-08-28 SDOH — HEALTH STABILITY: PHYSICAL HEALTH: ON AVERAGE, HOW MANY DAYS PER WEEK DO YOU ENGAGE IN MODERATE TO STRENUOUS EXERCISE (LIKE A BRISK WALK)?: 0 DAYS

## 2023-08-28 SDOH — ECONOMIC STABILITY: FOOD INSECURITY: WITHIN THE PAST 12 MONTHS, YOU WORRIED THAT YOUR FOOD WOULD RUN OUT BEFORE YOU GOT MONEY TO BUY MORE.: NEVER TRUE

## 2023-08-28 SDOH — ECONOMIC STABILITY: FOOD INSECURITY: WITHIN THE PAST 12 MONTHS, THE FOOD YOU BOUGHT JUST DIDN'T LAST AND YOU DIDN'T HAVE MONEY TO GET MORE.: NEVER TRUE

## 2023-08-28 SDOH — ECONOMIC STABILITY: TRANSPORTATION INSECURITY
IN THE PAST 12 MONTHS, HAS LACK OF TRANSPORTATION KEPT YOU FROM MEETINGS, WORK, OR FROM GETTING THINGS NEEDED FOR DAILY LIVING?: NO

## 2023-08-28 SDOH — HEALTH STABILITY: PHYSICAL HEALTH: ON AVERAGE, HOW MANY MINUTES DO YOU ENGAGE IN EXERCISE AT THIS LEVEL?: 0 MIN

## 2023-08-28 ASSESSMENT — SOCIAL DETERMINANTS OF HEALTH (SDOH): HOW HARD IS IT FOR YOU TO PAY FOR THE VERY BASICS LIKE FOOD, HOUSING, MEDICAL CARE, AND HEATING?: NOT VERY HARD

## 2023-08-28 ASSESSMENT — ACTIVITIES OF DAILY LIVING (ADL): DEPENDENT_IADLS:: INDEPENDENT

## 2023-08-28 NOTE — PROGRESS NOTES
Clinic Care Coordination Contact  Clinic Care Coordination Contact  OUTREACH    Referral Information:  Referral Source: IP Handoff         Chief Complaint   Patient presents with    Clinic Care Coordination - Post Hospital     Clinic Care Coordination RN         Universal Utilization:   8/25/2023 - 8/26/2023 (28 hours)  [facility]     Doron Roberts MD  Last attending  Treatment team S/P reverse total shoulder arthroplasty, unspecified laterality  Principal problem        Utilization      Hospital Admissions  1             ED Visits  0             No Show Count (past year)  0                    Current as of: 8/27/2023  1:19 AM                Clinical Concerns:  Current Medical Concerns:  Patient reports she took one Tylenol 500 mg   this morning and pain is a #2  Patient has her arm in a sling   Patient had a [facility] PT session and is awaiting a call today to schedule another home visit  Patient is using the incentive spirometer as directed   Patient had 2 BM's yesterday.  No drainage on surgical dressing  Patient has a Orthopedic follow up September 5   Current Behavioral Concerns: No    Education Provided to patient: CC RN role introduced  Encouraged patient to call the Orthopedic office for any questions or concerns  Patient agrees with this plan       Health Maintenance Reviewed:    Clinical Pathway: None    Medication Management:  Medication review status: Medications reviewed.  Changes noted per patient report.       Functional Status:  Dependent ADLs:: Independent  Dependent IADLs:: Independent  Bed or wheelchair confined:: No  Mobility Status: Independent    Living Situation:  Current living arrangement:: I live in a private home with family    Lifestyle & Psychosocial Needs:    Social Determinants of Health     Tobacco Use: Low Risk  (8/25/2023)    Patient History     Smoking Tobacco Use: Never     Smokeless Tobacco Use: Never     Passive Exposure: Not on file   Alcohol Use: Not  on file   Financial Resource Strain: Low Risk  (8/28/2023)    Overall Financial Resource Strain (CARDIA)     Difficulty of Paying Living Expenses: Not very hard   Food Insecurity: No Food Insecurity (8/28/2023)    Hunger Vital Sign     Worried About Running Out of Food in the Last Year: Never true     Ran Out of Food in the Last Year: Never true   Transportation Needs: No Transportation Needs (8/28/2023)    PRAPARE - Transportation     Lack of Transportation (Medical): No     Lack of Transportation (Non-Medical): No   Physical Activity: Inactive (8/28/2023)    Exercise Vital Sign     Days of Exercise per Week: 0 days     Minutes of Exercise per Session: 0 min   Stress: Not on file   Social Connections: Unknown (8/28/2023)    Social Connection and Isolation Panel [NHANES]     Frequency of Communication with Friends and Family: Not on file     Frequency of Social Gatherings with Friends and Family: Not on file     Attends Sikh Services: Not on file     Active Member of Clubs or Organizations: Not on file     Attends Club or Organization Meetings: Not on file     Marital Status:    Intimate Partner Violence: Not on file   Depression: Not at risk (7/5/2023)    PHQ-2     PHQ-2 Score: 0   Housing Stability: Not on file              Sikh or spiritual beliefs that impact treatment:: No  Mental health DX:: No  Mental health management concern (GOAL):: No  Chemical Dependency Status: No Current Concerns  Informal Support system:: Children             Resources and Interventions:  Current Resources:   Skilled Home Care Services: Physical Therapy  Community Resources: Home Care  Supplies Currently Used at Home: None  Equipment Currently Used at Home: grab bar, tub/shower, shower chair, raised toilet seat            Advance Care Plan/Directive  Advanced Care Plans/Directives on file:: Yes  Type Advanced Care Plans/Directives: Advanced Directive - On File    Referrals Placed: None      Patient/Caregiver  understanding: Patient expresses good understanding of discharge instructions        Future Appointments                In 2 weeks Eli Sommer MD [facility]    In 10 months Chapito Franco MD [facility]            Plan:   Patient will call surgeons office for any questions or concerns and keep follow up appointment 9/5/2023   Patient declines care coordination at this time    [facility]   Maddi Tom RN, Care Coordinator   [facility]'s   E-mail [email or web address]   [phone]

## 2023-09-05 ENCOUNTER — TRANSFERRED RECORDS (OUTPATIENT)
Dept: HEALTH INFORMATION MANAGEMENT | Facility: CLINIC | Age: 78
End: 2023-09-05
Payer: COMMERCIAL

## 2023-09-05 ENCOUNTER — MEDICAL CORRESPONDENCE (OUTPATIENT)
Dept: HEALTH INFORMATION MANAGEMENT | Facility: CLINIC | Age: 78
End: 2023-09-05
Payer: COMMERCIAL

## 2023-09-05 DIAGNOSIS — Z53.9 DIAGNOSIS NOT YET DEFINED: Primary | ICD-10-CM

## 2023-09-05 PROCEDURE — G0180 MD CERTIFICATION HHA PATIENT: HCPCS | Performed by: FAMILY MEDICINE

## 2023-09-11 ENCOUNTER — MEDICAL CORRESPONDENCE (OUTPATIENT)
Dept: HEALTH INFORMATION MANAGEMENT | Facility: CLINIC | Age: 78
End: 2023-09-11
Payer: COMMERCIAL

## 2023-09-14 ENCOUNTER — THERAPY VISIT (OUTPATIENT)
Dept: PHYSICAL THERAPY | Facility: CLINIC | Age: 78
End: 2023-09-14
Attending: FAMILY MEDICINE
Payer: COMMERCIAL

## 2023-09-14 ENCOUNTER — OFFICE VISIT (OUTPATIENT)
Dept: FAMILY MEDICINE | Facility: CLINIC | Age: 78
End: 2023-09-14
Payer: COMMERCIAL

## 2023-09-14 VITALS
BODY MASS INDEX: 24.72 KG/M2 | RESPIRATION RATE: 18 BRPM | SYSTOLIC BLOOD PRESSURE: 132 MMHG | WEIGHT: 139.5 LBS | HEART RATE: 67 BPM | TEMPERATURE: 98.3 F | OXYGEN SATURATION: 97 % | DIASTOLIC BLOOD PRESSURE: 68 MMHG | HEIGHT: 63 IN

## 2023-09-14 DIAGNOSIS — Z96.611 S/P REVERSE TOTAL SHOULDER ARTHROPLASTY, RIGHT: ICD-10-CM

## 2023-09-14 DIAGNOSIS — Z00.00 ENCOUNTER FOR MEDICARE ANNUAL WELLNESS EXAM: Primary | ICD-10-CM

## 2023-09-14 DIAGNOSIS — Z96.611 S/P REVERSE TOTAL SHOULDER ARTHROPLASTY, RIGHT: Primary | ICD-10-CM

## 2023-09-14 PROBLEM — G89.29 CHRONIC RIGHT SHOULDER PAIN: Status: RESOLVED | Noted: 2023-06-27 | Resolved: 2023-09-14

## 2023-09-14 PROBLEM — M25.511 CHRONIC RIGHT SHOULDER PAIN: Status: RESOLVED | Noted: 2023-06-27 | Resolved: 2023-09-14

## 2023-09-14 PROCEDURE — 97161 PT EVAL LOW COMPLEX 20 MIN: CPT | Mod: GP | Performed by: PHYSICAL THERAPIST

## 2023-09-14 PROCEDURE — 97110 THERAPEUTIC EXERCISES: CPT | Mod: GP | Performed by: PHYSICAL THERAPIST

## 2023-09-14 PROCEDURE — G0439 PPPS, SUBSEQ VISIT: HCPCS | Performed by: FAMILY MEDICINE

## 2023-09-14 RX ORDER — AMOXICILLIN 500 MG/1
2000 CAPSULE ORAL ONCE
Qty: 4 CAPSULE | Refills: 1 | Status: SHIPPED | OUTPATIENT
Start: 2023-09-14 | End: 2023-09-14

## 2023-09-14 ASSESSMENT — ENCOUNTER SYMPTOMS
DIZZINESS: 0
MYALGIAS: 0
HEMATURIA: 0
HEADACHES: 0
NAUSEA: 0
ABDOMINAL PAIN: 0
SORE THROAT: 0
PALPITATIONS: 0
BREAST MASS: 0
FREQUENCY: 0
PARESTHESIAS: 0
FEVER: 0
HEARTBURN: 0
SHORTNESS OF BREATH: 0
WEAKNESS: 0
EYE PAIN: 0
CHILLS: 0
ARTHRALGIAS: 0
COUGH: 0
DIARRHEA: 0
HEMATOCHEZIA: 0
NERVOUS/ANXIOUS: 0
JOINT SWELLING: 0
CONSTIPATION: 0
DYSURIA: 0

## 2023-09-14 ASSESSMENT — ACTIVITIES OF DAILY LIVING (ADL): CURRENT_FUNCTION: NO ASSISTANCE NEEDED

## 2023-09-14 ASSESSMENT — PAIN SCALES - GENERAL: PAINLEVEL: MILD PAIN (3)

## 2023-09-14 NOTE — PROGRESS NOTES
"SUBJECTIVE:   Mariah is a 77 year old who presents for Preventive Visit.      9/14/2023     9:47 AM   Additional Questions   Roomed by Vanessa SANCHEZ CMA   Accompanied by Self       Are you in the first 12 months of your Medicare coverage?  No    Healthy Habits:     In general, how would you rate your overall health?  Excellent    Frequency of exercise:  6-7 days/week    Duration of exercise:  15-30 minutes    Do you usually eat at least 4 servings of fruit and vegetables a day, include whole grains    & fiber and avoid regularly eating high fat or \"junk\" foods?  Yes    Taking medications regularly:  Yes    Medication side effects:  Not applicable    Ability to successfully perform activities of daily living:  No assistance needed    Home Safety:  No safety concerns identified    Hearing Impairment:  No hearing concerns    In the past 6 months, have you been bothered by leaking of urine?  No    In general, how would you rate your overall mental or emotional health?  Excellent    Additional concerns today:  No    - Questions about antibiotic before dental work and discuss vaccines    Have you ever done Advance Care Planning? (For example, a Health Directive, POLST, or a discussion with a medical provider or your loved ones about your wishes): Yes, advance care planning is on file.       Fall risk  Fallen 2 or more times in the past year?: No  Any fall with injury in the past year?: No    Cognitive Screening   1) Repeat 3 items (Leader, Season, Table)    2) Clock draw: NORMAL  3) 3 item recall: Recalls 1 object   Results: NORMAL clock, 1-2 items recalled: COGNITIVE IMPAIRMENT LESS LIKELY    Mini-CogTM Copyright GARY Khoury. Licensed by the author for use in [facility]; reprinted with permission ([email or web address]). All rights reserved.      Do you have sleep apnea, excessive snoring or daytime drowsiness? : no    Reviewed and updated as needed this visit by clinical staff   Tobacco  Allergies  Meds          "     Reviewed and updated as needed this visit by Provider                 Social History     Tobacco Use    Smoking status: Never    Smokeless tobacco: Never   Substance Use Topics    Alcohol use: No             9/14/2023     9:53 AM   Alcohol Use   Prescreen: >3 drinks/day or >7 drinks/week? No     Do you have a current opioid prescription? No  Do you use any other controlled substances or medications that are not prescribed by a provider? None        Hyperlipidemia Follow-Up  Simvastatin 20mg qd  Are you regularly taking any medication or supplement to lower your cholesterol?   Yes- statin  Are you having muscle aches or other side effects that you think could be caused by your cholesterol lowering medication?  No    Current providers sharing in care for this patient include:   Patient Care Team:  Eli Sommer MD as PCP - General (Family Medicine)  Eli Sommer MD as Assigned PCP  Chapito Franco MD as MD (Dermatology)  Chapito Franco MD as Assigned Surgical Provider  Alfredo Ferrell DO as Assigned Musculoskeletal Provider  Eli Sommer MD as Assigned Pain Medication Provider    The following health maintenance items are reviewed in Epic and correct as of today:  Health Maintenance   Topic Date Due    COVID-19 Vaccine (6 - Pfizer series) 03/08/2023    INFLUENZA VACCINE (1) 09/01/2023    ANNUAL REVIEW OF HM ORDERS  07/05/2024    MEDICARE ANNUAL WELLNESS VISIT  09/14/2024    FALL RISK ASSESSMENT  09/14/2024    LIPID  08/23/2028    ADVANCE CARE PLANNING  09/14/2028    DTAP/TDAP/TD IMMUNIZATION (3 - Td or Tdap) 02/09/2032    DEXA  10/13/2037    PHQ-2 (once per calendar year)  Completed    Pneumococcal Vaccine: 65+ Years  Completed    ZOSTER IMMUNIZATION  Completed    IPV IMMUNIZATION  Aged Out    HPV IMMUNIZATION  Aged Out    MENINGITIS IMMUNIZATION  Aged Out    HEPATITIS C SCREENING  Discontinued    MAMMO SCREENING  Discontinued    COLORECTAL CANCER SCREENING  Discontinued  "    Labs reviewed in EPIC      Mammogram Screening - Patient over age 75, has elected to continue with screening.  Pertinent mammograms are reviewed under the imaging tab.    Review of Systems   Constitutional:  Negative for chills and fever.   HENT:  Negative for congestion, ear pain, hearing loss and sore throat.    Eyes:  Negative for pain and visual disturbance.   Respiratory:  Negative for cough and shortness of breath.    Cardiovascular:  Negative for chest pain, palpitations and peripheral edema.   Gastrointestinal:  Negative for abdominal pain, constipation, diarrhea, heartburn, hematochezia and nausea.   Breasts:  Negative for tenderness, breast mass and discharge.   Genitourinary:  Negative for dysuria, frequency, genital sores, hematuria, pelvic pain, urgency, vaginal bleeding and vaginal discharge.   Musculoskeletal:  Negative for arthralgias, joint swelling and myalgias.   Skin:  Negative for rash.   Neurological:  Negative for dizziness, weakness, headaches and paresthesias.   Psychiatric/Behavioral:  Negative for mood changes. The patient is not nervous/anxious.          OBJECTIVE:   /68   Pulse 67   Temp 98.3  F (36.8  C) (Tympanic)   Resp 18   Ht 1.6 m (5' 3\")   Wt 63.3 kg (139 lb 8 oz)   LMP  (LMP Unknown)   SpO2 97%   BMI 24.71 kg/m   Estimated body mass index is 24.71 kg/m  as calculated from the following:    Height as of this encounter: 1.6 m (5' 3\").    Weight as of this encounter: 63.3 kg (139 lb 8 oz).  Physical Exam  GENERAL: healthy, alert and no distress  NECK: no adenopathy, no asymmetry, masses, or scars and thyroid normal to palpation  RESP: lungs clear to auscultation - no rales, rhonchi or wheezes  CV: regular rate and rhythm, normal S1 S2, no S3 or S4, no murmur, click or rub, no peripheral edema and peripheral pulses strong  ABDOMEN: soft, nontender, no hepatosplenomegaly, no masses and bowel sounds normal  MS: no gross musculoskeletal defects noted, no " edema        ASSESSMENT / PLAN:   (Z00.00) Encounter for Medicare annual wellness exam  (primary encounter diagnosis)  Comment:    Plan:      (Z96.981) S/P reverse total shoulder arthroplasty, right  Comment:  needs prophylactic antibiotic for dental work  Plan: amoxicillin (AMOXIL) 500 MG capsule            Patient has been advised of split billing requirements and indicates understanding: Yes      COUNSELING:  Reviewed preventive health counseling, as reflected in patient instructions       Regular exercise       Healthy diet/nutrition        She reports that she has never smoked. She has never used smokeless tobacco.      Appropriate preventive services were discussed with this patient, including applicable screening as appropriate for cardiovascular disease, diabetes, osteopenia/osteoporosis, and glaucoma.  As appropriate for age/gender, discussed screening for colorectal cancer, prostate cancer, breast cancer, and cervical cancer. Checklist reviewing preventive services available has been given to the patient.    Reviewed patients plan of care and provided an AVS. The Basic Care Plan (routine screening as documented in Health Maintenance) for Mariah meets the Care Plan requirement. This Care Plan has been established and reviewed with the Patient.          Eli Sommer MD  [facility]    Identified Health Risks:

## 2023-09-14 NOTE — PATIENT INSTRUCTIONS
Patient Education   Personalized Prevention Plan  You are due for the preventive services outlined below.  Your care team is available to assist you in scheduling these services.  If you have already completed any of these items, please share that information with your care team to update in your medical record.  Health Maintenance Due   Topic Date Due     COVID-19 Vaccine (6 - Pfizer series) 03/08/2023     Flu Vaccine (1) 09/01/2023     FALL RISK ASSESSMENT  09/08/2023

## 2023-09-20 ENCOUNTER — NURSE TRIAGE (OUTPATIENT)
Dept: NURSING | Facility: CLINIC | Age: 78
End: 2023-09-20
Payer: COMMERCIAL

## 2023-09-20 ENCOUNTER — TELEPHONE (OUTPATIENT)
Dept: FAMILY MEDICINE | Facility: CLINIC | Age: 78
End: 2023-09-20
Payer: COMMERCIAL

## 2023-09-21 ENCOUNTER — THERAPY VISIT (OUTPATIENT)
Dept: PHYSICAL THERAPY | Facility: CLINIC | Age: 78
End: 2023-09-21
Attending: FAMILY MEDICINE
Payer: COMMERCIAL

## 2023-09-21 DIAGNOSIS — Z96.611 S/P REVERSE TOTAL SHOULDER ARTHROPLASTY, RIGHT: Primary | ICD-10-CM

## 2023-09-21 PROCEDURE — 97110 THERAPEUTIC EXERCISES: CPT | Mod: GP | Performed by: PHYSICAL THERAPIST

## 2023-09-21 NOTE — TELEPHONE ENCOUNTER
Reason for Call:  prescription    Detailed comments: Patient just had shoulder surgery and patient is having sinus issues she thinks she has sinus infection top and bottom teeth hurt, and her ear hurts and was wondering if she can have a RX sent , I use [facility]    Phone Number Patient can be reached at: Cell number on file:    No relevant phone numbers on file. [phone]       Best Time: anytime    Can we leave a detailed message on this number? YES    Call taken on 9/20/2023 at 7:06 PM by PALAK DAWSON

## 2023-09-21 NOTE — TELEPHONE ENCOUNTER
"FYI - Status Update    Who is Calling: patient    Update: See Message from last night:  Pt called @ 7:05pm last night and LM for Dr. Sommer to send Rx to pharmacy for sinus infection.    Pt informed that she needs appt for antibiotic Rx.    Pt declines virtual appt, appt in another clinic and .  Pt asking to speak to \"Eli's nurse,\" she will give me antibiotic.  Please call patient and advise.      Does caller want a call/response back: Yes     Okay to leave a detailed message?: Yes at Home number on file [phone] (home)    "

## 2023-09-21 NOTE — TELEPHONE ENCOUNTER
Please see nurse triage note from other encounter with Dr Sommer's response.    Angeles Rodriguez RN

## 2023-09-21 NOTE — TELEPHONE ENCOUNTER
"Pt had shoulder surgery 8/25/23 and states today pt started sinus infection, all her teeth on left side of face and left ear is hurting started today, pain \"8.5\" on 0/10 pain scale.     Pt requesting Rx for Amoxicillin as she has had sinus infection in the past and pt wanting to know if she can have this called in as pt states with recent shoulder surgery she can't drive.  Pharmacy on file, [place] [place] [facility].    Please advise pt.  Pt can be reached at [phone].    Janeth Fuentes RN  FNA Nurse Advisor    Reason for Disposition   Earache    Additional Information   Negative: SEVERE difficulty breathing (e.g., struggling for each breath, speaks in single words)   Negative: Sounds like a life-threatening emergency to the triager   Negative: [1] Sinus infection AND [2] taking an antibiotic AND [3] symptoms continue   Negative: [1] Difficulty breathing AND [2] not from stuffy nose (e.g., not relieved by cleaning out the nose)   Negative: [1] SEVERE headache AND [2] fever   Negative: [1] Redness or swelling on the cheek, forehead or around the eye AND [2] fever   Negative: Fever > 104 F (40 C)   Negative: Patient sounds very sick or weak to the triager   Negative: [1] SEVERE pain AND [2] not improved 2 hours after pain medicine   Negative: [1] Redness or swelling on the cheek, forehead or around the eye AND [2] no fever   Negative: [1] Fever > 101 F (38.3 C) AND [2] age > 60 years   Negative: [1] Fever > 100.0 F (37.8 C) AND [2] bedridden (e.g., CVA, chronic illness, recovering from surgery)   Negative: [1] Fever > 100.0 F (37.8 C) AND [2] diabetes mellitus or weak immune system (e.g., HIV positive, cancer chemo, splenectomy, organ transplant, chronic steroids)   Negative: Fever present > 3 days (72 hours)   Negative: [1] Fever returns after gone for over 24 hours AND [2] symptoms worse or not improved   Negative: [1] Sinus pain (not just congestion) AND [2] fever    Protocols used: Sinus Pain or " Congestion-A-AH

## 2023-09-21 NOTE — TELEPHONE ENCOUNTER
Mariah notified and expressed understanding. Gave home treatment ideas. She will call to schedule if her ear pain gets worse or she gets a fever.    Angeles Rodriguez RN

## 2023-09-21 NOTE — TELEPHONE ENCOUNTER
"We do not treat sinus infections with one day of symptoms.  We do not treat sinus pain/pressure with antibiotics unless there is 10-14 days of symptoms and getting worse.   RN can advise on symptomatic treatment of sinus pain.    Most \"ear infections\" in adults are also viral. If pain x 48 hours or fever, should have ear looked at.    Eli Sommer M.D.    "

## 2023-09-22 ENCOUNTER — TELEPHONE (OUTPATIENT)
Dept: FAMILY MEDICINE | Facility: CLINIC | Age: 78
End: 2023-09-22
Payer: COMMERCIAL

## 2023-09-22 NOTE — TELEPHONE ENCOUNTER
Patient Returning Call    Reason for call:  can I  take OTC 12 hour expectorant  Mucinex with ibuprofen and tylenlol ???  I now have a sinus infection and need to take something else for my painful face.    Information relayed to patient:  someone will call her     Patient has additional questions:  Yes .  had shoulder surgery and needs to be taking the ibuprofen and alternating tylenol. Please advise     What are your questions/concerns:  face hurts terribly from a sinus infection    Who does the patient want to speak with:  RN    Is an  needed?:  No      Okay to leave a detailed message?: Yes at Home number on file [phone] (home)

## 2023-09-22 NOTE — TELEPHONE ENCOUNTER
Per medication review for Mucinex with ibuprofen and tylenol it appears to be safe to take together.     Called patient and informed patient.     Patient expressed understanding and will call if symptoms persist or become worse.     Agnes Pelayo RN on 9/22/2023 at 3:01 PM

## 2023-09-26 ENCOUNTER — THERAPY VISIT (OUTPATIENT)
Dept: PHYSICAL THERAPY | Facility: CLINIC | Age: 78
End: 2023-09-26
Attending: FAMILY MEDICINE
Payer: COMMERCIAL

## 2023-09-26 DIAGNOSIS — Z96.611 S/P REVERSE TOTAL SHOULDER ARTHROPLASTY, RIGHT: Primary | ICD-10-CM

## 2023-09-26 PROCEDURE — 97110 THERAPEUTIC EXERCISES: CPT | Mod: GP | Performed by: PHYSICAL THERAPIST

## 2023-09-29 ENCOUNTER — THERAPY VISIT (OUTPATIENT)
Dept: PHYSICAL THERAPY | Facility: CLINIC | Age: 78
End: 2023-09-29
Attending: FAMILY MEDICINE
Payer: COMMERCIAL

## 2023-09-29 DIAGNOSIS — Z96.611 S/P REVERSE TOTAL SHOULDER ARTHROPLASTY, RIGHT: Primary | ICD-10-CM

## 2023-09-29 PROCEDURE — 97110 THERAPEUTIC EXERCISES: CPT | Mod: GP | Performed by: PHYSICAL THERAPIST

## 2023-10-03 ENCOUNTER — THERAPY VISIT (OUTPATIENT)
Dept: PHYSICAL THERAPY | Facility: CLINIC | Age: 78
End: 2023-10-03
Attending: FAMILY MEDICINE
Payer: COMMERCIAL

## 2023-10-03 DIAGNOSIS — Z96.611 S/P REVERSE TOTAL SHOULDER ARTHROPLASTY, RIGHT: Primary | ICD-10-CM

## 2023-10-03 PROCEDURE — 97110 THERAPEUTIC EXERCISES: CPT | Mod: GP | Performed by: PHYSICAL THERAPIST

## 2023-10-05 ENCOUNTER — TRANSFERRED RECORDS (OUTPATIENT)
Dept: HEALTH INFORMATION MANAGEMENT | Facility: CLINIC | Age: 78
End: 2023-10-05
Payer: COMMERCIAL

## 2023-10-06 ENCOUNTER — THERAPY VISIT (OUTPATIENT)
Dept: PHYSICAL THERAPY | Facility: CLINIC | Age: 78
End: 2023-10-06
Attending: FAMILY MEDICINE
Payer: COMMERCIAL

## 2023-10-06 DIAGNOSIS — Z96.611 S/P REVERSE TOTAL SHOULDER ARTHROPLASTY, RIGHT: Primary | ICD-10-CM

## 2023-10-06 PROCEDURE — 97110 THERAPEUTIC EXERCISES: CPT | Mod: GP | Performed by: PHYSICAL THERAPIST

## 2023-10-09 ENCOUNTER — OFFICE VISIT (OUTPATIENT)
Dept: FAMILY MEDICINE | Facility: CLINIC | Age: 78
End: 2023-10-09
Payer: COMMERCIAL

## 2023-10-09 ENCOUNTER — HOSPITAL ENCOUNTER (OUTPATIENT)
Dept: ULTRASOUND IMAGING | Facility: CLINIC | Age: 78
Discharge: HOME | End: 2023-10-09
Attending: FAMILY MEDICINE | Admitting: FAMILY MEDICINE
Payer: COMMERCIAL

## 2023-10-09 VITALS
SYSTOLIC BLOOD PRESSURE: 132 MMHG | HEART RATE: 78 BPM | DIASTOLIC BLOOD PRESSURE: 70 MMHG | BODY MASS INDEX: 24.63 KG/M2 | WEIGHT: 139 LBS | HEIGHT: 63 IN | RESPIRATION RATE: 20 BRPM | TEMPERATURE: 98 F | OXYGEN SATURATION: 98 %

## 2023-10-09 DIAGNOSIS — M79.604 BILATERAL LOWER EXTREMITY PAIN: Primary | ICD-10-CM

## 2023-10-09 DIAGNOSIS — I83.813 VARICOSE VEINS OF BOTH LOWER EXTREMITIES WITH PAIN: ICD-10-CM

## 2023-10-09 DIAGNOSIS — M79.605 BILATERAL LOWER EXTREMITY PAIN: ICD-10-CM

## 2023-10-09 DIAGNOSIS — M79.605 BILATERAL LOWER EXTREMITY PAIN: Primary | ICD-10-CM

## 2023-10-09 DIAGNOSIS — M79.604 BILATERAL LOWER EXTREMITY PAIN: ICD-10-CM

## 2023-10-09 PROCEDURE — 99213 OFFICE O/P EST LOW 20 MIN: CPT | Performed by: FAMILY MEDICINE

## 2023-10-09 PROCEDURE — 93970 EXTREMITY STUDY: CPT

## 2023-10-09 ASSESSMENT — PAIN SCALES - GENERAL: PAINLEVEL: SEVERE PAIN (6)

## 2023-10-09 NOTE — PROGRESS NOTES
"  Assessment & Plan     Bilateral lower extremity pain  Started after surgery, will need to r/o DVT though my clinical suspicion is low.   She may have more venous incompetence   May benefit from thigh high compression stockings    - US Lower Extremity Venous Duplex Bilateral; Future        Eli Sommer MD  [facility]    Nakia Lemus is a 78 year old, presenting for the following health issues:  Leg Pain        10/9/2023     2:31 PM   Additional Questions   Roomed by Vanessa souza CMA   Accompanied by Self       HPI     Musculoskeletal problem/pain    Duration: Pain and swelling began after completing right shoulder surgery 8/25/23  Description  Location: Entire left leg and right knee  Intensity:  moderate  Accompanying signs and symptoms: swelling, redness, and limping  History  Previous similar problem: no   Previous evaluation:  none  Precipitating or alleviating factors:  Trauma or overuse: no   Aggravating factors include: none  Therapies tried and outcome: acetaminophen and Ibuprofen      Bilateral pain/swelling - now six weeks past surgery.    Surgeon told her it was \"all the fluids from surgery\".  She didn't feel that was likely.      Pain is mostly when palpated.        No chest pain/pressure/soa    Review of Systems   Constitutional, HEENT, cardiovascular, pulmonary, gi and gu systems are negative, except as otherwise noted.      Objective    /70   Pulse 78   Temp 98  F (36.7  C) (Tympanic)   Resp 20   Ht 1.6 m (5' 3\")   Wt 63 kg (139 lb)   LMP  (LMP Unknown)   SpO2 98%   BMI 24.62 kg/m    Body mass index is 24.62 kg/m .  Physical Exam   GENERAL: healthy, alert and no distress  NECK: no adenopathy, no asymmetry, masses, or scars and thyroid normal to palpation  RESP: lungs clear to auscultation - no rales, rhonchi or wheezes  CV: regular rate and rhythm, normal S1 S2, no S3 or S4, no murmur, click or rub, no peripheral edema and peripheral pulses " strong  ABDOMEN: soft, nontender, no hepatosplenomegaly, no masses and bowel sounds normal  MS: edema is 1+ bilaterally.  She has rope like varicosities of her right leg.  She has more subtle varicosities and spider veins of the left leg.    There is no erythema

## 2023-10-10 NOTE — PROGRESS NOTES
DME (Durable Medical Equipment) Orders and Documentation  Orders Placed This Encounter   Procedures     Orthotics and Prosthetics DME Compression; Leg; Thigh; Bilateral; 15/20 mmHg; 12 Pair        The patient was assessed and it was determined the patient is in need of the following listed DME Supplies/Equipment. Please complete supporting documentation below to demonstrate medical necessity.

## 2023-10-12 ENCOUNTER — THERAPY VISIT (OUTPATIENT)
Dept: PHYSICAL THERAPY | Facility: CLINIC | Age: 78
End: 2023-10-12
Attending: FAMILY MEDICINE
Payer: COMMERCIAL

## 2023-10-12 DIAGNOSIS — Z96.611 S/P REVERSE TOTAL SHOULDER ARTHROPLASTY, RIGHT: Primary | ICD-10-CM

## 2023-10-12 PROCEDURE — 97110 THERAPEUTIC EXERCISES: CPT | Mod: GP | Performed by: PHYSICAL THERAPIST

## 2023-10-13 ENCOUNTER — TELEPHONE (OUTPATIENT)
Dept: FAMILY MEDICINE | Facility: CLINIC | Age: 78
End: 2023-10-13
Payer: COMMERCIAL

## 2023-10-13 NOTE — TELEPHONE ENCOUNTER
Patient called back because someone from Dr. Sommer's team called and left a message about following up about something.    RN reviewed chart and found the 10/9/23 office visit comment recommending compression stockings and informed patient of that.    Routing to Dr. Sommer to please provide clarification.    Agnes Pelayo RN on 10/13/2023 at 9:10 AM

## 2023-10-16 NOTE — TELEPHONE ENCOUNTER
See result note from 10-12-23. Patient was read the US results, she was provided the Medical Supply number, she will contact them to get new compression stockings.          MAREN Nuñez

## 2023-10-17 ENCOUNTER — THERAPY VISIT (OUTPATIENT)
Dept: PHYSICAL THERAPY | Facility: CLINIC | Age: 78
End: 2023-10-17
Attending: FAMILY MEDICINE
Payer: COMMERCIAL

## 2023-10-17 DIAGNOSIS — Z96.611 S/P REVERSE TOTAL SHOULDER ARTHROPLASTY, RIGHT: Primary | ICD-10-CM

## 2023-10-17 PROCEDURE — 97110 THERAPEUTIC EXERCISES: CPT | Mod: GP | Performed by: PHYSICAL THERAPIST

## 2023-10-20 ENCOUNTER — THERAPY VISIT (OUTPATIENT)
Dept: PHYSICAL THERAPY | Facility: CLINIC | Age: 78
End: 2023-10-20
Attending: FAMILY MEDICINE
Payer: COMMERCIAL

## 2023-10-20 DIAGNOSIS — Z96.611 S/P REVERSE TOTAL SHOULDER ARTHROPLASTY, RIGHT: Primary | ICD-10-CM

## 2023-10-20 PROCEDURE — 97110 THERAPEUTIC EXERCISES: CPT | Mod: GP | Performed by: PHYSICAL THERAPIST

## 2023-10-24 ENCOUNTER — THERAPY VISIT (OUTPATIENT)
Dept: PHYSICAL THERAPY | Facility: CLINIC | Age: 78
End: 2023-10-24
Attending: FAMILY MEDICINE
Payer: COMMERCIAL

## 2023-10-24 DIAGNOSIS — Z96.611 S/P REVERSE TOTAL SHOULDER ARTHROPLASTY, RIGHT: Primary | ICD-10-CM

## 2023-10-24 PROCEDURE — 97110 THERAPEUTIC EXERCISES: CPT | Mod: GP | Performed by: PHYSICAL THERAPIST

## 2023-10-24 NOTE — PROGRESS NOTES
ASSESSMENT  Mariah continues to make progress. She is tolerating more exercises and more reps of exercises with less pain. Her PROM and AROM are both improving.     PLAN  Continue therapy per current plan of care.    Beginning/End Dates of Progress Note Reporting Period:    to 10/24/2023    Referring Provider:  Srikanth Gonsales     10/24/23 0500   Appointment Info   Signing clinician's name / credentials Sherron Schuler, PT, DPT, OCS   Total/Authorized Visits 365   Visits Used 10   Medical Diagnosis s/p right rTSA   PT Tx Diagnosis Right shoulder pain, decreased ROM, decreased strength   Quick Adds Certification   Progress Note/Certification   Start of Care Date 09/14/23   Onset of illness/injury or Date of Surgery 08/25/23   Therapy Frequency 1-2x a week   Predicted Duration 12 weeks   Certification date from 09/14/23   Certification date to 12/07/23   Progress Note Due Date 12/07/23   GOALS   PT Goals 2;3;4   PT Goal 1   Goal Identifier 1   Goal Description Patient will be able to perform dressing without shoulder pain or difficulty.   Goal Progress still can't get anything on/off over her head   Target Date 10/26/23   PT Goal 2   Goal Identifier 2   Goal Description Patient will be able to reach middle shelf in cupboard without pain or difficulty.   Goal Progress can reach counter items now   Target Date 11/23/23   PT Goal 3   Goal Identifier 3   Goal Description Patient will be able to lift and carry a gallon of milk out of the fridge with minimal pain or difficulty.   Goal Progress no lifting yet   Target Date 12/07/23   PT Goal 4   Goal Identifier 4   Goal Description Patient will be independent and consistent with HEP at least 5x a week to aid functional recovery.   Goal Progress consistent daily   Target Date 12/07/23   Subjective Report   Subjective Report Able to reach further with arm on its own.   Objective Measures   Objective Measures Objective Measure 1;Objective Measure 2;Objective Measure 3    Objective Measure 1   Objective Measure R Shoulder AAROM   Details Flexion on pulley 110   Objective Measure 2   Objective Measure R Shoulder AROM   Details Flexion 90, Abduction 90   Objective Measure 3   Objective Measure R Shoulder PROM   Details Flexion 105, Abduction 100, ER(45) 20, IR(45) 60   Treatment Interventions (PT)   Interventions Therapeutic Procedure/Exercise   Therapeutic Procedure/Exercise   Therapeutic Procedures: strength, endurance, ROM, flexibillity minutes (45365) 30   Ther Proc 1 - Details PROM R shoulder all directions. pulleys into flexion and scaption. wand ER 2 x 10 with towel and back against wall to dec compensation.  supine wand press ups x 10 indep. wall climb able to do R arm only up to 90, then use left hand assist to get up higher x 10. wand IR x 10. wand flexion x 10. wand abduction x 10.   Skilled Intervention ROM   Patient Response/Progress tolerating more reps with exercises   Education   Learner/Method Patient;Listening   Plan   Updates to plan of care next follow up with Dr. Gonsales is in December   Plan for next session cont PROM, AAROM   Comments   Comments s/p 8.5 weeks reverse TSA   Total Session Time   Timed Code Treatment Minutes 30   Total Treatment Time (sum of timed and untimed services) 30         Please contact me with any questions or concerns.  Thank you for your referral.    Sherron Schuler, PT, DPT, OCS  Physical Therapist, Orthopedic Certified Specialist    [facility]  [address] [address]  [email or web address]   Office: [phone]   Employed by [facility]

## 2023-10-25 NOTE — NURSING NOTE
"Chief Complaint   Patient presents with     Derm Problem     skin check       Initial /71 (BP Location: Left arm, Patient Position: Chair, Cuff Size: Adult Regular)  Pulse 64  SpO2 99% Estimated body mass index is 24.03 kg/(m^2) as calculated from the following:    Height as of 1/31/17: 1.626 m (5' 4\").    Weight as of 1/31/17: 63.5 kg (140 lb).  Medication Reconciliation: complete     Steff Guevara CMA      "
25-Oct-2023 13:26

## 2023-10-26 ENCOUNTER — THERAPY VISIT (OUTPATIENT)
Dept: PHYSICAL THERAPY | Facility: CLINIC | Age: 78
End: 2023-10-26
Attending: FAMILY MEDICINE
Payer: COMMERCIAL

## 2023-10-26 DIAGNOSIS — Z96.611 S/P REVERSE TOTAL SHOULDER ARTHROPLASTY, RIGHT: Primary | ICD-10-CM

## 2023-10-26 PROCEDURE — 97110 THERAPEUTIC EXERCISES: CPT | Mod: GP | Performed by: PHYSICAL THERAPIST

## 2023-11-02 ENCOUNTER — THERAPY VISIT (OUTPATIENT)
Dept: PHYSICAL THERAPY | Facility: CLINIC | Age: 78
End: 2023-11-02
Attending: FAMILY MEDICINE
Payer: COMMERCIAL

## 2023-11-02 DIAGNOSIS — Z96.611 S/P REVERSE TOTAL SHOULDER ARTHROPLASTY, RIGHT: Primary | ICD-10-CM

## 2023-11-02 PROCEDURE — 97110 THERAPEUTIC EXERCISES: CPT | Mod: GP | Performed by: PHYSICAL THERAPIST

## 2023-11-04 NOTE — PROGRESS NOTES
PHYSICAL THERAPY EVALUATION  Type of Visit: Evaluation    See electronic medical record for Abuse and Falls Screening details.    Subjective       Presenting condition or subjective complaint:  s/p right reverse TSA on 8/25/23, recently discharged from home care. Taking tylenol and ibuprofen for pain. Not taking any prescription pain meds.     Date of onset: 08/25/23    Relevant medical history:     Dates & types of surgery:  R reverse TSA 8/25/23    Prior diagnostic imaging/testing results:       Prior therapy history for the same diagnosis, illness or injury:        Prior Level of Function  Transfers: Independent  Ambulation: Independent  ADL: Independent  IADL:  independent    Living Environment  Social support:     Type of home:     Stairs to enter the home:         Ramp:     Stairs inside the home:         Help at home:    Equipment owned:       Employment:      Hobbies/Interests:      Patient goals for therapy:      Pain assessment: Pain present     Objective   SHOULDER EVALUATION  PAIN:   INTEGUMENTARY (edema, incisions): incisions well healed  POSTURE:   GAIT:   Weightbearing Status:   Assistive Device(s):   Gait Deviations:   BALANCE/PROPRIOCEPTION:   WEIGHTBEARING ALIGNMENT:   ROM:   (Degrees) Left AROM Left PROM Right AROM  Right PROM   Shoulder Flexion    50   Shoulder Extension       Shoulder Abduction    45   Shoulder Adduction       Shoulder Internal Rotation    To stomach   Shoulder External Rotation       Shoulder Horizontal Abduction       Shoulder Horizontal Adduction       Shoulder Flexion ER       Shoulder Flexion IR       Elbow Extension   wnl    Elbow Flexion   wnl    Pain:   End feel:   AROM of wrist and hand wnl    STRENGTH:   FLEXIBILITY:   SPECIAL TESTS:   PALPATION:  tender throughout right shoulder  JOINT MOBILITY:   CERVICAL SCREEN:     Assessment & Plan   CLINICAL IMPRESSIONS  Medical Diagnosis: s/p right rTSA    Treatment Diagnosis: Right shoulder pain, decreased ROM, decreased  strength   Impression/Assessment: Patient is a 77 year old female with right shoulder complaints.  The following significant findings have been identified: Pain, Decreased ROM/flexibility, and Decreased strength. These impairments interfere with their ability to perform self care tasks, recreational activities, household chores, driving , household mobility, and community mobility as compared to previous level of function.     Clinical Decision Making (Complexity):  Clinical Presentation: Stable/Uncomplicated  Clinical Presentation Rationale: based on medical and personal factors listed in PT evaluation  Clinical Decision Making (Complexity): Low complexity    PLAN OF CARE  Treatment Interventions:  Interventions: Manual Therapy, Neuromuscular Re-education, Therapeutic Activity, Therapeutic Exercise, Self-Care/Home Management    Long Term Goals     PT Goal 1  Goal Identifier: 1  Goal Description: Patient will be able to perform dressing without shoulder pain or difficulty.  Target Date: 10/26/23  PT Goal 2  Goal Identifier: 2  Goal Description: Patient will be able to reach middle shelf in cupboard without pain or difficulty.  Target Date: 11/23/23  PT Goal 3  Goal Identifier: 3  Goal Description: Patient will be able to lift and carry a gallon of milk out of the fridge with minimal pain or difficulty.  Target Date: 12/07/23  PT Goal 4  Goal Identifier: 4  Goal Description: Patient will be independent and consistent with HEP at least 5x a week to aid functional recovery.  Target Date: 12/07/23      Frequency of Treatment: 1-2x a week  Duration of Treatment: 12 weeks    Recommended Referrals to Other Professionals:   Education Assessment:   Learner/Method: Patient;Listening    Risks and benefits of evaluation/treatment have been explained.   Patient/Family/caregiver agrees with Plan of Care.     Evaluation Time:     PT Eval, Low Complexity Minutes (74574): 15       Signing Clinician: CHRISS Atkins  [facility]                                                                                   OUTPATIENT PHYSICAL THERAPY      PLAN OF TREATMENT FOR OUTPATIENT REHABILITATION   Patient's Last Name, First Name, Mariah Boyd YOB: 1945   Provider's Name   [facility]   Medical Record No.  [id number]     Onset Date: 08/25/23  Start of Care Date: 09/14/23     Medical Diagnosis:  s/p right rTSA      PT Treatment Diagnosis:  Right shoulder pain, decreased ROM, decreased strength Plan of Treatment  Frequency/Duration: 1-2x a week/ 12 weeks    Certification date from 09/14/23 to 12/07/23         See note for plan of treatment details and functional goals     Sherron Schuler, PT                         I CERTIFY THE NEED FOR THESE SERVICES FURNISHED UNDER        THIS PLAN OF TREATMENT AND WHILE UNDER MY CARE .             Physician Signature               Date    X_____________________________________________________                    Referring Provider:  Srikanth Gonsales      Initial Assessment  See Epic Evaluation- Start of Care Date: 09/14/23                 hair removal not indicated

## 2023-11-14 ENCOUNTER — THERAPY VISIT (OUTPATIENT)
Dept: PHYSICAL THERAPY | Facility: CLINIC | Age: 78
End: 2023-11-14
Attending: ORTHOPAEDIC SURGERY
Payer: COMMERCIAL

## 2023-11-14 DIAGNOSIS — Z96.611 S/P REVERSE TOTAL SHOULDER ARTHROPLASTY, RIGHT: Primary | ICD-10-CM

## 2023-11-14 PROCEDURE — 97110 THERAPEUTIC EXERCISES: CPT | Mod: GP | Performed by: PHYSICAL THERAPIST

## 2023-11-16 ENCOUNTER — THERAPY VISIT (OUTPATIENT)
Dept: PHYSICAL THERAPY | Facility: CLINIC | Age: 78
End: 2023-11-16
Attending: ORTHOPAEDIC SURGERY
Payer: COMMERCIAL

## 2023-11-16 DIAGNOSIS — Z96.611 S/P REVERSE TOTAL SHOULDER ARTHROPLASTY, RIGHT: Primary | ICD-10-CM

## 2023-11-16 PROCEDURE — 97530 THERAPEUTIC ACTIVITIES: CPT | Mod: GP | Performed by: PHYSICAL THERAPIST

## 2023-11-16 PROCEDURE — 97110 THERAPEUTIC EXERCISES: CPT | Mod: GP | Performed by: PHYSICAL THERAPIST

## 2023-11-21 ENCOUNTER — THERAPY VISIT (OUTPATIENT)
Dept: PHYSICAL THERAPY | Facility: CLINIC | Age: 78
End: 2023-11-21
Attending: ORTHOPAEDIC SURGERY
Payer: COMMERCIAL

## 2023-11-21 DIAGNOSIS — Z96.611 S/P REVERSE TOTAL SHOULDER ARTHROPLASTY, RIGHT: Primary | ICD-10-CM

## 2023-11-21 PROCEDURE — 97110 THERAPEUTIC EXERCISES: CPT | Mod: GP | Performed by: PHYSICAL THERAPIST

## 2023-11-28 ENCOUNTER — THERAPY VISIT (OUTPATIENT)
Dept: PHYSICAL THERAPY | Facility: CLINIC | Age: 78
End: 2023-11-28
Attending: ORTHOPAEDIC SURGERY
Payer: COMMERCIAL

## 2023-11-28 DIAGNOSIS — Z96.611 S/P REVERSE TOTAL SHOULDER ARTHROPLASTY, RIGHT: Primary | ICD-10-CM

## 2023-11-28 PROCEDURE — 97110 THERAPEUTIC EXERCISES: CPT | Mod: GP | Performed by: PHYSICAL THERAPIST

## 2023-11-30 ENCOUNTER — THERAPY VISIT (OUTPATIENT)
Dept: PHYSICAL THERAPY | Facility: CLINIC | Age: 78
End: 2023-11-30
Attending: ORTHOPAEDIC SURGERY
Payer: COMMERCIAL

## 2023-11-30 DIAGNOSIS — Z96.611 S/P REVERSE TOTAL SHOULDER ARTHROPLASTY, RIGHT: Primary | ICD-10-CM

## 2023-11-30 DIAGNOSIS — N95.2 ATROPHIC VAGINITIS: ICD-10-CM

## 2023-11-30 PROCEDURE — 97110 THERAPEUTIC EXERCISES: CPT | Mod: GP | Performed by: PHYSICAL THERAPIST

## 2023-11-30 RX ORDER — ESTRADIOL 0.1 MG/G
CREAM VAGINAL
Qty: 43 G | Refills: 4 | OUTPATIENT
Start: 2023-11-30

## 2023-11-30 NOTE — TELEPHONE ENCOUNTER
- Expect pancytopenia post chemo and transplant  - Daily CBC while inppatient  - Continue ppx antimicrobials per protocol   estradiol (ESTRACE) 0.1 MG/GM vaginal cream

## 2023-12-05 ENCOUNTER — THERAPY VISIT (OUTPATIENT)
Dept: PHYSICAL THERAPY | Facility: CLINIC | Age: 78
End: 2023-12-05
Attending: ORTHOPAEDIC SURGERY
Payer: COMMERCIAL

## 2023-12-05 DIAGNOSIS — Z96.611 S/P REVERSE TOTAL SHOULDER ARTHROPLASTY, RIGHT: Primary | ICD-10-CM

## 2023-12-05 PROCEDURE — 97110 THERAPEUTIC EXERCISES: CPT | Mod: GP | Performed by: PHYSICAL THERAPIST

## 2023-12-06 ENCOUNTER — TRANSFERRED RECORDS (OUTPATIENT)
Dept: HEALTH INFORMATION MANAGEMENT | Facility: CLINIC | Age: 78
End: 2023-12-06
Payer: COMMERCIAL

## 2023-12-07 ENCOUNTER — THERAPY VISIT (OUTPATIENT)
Dept: PHYSICAL THERAPY | Facility: CLINIC | Age: 78
End: 2023-12-07
Attending: ORTHOPAEDIC SURGERY
Payer: COMMERCIAL

## 2023-12-07 DIAGNOSIS — Z96.611 S/P REVERSE TOTAL SHOULDER ARTHROPLASTY, RIGHT: Primary | ICD-10-CM

## 2023-12-07 PROCEDURE — 97110 THERAPEUTIC EXERCISES: CPT | Mod: GP | Performed by: PHYSICAL THERAPIST

## 2023-12-14 ENCOUNTER — TELEPHONE (OUTPATIENT)
Dept: FAMILY MEDICINE | Facility: CLINIC | Age: 78
End: 2023-12-14
Payer: COMMERCIAL

## 2023-12-14 DIAGNOSIS — U07.1 INFECTION DUE TO 2019 NOVEL CORONAVIRUS: Primary | ICD-10-CM

## 2023-12-14 NOTE — TELEPHONE ENCOUNTER
RN COVID TREATMENT VISIT  12/14/23      The patient has been triaged and does not require a higher level of care.    Mariah Jacinto  78 year old  Current weight? 139 lbs    Has the patient been seen by a primary care provider at an [facility] or [facility] within the past two years? Yes.   Have you been in close proximity to/do you have a known exposure to a person with a confirmed case of influenza? No.     General treatment eligibility:  Date of positive COVID test (PCR or at home)?  12/14    Are you or have you been hospitalized for this COVID-19 infection? No.   Have you received monoclonal antibodies or antiviral treatment for COVID-19 since this positive test? No.   Do you have any of the following conditions that place you at risk of being very sick from COVID-19?   - Age 50 years or older  Yes, patient has at least one high risk condition as noted above.     Current COVID symptoms:   - cough  - fatigue  - sore throat  - congestion or runny nose  Yes. Patient has at least one symptom as selected.     How many days since symptoms started? 5 days or less. Established patient, 12 years or older weighing at least 88.2 lbs, who has symptoms that started in the past 5 days, has not been hospitalized nor received treatment already, and is at risk for being very sick from COVID-19.     Treatment eligibility by RN:  Are you currently pregnant or nursing? No  Do you have a clinically significant hypersensitivity to nirmatrelvir or ritonavir, or toxic epidermal necrolysis (TEN) or Castro-Jeffry Syndrome? No  Do you have a history of hepatitis, any hepatic impairment on the Problem List (such as Child-Yates Class C, cirrhosis, fatty liver disease, alcoholic liver disease), or was the last liver lab (hepatic panel, ALT, AST, ALK Phos, bilirubin) elevated in the past 6 months? No  Do you have any history of severe renal impairment (eGFR < 30mL/min)? No    Is patient eligible to continue?  Yes, patient meets all eligibility requirements for the RN COVID treatment (as denoted by all no responses above).     Current Outpatient Medications   Medication Sig Dispense Refill    acetaminophen (TYLENOL) 325 MG tablet Take 325 mg by mouth once      aspirin 81 MG EC tablet Take 1 tablet (81 mg) by mouth 2 times daily 60 tablet 0    estradiol (ESTRACE) 0.1 MG/GM vaginal cream Place vaginally twice a week 1 g  twice weekly at bedtime 43 g 4    melatonin 5 MG CAPS Take 5 mg by mouth At Bedtime 1 capsule 0    MULTI-VITAMIN OR TABS 1 TABLET DAILY      omeprazole (PRILOSEC) 40 MG DR capsule Take 1 capsule by mouth once daily 90 capsule 3    simvastatin (ZOCOR) 20 MG tablet Take 1 tablet (20 mg) by mouth At Bedtime 90 tablet 3    VITAMIN D OR 1 DAILY         Medications from List 1 of the standing order (on medications that exclude the use of Paxlovid) that patient is taking: NONE. Is patient taking Trupti's Wort? No  Is patient taking Oilton's Wort or any meds from List 1? No.   Medications from List 2 of the standing order (on meds that provider needs to adjust) that patient is taking: NONE. Is patient on any of the meds from List 2? No.   Medications from List 3 of standing order (on meds that a RN needs to adjust) that patient is taking: simvastatin (Zocor, FloLipid): Instructed patient to stop taking simvastatin while taking Paxlovid and first dose of Paxlovid must be at least 12 hours after last dose of simvastatin.  Instructed to restart simvastatin 5 days after the completion of Paxlovid.  Is patient on any meds from List 3? Yes. Patient is on meds from list 3. No meds require a provider visit and at least one med required RN to adjust.     Paxlovid has an approximate 90% reduction in hospitalization. Paxlovid can possibly cause altered sense of taste, diarrhea (loose, watery stools), high blood pressure, muscle aches.     Would patient like a Paxlovid prescription?   Yes.   Lab Results   Component Value  Date    GFRESTIMATED 77 08/23/2023       Was last eGFR reduced? No, eGFR 60 or greater/ No Result on record. Patient can receive the normal renal function dose. Paxlovid Rx sent to [facility]   [place], [place]    Temporary change to home medications: stop taking simvastatin while taking paxlovid. Last dose for pt was Tues 12/12. Ok to start today when she picks up med as this is over 12 hrs after last dose. May restart simvastatin 5 days after completion of paxlovid.    All medication adjustments (holds, etc) were discussed with the patient and patient was asked to repeat back (teachback) their med adjustment.  Did patient understand med adjustment? Yes, patient repeated back and understood correctly.        Reviewed the following instructions with the patient:    Paxlovid (nimatrelvir and ritonavir)    How it works  Two medicines (nirmatrelvir and ritonavir) are taken together. They stop the virus from growing. Less amount of virus is easier for your body to fight.    How to take  Medicine comes in a daily container with both medicine tablets. Take by mouth twice daily (once in the morning, once at night) for 5 days.  The number of tablets to take varies by patient.  Don't chew or break capsules. Swallow whole.    When to take  Take as soon as possible after positive COVID-19 test result, and within 5 days of your first symptoms.    Possible side effects  Can cause altered sense of taste, diarrhea (loose, watery stools), high blood pressure, muscle aches.    Judith Bansal RN

## 2023-12-14 NOTE — TELEPHONE ENCOUNTER
COVID Positive/Requesting COVID treatment    Patient is positive for COVID and requesting treatment options.    Date of positive COVID test (PCR or at home)? Thurs 12/14  Current COVID symptoms: cough, fatigue, sore throat, and congestion or runny nose  Date COVID symptoms began: Tues 12/12    Message should be routed to clinic RN pool. Best phone number to use for call back: [phone]    Pt is unsure if she wants to get treated with PAXLOVID she is wanting to know more about it.    .Kim Sommer PSC

## 2023-12-30 ENCOUNTER — HOSPITAL ENCOUNTER (EMERGENCY)
Facility: CLINIC | Age: 78
Discharge: HOME | End: 2023-12-30
Attending: FAMILY MEDICINE | Admitting: FAMILY MEDICINE
Payer: COMMERCIAL

## 2023-12-30 ENCOUNTER — NURSE TRIAGE (OUTPATIENT)
Dept: NURSING | Facility: CLINIC | Age: 78
End: 2023-12-30
Payer: COMMERCIAL

## 2023-12-30 VITALS
RESPIRATION RATE: 16 BRPM | HEART RATE: 71 BPM | SYSTOLIC BLOOD PRESSURE: 130 MMHG | TEMPERATURE: 97.9 F | DIASTOLIC BLOOD PRESSURE: 69 MMHG | OXYGEN SATURATION: 98 %

## 2023-12-30 DIAGNOSIS — J02.9 PHARYNGITIS, UNSPECIFIED ETIOLOGY: ICD-10-CM

## 2023-12-30 LAB — GROUP A STREP BY PCR: NOT DETECTED

## 2023-12-30 PROCEDURE — 99213 OFFICE O/P EST LOW 20 MIN: CPT | Performed by: FAMILY MEDICINE

## 2023-12-30 PROCEDURE — 87651 STREP A DNA AMP PROBE: CPT | Performed by: FAMILY MEDICINE

## 2023-12-30 PROCEDURE — G0463 HOSPITAL OUTPT CLINIC VISIT: HCPCS | Performed by: FAMILY MEDICINE

## 2023-12-30 RX ORDER — PREDNISONE 20 MG/1
20 TABLET ORAL DAILY
Qty: 5 TABLET | Refills: 0 | Status: SHIPPED | OUTPATIENT
Start: 2023-12-30 | End: 2024-01-04

## 2023-12-30 ASSESSMENT — ENCOUNTER SYMPTOMS
HEMATOLOGIC/LYMPHATIC NEGATIVE: 1
NEUROLOGICAL NEGATIVE: 1
SORE THROAT: 1
CONSTITUTIONAL NEGATIVE: 1
ENDOCRINE NEGATIVE: 1
MUSCULOSKELETAL NEGATIVE: 1
ALLERGIC/IMMUNOLOGIC NEGATIVE: 1
GASTROINTESTINAL NEGATIVE: 1
CARDIOVASCULAR NEGATIVE: 1
RESPIRATORY NEGATIVE: 1
EYES NEGATIVE: 1
PSYCHIATRIC NEGATIVE: 1

## 2023-12-30 NOTE — TELEPHONE ENCOUNTER
"Pt is phoning stating that she tested positive for COVID on 12/14/2023 via a home test - pt completed Paxlovid     Pt is saying that her sore throat has never went away     Rates throat pain 6/10    No fever     Per disposition: See PCP Within 3 Days     Pt will be going to [facility] now for evaluation     Care advice given per protocol and when to call back. Pt verbalized understanding and agrees to plan of care.    Tanya Allison RN  [place] Nurse Advisor  8:44 AM 12/30/2023      Reason for Disposition   [1] Sore throat is the only symptom AND [2] present > 48 hours    Additional Information   Negative: SEVERE difficulty breathing (e.g., struggling for each breath, speaks in single words, stridor)   Negative: Sounds like a life-threatening emergency to the triager   Negative: [1] Diagnosed strep throat AND [2] taking antibiotic AND [3] symptoms continue   Negative: Throat culture results, call about   Negative: Productive cough is main symptom   Negative: Non-productive cough is main symptom   Negative: Hoarseness is main symptom   Negative: Runny nose is main symptom   Negative: Uvula swelling is main symptom   Negative: [1] Drooling or spitting out saliva (because can't swallow) AND [2] normal breathing   Negative: Unable to open mouth completely   Negative: [1] Difficulty breathing AND [2] not severe   Negative: Fever > 104 F (40 C)   Negative: [1] Refuses to drink anything AND [2] for > 12 hours   Negative: [1] Drinking very little AND [2] dehydration suspected (e.g., no urine > 12 hours, very dry mouth, very lightheaded)   Negative: Patient sounds very sick or weak to the triager   Negative: SEVERE (e.g., excruciating) throat pain   Negative: [1] Pus on tonsils (back of throat) AND [2]  fever AND [3] swollen neck lymph nodes (\"glands\")   Negative: [1] Rash AND [2] widespread (especially chest and abdomen)   Negative: Earache also present   Negative: Fever present > 3 days (72 hours)   Negative: Diabetes " mellitus or weak immune system (e.g., HIV positive, cancer chemo, splenectomy, organ transplant, chronic steroids)   Negative: History of rheumatic fever   Negative: [1] Adult is leaving on a trip AND [2] requests an antibiotic NOW   Negative: [1] Positive throat culture or rapid strep test (according to lab, PCP, caller, etc.) AND [2] NO  standing order to call in prescription for antibiotic   Negative: [1] Exposure to family member (or spouse or boyfriend/girlfriend) with test-proven strep AND [2] within last 10 days    Protocols used: Sore Throat-A-AH

## 2023-12-30 NOTE — ED PROVIDER NOTES
History     Chief Complaint   Patient presents with    Pharyngitis     HPI  Mariah Jacinto is a 78 year old female who presents with sore throat. This has been ongoing for about three weeks. She was diagnosed with COVID earlier in the month on the 12 th of Dec. She says she completed her Paxlovid and felt better afterwards. The sore throat has persisted. She reports lots of post nasal drainage and she states that there is some irritation in her throat. Denies any fevers or chills. She has had no respiratory distress. She is otherwise relatively healthy.    Allergies:  Allergies   Allergen Reactions    Biaxin [Clarithromycin]      per pt      Celebrex [Bob-2 Inhibitors]      per pt    Cipro [Ciprofloxacin]      per pt    Darvocet [Propoxyphene N-Apap]     Fleet Phospho Soda [Sodium Phosphate/Biphosphate]      nausea per pt    Morphine     Neurontin [Gabapentin]      per pt    Nortriptyline      per pt    Percocet [Oxycodone-Acetaminophen]     Serzone [Nefazodone Hydrochloride]      per pt    Tegretol [Carbamazepine]     Vicodin [Acetaminophen]      per pt-dizziness    Vioxx      per pt    Vivactil [Protriptyline Hcl]      per pt    Wellbutrin [Bupropion Hcl]     Zithromax [Azithromycin Dihydrate]        Problem List:    Patient Active Problem List    Diagnosis Date Noted    S/P reverse total shoulder arthroplasty, right 09/14/2023     Priority: Medium    S/P reverse total shoulder arthroplasty, unspecified laterality 08/25/2023     Priority: Medium    Dense breast tissue on mammogram 09/27/2017     Priority: Medium    Chronic constipation 06/01/2017     Priority: Medium    Gastroesophageal reflux disease without esophagitis 11/17/2016     Priority: Medium    Subjective tinnitus, bilateral 04/04/2016     Priority: Medium    Trochanteric bursitis 09/25/2013     Priority: Medium    Health Care Home 08/13/2013     Priority: Medium     Keturah Granados RN-PHN   [phone]  A / [facility]  Coordinator /                  Intercostal neuralgia 10/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 Click on Code in the patient header to view. 10/4/2012  Health care directive scanned under the media tab.    Patient has completed an Advance/Health Care Directive (HCD), scanned into Epic Media tab, entry date of 10/4/2012.    Angelita CHANGChris Sommer  March 21, 2019        Hyperlipidemia LDL goal <160 10/31/2010     Priority: Medium    Trigeminal Neuralgia right  06/22/2009     Priority: Medium     Surgery 8/3/09 at    Right retromastoid craniectomy and microvascular decompression of the trigeminal nerve.   DX 2005       Right hip pain 12/15/2008     Priority: Medium    Sensorineural hearing loss 03/01/2007     Priority: Medium     Problem list name updated by automated process. Provider to review      Disorder of bone and cartilage 04/25/2005     Priority: Medium     Problem list name updated by automated process. Provider to review          Past Medical History:    Past Medical History:   Diagnosis Date    Adhesive capsulitis of shoulder     Basal cell carcinoma     Chronic right shoulder pain 06/27/2023    Displacement of cervical intervertebral disc without myelopathy     Esophageal reflux     Major depression in complete remission (H24) 06/22/2009    MENOPAUSE --ERT     OSTEOPENIA     Squamous cell carcinoma        Past Surgical History:    Past Surgical History:   Procedure Laterality Date    ABLATE VEIN VARICOSE RADIO FREQUENCY WITHOUT PHLEBECTOMY MULTIPLE STAB  3/28/2013    Procedure: ABLATE VEIN VARICOSE RADIO FREQUENCY WITHOUT PHLEBECTOMY MULTIPLE STAB;  Bilateral ablation of varicose veins legs-to ultrasound at 0930  ;  Surgeon: Noam Soliman MD;  Location: [facility]    COLONOSCOPY  8/20/2013    Procedure: COLONOSCOPY;  Colonoscopy;  Surgeon: Yuliya Nathan MD;  Location: [facility]  GI    ESOPHAGOSCOPY, GASTROSCOPY, DUODENOSCOPY (EGD), COMBINED N/A 2015    Procedure: COMBINED ESOPHAGOSCOPY, GASTROSCOPY, DUODENOSCOPY (EGD), BIOPSY SINGLE OR MULTIPLE;  Surgeon: Yuliya Nathan MD;  Location: [facility] GI    ESOPHAGOSCOPY, GASTROSCOPY, DUODENOSCOPY (EGD), COMBINED N/A 2017    Procedure: COMBINED ESOPHAGOSCOPY, GASTROSCOPY, DUODENOSCOPY (EGD), BIOPSY SINGLE OR MULTIPLE;  Surgeon: Qasim Bowie MD;  Location: [facility] GI    EXCISE MASS FINGER Right 2019    Procedure: Excision Right Ring Finger Volar Middle Phalanx Mass;  Surgeon: Jake Viveros MD;  Location: [facility] OR    HYSTERECTOMY, PAP NO LONGER INDICATED      has both ovaries out also     HYSTERECTOMY, VAGINAL  1988    Hysterectomy, oophorectomy    PHACOEMULSIFICATION WITH STANDARD INTRAOCULAR LENS IMPLANT Left 3/18/2019    Procedure: Cataract Removal with Implant;  Surgeon: Chapito Phillips MD;  Location: [facility] OR    PHACOEMULSIFICATION WITH STANDARD INTRAOCULAR LENS IMPLANT Right 4/10/2019    Procedure: Cataract Removal with Implant;  Surgeon: Chapito Phillips MD;  Location: [facility] OR    RELEASE TRIGGER FINGER Right 2017    Procedure: RELEASE TRIGGER FINGER;  Right Thumb A1 Pulley Release;  Surgeon: Jake Viveros MD;  Location: [facility] OR    REVERSE ARTHROPLASTY SHOULDER Right 2023    Procedure: Right Reverse total shoulder arthroplasty;  Surgeon: Srikanth Gonsales MD;  Location: [facility] OR    SURGICAL HISTORY OF -       right retromastoid craniectomy and decompression trigeminal nerve    TONSILLECTOMY & ADENOIDECTOMY  child    T&A     ZZC ANESTH,LOWER ARM SURGERY      ulnar nerve decompression - right       Family History:    Family History   Problem Relation Age of Onset    Alzheimer Disease Mother          at age 85    Osteoporosis Mother     Arthritis Mother     Alcohol/Drug Father     Respiratory Father     Eye Disorder Maternal Grandfather         Macular degneration    C.A.D. Brother          Tripple bypass age 57    Cardiovascular Brother     Cancer Brother         leukemia (ALL)    Cardiovascular Brother     Cardiovascular Brother     Neurologic Disorder Son     Heart Disease Son     Melanoma No family hx of     Skin Cancer No family hx of        Social History:  Marital Status:   [5]  Social History     Tobacco Use    Smoking status: Never    Smokeless tobacco: Never   Vaping Use    Vaping Use: Never used   Substance Use Topics    Alcohol use: No    Drug use: No        Medications:    predniSONE (DELTASONE) 20 MG tablet  acetaminophen (TYLENOL) 325 MG tablet  aspirin 81 MG EC tablet  estradiol (ESTRACE) 0.1 MG/GM vaginal cream  melatonin 5 MG CAPS  MULTI-VITAMIN OR TABS  omeprazole (PRILOSEC) 40 MG DR capsule  simvastatin (ZOCOR) 20 MG tablet  VITAMIN D OR          Review of Systems   Constitutional: Negative.    HENT:  Positive for postnasal drip and sore throat. Negative for sneezing.    Eyes: Negative.    Respiratory: Negative.     Cardiovascular: Negative.    Gastrointestinal: Negative.    Endocrine: Negative.    Genitourinary: Negative.    Musculoskeletal: Negative.    Skin: Negative.    Allergic/Immunologic: Negative.    Neurological: Negative.    Hematological: Negative.    Psychiatric/Behavioral: Negative.         Physical Exam   BP: 130/69  Pulse: 71  Temp: 97.9  F (36.6  C)  Resp: 16  SpO2: 98 %      Physical Exam  Constitutional:       Appearance: She is well-developed.   HENT:      Head: Normocephalic and atraumatic.      Right Ear: Tympanic membrane normal.      Left Ear: Tympanic membrane normal.      Mouth/Throat:      Mouth: Mucous membranes are moist. No oral lesions.      Pharynx: No pharyngeal swelling, oropharyngeal exudate, posterior oropharyngeal erythema or uvula swelling.   Cardiovascular:      Rate and Rhythm: Normal rate and regular rhythm.      Heart sounds: Normal heart sounds.   Musculoskeletal:      Cervical back: Normal range of motion and neck supple.    Lymphadenopathy:      Cervical: No cervical adenopathy.   Skin:     General: Skin is warm and dry.   Neurological:      General: No focal deficit present.      Mental Status: She is alert and oriented to person, place, and time.         ED Course                 Procedures             Critical Care time:               No results found for this or any previous visit (from the past 24 hour(s)).    Medications - No data to display    Assessments & Plan (with Medical Decision Making)     I have reviewed the nursing notes.    I have reviewed the findings, diagnosis, plan and need for follow up with the patient.          Medical Decision Making  The patient's presentation was of low complexity (an acute and uncomplicated illness or injury).    The patient's evaluation involved:  history and exam without other MDM data elements    The patient's management necessitated only low risk treatment.        Discharge Medication List as of 12/30/2023  9:50 AM        START taking these medications    Details   predniSONE (DELTASONE) 20 MG tablet Take 1 tablet (20 mg) by mouth daily for 5 days, Disp-5 tablet, R-0, E-Prescribe             Final diagnoses:   Pharyngitis, unspecified etiology   Patient reassured. Recommend low dose prednisone for throat irritation - likely sequale of COVID. Tests were pending at the time of dictation. Patient will be notified of results.     12/30/2023   [facility] EMERGENCY DEPT       Daniel Zapata MD  12/30/23 7223

## 2024-01-03 ENCOUNTER — TELEPHONE (OUTPATIENT)
Dept: FAMILY MEDICINE | Facility: CLINIC | Age: 79
End: 2024-01-03
Payer: COMMERCIAL

## 2024-01-03 NOTE — TELEPHONE ENCOUNTER
Symptoms    Describe your symptoms: Pt diagnosed with COVID 12/12 and was seen in ED 12/30 because she was still having symptoms and was given Rx for Prednisone.  Pt continues to have drainage and sore throat.  Please call patient and advise.      Any pain: Yes: Sore Throat    How long have you been having symptoms: Ongoing      Have you been seen for this:  Yes:     Appointment offered?: No    Triage offered?: Yes:     Home remedies tried:     Preferred Pharmacy:   [facility] - [place], [place] [address]  [address] [address]  Phone: [phone] Fax: [phone] Alternate Fax: [phone], [phone]    Okay to leave a detailed message?: Yes at Home number on file [phone] (home)

## 2024-01-03 NOTE — TELEPHONE ENCOUNTER
Patient calls to follow-up to her inquiry below.   Had COVID a couple weeks ago, then seen recently in UC/ER for ongoing sore throat-had negative strep test and diagnosed with pharyngitis likely related to COVID, post nasal drip, etc.  Patient prescribed prednisone, has finished that and continues with lingering mild sore throat. She denies worsening or new symptoms, denies SOB or concerning symptoms. Denies fever.   RN answered patient's questions regarding isolation per CDC guidelines.  Recommended warm fluids, honey, saltwater gargles as needed and advised follow-up appt in clinic should symptoms worsen or new symptoms like fever develop. Understanding voiced.    Yuliet Ramos RN  [facility]

## 2024-01-04 ENCOUNTER — THERAPY VISIT (OUTPATIENT)
Dept: PHYSICAL THERAPY | Facility: CLINIC | Age: 79
End: 2024-01-04
Attending: ORTHOPAEDIC SURGERY
Payer: COMMERCIAL

## 2024-01-04 DIAGNOSIS — Z96.611 S/P REVERSE TOTAL SHOULDER ARTHROPLASTY, RIGHT: Primary | ICD-10-CM

## 2024-01-04 PROCEDURE — 97110 THERAPEUTIC EXERCISES: CPT | Mod: GP | Performed by: PHYSICAL THERAPIST

## 2024-01-04 NOTE — PROGRESS NOTES
DISCHARGE  Reason for Discharge: Most goals met. Patient independent with HEP.     Equipment Issued: therband    Discharge Plan: Patient to continue home program.    Referring Provider:  Srikanth Gonsales     01/04/24 0500   Appointment Info   Signing clinician's name / credentials Sherron Schuler, PT, DPT, OCS   Total/Authorized Visits 365   Visits Used 20   Medical Diagnosis s/p right rTSA   PT Tx Diagnosis Right shoulder pain, decreased ROM, decreased strength   Quick Adds Certification   Progress Note/Certification   Start of Care Date 09/14/23   Onset of illness/injury or Date of Surgery 08/25/23   Therapy Frequency 1-2x a week   Predicted Duration 12 weeks   Certification date from 09/14/23   Certification date to 12/07/23   Progress Note Due Date 12/07/23   Progress Note Completed Date 10/24/23  (visit 10)   GOALS   PT Goals 2;3;4   PT Goal 1   Goal Identifier 1   Goal Description Patient will be able to perform dressing without shoulder pain or difficulty.   Goal Progress practiced a turtle neck today, was able to do independently without pain but with minor difficulty   Target Date 10/26/23   Date Met 01/04/24   PT Goal 2   Goal Identifier 2   Goal Description Patient will be able to reach middle shelf in cupboard without pain or difficulty.   Target Date 11/23/23   Date Met 01/04/24   PT Goal 3   Goal Identifier 3   Goal Description Patient will be able to lift and carry a gallon of milk out of the fridge with minimal pain or difficulty.   Goal Progress not a full gallon yet but able to do partial   Target Date 12/07/23   PT Goal 4   Goal Identifier 4   Goal Description Patient will be independent and consistent with HEP at least 5x a week to aid functional recovery.   Goal Progress consistent daily   Target Date 12/07/23   Date Met 01/04/24   Subjective Report   Subjective Report Shoulder has been doing well. Able to reach higher with having to help the arm.   Objective Measures   Objective Measures  Objective Measure 1;Objective Measure 2;Objective Measure 3   Objective Measure 1   Objective Measure R Shoulder AAROM   Objective Measure 2   Objective Measure R Shoulder AROM   Details Flexion 125, Abduction 120   Objective Measure 3   Objective Measure R Shoulder PROM   Treatment Interventions (PT)   Interventions Therapeutic Procedure/Exercise;Therapeutic Activity   Therapeutic Procedure/Exercise   Therapeutic Procedures: strength, endurance, ROM, flexibillity minutes (63729) 30   Ther Proc 1 - Details PROM R shoulder all directions. ROM assessment. Review of current HEP: Exercise Name: Wand Shoulder External Rotation in Standing, Sets: 1-2 - Reps: 10 - Sessions: 2  Exercise Name: Wand Press Ups, Sets: 1 - Reps: 8-10 - Sessions: 1  Exercise Name: Wall Climb, Sets: 1 - Reps: 5-10 - Sessions: 2, Notes: reach right arm up as high as you can on it's own, then use the left arm to get a little higher  Exercise Name: Pulley Shoulder Flexion, Sets: 1 - Reps: 10-20 - Sessions: 2  Exercise Name: Pulley Shoulder Scaption - Reps: 10-20 - Sessions: 2  Exercise Name: Wand Shoulder Internal Rotation, Sets: 1 - Reps: 10 - Sessions: 2  Exercise Name: Wand Shoulder Flexion in Standing, Sets: 1 - Reps: 10 - Sessions: 2  Exercise Name: Shoulder Theraband Rows, Sets: 2-3 - Reps: 10 - Sessions: 1, Notes: yellow band  Exercise Name: Elbow Strengthening Isotonic Flexion, Sets: 2-3 - Reps: 10 - Sessions: 1, Notes: 2#  Exercise Name: Reverse Pendulum Supine or Sidelying - Reps: 10 each direction - Sessions: 1, Notes: 1) Clockwise  2) Counterclockwise  3) Forward/back (small range)  4) side to side (small range)  Exercise Name: Shoulder Sidelying Abduction, Sets: 1-2 - Reps: 10 - Sessions: 1  Exercise Name: PNF Diagonal 1, Sets: 2 - Reps: 10 - Sessions: 1, Notes: yellow band  Exercise Name: Shoulder Horizontal Abduction/Diagonals With Theraband, Sets: 2 - Reps: 10 - Sessions: 1  Exercise Name: Elbow Strengthening Extension with Theraband,  Sets: 2 - Reps: 10 - Sessions: 1   Skilled Intervention ROM strenghtening   Patient Response/Progress tolerated strength progressions well   Education   Learner/Method Patient;Listening   Plan   Updates to plan of care most goals met, discharge   Plan for next session pt to cont HEP on own   Total Session Time   Timed Code Treatment Minutes 30   Total Treatment Time (sum of timed and untimed services) 30         Please contact me with any questions or concerns.  Thank you for your referral.    Sherron Schuler, PT, DPT, OCS  Physical Therapist, Orthopedic Certified Specialist    [facility]  [address] [place] [address]  [email or web address]   Office: [phone]   Employed by [facility]

## 2024-02-05 DIAGNOSIS — E78.5 HYPERLIPIDEMIA LDL GOAL <160: ICD-10-CM

## 2024-02-05 DIAGNOSIS — K21.9 GASTROESOPHAGEAL REFLUX DISEASE WITHOUT ESOPHAGITIS: ICD-10-CM

## 2024-02-05 RX ORDER — OMEPRAZOLE 40 MG/1
CAPSULE, DELAYED RELEASE ORAL
Qty: 90 CAPSULE | Refills: 1 | Status: SHIPPED | OUTPATIENT
Start: 2024-02-05 | End: 2024-08-19

## 2024-02-05 RX ORDER — SIMVASTATIN 20 MG
20 TABLET ORAL AT BEDTIME
Qty: 90 TABLET | Refills: 1 | Status: SHIPPED | OUTPATIENT
Start: 2024-02-05 | End: 2024-08-12

## 2024-02-19 ENCOUNTER — OFFICE VISIT (OUTPATIENT)
Dept: FAMILY MEDICINE | Facility: CLINIC | Age: 79
End: 2024-02-19
Payer: COMMERCIAL

## 2024-02-19 VITALS
TEMPERATURE: 97.9 F | DIASTOLIC BLOOD PRESSURE: 76 MMHG | HEIGHT: 63 IN | BODY MASS INDEX: 24.98 KG/M2 | OXYGEN SATURATION: 98 % | HEART RATE: 70 BPM | SYSTOLIC BLOOD PRESSURE: 139 MMHG | WEIGHT: 141 LBS | RESPIRATION RATE: 20 BRPM

## 2024-02-19 DIAGNOSIS — M77.12 LATERAL EPICONDYLITIS OF LEFT ELBOW: Primary | ICD-10-CM

## 2024-02-19 PROCEDURE — 99213 OFFICE O/P EST LOW 20 MIN: CPT | Performed by: FAMILY MEDICINE

## 2024-02-19 RX ORDER — RESPIRATORY SYNCYTIAL VIRUS VACCINE 120MCG/0.5
0.5 KIT INTRAMUSCULAR ONCE
Qty: 1 EACH | Refills: 0 | Status: CANCELLED | OUTPATIENT
Start: 2024-02-19 | End: 2024-02-19

## 2024-02-19 ASSESSMENT — PAIN SCALES - GENERAL: PAINLEVEL: SEVERE PAIN (7)

## 2024-02-19 NOTE — PROGRESS NOTES
"    Assessment & Plan     Lateral epicondylitis of left elbow  Discussed the causes and treatment of lateral epicondylitis including ice, heat, stretching, massage, NSAIDs, elbow straps, and wrist brace use.  Rest as possible.  OT for hand therapy    - Occupational Therapy Referral; Future        Subjective   Mariah is a 78 year old, presenting for the following health issues:  Elbow Pain        2/19/2024     7:57 AM   Additional Questions   Roomed by Vanessa SANCHEZ CMA   Accompanied by Self     HPI     Elbow Pain  Onset: 4-5 weeks  Description:   Location: Left  Character: Dull to sharp pain if putting pressure on area (getting out of bed)  Intensity: moderate  Progression of Symptoms: Worse at night  Accompanying Signs & Symptoms:  Other symptoms: weakness of arm and swelling  History:   Previous similar pain: no     Precipitating factors:   Trauma or overuse: no   Alleviating factors:  Improved by: acetaminophen    Therapies Tried and outcome: Salon Pas roll on, Tylenol    - Check ears.      Review of Systems  Constitutional, HEENT, cardiovascular, pulmonary, gi and gu systems are negative, except as otherwise noted.      Objective    /76   Pulse 70   Temp 97.9  F (36.6  C) (Tympanic)   Resp 20   Ht 1.6 m (5' 3\")   Wt 64 kg (141 lb)   LMP  (LMP Unknown)   SpO2 98%   BMI 24.98 kg/m    Body mass index is 24.98 kg/m .  Physical Exam   GENERAL: alert and no distress    EXTREMITIES: Effected elbow is tender over lateral epicondyle. Resisted external rotation and extension of wrist and fingers reproduces pain.  X-ray is normal.              Signed Electronically by: Eli Sommer MD    "

## 2024-02-19 NOTE — PATIENT INSTRUCTIONS
"    When you are out of refills or the refills say \"zero\", it is time to schedule your next appointment in clinic!    Labs are released to you almost immediately and sometimes before I have had a chance to review them.  I review labs regularly and once they are all in, you will either be sent a letter with your results or if you are signed up for on-line services, you will be notified that results are available to you on [employer]. If there are serious findings, you typically will be called.    If you have any questions about your visit, your symptoms, your medication, your test results or it is not clear what your diagnosis or treatment plan is please contact me (via [employer]) or call the care team at [phone] option #2          "

## 2024-03-12 ENCOUNTER — THERAPY VISIT (OUTPATIENT)
Dept: OCCUPATIONAL THERAPY | Facility: CLINIC | Age: 79
End: 2024-03-12
Attending: FAMILY MEDICINE
Payer: COMMERCIAL

## 2024-03-12 DIAGNOSIS — M77.12 LATERAL EPICONDYLITIS OF LEFT ELBOW: ICD-10-CM

## 2024-03-12 PROCEDURE — 97535 SELF CARE MNGMENT TRAINING: CPT | Mod: GO | Performed by: OCCUPATIONAL THERAPIST

## 2024-03-12 PROCEDURE — 97110 THERAPEUTIC EXERCISES: CPT | Mod: GO | Performed by: OCCUPATIONAL THERAPIST

## 2024-03-12 PROCEDURE — 97165 OT EVAL LOW COMPLEX 30 MIN: CPT | Mod: GO | Performed by: OCCUPATIONAL THERAPIST

## 2024-03-12 NOTE — PROGRESS NOTES
OCCUPATIONAL THERAPY EVALUATION  Type of Visit: Evaluation    See electronic medical record for Abuse and Falls Screening details.    Subjective      Presenting condition or subjective complaint:  Patient relates that she had right shoulder replacement in August of 23 and she was having trouble getting things on and off in the fall. She said she had difficulty getting seatbelt and thinks that may have been what initially caused the pain.  Complains of an on and off pain in left lateral elbow .  Date of onset: 02/12/24    Relevant medical history:   none  Dates & types of surgery:  right shoulder surgery last year        Prior therapy history for the same diagnosis, illness or injury:    no    Prior Level of Function     ADL: Independent  IADL:  independent    Living Environment  Social support:   lives with son who is buying her home  Hobbies/Interests:  playing cards, taking care of plants, reading, cleaning, cooking, laundry, choir at [employer]    Patient goals for therapy:  Patient would like to be able to get dressed and pull on seatbelt without pain    Pain assessment: See objective evaluation for additional pain details     Objective   ADDITIONAL HISTORY:  Right hand dominant  Patient reports symptoms of pain and edema  Transportation: drives  Currently retired    Functional Outcome Measure:   UEFI 41/80    PAIN:  Pain Level at Rest: 3/10  Pain Level with Use: 7/10  Pain Location: elbow  Pain Quality: Aching and Sharp  Pain Frequency: intermittent  Pain is Worst: daytime or nighttime  Pain is Exacerbated By: rest and activity - not sure of exact thing that makes it worse  Pain is Relieved By: otc medications  Pain Progression: Unchanged    POSTURE: Normal     EDEMA:  Right elbow crease 23.5, left 24 cm          SENSATION: WNL throughout all nerve distributions; per patient report         ROM: ETL: right- 60, Left 45, other motions WNL             STRENGTH:     Measured in pounds 3/12/2024 3/12/2024    Left  Right   90 degrees 55 55             Full extension 48 6/10 pain 58   Increased pain with resistance to EDC, ECRB      Deferred due to pain    PALPATION: Increased pain with palpation to lateral epicondyle mostly with associated pain with palpation to extensor wad as well, pain at PIN as well           Assessment & Plan   CLINICAL IMPRESSIONS  Medical Diagnosis: Left Lateral Epicondylitis    Treatment Diagnosis: decreased ADL's IADL's    Impression/Assessment: Pt is a 78 year old female presenting to Occupational Therapy due to left elbow pain.  The following significant findings have been identified: Impaired ROM, Impaired strength, and Pain.  These identified deficits interfere with their ability to perform self care tasks, recreational activities, driving , and meal planning and preparation as compared to previous level of function.     Clinical Decision Making (Complexity):  Assessment of Occupational Performance: 3-5 Performance Deficits  Occupational Performance Limitations: bathing/showering, dressing, hygiene and grooming, driving and community mobility, home establishment and management, and sleep  Clinical Decision Making (Complexity): Low complexity    PLAN OF CARE  Treatment Interventions:  Modalities: Ultrasound  Interventions: Self-Care/Home Management, Therapeutic Activity, Therapeutic Exercise, Manual Therapy, Orthotic Fitting/Training    Long Term Goals   OT Goal 1  Goal Identifier: home program  Goal Description: Patient to be independent with home program, not needing more than 15% cuing  Rationale: In order to maximize safety and independence with ADL/IADLs  Target Date: 04/02/24  OT Goal 2  Goal Identifier: dressing  Goal Description: Patient to report 80% improvement in being able to get dressed without pain  Rationale: In order to maximize safety and independence with ADL/IADLs  Target Date: 04/23/24  OT Goal 3  Goal Identifier: put on seatbelt  Goal Description: Patient to be able to fasten  seatbelt with less than 2/10 pain  Rationale: In order to maximize safety and independence with ADL/IADLs  Target Date: 05/07/24      Frequency of Treatment: 1x/week  Duration of Treatment: 8 weeks     Recommended Referrals to Other Professionals:         Education Assessment: Learner/Method: Patient;Listening;Reading;Demonstration;Pictures/Video;No Barriers to Learning     Risks and benefits of evaluation/treatment have been explained.   Patient/Family/caregiver agrees with Plan of Care.     Evaluation Time:    OT Eval, Low Complexity Minutes (48100): 25       Signing Clinician: DARWIN Sandoval [facility]                                                                                   OUTPATIENT OCCUPATIONAL THERAPY      PLAN OF TREATMENT FOR OUTPATIENT REHABILITATION   Patient's Last Name, First Name, Mariah Boyd YOB: 1945   Provider's Name   [facility]   Medical Record No.  [id number]     Onset Date: 02/12/24 Start of Care Date: 03/12/24     Medical Diagnosis:  Left Lateral Epicondylitis      OT Treatment Diagnosis:  decreased ADL's IADL's Plan of Treatment  Frequency/Duration:1x/week/8 weeks    Certification date from 03/12/24   To 05/07/24        See note for plan of treatment details and functional goals     Rosaura Hernandez OT                         I CERTIFY THE NEED FOR THESE SERVICES FURNISHED UNDER        THIS PLAN OF TREATMENT AND WHILE UNDER MY CARE     (Physician attestation of this document indicates review and certification of the therapy plan).              Referring Provider:  Eli Sommer    Initial Assessment  See Epic Evaluation- 03/12/24

## 2024-03-15 ENCOUNTER — NURSE TRIAGE (OUTPATIENT)
Dept: FAMILY MEDICINE | Facility: CLINIC | Age: 79
End: 2024-03-15
Payer: COMMERCIAL

## 2024-03-15 NOTE — TELEPHONE ENCOUNTER
Patient reports R upper flank/rib pain for about a month.   It hurts to bend over, sit sometimes.   She thinks it's related R shoulder replacement and compensation, as she's had this before.  Denies chest pain, SOB.   Has been alternating ibuprofen, Tylenol with some relief, now is requesting naproxen.  This is not on her medication list.  RN advised a visit, but patient declines.   She declines further triage or appt with PCP.  RN informed patient that naproxen is OTC.  Advised I cannot advise her to take naproxen, as it's not on her medication list, but informed of contraindications such as history of kidney disease, stomach problems, ulcers, blood thinners.  She reports she has none of these and she states she is going to try it OTC and will schedule appt if still not improving in the next week.     Yuliet Ramos RN  [facility]

## 2024-03-15 NOTE — TELEPHONE ENCOUNTER
Symptoms    Describe your symptoms: Rib Pain Rt side  Pt said this started 4 weeks.  Pt has been alternating Ibuprofen and Tylenol.   Pt is wondering what else she can try with out having to come in.  Please Advise.     Preferred Pharmacy:   [facility] [place], [place] - [address]  [address] [address]  Phone: [phone] Fax: [phone] Alternate Fax: [phone], [phone]    Okay to leave a detailed message?: Yes at Home number on file [phone] (home)    Daphne Vill[employer]

## 2024-03-20 ENCOUNTER — TRANSFERRED RECORDS (OUTPATIENT)
Dept: HEALTH INFORMATION MANAGEMENT | Facility: CLINIC | Age: 79
End: 2024-03-20
Payer: COMMERCIAL

## 2024-04-12 ENCOUNTER — OFFICE VISIT (OUTPATIENT)
Dept: FAMILY MEDICINE | Facility: CLINIC | Age: 79
End: 2024-04-12
Payer: COMMERCIAL

## 2024-04-12 ENCOUNTER — NURSE TRIAGE (OUTPATIENT)
Dept: FAMILY MEDICINE | Facility: CLINIC | Age: 79
End: 2024-04-12

## 2024-04-12 ENCOUNTER — ANCILLARY PROCEDURE (OUTPATIENT)
Dept: GENERAL RADIOLOGY | Facility: CLINIC | Age: 79
End: 2024-04-12
Attending: NURSE PRACTITIONER
Payer: COMMERCIAL

## 2024-04-12 VITALS
WEIGHT: 142 LBS | HEART RATE: 76 BPM | RESPIRATION RATE: 16 BRPM | BODY MASS INDEX: 25.16 KG/M2 | TEMPERATURE: 98 F | DIASTOLIC BLOOD PRESSURE: 72 MMHG | OXYGEN SATURATION: 98 % | HEIGHT: 63 IN | SYSTOLIC BLOOD PRESSURE: 128 MMHG

## 2024-04-12 DIAGNOSIS — M25.512 ACUTE PAIN OF LEFT SHOULDER: Primary | ICD-10-CM

## 2024-04-12 DIAGNOSIS — M25.512 ACUTE PAIN OF LEFT SHOULDER: ICD-10-CM

## 2024-04-12 PROCEDURE — 73030 X-RAY EXAM OF SHOULDER: CPT | Mod: TC | Performed by: RADIOLOGY

## 2024-04-12 PROCEDURE — 99213 OFFICE O/P EST LOW 20 MIN: CPT | Performed by: NURSE PRACTITIONER

## 2024-04-12 RX ORDER — TIZANIDINE 2 MG/1
1-2 TABLET ORAL 3 TIMES DAILY
Qty: 30 TABLET | Refills: 1 | Status: SHIPPED | OUTPATIENT
Start: 2024-04-12 | End: 2024-05-29

## 2024-04-12 ASSESSMENT — PAIN SCALES - GENERAL: PAINLEVEL: EXTREME PAIN (9)

## 2024-04-12 NOTE — PATIENT INSTRUCTIONS
I do not see an obvious abnormality on xray today, but I will call you if the radiologist reads this differently.  Continue Aleve as needed for pain.  Can try tizanidine. This may make you sleepy.  PT evaluation.  Let me know if not improving.

## 2024-04-12 NOTE — TELEPHONE ENCOUNTER
"Reason for Disposition    Patient wants to be seen    Additional Information    Negative: Shock suspected (e.g., cold/pale/clammy skin, too weak to stand, low BP, rapid pulse)    Negative: Similar pain previously and it was from 'heart attack'    Negative: Similar pain previously from 'angina' and not relieved by nitroglycerin    Negative: Sounds like a life-threatening emergency to the triager    Negative: Followed an injury to arm    Negative: Chest pain    Negative: Wound looks infected    Negative: Elbow pain is main symptom    Negative: Hand or wrist pain is main symptom    Negative: Difficulty breathing or unusual sweating (e.g., sweating without exertion)    Negative: Chest pain lasting longer than 5 minutes    Negative: Age > 40 and no obvious cause for pain, pain still present even when not moving the arm    Negative: Fever and red area (or area very tender to touch)    Negative: Swollen joint and fever    Negative: Entire arm is swollen    Negative: Patient sounds very sick or weak to the triager    Negative: SEVERE pain (e.g., excruciating, unable to do any normal activities)    Negative: Red area or streak > 2 inches (or 5 cm)    Negative: Cast on wrist or arm and now increasing pain    Negative: Weakness (i.e., loss of strength) in hand or fingers  (Exception: Not truly weak; hand feels weak because of pain.)    Negative: Arm pains with exertion (e.g., occurs with walking; goes away on resting)    Negative: Painful rash with multiple small blisters grouped together (i.e., dermatomal distribution or 'band' or 'stripe')    Negative: Looks like a boil, infected sore, deep ulcer, or other infected rash (spreading redness, pus)    Negative: Localized rash is very painful (no fever)    Negative: Numbness (i.e., loss of sensation) in hand or fingers    Negative: Localized pain, redness or hard lump along vein    Answer Assessment - Initial Assessment Questions  1. ONSET: \"When did the pain start?\"      2 days " "ago  2. LOCATION: \"Where is the pain located?\"      Above left elbow and into left arm pit.  Pain in positional; worsened pain with certain positions but cannot get arm into a position that pt is pain free.  Took 2 Aleve last night with no relief.  3. PAIN: \"How bad is the pain?\" (Scale 1-10; or mild, moderate, severe)    - MILD (1-3): Doesn't interfere with normal activities.    - MODERATE (4-7): Interferes with normal activities (e.g., work or school) or awakens from sleep.    - SEVERE (8-10): Excruciating pain, unable to do any normal activities, unable to hold a cup of water.      moderate  4. WORK OR EXERCISE: \"Has there been any recent work or exercise that involved this part of the body?\"      Denies. 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 CAUSE: \"What do you think is causing the arm pain?\"      Unknown but recent history of tennis elbow on that arm  6. OTHER SYMPTOMS: \"Do you have any other symptoms?\" (e.g., neck pain, swelling, rash, fever, numbness, weakness)      Denies chest pain, tightness, heavyness, or pain radiating into neck or back.  Denies light headed, dizzy,or palpitations.  Denies breathing problems or changes.  Denies nuasea, reflux, or burping  7. PREGNANCY: \"Is there any chance you are pregnant?\" \"When was your last menstrual period?\"    Protocols used: Arm Pain-A-[place]    "

## 2024-04-12 NOTE — PROGRESS NOTES
"  Assessment & Plan     Acute pain of left shoulder  No known injury.  X-ray appears negative on my review.  Question rotator cuff injury.  Can continue NSAIDs as needed for pain.  Recommend physical therapy evaluation.  If not improving over the course the next few weeks, consider MRI for further evaluation.  - XR Shoulder Left G/E 3 Views; Future  - tiZANidine (ZANAFLEX) 2 MG tablet; Take 0.5-1 tablets (1-2 mg) by mouth 3 times daily  - Physical Therapy  Referral; Future        BMI  Estimated body mass index is 25.15 kg/m  as calculated from the following:    Height as of this encounter: 1.6 m (5' 3\").    Weight as of this encounter: 64.4 kg (142 lb).         See Patient Instructions    Nakia Lemus is a 78 year old, presenting for the following health issues:  Musculoskeletal Problem        4/12/2024    10:10 AM   Additional Questions   Roomed by Sherron RANGEL   Accompanied by self         4/12/2024    10:10 AM   Patient Reported Additional Medications   Patient reports taking the following new medications no new meds     HPI     Pain History:  When did you first notice your pain? Started yesterday    Have you seen anyone else for your pain? No  How has your pain affected your ability to work? Not applicable  Where in your body do you have pain? Musculoskeletal problem/pain  Onset/Duration: yesterday, pt is currently treating tennis elbow on left  Description  Location: elbow up and into armpit/ shoulder - left  Joint Swelling: YES- hands are swollen   Redness: No  Pain: YES  Warmth: No  Intensity:  severe  Progression of Symptoms:  worsening  Accompanying signs and symptoms:   Fevers: No  Numbness/tingling/weakness: YES- weakness   History  Trauma to the area: No  Recent illness:  No  Previous similar problem: No  Previous evaluation:  No  Precipitating or alleviating factors:  Aggravating factors include: moving   Therapies tried and outcome: ice, heat, and aleve    Above HPI reviewed. Additionally, " "patient is right-hand dominant.  Yesterday, had a gradual onset of severe left shoulder and upper arm pain.  No weakness, but does have significant pain with abduction and abduction of the shoulder.  Does have some mild left lateral neck pain as well, no significant increase in pain with lateral rotation of the neck.  Has had mild tingling to the palmar surface of her thumb, but no other paresthesias.  Normal  strength.  Denies injury.  No fevers.  No chest pain, shortness of breath, exertional dyspnea, or new neurologic symptoms including headaches, vision changes, dizziness, aphasia, difficulty with word finding, confusion, weakness to any other extremity.        Review of Systems  Constitutional, HEENT, cardiovascular, pulmonary, gi and gu systems are negative, except as otherwise noted.      Objective    /72   Pulse 76   Temp 98  F (36.7  C) (Tympanic)   Resp 16   Ht 1.6 m (5' 3\")   Wt 64.4 kg (142 lb)   LMP  (LMP Unknown)   SpO2 98%   BMI 25.15 kg/m    Body mass index is 25.15 kg/m .  Physical Exam  Vitals and nursing note reviewed.   Constitutional:       General: She is not in acute distress.     Appearance: Normal appearance.   HENT:      Head: Normocephalic and atraumatic.      Mouth/Throat:      Mouth: Mucous membranes are moist.   Cardiovascular:      Rate and Rhythm: Normal rate.   Pulmonary:      Effort: Pulmonary effort is normal.   Musculoskeletal:      Cervical back: Neck supple.      Comments: Left shoulder-no deformity, no skin changes.  There is tenderness to palpation over the supraspinatus, scapular border, AC joint, glenohumeral joint, bicep.  Range of motion is significantly limited by pain.  Unable to do special testing due to pain.  CMS is intact.    Neck-no bony tenderness to palpation.  There is mild tenderness to palpation of the left paracervical musculature.  Normal range of motion of the neck without increased pain.  Negative Spurling's test.   Skin:     General: Skin " is warm and dry.   Neurological:      General: No focal deficit present.      Mental Status: She is alert.   Psychiatric:         Mood and Affect: Mood normal.         Behavior: Behavior normal.            Xray - Reviewed and interpreted by me.  Left shoulder-no obvious abnormality on my review.  Pending radiology interpretation.        Signed Electronically by: VICKY Hay CNP

## 2024-04-16 ENCOUNTER — THERAPY VISIT (OUTPATIENT)
Dept: PHYSICAL THERAPY | Facility: CLINIC | Age: 79
End: 2024-04-16
Attending: NURSE PRACTITIONER
Payer: COMMERCIAL

## 2024-04-16 DIAGNOSIS — M25.512 ACUTE PAIN OF LEFT SHOULDER: Primary | ICD-10-CM

## 2024-04-16 PROCEDURE — 97110 THERAPEUTIC EXERCISES: CPT | Mod: GP | Performed by: PHYSICAL THERAPIST

## 2024-04-16 PROCEDURE — 97161 PT EVAL LOW COMPLEX 20 MIN: CPT | Mod: GP | Performed by: PHYSICAL THERAPIST

## 2024-04-16 NOTE — PROGRESS NOTES
PHYSICAL THERAPY EVALUATION  Type of Visit: Evaluation    See electronic medical record for Abuse and Falls Screening details.    Subjective       Presenting condition or subjective complaint:  Patient states that her L shoulder pain has been slowly coming on. She was using it a lot after her R rTSA in 2023. Her pain in on the top of her left shoulder that can radiate down into her hand. Also having left axillary pain. Pain with reaching, ADLs, lifting, and carrying. Rest is helping with the pain as well as muscle relaxants and Aleve but not enough. Neck pain with reading and looking down.   Date of onset: 23    Relevant medical history:     Dates & types of surgery:      Prior diagnostic imaging/testing results:     Impression:  1.  Moderate-severe degenerative arthritic changes in the glenohumeral  joint, with joint space narrowing and osteophytosis. Normal  acromioclavicular joint.  2.  Left shoulder negative for fracture or bone lesion. Normal joint  alignment.  Prior therapy history for the same diagnosis, illness or injury:           Employment:      Hobbies/Interests:  reading    Patient goals for therapy:  move arm without pain    Pain assessment: Pain present  Location: left shoulder/Ratin/10     Objective   SHOULDER EVALUATION  PAIN: Pain Level at Rest: 6/10  Pain Level with Use: 9/10  Pain Location: shoulder  Pain Quality: Aching, Burning, Sharp, and Throbbing  Pain Frequency: constant  Pain is Worst: daytime or nighttime  Pain is Exacerbated By: reaching, lifting, sleeping  INTEGUMENTARY (edema, incisions): WNL  POSTURE: WNL  ROM:  cervical ROM: WNL, some pulline with R rotation; L shoulder: flexion 74* pain; abduction 85* pain , PROM L shoulder: flexion 125*, abduction 120*  STRENGTH:  did not formally test d/t pain  SPECIAL TESTS:    Left Right   Impingement     Neer's Negative  Negative    Hawkin's-Jax Negative  Negative    Coracoid Impingement     Internal impingement     Labral      Anterior Slide     Ovid's     Crank     Instability     Apprehension (anterior)     Relocation (anterior)     Anterior Load & Shift     Posterior Load & Shift     Posterior instability (with 90 degrees flex)     Multi-Directional Instability      Sulcus     Biceps      Speed's     Rotator Cuff Tear     Drop Arm Positive Negative    Belly Press Positive Negative    Lift off  Positive Negative      PALPATION:  TTP UT, levator scap, pec minor  JOINT MOBILITY:  guarded with AROM, some joint hypomobility  CERVICAL SCREEN: WNL    Assessment & Plan   CLINICAL IMPRESSIONS  Medical Diagnosis: Acute pain of left shoulder    Treatment Diagnosis: left shoulder pain   Impression/Assessment: Patient is a 78 year old female with left shoulder pain complaints.  The following significant findings have been identified: Pain, Decreased ROM/flexibility, Decreased joint mobility, Decreased strength, Impaired muscle performance, and Decreased activity tolerance. These impairments interfere with their ability to perform self care tasks, recreational activities, household chores, and driving  as compared to previous level of function.     Clinical Decision Making (Complexity):  Clinical Presentation: Stable/Uncomplicated  Clinical Presentation Rationale: based on medical and personal factors listed in PT evaluation  Clinical Decision Making (Complexity): Low complexity    PLAN OF CARE  Treatment Interventions:  Modalities: E-stim  Interventions: Manual Therapy, Neuromuscular Re-education, Therapeutic Activity, Therapeutic Exercise, Self-Care/Home Management    Long Term Goals     PT Goal 1  Goal Identifier: 1.  Goal Description: Patient will tolerate ADLs ind in order to get dressed.  Target Date: 05/14/24  PT Goal 2  Goal Identifier: 2.  Goal Description: Patient will have full ROM in L shoulder in order to reach into cupboard and put on seat belt.  Target Date: 06/11/24  PT Goal 3  Goal Identifier: 3.  Goal Description: Patient  will lift light object overhead in order to complete household tasks.  Target Date: 05/28/24  PT Goal 4  Goal Identifier: 4.  Goal Description: Patient will be ind with HEP in order to reduce risk for return of symptoms.  Target Date: 06/11/24      Frequency of Treatment: 1x/week  Duration of Treatment: 12 weeks    Education Assessment:        Risks and benefits of evaluation/treatment have been explained.   Patient/Family/caregiver agrees with Plan of Care.     Evaluation Time:     PT Eval, Low Complexity Minutes (03010): 20   Signing Clinician: CHRISS Campos [facility]                                                                                   OUTPATIENT PHYSICAL THERAPY      PLAN OF TREATMENT FOR OUTPATIENT REHABILITATION   Patient's Last Name, First Name, JENNADANIA LarseneyMariah  JENNA YOB: 1945   Provider's Name   [facility]   Medical Record No.  [id number]     Onset Date: 04/12/24  Start of Care Date: 04/16/24     Medical Diagnosis:  Acute pain of left shoulder      PT Treatment Diagnosis:  left shoulder pain Plan of Treatment  Frequency/Duration: 1x/week/ 12 weeks    Certification date from 04/16/24 to 07/09/24         See note for plan of treatment details and functional goals     Kellen Gan, PT                         I CERTIFY THE NEED FOR THESE SERVICES FURNISHED UNDER        THIS PLAN OF TREATMENT AND WHILE UNDER MY CARE     (Physician attestation of this document indicates review and certification of the therapy plan).              Referring Provider:  Melissa Galindo    Initial Assessment  See Epic Evaluation- Start of Care Date: 04/16/24

## 2024-04-23 ENCOUNTER — THERAPY VISIT (OUTPATIENT)
Dept: PHYSICAL THERAPY | Facility: CLINIC | Age: 79
End: 2024-04-23
Attending: NURSE PRACTITIONER
Payer: COMMERCIAL

## 2024-04-23 DIAGNOSIS — M25.512 ACUTE PAIN OF LEFT SHOULDER: ICD-10-CM

## 2024-04-23 PROCEDURE — 97110 THERAPEUTIC EXERCISES: CPT | Mod: GP | Performed by: PHYSICAL THERAPIST

## 2024-04-30 ENCOUNTER — THERAPY VISIT (OUTPATIENT)
Dept: PHYSICAL THERAPY | Facility: CLINIC | Age: 79
End: 2024-04-30
Attending: NURSE PRACTITIONER
Payer: COMMERCIAL

## 2024-04-30 DIAGNOSIS — M25.512 ACUTE PAIN OF LEFT SHOULDER: Primary | ICD-10-CM

## 2024-04-30 PROCEDURE — 97110 THERAPEUTIC EXERCISES: CPT | Mod: GP | Performed by: PHYSICAL THERAPIST

## 2024-04-30 PROCEDURE — 97140 MANUAL THERAPY 1/> REGIONS: CPT | Mod: GP | Performed by: PHYSICAL THERAPIST

## 2024-05-07 ENCOUNTER — THERAPY VISIT (OUTPATIENT)
Dept: PHYSICAL THERAPY | Facility: CLINIC | Age: 79
End: 2024-05-07
Attending: NURSE PRACTITIONER
Payer: COMMERCIAL

## 2024-05-07 DIAGNOSIS — M25.512 ACUTE PAIN OF LEFT SHOULDER: Primary | ICD-10-CM

## 2024-05-07 PROCEDURE — 97140 MANUAL THERAPY 1/> REGIONS: CPT | Mod: GP | Performed by: PHYSICAL THERAPIST

## 2024-05-07 PROCEDURE — 97110 THERAPEUTIC EXERCISES: CPT | Mod: GP | Performed by: PHYSICAL THERAPIST

## 2024-05-24 ENCOUNTER — THERAPY VISIT (OUTPATIENT)
Dept: PHYSICAL THERAPY | Facility: CLINIC | Age: 79
End: 2024-05-24
Attending: NURSE PRACTITIONER
Payer: COMMERCIAL

## 2024-05-24 DIAGNOSIS — M25.512 ACUTE PAIN OF LEFT SHOULDER: Primary | ICD-10-CM

## 2024-05-24 PROCEDURE — 97110 THERAPEUTIC EXERCISES: CPT | Mod: GP | Performed by: PHYSICAL THERAPIST

## 2024-05-29 ENCOUNTER — OFFICE VISIT (OUTPATIENT)
Dept: FAMILY MEDICINE | Facility: CLINIC | Age: 79
End: 2024-05-29
Payer: COMMERCIAL

## 2024-05-29 VITALS
TEMPERATURE: 98 F | SYSTOLIC BLOOD PRESSURE: 132 MMHG | DIASTOLIC BLOOD PRESSURE: 68 MMHG | RESPIRATION RATE: 16 BRPM | BODY MASS INDEX: 25.12 KG/M2 | WEIGHT: 141.8 LBS | HEART RATE: 74 BPM | OXYGEN SATURATION: 96 %

## 2024-05-29 DIAGNOSIS — J01.90 ACUTE SINUSITIS WITH SYMPTOMS > 10 DAYS: Primary | ICD-10-CM

## 2024-05-29 PROCEDURE — 99213 OFFICE O/P EST LOW 20 MIN: CPT | Performed by: NURSE PRACTITIONER

## 2024-05-29 ASSESSMENT — PAIN SCALES - GENERAL: PAINLEVEL: MILD PAIN (3)

## 2024-05-29 NOTE — PROGRESS NOTES
"  Assessment & Plan     Acute sinusitis with symptoms > 10 days  Discussed with patient that I do not think her symptoms are entirely c/w sinusitis given lack of URI type symptoms, ill symptoms at any point. Recommended CT imaging of the sinuses for further evaluation. Because this has been well treated with Augmentin in the past with identical presentation, patient would like to try Augmentin. If no improvement, she will let me know and proceed with CT.  - amoxicillin-clavulanate (AUGMENTIN) 875-125 MG tablet; Take 1 tablet by mouth 2 times daily for 7 days      BMI  Estimated body mass index is 25.12 kg/m  as calculated from the following:    Height as of 4/12/24: 1.6 m (5' 3\").    Weight as of this encounter: 64.3 kg (141 lb 12.8 oz).       See Patient Instructions    Subjective   Mariah is a 78 year old, presenting for the following health issues:  Sinus Problem        5/29/2024    10:42 AM   Additional Questions   Roomed by Yissel ELISE CMA   Accompanied by Self         5/29/2024    10:42 AM   Patient Reported Additional Medications   Patient reports taking the following new medications None     HPI       Acute Illness  Acute illness concerns: Possible sinus infection  Onset/Duration: 1 month-seen at the dentist and her teeth started hurting  Symptoms:  Fever: No  Chills/Sweats: No  Headache (location?): No  Sinus Pressure: YES  Conjunctivitis:  No  Ear Pain: no  Rhinorrhea: YES- little bit  Congestion: No  Sore Throat: No  Cough: no  Wheeze: No  Decreased Appetite: No  Nausea: No  Vomiting: No  Diarrhea: No  Dysuria/Freq.: No  Dysuria or Hematuria: No  Fatigue/Achiness: YES- little more tired  Sick/Strep Exposure: No  Therapies tried and outcome: Tylenol this morning    *Wondering if she will be able to give blood on Friday*    Above HPI reviewed. Additionally, pain started to lower left teeth about a month ago, then upper left teeth, then left maxillary area. No nasal congestion, mild runny nose. No fever. No " cough. Seen by dentist with xrays, no concerning findings. Has had 2 other similar presentations and treated for sinusitis with Augmentin, complete relief of symptoms.        Review of Systems  Constitutional, neuro, ENT, endocrine, pulmonary, cardiac, gastrointestinal, genitourinary, musculoskeletal, integument and psychiatric systems are negative, except as otherwise noted.      Objective    /68 (BP Location: Left arm, Patient Position: Sitting, Cuff Size: Adult Regular)   Pulse 74   Temp 98  F (36.7  C) (Tympanic)   Resp 16   Wt 64.3 kg (141 lb 12.8 oz)   LMP  (LMP Unknown)   SpO2 96%   BMI 25.12 kg/m    Body mass index is 25.12 kg/m .  Physical Exam  Vitals and nursing note reviewed.   Constitutional:       Appearance: Normal appearance.   HENT:      Head: Normocephalic and atraumatic.      Right Ear: Tympanic membrane and ear canal normal.      Left Ear: Tympanic membrane and ear canal normal.      Nose: No rhinorrhea.      Right Sinus: No maxillary sinus tenderness or frontal sinus tenderness.      Left Sinus: Maxillary sinus tenderness present. No frontal sinus tenderness.      Mouth/Throat:      Lips: Pink.      Mouth: Mucous membranes are moist.      Dentition: Normal dentition. No dental tenderness, gingival swelling, dental caries or dental abscesses.      Pharynx: Oropharynx is clear.   Eyes:      General: Lids are normal.      Conjunctiva/sclera: Conjunctivae normal.      Comments: Non icteric   Cardiovascular:      Rate and Rhythm: Normal rate and regular rhythm.      Pulses: Normal pulses.      Heart sounds: Normal heart sounds, S1 normal and S2 normal.   Pulmonary:      Effort: Pulmonary effort is normal.      Breath sounds: Normal breath sounds and air entry.   Musculoskeletal:      Cervical back: Neck supple.   Lymphadenopathy:      Cervical: No cervical adenopathy.   Skin:     General: Skin is warm and dry.   Neurological:      General: No focal deficit present.      Mental Status:  She is alert and oriented to person, place, and time.   Psychiatric:         Mood and Affect: Mood normal.         Behavior: Behavior normal.         Thought Content: Thought content normal.         Judgment: Judgment normal.                    Signed Electronically by: VICKY Hay CNP

## 2024-05-29 NOTE — PATIENT INSTRUCTIONS
"Take Augmentin twice daily with food. Take a probiotic such as Culturelle or Florastor (recommend lactobacillus 50 billion CFU twice daily  by at least one hour from the antibiotic) while on the antibiotic or eat a Greek yogurt containing \"live active cultures\" daily.    "

## 2024-06-10 ENCOUNTER — TELEPHONE (OUTPATIENT)
Dept: FAMILY MEDICINE | Facility: CLINIC | Age: 79
End: 2024-06-10
Payer: COMMERCIAL

## 2024-06-10 NOTE — TELEPHONE ENCOUNTER
Patient had an appointment with  for elbow 2/19/24 with Dr. Sommer.    Patient provided and charged for an arm brace and insurance charged her $40.00 out of pocket. She went to Physical therapy and they do not want her to wear the arm band.  Patient requesting for a refund for the brace. RN informed patient this will need to go through Patient Relations and provided the phone number to call. [phone].    Agnes Pelayo RN on 6/10/2024 at 2:14 PM

## 2024-06-13 ENCOUNTER — THERAPY VISIT (OUTPATIENT)
Dept: PHYSICAL THERAPY | Facility: CLINIC | Age: 79
End: 2024-06-13
Attending: NURSE PRACTITIONER
Payer: COMMERCIAL

## 2024-06-13 DIAGNOSIS — M25.512 ACUTE PAIN OF LEFT SHOULDER: Primary | ICD-10-CM

## 2024-06-13 PROCEDURE — 97110 THERAPEUTIC EXERCISES: CPT | Mod: GP | Performed by: PHYSICAL THERAPIST

## 2024-06-13 NOTE — PROGRESS NOTES
DISCHARGE  Reason for Discharge: Patient has met all goals.    Discharge Plan: Patient to continue home program.    Referring Provider:  Melissa Galindo       06/13/24 0500   Appointment Info   Signing clinician's name / credentials Kellen Gan, PT, DPT   Total/Authorized Visits 8   Visits Used 6   Medical Diagnosis Acute pain of left shoulder   PT Tx Diagnosis left shoulder pain   Quick Adds Certification   Progress Note/Certification   Start of Care Date 04/16/24   Onset of illness/injury or Date of Surgery 04/12/24   Therapy Frequency 1x/week   Predicted Duration 12 weeks   Certification date from 04/16/24   Certification date to 07/09/24   Progress Note Due Date 07/09/24   Progress Note Completed Date 04/16/24   GOALS   PT Goals 2;3   PT Goal 1   Goal Identifier 1.   Goal Description Patient will tolerate ADLs ind in order to get dressed.   Goal Progress met   Target Date 05/14/24   Date Met 06/13/24   PT Goal 2   Goal Identifier 2.   Goal Description Patient will have full ROM in L shoulder in order to reach into cupboard and put on seat belt.   Goal Progress met   Target Date 06/11/24   Date Met 06/13/24   PT Goal 3   Goal Identifier 3.   Goal Description Patient will lift light object overhead in order to complete household tasks.   Goal Progress met   Target Date 05/28/24   Date Met 06/13/24   PT Goal 4   Goal Identifier 4   Goal Description Patient will be ind with HEP in order to reduce risk for return of symptoms.   Goal Progress met   Target Date 06/11/24   Date Met 06/13/24   Subjective Report   Subjective Report Patient is doing well and okay to DC from PT.   Treatment Interventions (PT)   Interventions Therapeutic Procedure/Exercise   Therapeutic Procedure/Exercise   Therapeutic Procedures: strength, endurance, ROM, flexibility minutes (34028) 25   Ther Proc 1 strength, ROM   Ther Proc 1 - Details shoulder flexion and abduction 2# - 3# x10, bent over x10 5#, tricep extension x10 red TB,  sherly mckeon x10, reviewed HEP shoulder press x10 with 3#   Skilled Intervention VCs and TCs   Patient Response/Progress tolerated well   Plan   Home program PTRx   Plan for next session DC today

## 2024-06-14 ENCOUNTER — OFFICE VISIT (OUTPATIENT)
Dept: FAMILY MEDICINE | Facility: CLINIC | Age: 79
End: 2024-06-14
Payer: COMMERCIAL

## 2024-06-14 VITALS
BODY MASS INDEX: 25.12 KG/M2 | RESPIRATION RATE: 20 BRPM | OXYGEN SATURATION: 98 % | DIASTOLIC BLOOD PRESSURE: 70 MMHG | SYSTOLIC BLOOD PRESSURE: 126 MMHG | HEIGHT: 63 IN | TEMPERATURE: 99 F | HEART RATE: 73 BPM | WEIGHT: 141.8 LBS

## 2024-06-14 DIAGNOSIS — J01.01 ACUTE RECURRENT MAXILLARY SINUSITIS: Primary | ICD-10-CM

## 2024-06-14 PROCEDURE — 99213 OFFICE O/P EST LOW 20 MIN: CPT | Performed by: NURSE PRACTITIONER

## 2024-06-14 RX ORDER — RESPIRATORY SYNCYTIAL VIRUS VACCINE 120MCG/0.5
0.5 KIT INTRAMUSCULAR ONCE
Qty: 1 EACH | Refills: 0 | Status: CANCELLED | OUTPATIENT
Start: 2024-06-14 | End: 2024-06-14

## 2024-06-14 RX ORDER — FLUTICASONE PROPIONATE 50 MCG
1 SPRAY, SUSPENSION (ML) NASAL DAILY
Qty: 16 G | Refills: 1 | Status: SHIPPED | OUTPATIENT
Start: 2024-06-14 | End: 2024-08-19

## 2024-06-14 ASSESSMENT — PAIN SCALES - GENERAL: PAINLEVEL: MILD PAIN (3)

## 2024-06-14 NOTE — PROGRESS NOTES
"  Assessment & Plan     Acute recurrent maxillary sinusitis  Seen in May, treated with Augmentin, no improvement. Discussed at that time that I think she should have imaging. She is amenable to this today. Feels like left side of face is \"full\" some congestion. No fever or chills. She will proceed with imaging, further recommendations after we have results.  - CT Sinus w/o Contrast; Future  - fluticasone (FLONASE) 50 MCG/ACT nasal spray; Spray 1 spray into both nostrils daily        BMI  Estimated body mass index is 25.12 kg/m  as calculated from the following:    Height as of this encounter: 1.6 m (5' 3\").    Weight as of this encounter: 64.3 kg (141 lb 12.8 oz).         See Patient Instructions    Nakia Lemus is a 78 year old, presenting for the following health issues:  Sinus Problem        6/14/2024     1:25 PM   Additional Questions   Roomed by Anahi LEMA     Sinus Problem            Acute Illness  Acute illness concerns: sinuses  Onset/Duration: over 1 month  Symptoms:  Fever: No  Chills/Sweats: No  Headache (location?): No  Sinus Pressure: YES- some  Conjunctivitis:  No  Ear Pain: no  Rhinorrhea: YES- little  Congestion: No  Sore Throat: No  Cough: no  Wheeze: No  Decreased Appetite: No  Nausea: Yes , nausea   Vomiting: No  Diarrhea: No  Dysuria/Freq.: No  Dysuria or Hematuria: No  Fatigue/Achiness: YES- teeth on left side are very achy   Sick/Strep Exposure: No  Therapies tried and outcome: just completed a course of augmentin and did not seem to help         Review of Systems  Constitutional, neuro, ENT, endocrine, pulmonary, cardiac, gastrointestinal, genitourinary, musculoskeletal, integument and psychiatric systems are negative, except as otherwise noted.      Objective    /70   Pulse 73   Temp 99  F (37.2  C) (Tympanic)   Resp 20   Ht 1.6 m (5' 3\")   Wt 64.3 kg (141 lb 12.8 oz)   LMP  (LMP Unknown)   SpO2 98%   BMI 25.12 kg/m    Body mass index is 25.12 kg/m .  Physical Exam  Vitals " and nursing note reviewed.   Constitutional:       Appearance: Normal appearance.   HENT:      Head: Normocephalic and atraumatic.      Right Ear: Tympanic membrane and ear canal normal.      Left Ear: Tympanic membrane and ear canal normal.      Nose: Rhinorrhea (mild, clear) present.      Right Sinus: No maxillary sinus tenderness or frontal sinus tenderness.      Left Sinus: No maxillary sinus tenderness or frontal sinus tenderness.      Mouth/Throat:      Lips: Pink.      Mouth: Mucous membranes are moist.      Dentition: Normal dentition.      Pharynx: Oropharynx is clear.      Comments: No dental tenderness   Eyes:      General: Lids are normal.      Conjunctiva/sclera: Conjunctivae normal.      Comments: Non icteric   Cardiovascular:      Rate and Rhythm: Normal rate and regular rhythm.      Pulses: Normal pulses.      Heart sounds: Normal heart sounds, S1 normal and S2 normal.   Pulmonary:      Effort: Pulmonary effort is normal.      Breath sounds: Normal breath sounds and air entry.   Musculoskeletal:      Cervical back: Neck supple.   Lymphadenopathy:      Cervical: No cervical adenopathy.   Skin:     General: Skin is warm and dry.   Neurological:      General: No focal deficit present.      Mental Status: She is alert and oriented to person, place, and time.   Psychiatric:         Mood and Affect: Mood normal.         Behavior: Behavior normal.         Thought Content: Thought content normal.         Judgment: Judgment normal.                    Signed Electronically by: VICKY Hay CNP

## 2024-06-26 ENCOUNTER — OFFICE VISIT (OUTPATIENT)
Dept: DERMATOLOGY | Facility: CLINIC | Age: 79
End: 2024-06-26
Payer: COMMERCIAL

## 2024-06-26 ENCOUNTER — TELEPHONE (OUTPATIENT)
Dept: DERMATOLOGY | Facility: CLINIC | Age: 79
End: 2024-06-26

## 2024-06-26 DIAGNOSIS — D23.9 DERMAL NEVUS: Primary | ICD-10-CM

## 2024-06-26 DIAGNOSIS — C44.612 BASAL CELL CARCINOMA (BCC) OF RIGHT UPPER ARM: ICD-10-CM

## 2024-06-26 DIAGNOSIS — L81.4 LENTIGO: ICD-10-CM

## 2024-06-26 DIAGNOSIS — L82.1 SEBORRHEIC KERATOSES: ICD-10-CM

## 2024-06-26 DIAGNOSIS — C44.612 BASAL CELL CARCINOMA (BCC) OF RIGHT UPPER ARM: Primary | ICD-10-CM

## 2024-06-26 DIAGNOSIS — Z85.828 HISTORY OF SKIN CANCER: ICD-10-CM

## 2024-06-26 DIAGNOSIS — D18.01 ANGIOMA OF SKIN: ICD-10-CM

## 2024-06-26 PROCEDURE — 99213 OFFICE O/P EST LOW 20 MIN: CPT | Mod: 25 | Performed by: DERMATOLOGY

## 2024-06-26 PROCEDURE — 88331 PATH CONSLTJ SURG 1 BLK 1SPC: CPT | Performed by: DERMATOLOGY

## 2024-06-26 PROCEDURE — 11102 TANGNTL BX SKIN SINGLE LES: CPT | Performed by: DERMATOLOGY

## 2024-06-26 NOTE — TELEPHONE ENCOUNTER
Patient Contact    Attempt # 1    Was call answered?  Yes    Called patient.Results were given. Patient denied having any questions. Patient was scheduled with Dr. Franco. Patient declined to have information sent in the mail. Derm Surg Referral was placed and linked.     Bessie Knapp LPN   [facility]   [phone]

## 2024-06-26 NOTE — PATIENT INSTRUCTIONS
Wound Care Instructions     FOR SUPERFICIAL WOUNDS     [facility] [phone]    [facility] [phone]      Right upper arm                 AFTER 24 HOURS YOU SHOULD REMOVE THE BANDAGE AND BEGIN DAILY DRESSING CHANGES AS FOLLOWS:     1) Remove Dressing.     2) Clean and dry the area with tap water using a Q-tip or sterile gauze pad.     3) Apply Vaseline, Aquaphor, Polysporin ointment or Bacitracin ointment over entire wound.  Do NOT use Neosporin ointment.     4) Cover the wound with a band-aid, or a sterile non-stick gauze pad and micropore paper tape      REPEAT THESE INSTRUCTIONS AT LEAST ONCE A DAY UNTIL THE WOUND HAS COMPLETELY HEALED.    It is an old wives tale that a wound heals better when it is exposed to air and allowed to dry out. The wound will heal faster with a better cosmetic result if it is kept moist with ointment and covered with a bandage.    **Do not let the wound dry out.**      Supplies Needed:      *Cotton tipped applicators (Q-tips)    *Polysporin Ointment or Bacitracin Ointment (NOT NEOSPORIN)    *Band-aids or non-stick gauze pads and micropore paper tape.      PATIENT INFORMATION:    During the healing process you will notice a number of changes. All wounds develop a small halo of redness surrounding the wound.  This means healing is occurring. Severe itching with extensive redness usually indicates sensitivity to the ointment or bandage tape used to dress the wound.  You should call our office if this develops.      Swelling  and/or discoloration around your surgical site is common, particularly when performed around the eye.    All wounds normally drain.  The larger the wound the more drainage there will be.  After 7-10 days, you will notice the wound beginning to shrink and new skin will begin to grow.  The wound is healed when you can see skin has formed over the entire area.  A healed wound has a healthy, shiny look to the surface and is red to dark  pink in color to normalize.  Wounds may take approximately 4-6 weeks to heal.  Larger wounds may take 6-8 weeks.  After the wound is healed you may discontinue dressing changes.    You may experience a sensation of tightness as your wound heals. This is normal and will gradually subside.    Your healed wound may be sensitive to temperature changes. This sensitivity improves with time, but if you re having a lot of discomfort, try to avoid temperature extremes.    Patients frequently experience itching after their wound appears to have healed because of the continue healing under the skin.  Plain Vaseline will help relieve the itching.        POSSIBLE COMPLICATIONS    BLEEDING:    Leave the bandage in place.  Use tightly rolled up gauze or a cloth to apply direct pressure over the bandage for 30  minutes.  Reapply pressure for an additional 30 minutes if necessary  Use additional gauze and tape to maintain pressure once the bleeding has stopped.

## 2024-06-26 NOTE — LETTER
6/26/2024      Mariah Jacitno  [address] [place] [address]      Dear Colleague,    Thank you for referring your patient, Mariah Jacinto, to the [facility]. Please see a copy of my visit note below.    Mariah Jacinto is an extremely pleasant 78 year old year old female patient here today for hx of non-melanoma skin cancer.  Patient has no other skin complaints today.  Remainder of the HPI, Meds, PMH, Allergies, FH, and SH was reviewed in chart.      Past Medical History:   Diagnosis Date     Adhesive capsulitis of shoulder     Right shoulder tendonitis     Basal cell carcinoma      Chronic right shoulder pain 06/27/2023     Displacement of cervical intervertebral disc without myelopathy      Esophageal reflux      Major depression in complete remission (H24) 06/22/2009    celexa caused spinning side effect      MENOPAUSE --ERT      OSTEOPENIA      Squamous cell carcinoma        Past Surgical History:   Procedure Laterality Date     ABLATE VEIN VARICOSE RADIO FREQUENCY WITHOUT PHLEBECTOMY MULTIPLE STAB  3/28/2013    Procedure: ABLATE VEIN VARICOSE RADIO FREQUENCY WITHOUT PHLEBECTOMY MULTIPLE STAB;  Bilateral ablation of varicose veins legs-to ultrasound at 0930  ;  Surgeon: Noam Soliman MD;  Location: [facility]     COLONOSCOPY  8/20/2013    Procedure: COLONOSCOPY;  Colonoscopy;  Surgeon: Yuliya Nathan MD;  Location: [facility]     ESOPHAGOSCOPY, GASTROSCOPY, DUODENOSCOPY (EGD), COMBINED N/A 5/12/2015    Procedure: COMBINED ESOPHAGOSCOPY, GASTROSCOPY, DUODENOSCOPY (EGD), BIOPSY SINGLE OR MULTIPLE;  Surgeon: Yuliya Nathan MD;  Location: [facility]     ESOPHAGOSCOPY, GASTROSCOPY, DUODENOSCOPY (EGD), COMBINED N/A 1/31/2017    Procedure: COMBINED ESOPHAGOSCOPY, GASTROSCOPY, DUODENOSCOPY (EGD), BIOPSY SINGLE OR MULTIPLE;  Surgeon: Qasim Bowie MD;  Location: [facility]     EXCISE MASS FINGER Right 2/19/2019    Procedure: Excision Right Ring Finger Volar Middle  Phalanx Mass;  Surgeon: Jake Viveros MD;  Location: [facility]     HYSTERECTOMY, PAP NO LONGER INDICATED      has both ovaries out also      HYSTERECTOMY, VAGINAL  1988    Hysterectomy, oophorectomy     PHACOEMULSIFICATION WITH STANDARD INTRAOCULAR LENS IMPLANT Left 3/18/2019    Procedure: Cataract Removal with Implant;  Surgeon: Chapito Phillips MD;  Location: [facility]     PHACOEMULSIFICATION WITH STANDARD INTRAOCULAR LENS IMPLANT Right 4/10/2019    Procedure: Cataract Removal with Implant;  Surgeon: Chapito Phillips MD;  Location: [facility]     RELEASE TRIGGER FINGER Right 2017    Procedure: RELEASE TRIGGER FINGER;  Right Thumb A1 Pulley Release;  Surgeon: Jake Viveros MD;  Location: [facility]     REVERSE ARTHROPLASTY SHOULDER Right 2023    Procedure: Right Reverse total shoulder arthroplasty;  Surgeon: Srikanth Gonsales MD;  Location: [facility]     SURGICAL HISTORY OF -       right retromastoid craniectomy and decompression trigeminal nerve     TONSILLECTOMY & ADENOIDECTOMY  child    T&A      ZZC ANESTH,LOWER ARM SURGERY      ulnar nerve decompression - right        Family History   Problem Relation Age of Onset     Alzheimer Disease Mother          at age 85     Osteoporosis Mother      Arthritis Mother      Alcohol/Drug Father      Respiratory Father      Eye Disorder Maternal Grandfather         Macular degneration     C.A.D. Brother         Tripple bypass age 57     Cardiovascular Brother      Cancer Brother         leukemia (ALL)     Cardiovascular Brother      Cardiovascular Brother      Neurologic Disorder Son      Heart Disease Son      Melanoma No family hx of      Skin Cancer No family hx of        Social History     Socioeconomic History     Marital status:      Spouse name: Not on file     Number of children: Not on file     Years of education: Not on file     Highest education level: Not on file   Occupational History     Not on file   Tobacco Use      Smoking status: Never     Passive exposure: Never     Smokeless tobacco: Never   Vaping Use     Vaping status: Never Used   Substance and Sexual Activity     Alcohol use: No     Drug use: No     Sexual activity: Yes     Birth control/protection: Surgical     Comment: complete hysterectomy 1988   Other Topics Concern     Parent/sibling w/ CABG, MI or angioplasty before 65F 55M? No   Social History Narrative     Not on file     Social Determinants of Health     Financial Resource Strain: Low Risk  (8/28/2023)    Overall Financial Resource Strain (CARDIA)      Difficulty of Paying Living Expenses: Not very hard   Food Insecurity: No Food Insecurity (8/28/2023)    Hunger Vital Sign      Worried About Running Out of Food in the Last Year: Never true      Ran Out of Food in the Last Year: Never true   Transportation Needs: No Transportation Needs (8/28/2023)    PRAPARE - Transportation      Lack of Transportation (Medical): No      Lack of Transportation (Non-Medical): No   Physical Activity: Inactive (8/28/2023)    Exercise Vital Sign      Days of Exercise per Week: 0 days      Minutes of Exercise per Session: 0 min   Stress: Not on file   Social Connections: Unknown (8/28/2023)    Social Connection and Isolation Panel [NHANES]      Frequency of Communication with Friends and Family: Not on file      Frequency of Social Gatherings with Friends and Family: Not on file      Attends [employer] Services: Not on file      Active Member of Clubs or Organizations: Not on file      Attends Club or Organization Meetings: Not on file      Marital Status:    Interpersonal Safety: Low Risk  (4/12/2024)    Interpersonal Safety      Do you feel physically and emotionally safe where you currently live?: Yes      Within the past 12 months, have you been hit, slapped, kicked or otherwise physically hurt by someone?: No      Within the past 12 months, have you been humiliated or emotionally abused in other ways by your partner or  ex-partner?: No   Housing Stability: Not on file       Outpatient Encounter Medications as of 6/26/2024   Medication Sig Dispense Refill     acetaminophen (TYLENOL) 325 MG tablet Take 325 mg by mouth once       estradiol (ESTRACE) 0.1 MG/GM vaginal cream Place vaginally twice a week 1 g  twice weekly at bedtime 43 g 4     fluticasone (FLONASE) 50 MCG/ACT nasal spray Spray 1 spray into both nostrils daily 16 g 1     melatonin 5 MG CAPS Take 5 mg by mouth At Bedtime 1 capsule 0     MULTI-VITAMIN OR TABS 1 TABLET DAILY       omeprazole (PRILOSEC) 40 MG DR capsule Take 1 capsule by mouth once daily 90 capsule 1     simvastatin (ZOCOR) 20 MG tablet Take 1 tablet (20 mg) by mouth at bedtime 90 tablet 1     VITAMIN D OR 1 DAILY       No facility-administered encounter medications on file as of 6/26/2024.             O:   NAD, WDWN, Alert & Oriented, Mood & Affect wnl, Vitals stable   General appearance normal   Vitals stable   Alert, oriented and in no acute distress     R arm 1cm pink pearly papule    Stuck on papules and brown macules on trunk and ext   Red papules on trunk  Flesh colored papules on trunk     The remainder of the full exam was normal; the following areas were examined:  conjunctiva/lids, , neck, peripheral vascular system, abdomen, lymph nodes, digits/nails, eccrine and apocrine glands, scalp/hair, face, neck, chest, abdomen, buttocks, back, RUE, LUE, RLE, LLE       Eyes: Conjunctivae/lids:Normal     ENT: Lips, mucosa: normal    MSK:Normal    Cardiovascular: peripheral edema none    Pulm: Breathing Normal    Lymph Nodes: No Head and Neck Lymphadenopathy     Neuro/Psych: Orientation:Alert and Orientedx3 ; Mood/Affect:normal       MICRO:   R arm:Orthokeratosis of epidermis with a proliferation of nests of basaloid cells, with peripheral palisading and a haphazard arrangement in the center extending into the dermis, forming nodules.  The tumor cells have hyperchromatic nuclei. Poor cytoplasm and  intercellular bridging.    A/P:  1. Seborrheic keratosis, lentigo, angioma, dermal nevus, hx of non-melanoma skin cancer   2. R arm r/o basal cell carcinoma   TANGENTIAL BIOPSY IN HOUSE:  After consent, anesthesia with L[facility] and prep, tangential excision performed and dx above confirmed with frozen section histology.  No complications and routine wound care.  Patient is not on  anticoagulants and risk of bleeding discussed with patient.       I have personally reviewed all specimens and/or slides and used them with my medical judgement to determine or confirm the final diagnosis.     Patient told result basal cell carcinoma schedule excision .    It was a pleasure speaking to Mariah Jacinto today.  Previous clinic notes and pertinent laboratory tests were reviewed prior to Mariah Jacinto's visit.  Signs and Symptoms of skin cancer discussed with patient.  Patient encouraged to perform monthly skin exams.  UV precautions reviewed with patient.  Risks of non-melanoma skin cancer discussed with patient   Return to clinic next appt      Again, thank you for allowing me to participate in the care of your patient.        Sincerely,        Chapito Franco MD

## 2024-06-26 NOTE — TELEPHONE ENCOUNTER
----- Message from Chapito Franco sent at 6/26/2024 11:26 AM CDT -----  R arm basal cell carcinoma schedule excision

## 2024-06-26 NOTE — PROGRESS NOTES
Mariah Jacinto is an extremely pleasant 78 year old year old female patient here today for hx of non-melanoma skin cancer.  Patient has no other skin complaints today.  Remainder of the HPI, Meds, PMH, Allergies, FH, and SH was reviewed in chart.      Past Medical History:   Diagnosis Date    Adhesive capsulitis of shoulder     Right shoulder tendonitis    Basal cell carcinoma     Chronic right shoulder pain 06/27/2023    Displacement of cervical intervertebral disc without myelopathy     Esophageal reflux     Major depression in complete remission (H24) 06/22/2009    celexa caused spinning side effect     MENOPAUSE --ERT     OSTEOPENIA     Squamous cell carcinoma        Past Surgical History:   Procedure Laterality Date    ABLATE VEIN VARICOSE RADIO FREQUENCY WITHOUT PHLEBECTOMY MULTIPLE STAB  3/28/2013    Procedure: ABLATE VEIN VARICOSE RADIO FREQUENCY WITHOUT PHLEBECTOMY MULTIPLE STAB;  Bilateral ablation of varicose veins legs-to ultrasound at 0930  ;  Surgeon: Noam Soliman MD;  Location: [facility]    COLONOSCOPY  8/20/2013    Procedure: COLONOSCOPY;  Colonoscopy;  Surgeon: Yuliya Nathan MD;  Location: [facility]    ESOPHAGOSCOPY, GASTROSCOPY, DUODENOSCOPY (EGD), COMBINED N/A 5/12/2015    Procedure: COMBINED ESOPHAGOSCOPY, GASTROSCOPY, DUODENOSCOPY (EGD), BIOPSY SINGLE OR MULTIPLE;  Surgeon: Yuliya Nathan MD;  Location: [facility]    ESOPHAGOSCOPY, GASTROSCOPY, DUODENOSCOPY (EGD), COMBINED N/A 1/31/2017    Procedure: COMBINED ESOPHAGOSCOPY, GASTROSCOPY, DUODENOSCOPY (EGD), BIOPSY SINGLE OR MULTIPLE;  Surgeon: Qasim Bowie MD;  Location: [facility]    EXCISE MASS FINGER Right 2/19/2019    Procedure: Excision Right Ring Finger Volar Middle Phalanx Mass;  Surgeon: Jake Viveros MD;  Location: [facility]    HYSTERECTOMY, PAP NO LONGER INDICATED      has both ovaries out also     HYSTERECTOMY, VAGINAL  1988    Hysterectomy, oophorectomy    PHACOEMULSIFICATION WITH STANDARD INTRAOCULAR LENS  IMPLANT Left 3/18/2019    Procedure: Cataract Removal with Implant;  Surgeon: Chapito Phillips MD;  Location: [facility]    PHACOEMULSIFICATION WITH STANDARD INTRAOCULAR LENS IMPLANT Right 4/10/2019    Procedure: Cataract Removal with Implant;  Surgeon: Chapito Phillips MD;  Location: [facility]    RELEASE TRIGGER FINGER Right 2017    Procedure: RELEASE TRIGGER FINGER;  Right Thumb A1 Pulley Release;  Surgeon: Jake Viveros MD;  Location: [facility]    REVERSE ARTHROPLASTY SHOULDER Right 2023    Procedure: Right Reverse total shoulder arthroplasty;  Surgeon: Srikanth Gonsales MD;  Location: [facility]    SURGICAL HISTORY OF -       right retromastoid craniectomy and decompression trigeminal nerve    TONSILLECTOMY & ADENOIDECTOMY  child    T&A     ZZC ANESTH,LOWER ARM SURGERY      ulnar nerve decompression - right        Family History   Problem Relation Age of Onset    Alzheimer Disease Mother          at age 85    Osteoporosis Mother     Arthritis Mother     Alcohol/Drug Father     Respiratory Father     Eye Disorder Maternal Grandfather         Macular degneration    C.A.D. Brother         Tripple bypass age 57    Cardiovascular Brother     Cancer Brother         leukemia (ALL)    Cardiovascular Brother     Cardiovascular Brother     Neurologic Disorder Son     Heart Disease Son     Melanoma No family hx of     Skin Cancer No family hx of        Social History     Socioeconomic History    Marital status:      Spouse name: Not on file    Number of children: Not on file    Years of education: Not on file    Highest education level: Not on file   Occupational History    Not on file   Tobacco Use    Smoking status: Never     Passive exposure: Never    Smokeless tobacco: Never   Vaping Use    Vaping status: Never Used   Substance and Sexual Activity    Alcohol use: No    Drug use: No    Sexual activity: Yes     Birth control/protection: Surgical     Comment: complete hysterectomy  1988   Other Topics Concern    Parent/sibling w/ CABG, MI or angioplasty before 65F 55M? No   Social History Narrative    Not on file     Social Determinants of Health     Financial Resource Strain: Low Risk  (8/28/2023)    Overall Financial Resource Strain (CARDIA)     Difficulty of Paying Living Expenses: Not very hard   Food Insecurity: No Food Insecurity (8/28/2023)    Hunger Vital Sign     Worried About Running Out of Food in the Last Year: Never true     Ran Out of Food in the Last Year: Never true   Transportation Needs: No Transportation Needs (8/28/2023)    PRAPARE - Transportation     Lack of Transportation (Medical): No     Lack of Transportation (Non-Medical): No   Physical Activity: Inactive (8/28/2023)    Exercise Vital Sign     Days of Exercise per Week: 0 days     Minutes of Exercise per Session: 0 min   Stress: Not on file   Social Connections: Unknown (8/28/2023)    Social Connection and Isolation Panel [NHANES]     Frequency of Communication with Friends and Family: Not on file     Frequency of Social Gatherings with Friends and Family: Not on file     Attends Confucianist Services: Not on file     Active Member of Clubs or Organizations: Not on file     Attends Club or Organization Meetings: Not on file     Marital Status:    Interpersonal Safety: Low Risk  (4/12/2024)    Interpersonal Safety     Do you feel physically and emotionally safe where you currently live?: Yes     Within the past 12 months, have you been hit, slapped, kicked or otherwise physically hurt by someone?: No     Within the past 12 months, have you been humiliated or emotionally abused in other ways by your partner or ex-partner?: No   Housing Stability: Not on file       Outpatient Encounter Medications as of 6/26/2024   Medication Sig Dispense Refill    acetaminophen (TYLENOL) 325 MG tablet Take 325 mg by mouth once      estradiol (ESTRACE) 0.1 MG/GM vaginal cream Place vaginally twice a week 1 g  twice weekly at bedtime  43 g 4    fluticasone (FLONASE) 50 MCG/ACT nasal spray Spray 1 spray into both nostrils daily 16 g 1    melatonin 5 MG CAPS Take 5 mg by mouth At Bedtime 1 capsule 0    MULTI-VITAMIN OR TABS 1 TABLET DAILY      omeprazole (PRILOSEC) 40 MG DR capsule Take 1 capsule by mouth once daily 90 capsule 1    simvastatin (ZOCOR) 20 MG tablet Take 1 tablet (20 mg) by mouth at bedtime 90 tablet 1    VITAMIN D OR 1 DAILY       No facility-administered encounter medications on file as of 6/26/2024.             O:   NAD, WDWN, Alert & Oriented, Mood & Affect wnl, Vitals stable   General appearance normal   Vitals stable   Alert, oriented and in no acute distress     R arm 1cm pink pearly papule    Stuck on papules and brown macules on trunk and ext   Red papules on trunk  Flesh colored papules on trunk     The remainder of the full exam was normal; the following areas were examined:  conjunctiva/lids, , neck, peripheral vascular system, abdomen, lymph nodes, digits/nails, eccrine and apocrine glands, scalp/hair, face, neck, chest, abdomen, buttocks, back, RUE, LUE, RLE, LLE       Eyes: Conjunctivae/lids:Normal     ENT: Lips, mucosa: normal    MSK:Normal    Cardiovascular: peripheral edema none    Pulm: Breathing Normal    Lymph Nodes: No Head and Neck Lymphadenopathy     Neuro/Psych: Orientation:Alert and Orientedx3 ; Mood/Affect:normal       MICRO:   R arm:Orthokeratosis of epidermis with a proliferation of nests of basaloid cells, with peripheral palisading and a haphazard arrangement in the center extending into the dermis, forming nodules.  The tumor cells have hyperchromatic nuclei. Poor cytoplasm and intercellular bridging.    A/P:  1. Seborrheic keratosis, lentigo, angioma, dermal nevus, hx of non-melanoma skin cancer   2. R arm r/o basal cell carcinoma   TANGENTIAL BIOPSY IN HOUSE:  After consent, anesthesia with LEC and prep, tangential excision performed and dx above confirmed with frozen section histology.  No  complications and routine wound care.  Patient is not on  anticoagulants and risk of bleeding discussed with patient.       I have personally reviewed all specimens and/or slides and used them with my medical judgement to determine or confirm the final diagnosis.     Patient told result basal cell carcinoma schedule excision .    It was a pleasure speaking to Mariah Jacinto today.  Previous clinic notes and pertinent laboratory tests were reviewed prior to Mariah Jacinto's visit.  Signs and Symptoms of skin cancer discussed with patient.  Patient encouraged to perform monthly skin exams.  UV precautions reviewed with patient.  Risks of non-melanoma skin cancer discussed with patient   Return to clinic next appt

## 2024-07-03 ENCOUNTER — HOSPITAL ENCOUNTER (OUTPATIENT)
Dept: CT IMAGING | Facility: CLINIC | Age: 79
Discharge: HOME | End: 2024-07-03
Attending: NURSE PRACTITIONER | Admitting: NURSE PRACTITIONER
Payer: COMMERCIAL

## 2024-07-03 DIAGNOSIS — J01.01 ACUTE RECURRENT MAXILLARY SINUSITIS: ICD-10-CM

## 2024-07-03 PROCEDURE — 70486 CT MAXILLOFACIAL W/O DYE: CPT

## 2024-07-21 ENCOUNTER — NURSE TRIAGE (OUTPATIENT)
Dept: NURSING | Facility: CLINIC | Age: 79
End: 2024-07-21
Payer: COMMERCIAL

## 2024-07-21 ENCOUNTER — APPOINTMENT (OUTPATIENT)
Dept: CT IMAGING | Facility: CLINIC | Age: 79
End: 2024-07-21
Attending: EMERGENCY MEDICINE
Payer: COMMERCIAL

## 2024-07-21 ENCOUNTER — HOSPITAL ENCOUNTER (EMERGENCY)
Facility: CLINIC | Age: 79
Discharge: HOME | End: 2024-07-21
Attending: EMERGENCY MEDICINE | Admitting: EMERGENCY MEDICINE
Payer: COMMERCIAL

## 2024-07-21 VITALS
SYSTOLIC BLOOD PRESSURE: 166 MMHG | TEMPERATURE: 98 F | OXYGEN SATURATION: 94 % | BODY MASS INDEX: 24.98 KG/M2 | DIASTOLIC BLOOD PRESSURE: 80 MMHG | RESPIRATION RATE: 16 BRPM | HEART RATE: 84 BPM | WEIGHT: 141 LBS

## 2024-07-21 DIAGNOSIS — R14.0 ABDOMINAL BLOATING: ICD-10-CM

## 2024-07-21 LAB
ALBUMIN SERPL BCG-MCNC: 4.2 G/DL (ref 3.5–5.2)
ALP SERPL-CCNC: 101 U/L (ref 40–150)
ALT SERPL W P-5'-P-CCNC: 25 U/L (ref 0–50)
ANION GAP SERPL CALCULATED.3IONS-SCNC: 11 MMOL/L (ref 7–15)
AST SERPL W P-5'-P-CCNC: 32 U/L (ref 0–45)
BASOPHILS # BLD AUTO: 0 10E3/UL (ref 0–0.2)
BASOPHILS NFR BLD AUTO: 1 %
BILIRUB SERPL-MCNC: 0.3 MG/DL
BUN SERPL-MCNC: 18.1 MG/DL (ref 8–23)
CALCIUM SERPL-MCNC: 9.3 MG/DL (ref 8.8–10.4)
CHLORIDE SERPL-SCNC: 106 MMOL/L (ref 98–107)
CREAT SERPL-MCNC: 0.73 MG/DL (ref 0.51–0.95)
EGFRCR SERPLBLD CKD-EPI 2021: 83 ML/MIN/1.73M2
EOSINOPHIL # BLD AUTO: 0.1 10E3/UL (ref 0–0.7)
EOSINOPHIL NFR BLD AUTO: 2 %
ERYTHROCYTE [DISTWIDTH] IN BLOOD BY AUTOMATED COUNT: 13.2 % (ref 10–15)
GLUCOSE SERPL-MCNC: 101 MG/DL (ref 70–99)
HCO3 SERPL-SCNC: 24 MMOL/L (ref 22–29)
HCT VFR BLD AUTO: 41.1 % (ref 35–47)
HGB BLD-MCNC: 14.1 G/DL (ref 11.7–15.7)
IMM GRANULOCYTES # BLD: 0 10E3/UL
IMM GRANULOCYTES NFR BLD: 1 %
LYMPHOCYTES # BLD AUTO: 1.9 10E3/UL (ref 0.8–5.3)
LYMPHOCYTES NFR BLD AUTO: 23 %
MCH RBC QN AUTO: 31.8 PG (ref 26.5–33)
MCHC RBC AUTO-ENTMCNC: 34.3 G/DL (ref 31.5–36.5)
MCV RBC AUTO: 93 FL (ref 78–100)
MONOCYTES # BLD AUTO: 0.9 10E3/UL (ref 0–1.3)
MONOCYTES NFR BLD AUTO: 12 %
NEUTROPHILS # BLD AUTO: 5.1 10E3/UL (ref 1.6–8.3)
NEUTROPHILS NFR BLD AUTO: 63 %
NRBC # BLD AUTO: 0 10E3/UL
NRBC BLD AUTO-RTO: 0 /100
PLATELET # BLD AUTO: 194 10E3/UL (ref 150–450)
POTASSIUM SERPL-SCNC: 4.1 MMOL/L (ref 3.4–5.3)
PROT SERPL-MCNC: 6.7 G/DL (ref 6.4–8.3)
RBC # BLD AUTO: 4.44 10E6/UL (ref 3.8–5.2)
SODIUM SERPL-SCNC: 141 MMOL/L (ref 135–145)
TROPONIN T SERPL HS-MCNC: 8 NG/L
WBC # BLD AUTO: 8.1 10E3/UL (ref 4–11)

## 2024-07-21 PROCEDURE — 250N000011 HC RX IP 250 OP 636: Performed by: EMERGENCY MEDICINE

## 2024-07-21 PROCEDURE — 96374 THER/PROPH/DIAG INJ IV PUSH: CPT | Mod: 59 | Performed by: EMERGENCY MEDICINE

## 2024-07-21 PROCEDURE — 250N000009 HC RX 250: Performed by: EMERGENCY MEDICINE

## 2024-07-21 PROCEDURE — 84484 ASSAY OF TROPONIN QUANT: CPT | Performed by: EMERGENCY MEDICINE

## 2024-07-21 PROCEDURE — 85025 COMPLETE CBC W/AUTO DIFF WBC: CPT | Performed by: EMERGENCY MEDICINE

## 2024-07-21 PROCEDURE — 36415 COLL VENOUS BLD VENIPUNCTURE: CPT | Performed by: EMERGENCY MEDICINE

## 2024-07-21 PROCEDURE — 74177 CT ABD & PELVIS W/CONTRAST: CPT

## 2024-07-21 PROCEDURE — 99285 EMERGENCY DEPT VISIT HI MDM: CPT | Performed by: EMERGENCY MEDICINE

## 2024-07-21 PROCEDURE — 99285 EMERGENCY DEPT VISIT HI MDM: CPT | Mod: 25 | Performed by: EMERGENCY MEDICINE

## 2024-07-21 PROCEDURE — 80053 COMPREHEN METABOLIC PANEL: CPT | Performed by: EMERGENCY MEDICINE

## 2024-07-21 RX ORDER — ONDANSETRON 2 MG/ML
4 INJECTION INTRAMUSCULAR; INTRAVENOUS EVERY 6 HOURS PRN
Status: DISCONTINUED | OUTPATIENT
Start: 2024-07-21 | End: 2024-07-22 | Stop reason: HOSPADM

## 2024-07-21 RX ORDER — IOPAMIDOL 755 MG/ML
69 INJECTION, SOLUTION INTRAVASCULAR ONCE
Status: COMPLETED | OUTPATIENT
Start: 2024-07-21 | End: 2024-07-21

## 2024-07-21 RX ADMIN — IOPAMIDOL 69 ML: 755 INJECTION, SOLUTION INTRAVENOUS at 21:46

## 2024-07-21 RX ADMIN — ONDANSETRON 4 MG: 2 INJECTION INTRAMUSCULAR; INTRAVENOUS at 21:14

## 2024-07-21 RX ADMIN — SODIUM CHLORIDE 57 ML: 9 INJECTION, SOLUTION INTRAVENOUS at 21:46

## 2024-07-21 ASSESSMENT — COLUMBIA-SUICIDE SEVERITY RATING SCALE - C-SSRS
2. HAVE YOU ACTUALLY HAD ANY THOUGHTS OF KILLING YOURSELF IN THE PAST MONTH?: NO
1. IN THE PAST MONTH, HAVE YOU WISHED YOU WERE DEAD OR WISHED YOU COULD GO TO SLEEP AND NOT WAKE UP?: NO
6. HAVE YOU EVER DONE ANYTHING, STARTED TO DO ANYTHING, OR PREPARED TO DO ANYTHING TO END YOUR LIFE?: NO

## 2024-07-21 ASSESSMENT — ACTIVITIES OF DAILY LIVING (ADL)
ADLS_ACUITY_SCORE: 36
ADLS_ACUITY_SCORE: 36

## 2024-07-22 ENCOUNTER — PATIENT OUTREACH (OUTPATIENT)
Dept: FAMILY MEDICINE | Facility: CLINIC | Age: 79
End: 2024-07-22
Payer: COMMERCIAL

## 2024-07-22 ENCOUNTER — NURSE TRIAGE (OUTPATIENT)
Dept: FAMILY MEDICINE | Facility: CLINIC | Age: 79
End: 2024-07-22
Payer: COMMERCIAL

## 2024-07-22 NOTE — ED NOTES
Pt states she has had a bloated feeling and nausea for 1 week states tonight the feeling is worse, denies any pain anywhere, vomiting or fevers, having regular BM, awaiting results, family @ bedside, will continue to monitor and assist as needed.

## 2024-07-22 NOTE — ED TRIAGE NOTES
Nausea with abdominal bloating for the past week that has progressively gotten worse.  Patient denies pain with urination, constipation, diarrhea, abdominal pain.     Triage Assessment (Adult)       Row Name 07/21/24 2057          Triage Assessment    Airway WDL WDL        Respiratory WDL    Respiratory WDL WDL        Skin Circulation/Temperature WDL    Skin Circulation/Temperature WDL WDL        Cardiac WDL    Cardiac WDL WDL        Peripheral/Neurovascular WDL    Peripheral Neurovascular WDL WDL        Cognitive/Neuro/Behavioral WDL    Cognitive/Neuro/Behavioral WDL WDL

## 2024-07-22 NOTE — ED NOTES
Pt returned from CT states nausea is better, awaiting results, denies any further complaints, call light within reach, will continue to monitor and assist as needed.

## 2024-07-22 NOTE — TELEPHONE ENCOUNTER
Patient calling with nausea, seen in ER last night. CT negative.   Patient given home care advice and advised to follow up if not improving.   Will switch to clear liquids for 8 hours and advance diet as tolerated.   Declined to make an appointment. Will call back if worsening.   Advised to try OTC Nauzene or Pepto Bismol.     Lisbet Teran RN on 7/22/2024 at 11:35 AM

## 2024-07-22 NOTE — ED PROVIDER NOTES
History     Chief Complaint   Patient presents with    Nausea     HPI  Mariah Jacinto is a 78 year old female who presents for abdominal bloating and nausea.  The patient says that the symptoms have been off and on all week.  She has been having this episode all day today over the last 10 hours.  Feels bloated and nauseated, was not hungry for dinner tonight.  No vomiting.  She did have a normal bowel movement earlier today.  No prior abdominal surgeries.  No chest pain or shortness of breath.  She is not sure what brings these episodes on, sometimes the last for a short amount of time and sometimes last for several hours before improving.  Sometimes they get somewhat better which she burps.  No fevers or chills.  No dysuria or urinary frequency.    Allergies:  Allergies   Allergen Reactions    Biaxin [Clarithromycin]      per pt      Celebrex [Bob-2 Inhibitors]      per pt    Cipro [Ciprofloxacin]      per pt    Darvocet [Propoxyphene N-Apap]     Fleet Phospho Soda [Sodium Phosphate/Biphosphate]      nausea per pt    Morphine     Neurontin [Gabapentin]      per pt    Nortriptyline      per pt    Percocet [Oxycodone-Acetaminophen]     Serzone [Nefazodone Hydrochloride]      per pt    Tegretol [Carbamazepine]     Vicodin [Acetaminophen]      per pt-dizziness    Vioxx      per pt    Vivactil [Protriptyline Hcl]      per pt    Wellbutrin [Bupropion Hcl]     Zithromax [Azithromycin Dihydrate]        Problem List:    Patient Active Problem List    Diagnosis Date Noted    Lateral epicondylitis of left elbow 03/12/2024     Priority: Medium    S/P reverse total shoulder arthroplasty, right 09/14/2023     Priority: Medium    S/P reverse total shoulder arthroplasty, unspecified laterality 08/25/2023     Priority: Medium    Dense breast tissue on mammogram 09/27/2017     Priority: Medium    Chronic constipation 06/01/2017     Priority: Medium    Gastroesophageal reflux disease without esophagitis 11/17/2016     Priority:  Medium    Subjective tinnitus, bilateral 04/04/2016     Priority: Medium    Trochanteric bursitis 09/25/2013     Priority: Medium    Intercostal neuralgia 10/22/2012     Priority: Medium    Hyperlipidemia LDL goal <160 10/31/2010     Priority: Medium    Trigeminal Neuralgia right  06/22/2009     Priority: Medium     Surgery 8/3/09 at    Right retromastoid craniectomy and microvascular decompression of the trigeminal nerve.   DX 2005       Right hip pain 12/15/2008     Priority: Medium    Sensorineural hearing loss 03/01/2007     Priority: Medium     Problem list name updated by automated process. Provider to review      Disorder of bone and cartilage 04/25/2005     Priority: Medium     Problem list name updated by automated process. Provider to review          Past Medical History:    Past Medical History:   Diagnosis Date    Adhesive capsulitis of shoulder     Basal cell carcinoma     Chronic right shoulder pain 06/27/2023    Displacement of cervical intervertebral disc without myelopathy     Esophageal reflux     Major depression in complete remission (H24) 06/22/2009    MENOPAUSE --ERT     OSTEOPENIA     Squamous cell carcinoma        Past Surgical History:    Past Surgical History:   Procedure Laterality Date    ABLATE VEIN VARICOSE RADIO FREQUENCY WITHOUT PHLEBECTOMY MULTIPLE STAB  3/28/2013    Procedure: ABLATE VEIN VARICOSE RADIO FREQUENCY WITHOUT PHLEBECTOMY MULTIPLE STAB;  Bilateral ablation of varicose veins legs-to ultrasound at 0930  ;  Surgeon: Noam Soliman MD;  Location: [facility]    COLONOSCOPY  8/20/2013    Procedure: COLONOSCOPY;  Colonoscopy;  Surgeon: Yuliya Nathan MD;  Location: [facility]    ESOPHAGOSCOPY, GASTROSCOPY, DUODENOSCOPY (EGD), COMBINED N/A 5/12/2015    Procedure: COMBINED ESOPHAGOSCOPY, GASTROSCOPY, DUODENOSCOPY (EGD), BIOPSY SINGLE OR MULTIPLE;  Surgeon: Yuliya Nathan MD;  Location: [facility]    ESOPHAGOSCOPY, GASTROSCOPY, DUODENOSCOPY (EGD), COMBINED N/A  2017    Procedure: COMBINED ESOPHAGOSCOPY, GASTROSCOPY, DUODENOSCOPY (EGD), BIOPSY SINGLE OR MULTIPLE;  Surgeon: Qasim Bowie MD;  Location: [facility]    EXCISE MASS FINGER Right 2019    Procedure: Excision Right Ring Finger Volar Middle Phalanx Mass;  Surgeon: Jake Viveros MD;  Location: [facility]    HYSTERECTOMY, PAP NO LONGER INDICATED      has both ovaries out also     HYSTERECTOMY, VAGINAL  1988    Hysterectomy, oophorectomy    PHACOEMULSIFICATION WITH STANDARD INTRAOCULAR LENS IMPLANT Left 3/18/2019    Procedure: Cataract Removal with Implant;  Surgeon: Chapito Phillips MD;  Location: [facility]    PHACOEMULSIFICATION WITH STANDARD INTRAOCULAR LENS IMPLANT Right 4/10/2019    Procedure: Cataract Removal with Implant;  Surgeon: Chapito Phillips MD;  Location: [facility]    RELEASE TRIGGER FINGER Right 2017    Procedure: RELEASE TRIGGER FINGER;  Right Thumb A1 Pulley Release;  Surgeon: Jake Viveros MD;  Location: [facility]    REVERSE ARTHROPLASTY SHOULDER Right 2023    Procedure: Right Reverse total shoulder arthroplasty;  Surgeon: Srikanth Gonsales MD;  Location: [facility]    SURGICAL HISTORY OF -       right retromastoid craniectomy and decompression trigeminal nerve    TONSILLECTOMY & ADENOIDECTOMY  child    T&A     ZZC ANESTH,LOWER ARM SURGERY      ulnar nerve decompression - right       Family History:    Family History   Problem Relation Age of Onset    Alzheimer Disease Mother          at age 85    Osteoporosis Mother     Arthritis Mother     Alcohol/Drug Father     Respiratory Father     Eye Disorder Maternal Grandfather         Macular degneration    C.A.D. Brother         Tripple bypass age 57    Cardiovascular Brother     Cancer Brother         leukemia (ALL)    Cardiovascular Brother     Cardiovascular Brother     Neurologic Disorder Son     Heart Disease Son     Melanoma No family hx of     Skin Cancer No family hx of        Social History:  Marital Status:    [5]  Social History     Tobacco Use    Smoking status: Never     Passive exposure: Never    Smokeless tobacco: Never   Vaping Use    Vaping status: Never Used   Substance Use Topics    Alcohol use: No    Drug use: No        Medications:    acetaminophen (TYLENOL) 325 MG tablet  estradiol (ESTRACE) 0.1 MG/GM vaginal cream  fluticasone (FLONASE) 50 MCG/ACT nasal spray  melatonin 5 MG CAPS  MULTI-VITAMIN OR TABS  omeprazole (PRILOSEC) 40 MG DR capsule  simvastatin (ZOCOR) 20 MG tablet  VITAMIN D OR          Review of Systems    Physical Exam   BP: (!) 166/80  Pulse: 84  Temp: 98  F (36.7  C)  Resp: 16  Weight: 64 kg (141 lb)  SpO2: 94 %      Physical Exam  Constitutional:       General: She is not in acute distress.     Appearance: She is well-developed.   HENT:      Head: Normocephalic and atraumatic.   Cardiovascular:      Rate and Rhythm: Normal rate.   Pulmonary:      Effort: No respiratory distress.      Breath sounds: No stridor.   Abdominal:      General: There is no distension.      Palpations: Abdomen is soft.      Tenderness: There is no abdominal tenderness. There is no guarding.   Skin:     General: Skin is warm and dry.   Neurological:      Mental Status: She is alert.         ED Course        Procedures              Critical Care time:  none               Results for orders placed or performed during the hospital encounter of 07/21/24 (from the past 24 hour(s))   CBC with Platelets & Differential    Narrative    The following orders were created for panel order CBC with Platelets & Differential.  Procedure                               Abnormality         Status                     ---------                               -----------         ------                     CBC with platelets and d...[[id number]]                      Final result                 Please view results for these tests on the individual orders.   CBC with platelets and differential   Result Value Ref Range    WBC Count 8.1  4.0 - 11.0 10e3/uL    RBC Count 4.44 3.80 - 5.20 10e6/uL    Hemoglobin 14.1 11.7 - 15.7 g/dL    Hematocrit 41.1 35.0 - 47.0 %    MCV 93 78 - 100 fL    MCH 31.8 26.5 - 33.0 pg    MCHC 34.3 31.5 - 36.5 g/dL    RDW 13.2 10.0 - 15.0 %    Platelet Count 194 150 - 450 10e3/uL    % Neutrophils 63 %    % Lymphocytes 23 %    % Monocytes 12 %    % Eosinophils 2 %    % Basophils 1 %    % Immature Granulocytes 1 %    NRBCs per 100 WBC 0 <1 /100    Absolute Neutrophils 5.1 1.6 - 8.3 10e3/uL    Absolute Lymphocytes 1.9 0.8 - 5.3 10e3/uL    Absolute Monocytes 0.9 0.0 - 1.3 10e3/uL    Absolute Eosinophils 0.1 0.0 - 0.7 10e3/uL    Absolute Basophils 0.0 0.0 - 0.2 10e3/uL    Absolute Immature Granulocytes 0.0 <=0.4 10e3/uL    Absolute NRBCs 0.0 10e3/uL   Comprehensive metabolic panel   Result Value Ref Range    Sodium 141 135 - 145 mmol/L    Potassium 4.1 3.4 - 5.3 mmol/L    Carbon Dioxide (CO2) 24 22 - 29 mmol/L    Anion Gap 11 7 - 15 mmol/L    Urea Nitrogen 18.1 8.0 - 23.0 mg/dL    Creatinine 0.73 0.51 - 0.95 mg/dL    GFR Estimate 83 >60 mL/min/1.73m2    Calcium 9.3 8.8 - 10.4 mg/dL    Chloride 106 98 - 107 mmol/L    Glucose 101 (H) 70 - 99 mg/dL    Alkaline Phosphatase 101 40 - 150 U/L    AST 32 0 - 45 U/L    ALT 25 0 - 50 U/L    Protein Total 6.7 6.4 - 8.3 g/dL    Albumin 4.2 3.5 - 5.2 g/dL    Bilirubin Total 0.3 <=1.2 mg/dL   Troponin T, High Sensitivity   Result Value Ref Range    Troponin T, High Sensitivity 8 <=14 ng/L   CT Abdomen Pelvis w Contrast    Narrative    EXAM: CT ABDOMEN PELVIS W CONTRAST  LOCATION: [facility]  DATE: 7/21/2024    INDICATION: Diffuse abdominal bloating, nausea.  COMPARISON: 1/13/2012  TECHNIQUE: CT scan of the abdomen and pelvis was performed following injection of IV contrast. Multiplanar reformats were obtained. Dose reduction techniques were used.  CONTRAST: 69 mL Isovue 370    FINDINGS:   LOWER CHEST: Scattered atelectasis in the lung bases. No consolidation or  pleural effusion.    HEPATOBILIARY: 5 normal liver and gallbladder.    PANCREAS: Normal.    SPLEEN: Normal.    ADRENAL GLANDS: Normal.    KIDNEYS/BLADDER: Normal.    BOWEL: No focal bowel thickening or obstruction. Small amount of stool seen throughout the colon. Scattered diverticula in the sigmoid colon without acute diverticulitis. No free fluid or free air.    LYMPH NODES: Normal.    VASCULATURE: Moderate aortoiliac atherosclerotic calcifications. No abdominal aortic aneurysm.    PELVIC ORGANS: Status post hysterectomy. No abnormal adnexal masses.    MUSCULOSKELETAL: A tiny fat-containing umbilical hernia is present. Grade 1 degenerative anterolisthesis of L4 and L5. No suspicious osseous lesions or acute fractures.        Impression    IMPRESSION:     1.  Mild colonic diverticulosis without acute diverticulitis.    2.  No acute process in the abdomen and pelvis.       Medications   ondansetron (ZOFRAN) injection 4 mg (4 mg Intravenous $Given 7/21/24 2114)   CT Saline (57 mLs Intravenous $Given 7/21/24 2146)   iopamidol (ISOVUE-370) solution 69 mL (69 mLs Intravenous $Given 7/21/24 2146)       Assessments & Plan (with Medical Decision Making)   78-year-old female presents with abdominal bloating and nausea.  Vital signs are reassuring.  She declined pain medications or nausea medicine at this time.  Blood work is reassuring as above.  No explanation for her symptoms at this time.  CT of the abdomen and pelvis obtained, images interpreted independently as well as radiology read reviewed, no signs of obstruction, diverticulitis, or other cause of her pain.  On recheck she is comfortable.  Hospitalization is considered given the patient is 78 years old with this abdominal pain that is unexplained however at this time with now with the reassuring workup and with her feeling better I believe she is safe for discharge home.  I encouraged her to drink plenty of fluids and try possibly increasing her MiraLAX to twice a  day for the next several days to see if constipation could be contributing to her symptoms.  She is told to return if she has worsening of her pain or other concerns, otherwise follow-up in clinic.  The patient is in agreement with this plan.    I have reviewed the nursing notes.    I have reviewed the findings, diagnosis, plan and need for follow up with the patient.           Medical Decision Making  The patient's presentation was of moderate complexity (an undiagnosed new problem with uncertain prognosis).    The patient's evaluation involved:  ordering and/or review of 3+ test(s) in this encounter (see separate area of note for details)  independent interpretation of testing performed by another health professional (see separate area of note for details)    The patient's management necessitated high risk (a decision regarding hospitalization).        Discharge Medication List as of 7/21/2024 10:34 PM          Final diagnoses:   Abdominal bloating       7/21/2024   [facility] EMERGENCY DEPT       William Maya MD  07/22/24 0055

## 2024-07-22 NOTE — DISCHARGE INSTRUCTIONS
So far the tests have been reassuring.  It may be that you are feeling bloated relating to constipation.  Drink plenty of fluids including water or you could try prune juice.  You could try using MiraLAX which is a powder that you can mix into water or prune juice to help with bowel movements.  Return if you are having increasing pain, repeated vomiting, worsening symptoms, or other concerns.  Otherwise follow-up in clinic.

## 2024-07-22 NOTE — TELEPHONE ENCOUNTER
What type of discharge? Emergency Department  Risk of Hospital admission or ED visit: 56.9%  Is a TCM episode required? No  When should the patient follow up with PCP? 7 days of discharge.  .  Lisbet Teran RN on 7/22/2024 at 8:20 AM

## 2024-07-22 NOTE — TELEPHONE ENCOUNTER
Nurse Triage SBAR    Is this a 2nd Level Triage? YES, LICENSED PRACTITIONER REVIEW IS REQUIRED    Situation: Abdominal Bloating and Nausea    Background: :na    Assessment: Patient reports abdominal bloating and nausea for past week, lasting longer and more often. She reports lasting all day today. She has had regular stools. Her abdomen is hard and distended. She has been able to keep up with hydration and eat small amounts of food. Denies fever, blood in stool, vomiting, or abdominal pain. She reports no improvement with antacids and fizzy water.    Protocol Recommended Disposition:   According to the protocol, patient should see provider within 4 hours (or PCP triage).      Paged to provider Dr Val LOZANO Recommendations:  -Patient should be evaluated tonight.    Provider Recommendation Follow Up:   Reached patient/caregiver. Informed of provider's recommendations. Patient verbalized understanding and agrees with the plan.     Ju Del Cid RN  07/21/24 8:13 PM  [facility] Nurse Advisor    Reason for Disposition   [1] MODERATE-SEVERE SWELLING of abdomen (e.g., looks very distended or swollen) AND [2] NEW-onset or much worse    Additional Information   Negative: Sounds like a life-threatening emergency to the triager   Negative: Chest pain   Negative: Menstrual cramps with bloating are main symptoms   Negative: [1] Abdomen pain is main symptom AND [2] female   Negative: [1] Abdomen pain is main symptom AND [2] male   Negative: Breathing difficulty is main symptom   Negative: Constipation is main symptom   Negative: Diarrhea is main symptom   Negative: Vomiting is main symptom   Negative: [1] MODERATE-SEVERE SWELLING of abdomen (e.g., looks very distended or swollen) AND [2] NEW-onset or much worse AND [3] vomiting   Negative: Blood in bowel movements  (Exception: Blood on surface of BM with constipation.)   Negative: Black or tarry bowel movements  (Exception: Chronic-unchanged black-grey BMs AND is  taking iron pills or Pepto-Bismol.)    Protocols used: Abdomen Bloating and Swelling-A-AH

## 2024-07-22 NOTE — TELEPHONE ENCOUNTER
"Reason for Disposition   Nausea lasts > 1 week    Answer Assessment - Initial Assessment Questions  1. NAUSEA SEVERITY: \"How bad is the nausea?\" (e.g., mild, moderate, severe; dehydration, weight loss)    - MILD: loss of appetite without change in eating habits    - MODERATE: decreased oral intake without significant weight loss, dehydration, or malnutrition    - SEVERE: inadequate caloric or fluid intake, significant weight loss, symptoms of dehydration      A week ago  2. ONSET: \"When did the nausea begin?\"      A week ago  3. VOMITING: \"Any vomiting?\" If Yes, ask: \"How many times today?\"      no  4. RECURRENT SYMPTOM: \"Have you had nausea before?\" If Yes, ask: \"When was the last time?\" \"What happened that time?\"      similar  5. CAUSE: \"What do you think is causing the nausea?\"      Not sure  6. PREGNANCY: \"Is there any chance you are pregnant?\" (e.g., unprotected intercourse, missed birth control pill, broken condom)      na    Protocols used: Nausea-A-AH    "

## 2024-07-29 ENCOUNTER — OFFICE VISIT (OUTPATIENT)
Dept: DERMATOLOGY | Facility: CLINIC | Age: 79
End: 2024-07-29
Payer: COMMERCIAL

## 2024-07-29 DIAGNOSIS — C44.612 BASAL CELL CARCINOMA (BCC) OF RIGHT UPPER ARM: ICD-10-CM

## 2024-07-29 PROCEDURE — 11602 EXC TR-EXT MAL+MARG 1.1-2 CM: CPT | Performed by: DERMATOLOGY

## 2024-07-29 PROCEDURE — 88331 PATH CONSLTJ SURG 1 BLK 1SPC: CPT | Performed by: DERMATOLOGY

## 2024-07-29 NOTE — PROGRESS NOTES
Mariah Jacinto is an extremely pleasant 78 year old year old female patient here today for evaluation and managment of basal cell carcinoma on right arm.  Patient has no other skin complaints today.  Remainder of the HPI, Meds, PMH, Allergies, FH, and SH was reviewed in chart.      Past Medical History:   Diagnosis Date    Adhesive capsulitis of shoulder     Right shoulder tendonitis    Basal cell carcinoma     Chronic right shoulder pain 06/27/2023    Displacement of cervical intervertebral disc without myelopathy     Esophageal reflux     Major depression in complete remission (H24) 06/22/2009    celexa caused spinning side effect     MENOPAUSE --ERT     OSTEOPENIA     Squamous cell carcinoma        Past Surgical History:   Procedure Laterality Date    ABLATE VEIN VARICOSE RADIO FREQUENCY WITHOUT PHLEBECTOMY MULTIPLE STAB  3/28/2013    Procedure: ABLATE VEIN VARICOSE RADIO FREQUENCY WITHOUT PHLEBECTOMY MULTIPLE STAB;  Bilateral ablation of varicose veins legs-to ultrasound at 0930  ;  Surgeon: Noam Soliman MD;  Location: [facility]    COLONOSCOPY  8/20/2013    Procedure: COLONOSCOPY;  Colonoscopy;  Surgeon: Yuliya Nathan MD;  [facility]: [facility]    ESOPHAGOSCOPY, GASTROSCOPY, DUODENOSCOPY (EGD), COMBINED N/A 5/12/2015    Procedure: COMBINED ESOPHAGOSCOPY, GASTROSCOPY, DUODENOSCOPY (EGD), BIOPSY SINGLE OR MULTIPLE;  Surgeon: Yuliya Nathan MD;  [facility]    ESOPHAGOSCOPY, GASTROSCOPY, DUODENOSCOPY (EGD), COMBINED N/A 1/31/2017    Procedure: COMBINED ESOPHAGOSCOPY, GASTROSCOPY, DUODENOSCOPY (EGD), BIOPSY SINGLE OR MULTIPLE;  Surgeon: Qasim Bowie MD;  [facility]: [facility]    EXCISE MASS FINGER Right 2/19/2019    Procedure: Excision Right Ring Finger Volar Middle Phalanx Mass;  Surgeon: Jake Viveros MD;  Location: [facility]    HYSTERECTOMY, PAP NO LONGER INDICATED      has both ovaries out also     HYSTERECTOMY, VAGINAL  1988    Hysterectomy, oophorectomy    PHACOEMULSIFICATION  WITH STANDARD INTRAOCULAR LENS IMPLANT Left 3/18/2019    Procedure: Cataract Removal with Implant;  Surgeon: Chapito Phillips MD;  [facility]    PHACOEMULSIFICATION WITH STANDARD INTRAOCULAR LENS IMPLANT Right 4/10/2019    Procedure: Cataract Removal with Implant;  Surgeon: Chapito Phillips MD;  [facility]    RELEASE TRIGGER FINGER Right 2017    Procedure: RELEASE TRIGGER FINGER;  Right Thumb A1 Pulley Release;  Surgeon: Jake Viveros MD;  [facility]    REVERSE ARTHROPLASTY SHOULDER Right 2023    Procedure: Right Reverse total shoulder arthroplasty;  Surgeon: Srikanth Gonsales MD;  Location: [facility]    SURGICAL HISTORY OF -       right retromastoid craniectomy and decompression trigeminal nerve    TONSILLECTOMY & ADENOIDECTOMY  child    T&A     ZZC ANESTH,LOWER ARM SURGERY      ulnar nerve decompression - right        Family History   Problem Relation Age of Onset    Alzheimer Disease Mother          at age 85    Osteoporosis Mother     Arthritis Mother     Alcohol/Drug Father     Respiratory Father     Eye Disorder Maternal Grandfather         Macular degneration    C.A.D. Brother         Tripple bypass age 57    Cardiovascular Brother     Cancer Brother         leukemia (ALL)    Cardiovascular Brother     Cardiovascular Brother     Neurologic Disorder Son     Heart Disease Son     Melanoma No family hx of     Skin Cancer No family hx of        Social History     Socioeconomic History    Marital status:      Spouse name: Not on file    Number of children: Not on file    Years of education: Not on file    Highest education level: Not on file   Occupational History    Not on file   Tobacco Use    Smoking status: Never     Passive exposure: Never    Smokeless tobacco: Never   Vaping Use    Vaping status: Never Used   Substance and Sexual Activity    Alcohol use: No    Drug use: No    Sexual activity: Yes     Birth control/protection: Surgical      Comment: complete hysterectomy 1988   Other Topics Concern    Parent/sibling w/ CABG, MI or angioplasty before 65F 55M? No   Social History Narrative    Not on file     Social Determinants of Health     Financial Resource Strain: Low Risk  (8/28/2023)    Overall Financial Resource Strain (CARDIA)     Difficulty of Paying Living Expenses: Not very hard   Food Insecurity: No Food Insecurity (8/28/2023)    Hunger Vital Sign     Worried About Running Out of Food in the Last Year: Never true     Ran Out of Food in the Last Year: Never true   Transportation Needs: No Transportation Needs (8/28/2023)    PRAPARE - Transportation     Lack of Transportation (Medical): No     Lack of Transportation (Non-Medical): No   Physical Activity: Inactive (8/28/2023)    Exercise Vital Sign     Days of Exercise per Week: 0 days     Minutes of Exercise per Session: 0 min   Stress: Not on file   Social Connections: Unknown (8/28/2023)    Social Connection and Isolation Panel [NHANES]     Frequency of Communication with Friends and Family: Not on file     Frequency of Social Gatherings with Friends and Family: Not on file     Attends Anabaptist Services: Not on file     Active Member of Clubs or Organizations: Not on file     Attends Club or Organization Meetings: Not on file     Marital Status:    Interpersonal Safety: Low Risk  (4/12/2024)    Interpersonal Safety     Do you feel physically and emotionally safe where you currently live?: Yes     Within the past 12 months, have you been hit, slapped, kicked or otherwise physically hurt by someone?: No     Within the past 12 months, have you been humiliated or emotionally abused in other ways by your partner or ex-partner?: No   Housing Stability: Not on file       Outpatient Encounter Medications as of 7/29/2024   Medication Sig Dispense Refill    acetaminophen (TYLENOL) 325 MG tablet Take 325 mg by mouth once      estradiol (ESTRACE) 0.1 MG/GM vaginal cream Place vaginally twice a  week 1 g  twice weekly at bedtime 43 g 4    fluticasone (FLONASE) 50 MCG/ACT nasal spray Spray 1 spray into both nostrils daily 16 g 1    melatonin 5 MG CAPS Take 5 mg by mouth At Bedtime 1 capsule 0    MULTI-VITAMIN OR TABS 1 TABLET DAILY      omeprazole (PRILOSEC) 40 MG DR capsule Take 1 capsule by mouth once daily 90 capsule 1    simvastatin (ZOCOR) 20 MG tablet Take 1 tablet (20 mg) by mouth at bedtime 90 tablet 1    VITAMIN D OR 1 DAILY       No facility-administered encounter medications on file as of 7/29/2024.             O:   NAD, WDWN, Alert & Oriented, Mood & Affect wnl, Vitals stable   General appearance normal   Vitals stable   Alert, oriented and in no acute distress     R arm 1cm red plaque      Eyes: Conjunctivae/lids:Normal     ENT: Lips, mucosa: normal    MSK:Normal    Cardiovascular: peripheral edema none    Pulm: Breathing Normal    Neuro/Psych: Orientation:Alert and Orientedx3 ; Mood/Affect:normal       MICRO:   R arm:Unremarkable epidermis, dermis and superficial subcutis.  No concerning areas for malignancy.     A/P:  R arm basal cell carcinoma   EXCISION OF BASAL CELL CARCINOMA, Margins confirmed with FROZEN SECTIONS AND Second intent: After thorough discussion of PGACAC, consent obtained, anesthesia and prep, the margins of the lesion were identified and an incision was made encompassing the lesion with 3mm margin. The incisions were made through the skin and down to and including the superficial subcutis.  The lesion was removed en bloc and submitted for frozen section pathologic review. Clear margins obtained (1.6x1.7cm).    REPAIR SECOND INTENT: We discussed the options for wound management in full with the patient including risks/benefits/possible outcomes. Decision made to allow the wound to heal by second intention. EBL minimal; complications none; wound care routine.  The patient was discharged in good condition and will return in one month or prn for wound evaluation.     It was a  pleasure speaking to Mariah Jacinto today.  Previous clinic notes and pertinent laboratory tests were reviewed prior to Mariah Jacinto's visit.  Signs and Symptoms of skin cancer discussed with patient.  Patient encouraged to perform monthly skin exams.  UV precautions reviewed with patient.  Risks of non-melanoma skin cancer discussed with patient   Return to clinic 6 months

## 2024-07-29 NOTE — LETTER
7/29/2024      Mariah Jacinto  [address] [place] [address]      Dear Colleague,    Thank you for referring your patient, Mariah Jacinto, to the [facility]. Please see a copy of my visit note below.    Surgical Office Location :   [facility]  [facility] [place] [address]      Mariah Jacinto is an extremely pleasant 78 year old year old female patient here today for evaluation and managment of basal cell carcinoma on right arm.  Patient has no other skin complaints today.  Remainder of the HPI, Meds, PMH, Allergies, FH, and SH was reviewed in chart.      Past Medical History:   Diagnosis Date     Adhesive capsulitis of shoulder     Right shoulder tendonitis     Basal cell carcinoma      Chronic right shoulder pain 06/27/2023     Displacement of cervical intervertebral disc without myelopathy      Esophageal reflux      Major depression in complete remission (H24) 06/22/2009    celexa caused spinning side effect      MENOPAUSE --ERT      OSTEOPENIA      Squamous cell carcinoma        Past Surgical History:   Procedure Laterality Date     ABLATE VEIN VARICOSE RADIO FREQUENCY WITHOUT PHLEBECTOMY MULTIPLE STAB  3/28/2013    Procedure: ABLATE VEIN VARICOSE RADIO FREQUENCY WITHOUT PHLEBECTOMY MULTIPLE STAB;  Bilateral ablation of varicose veins legs-to ultrasound at 0930  ;  Surgeon: Noam Soliman MD;  Location: [facility]     COLONOSCOPY  8/20/2013    Procedure: COLONOSCOPY;  Colonoscopy;  Surgeon: Yuliya Nathan MD;  [facility]: [facility]     ESOPHAGOSCOPY, GASTROSCOPY, DUODENOSCOPY (EGD), COMBINED N/A 5/12/2015    Procedure: COMBINED ESOPHAGOSCOPY, GASTROSCOPY, DUODENOSCOPY (EGD), BIOPSY SINGLE OR MULTIPLE;  Surgeon: Yuliya Nathan MD;  [facility]: [facility]     ESOPHAGOSCOPY, GASTROSCOPY, DUODENOSCOPY (EGD), COMBINED N/A 1/31/2017    Procedure: COMBINED ESOPHAGOSCOPY, GASTROSCOPY, DUODENOSCOPY (EGD), BIOPSY SINGLE OR MULTIPLE;  Surgeon:  Qasim Boiwe MD;  Location: [facility]     EXCISE MASS FINGER Right 2019    Procedure: Excision Right Ring Finger Volar Middle Phalanx Mass;  Surgeon: Jake Viveros MD;  Location: [facility]     HYSTERECTOMY, PAP NO LONGER INDICATED      has both ovaries out also      HYSTERECTOMY, VAGINAL  1988    Hysterectomy, oophorectomy     PHACOEMULSIFICATION WITH STANDARD INTRAOCULAR LENS IMPLANT Left 3/18/2019    Procedure: Cataract Removal with Implant;  Surgeon: Chapito Phillips MD;  Location: [facility]     PHACOEMULSIFICATION WITH STANDARD INTRAOCULAR LENS IMPLANT Right 4/10/2019    Procedure: Cataract Removal with Implant;  Surgeon: Chapito Phillips MD;  Location: [facility]     RELEASE TRIGGER FINGER Right 2017    Procedure: RELEASE TRIGGER FINGER;  Right Thumb A1 Pulley Release;  Surgeon: Jake Viveros MD;  Location: [facility]     REVERSE ARTHROPLASTY SHOULDER Right 2023    Procedure: Right Reverse total shoulder arthroplasty;  Surgeon: Srikanth Gonsales MD;  Location: [facility]     SURGICAL HISTORY OF -       right retromastoid craniectomy and decompression trigeminal nerve     TONSILLECTOMY & ADENOIDECTOMY  child    T&A      ZZC ANESTH,LOWER ARM SURGERY      ulnar nerve decompression - right        Family History   Problem Relation Age of Onset     Alzheimer Disease Mother          at age 85     Osteoporosis Mother      Arthritis Mother      Alcohol/Drug Father      Respiratory Father      Eye Disorder Maternal Grandfather         Macular degneration     C.A.D. Brother         Tripple bypass age 57     Cardiovascular Brother      Cancer Brother         leukemia (ALL)     Cardiovascular Brother      Cardiovascular Brother      Neurologic Disorder Son      Heart Disease Son      Melanoma No family hx of      Skin Cancer No family hx of        Social History     Socioeconomic History     Marital status:      Spouse name: Not on file     Number of children: Not on file     Years  of education: Not on file     Highest education level: Not on file   Occupational History     Not on file   Tobacco Use     Smoking status: Never     Passive exposure: Never     Smokeless tobacco: Never   Vaping Use     Vaping status: Never Used   Substance and Sexual Activity     Alcohol use: No     Drug use: No     Sexual activity: Yes     Birth control/protection: Surgical     Comment: complete hysterectomy 1988   Other Topics Concern     Parent/sibling w/ CABG, MI or angioplasty before 65F 55M? No   Social History Narrative     Not on file     Social Determinants of Health     Financial Resource Strain: Low Risk  (8/28/2023)    Overall Financial Resource Strain (CARDIA)      Difficulty of Paying Living Expenses: Not very hard   Food Insecurity: No Food Insecurity (8/28/2023)    Hunger Vital Sign      Worried About Running Out of Food in the Last Year: Never true      Ran Out of Food in the Last Year: Never true   Transportation Needs: No Transportation Needs (8/28/2023)    PRAPARE - Transportation      Lack of Transportation (Medical): No      Lack of Transportation (Non-Medical): No   Physical Activity: Inactive (8/28/2023)    Exercise Vital Sign      Days of Exercise per Week: 0 days      Minutes of Exercise per Session: 0 min   Stress: Not on file   Social Connections: Unknown (8/28/2023)    Social Connection and Isolation Panel [NHANES]      Frequency of Communication with Friends and Family: Not on file      Frequency of Social Gatherings with Friends and Family: Not on file      Attends Scientologist Services: Not on file      Active Member of Clubs or Organizations: Not on file      Attends Club or Organization Meetings: Not on file      Marital Status:    Interpersonal Safety: Low Risk  (4/12/2024)    Interpersonal Safety      Do you feel physically and emotionally safe where you currently live?: Yes      Within the past 12 months, have you been hit, slapped, kicked or otherwise physically hurt by  someone?: No      Within the past 12 months, have you been humiliated or emotionally abused in other ways by your partner or ex-partner?: No   Housing Stability: Not on file       Outpatient Encounter Medications as of 7/29/2024   Medication Sig Dispense Refill     acetaminophen (TYLENOL) 325 MG tablet Take 325 mg by mouth once       estradiol (ESTRACE) 0.1 MG/GM vaginal cream Place vaginally twice a week 1 g  twice weekly at bedtime 43 g 4     fluticasone (FLONASE) 50 MCG/ACT nasal spray Spray 1 spray into both nostrils daily 16 g 1     melatonin 5 MG CAPS Take 5 mg by mouth At Bedtime 1 capsule 0     MULTI-VITAMIN OR TABS 1 TABLET DAILY       omeprazole (PRILOSEC) 40 MG DR capsule Take 1 capsule by mouth once daily 90 capsule 1     simvastatin (ZOCOR) 20 MG tablet Take 1 tablet (20 mg) by mouth at bedtime 90 tablet 1     VITAMIN D OR 1 DAILY       No facility-administered encounter medications on file as of 7/29/2024.             O:   NAD, WDWN, Alert & Oriented, Mood & Affect wnl, Vitals stable   General appearance normal   Vitals stable   Alert, oriented and in no acute distress     R arm 1cm red plaque      Eyes: Conjunctivae/lids:Normal     ENT: Lips, mucosa: normal    MSK:Normal    Cardiovascular: peripheral edema none    Pulm: Breathing Normal    Neuro/Psych: Orientation:Alert and Orientedx3 ; Mood/Affect:normal       MICRO:   R arm:Unremarkable epidermis, dermis and superficial subcutis.  No concerning areas for malignancy.     A/P:  R arm basal cell carcinoma   EXCISION OF BASAL CELL CARCINOMA, Margins confirmed with FROZEN SECTIONS AND Second intent: After thorough discussion of PGACAC, consent obtained, anesthesia and prep, the margins of the lesion were identified and an incision was made encompassing the lesion with 3mm margin. The incisions were made through the skin and down to and including the superficial subcutis.  The lesion was removed en bloc and submitted for frozen section pathologic review.  Clear margins obtained (1.6x1.7cm).    REPAIR SECOND INTENT: We discussed the options for wound management in full with the patient including risks/benefits/possible outcomes. Decision made to allow the wound to heal by second intention. EBL minimal; complications none; wound care routine.  The patient was discharged in good condition and will return in one month or prn for wound evaluation.     It was a pleasure speaking to Mariah Jacinto today.  Previous clinic notes and pertinent laboratory tests were reviewed prior to Mariah Jacinto's visit.  Signs and Symptoms of skin cancer discussed with patient.  Patient encouraged to perform monthly skin exams.  UV precautions reviewed with patient.  Risks of non-melanoma skin cancer discussed with patient   Return to clinic 6 months        Again, thank you for allowing me to participate in the care of your patient.        Sincerely,        Chapito Franco MD

## 2024-07-29 NOTE — PATIENT INSTRUCTIONS
Open Wound Care     for ____Right Arm__________        No strenuous activity for 48 hours    Take Tylenol as needed for discomfort.                                                .         Do not drink alcoholic beverages for 48 hours.    Keep the pressure bandage in place for 24 hours. If the bandage becomes blood tinged or loose, reinforce it with gauze and tape.        (Refer to the reverse side of this page for management of bleeding).    Remove bandage in 24 hours and begin wound care as follows:     Clean area with tap water using a Q tip or gauze pad, (shower / bathe normally)  Dry wound with Q tip or gauze pad  Apply Aquaphor, Vaseline, Polysporin or Bacitracin Ointment with a Q tip  Do NOT use Neosporin Ointment *  Cover the wound with a band-aid or nonstick gauze pad and paper tape.  Repeat wound care once a day until wound is completely healed.    It is an old wives tale that a wound heals better when it is exposed to air and allowed to dry out. The wound will heal faster with a better cosmetic result if it is kept moist with ointment and covered with a bandage.  Do not let the wound dry out.      Supplies Needed:                Qtips or gauze pads                Polysporin or Bacitracin Ointment                Bandaids or nonstick gauze pads and paper tape    Wound care kits and brown paper tape are available for purchase at   the pharmacy.    BLEEDING:    Use tightly rolled up gauze or cloth to apply direct pressure over the bandage for 20   minutes.  Reapply pressure for an additional 20 minutes if necessary  Call the office or go to the nearest emergency room if pressure fails to stop the bleeding.  Use additional gauze and tape to maintain pressure once the bleeding has stopped.  Begin wound care 24 hours after surgery as directed.                  WOUND HEALING    One week after surgery a pink / red halo will form around the outside of the wound.   This is new skin.  The center of the wound will  appear yellowish white and produce some drainage.  The pink halo will slowly migrate in toward the center of the wound until the wound is covered with new shiny pink skin.  There will be no more drainage when the wound is completely healed.  It will take six months to one year for the redness to fade.  The scar may be itchy, tight and sensitive to extreme temperatures for a year after the surgery.  Massaging the area several times a day for several minutes after the wound is completely healed will help the scar soften and normalize faster. Begin massage only after healing is complete.      In case of emergency call: Dr Franco: [phone]    [facility]: [phone]    [facility]:[phone]

## 2024-08-12 DIAGNOSIS — E78.5 HYPERLIPIDEMIA LDL GOAL <160: ICD-10-CM

## 2024-08-12 RX ORDER — SIMVASTATIN 20 MG
20 TABLET ORAL AT BEDTIME
Qty: 90 TABLET | Refills: 1 | Status: SHIPPED | OUTPATIENT
Start: 2024-08-12

## 2024-08-12 NOTE — TELEPHONE ENCOUNTER
Medication Question or Refill    Contacts       Contact Date/Time Type Contact Phone/Fax    08/12/2024 08:56 AM CDT Phone (Incoming) Mariah Jacinto (Self) [phone] (H)          What medication are you calling about (include dose and sig)?: Simvastatin - Pt is almost OUT of med and needs refill to get her through until appt.  Pt declines local pharmacy and still wants sent to [facility].  No need to call patient back, unless there are questions or problems.      Preferred Pharmacy:   [facility] - [place] [address] [address]  Phone: [phone] Fax: [phone]    Controlled Substance Agreement on file:   CSA -- Patient Level:    CSA: None found at the patient level.       Who prescribed the medication?: Kemal    Do you need a refill? Yes    When did you use the medication last? ?    Patient offered an appointment? No    Do you have any questions or concerns?  No    Okay to leave a detailed message?: Yes at Home number on file [phone] (home)

## 2024-08-12 NOTE — TELEPHONE ENCOUNTER
Patient informed and voiced understanding.     Ramin WEST RN  [facility]

## 2024-08-12 NOTE — TELEPHONE ENCOUNTER
Requested Prescriptions   Pending Prescriptions Disp Refills    simvastatin (ZOCOR) 20 MG tablet 90 tablet 1     Sig: Take 1 tablet (20 mg) by mouth at bedtime       Antihyperlipidemic agents Passed - 8/12/2024  8:59 AM        Passed - LDL on file in the past 12 months        Passed - Medication is active on med list        Passed - Recent (12 mo) or future (90 days) visit within the authorizing provider's specialty     The patient must have completed an in-person or virtual visit within the past 12 months or has a future visit scheduled within the next 90 days with the authorizing provider s specialty.  Urgent care and e-visits do not quality as an office visit for this protocol.          Passed - Patient is age 18 years or older        Passed - No active pregnancy on record        Passed - No positive pregnancy test in past 12 mos           Agnes Pelayo RN on 8/12/2024 at 9:48 AM

## 2024-08-16 ENCOUNTER — HOSPITAL ENCOUNTER (OUTPATIENT)
Dept: MAMMOGRAPHY | Facility: CLINIC | Age: 79
Discharge: HOME | End: 2024-08-16
Attending: FAMILY MEDICINE | Admitting: FAMILY MEDICINE
Payer: COMMERCIAL

## 2024-08-16 DIAGNOSIS — Z12.31 VISIT FOR SCREENING MAMMOGRAM: ICD-10-CM

## 2024-08-16 PROCEDURE — 77063 BREAST TOMOSYNTHESIS BI: CPT

## 2024-08-19 ENCOUNTER — OFFICE VISIT (OUTPATIENT)
Dept: FAMILY MEDICINE | Facility: CLINIC | Age: 79
End: 2024-08-19
Payer: COMMERCIAL

## 2024-08-19 VITALS
SYSTOLIC BLOOD PRESSURE: 126 MMHG | RESPIRATION RATE: 18 BRPM | BODY MASS INDEX: 24.14 KG/M2 | WEIGHT: 136.25 LBS | HEIGHT: 63 IN | HEART RATE: 64 BPM | DIASTOLIC BLOOD PRESSURE: 80 MMHG | TEMPERATURE: 97.9 F | OXYGEN SATURATION: 98 %

## 2024-08-19 DIAGNOSIS — N36.2 URETHRAL CARUNCLE: ICD-10-CM

## 2024-08-19 DIAGNOSIS — N95.2 ATROPHIC VAGINITIS: ICD-10-CM

## 2024-08-19 DIAGNOSIS — Z00.00 ENCOUNTER FOR MEDICARE ANNUAL WELLNESS EXAM: Primary | ICD-10-CM

## 2024-08-19 DIAGNOSIS — K21.9 GASTROESOPHAGEAL REFLUX DISEASE WITHOUT ESOPHAGITIS: ICD-10-CM

## 2024-08-19 DIAGNOSIS — R11.0 NAUSEA: ICD-10-CM

## 2024-08-19 DIAGNOSIS — R10.13 EPIGASTRIC PAIN: ICD-10-CM

## 2024-08-19 DIAGNOSIS — E78.5 HYPERLIPIDEMIA LDL GOAL <160: ICD-10-CM

## 2024-08-19 LAB
CHOLEST SERPL-MCNC: 197 MG/DL
FASTING STATUS PATIENT QL REPORTED: NO
HDLC SERPL-MCNC: 55 MG/DL
LDLC SERPL CALC-MCNC: 117 MG/DL
NONHDLC SERPL-MCNC: 142 MG/DL
TRIGL SERPL-MCNC: 125 MG/DL

## 2024-08-19 PROCEDURE — 99213 OFFICE O/P EST LOW 20 MIN: CPT | Mod: 25 | Performed by: FAMILY MEDICINE

## 2024-08-19 PROCEDURE — G0439 PPPS, SUBSEQ VISIT: HCPCS | Performed by: FAMILY MEDICINE

## 2024-08-19 PROCEDURE — 36415 COLL VENOUS BLD VENIPUNCTURE: CPT | Performed by: FAMILY MEDICINE

## 2024-08-19 PROCEDURE — 80061 LIPID PANEL: CPT | Performed by: FAMILY MEDICINE

## 2024-08-19 RX ORDER — ESTRADIOL 0.1 MG/G
CREAM VAGINAL
Qty: 43 G | Refills: 4 | Status: SHIPPED | OUTPATIENT
Start: 2024-08-19

## 2024-08-19 RX ORDER — OMEPRAZOLE 40 MG/1
CAPSULE, DELAYED RELEASE ORAL
Qty: 90 CAPSULE | Refills: 3 | Status: SHIPPED | OUTPATIENT
Start: 2024-08-19

## 2024-08-19 SDOH — HEALTH STABILITY: PHYSICAL HEALTH: ON AVERAGE, HOW MANY DAYS PER WEEK DO YOU ENGAGE IN MODERATE TO STRENUOUS EXERCISE (LIKE A BRISK WALK)?: 7 DAYS

## 2024-08-19 SDOH — HEALTH STABILITY: PHYSICAL HEALTH: ON AVERAGE, HOW MANY MINUTES DO YOU ENGAGE IN EXERCISE AT THIS LEVEL?: 20 MIN

## 2024-08-19 ASSESSMENT — SOCIAL DETERMINANTS OF HEALTH (SDOH): HOW OFTEN DO YOU GET TOGETHER WITH FRIENDS OR RELATIVES?: TWICE A WEEK

## 2024-08-19 ASSESSMENT — PAIN SCALES - GENERAL: PAINLEVEL: NO PAIN (0)

## 2024-08-19 NOTE — H&P (VIEW-ONLY)
Preventive Care Visit  [facility]  Eli Sommer MD, Family Medicine  Aug 19, 2024      Assessment & Plan     Encounter for Medicare annual wellness exam       Hyperlipidemia LDL goal <160     - Lipid panel reflex to direct LDL Non-fasting; Future    Gastroesophageal reflux disease without esophagitis     - omeprazole (PRILOSEC) 40 MG DR capsule; Take 1 capsule by mouth once daily    Epigastric pain  Nausea   H/o PUD and H Pylori  - Adult GI  Referral - Procedure Only; Future  \  Atrophic vaginitis + Urethral Caruncle  Continue use of this  - estradiol (ESTRACE) 0.1 MG/GM vaginal cream; Place vaginally twice a week 1 g  twice weekly at bedtime    Patient has been advised of split billing requirements and indicates understanding: Yes        Counseling  Appropriate preventive services were addressed with this patient via screening, questionnaire, or discussion as appropriate for fall prevention, nutrition, physical activity, Tobacco-use cessation, social engagement, weight loss and cognition.  Checklist reviewing preventive services available has been given to the patient.  Reviewed patient's diet, addressing concerns and/or questions.   Discussed possible causes of fatigue.         Nakia Lemus is a 78 year old, presenting for the following:  Medicare Visit        8/19/2024     8:48 AM   Additional Questions   Roomed by Vanessa souza CMA   Accompanied by Self         Health Care Directive  Patient has a Health Care Directive on file  Advance care planning document is on file and is current.    HPI    - Varicose veins.     - Continued intermittent nausea since 7/21 ED visit. She also notes some gas. Bloating has resolved. No stomach pain, emesis, constipation or diarrhea.  Takes Nauzeen which does help.  Needs to take it almost daily.  There is no correlation with eating.  Regular bowel movements, no blood in stool.  No melena.    Hyperlipidemia Follow-Up  Simvastatin 20mg  qhs  Are you regularly taking any medication or supplement to lower your cholesterol?   Yes- statin  Are you having muscle aches or other side effects that you think could be caused by your cholesterol lowering medication?  No        8/19/2024   General Health   How would you rate your overall physical health? Good   Feel stress (tense, anxious, or unable to sleep) Not at all            8/19/2024   Nutrition   Diet: Regular (no restrictions)            8/19/2024   Exercise   Days per week of moderate/strenous exercise 7 days   Average minutes spent exercising at this level 20 min            8/19/2024   Social Factors   Frequency of gathering with friends or relatives Twice a week   Worry food won't last until get money to buy more No   Food not last or not have enough money for food? No   Do you have housing? (Housing is defined as stable permanent housing and does not include staying ouside in a car, in a tent, in an abandoned building, in an overnight shelter, or couch-surfing.) Yes   Are you worried about losing your housing? No   Lack of transportation? No   Unable to get utilities (heat,electricity)? No            8/19/2024   Fall Risk   Fallen 2 or more times in the past year? No   Trouble with walking or balance? No             8/19/2024   Activities of Daily Living- Home Safety   Needs help with the following daily activites None of the above   Safety concerns in the home None of the above            8/19/2024   Dental   Dentist two times every year? Yes            8/19/2024   Hearing Screening   Hearing concerns? None of the above            8/19/2024   Driving Risk Screening   Patient/family members have concerns about driving No            8/19/2024   General Alertness/Fatigue Screening   Have you been more tired than usual lately? (!) YES            8/19/2024   Urinary Incontinence Screening   Bothered by leaking urine in past 6 months No            8/19/2024   TB Screening   Were you born outside of the  [place]? No            Today's PHQ-2 Score:       8/19/2024     8:53 AM   PHQ-2 ( 1999 [employer])   Q1: Little interest or pleasure in doing things 0   Q2: Feeling down, depressed or hopeless 0   PHQ-2 Score 0   Q1: Little interest or pleasure in doing things Not at all   Q2: Feeling down, depressed or hopeless Not at all   PHQ-2 Score 0           8/19/2024   Substance Use   Alcohol more than 3/day or more than 7/wk No   Do you have a current opioid prescription? No   How severe/bad is pain from 1 to 10? 0/10 (No Pain)   Do you use any other substances recreationally? No        Social History     Tobacco Use    Smoking status: Never     Passive exposure: Never    Smokeless tobacco: Never   Vaping Use    Vaping status: Never Used   Substance Use Topics    Alcohol use: No    Drug use: No           8/24/2023   LAST FHS-7 RESULTS   1st degree relative breast or ovarian cancer No   Any relative bilateral breast cancer No   Any male have breast cancer No   Any ONE woman have BOTH breast AND ovarian cancer No   Any woman with breast cancer before 50yrs No   2 or more relatives with breast AND/OR ovarian cancer No   2 or more relatives with breast AND/OR bowel cancer No           Mammogram Screening - After age 74- determine frequency with patient based on health status, life expectancy and patient goals    ASCVD Risk   The 10-year ASCVD risk score (Kelys POWERS, et al., 2019) is: 24%    Values used to calculate the score:      Age: 78 years      Sex: Female      Is Non- : No      Diabetic: No      Tobacco smoker: No      Systolic Blood Pressure: 126 mmHg      Is BP treated: No      HDL Cholesterol: 61 mg/dL      Total Cholesterol: 179 mg/dL            Reviewed and updated as needed this visit by Provider                      Current providers sharing in care for this patient include:  Patient Care Team:  Eli Sommer MD as PCP - General (Family Medicine)  Eli Sommer MD as Assigned  "PCP  Chapito Franco MD as MD (Dermatology)  Chapito Franco MD as Assigned Surgical Provider  Alfredo Ferrell DO as Assigned Musculoskeletal Provider    The following health maintenance items are reviewed in Epic and correct as of today:  Health Maintenance   Topic Date Due    RSV VACCINE (Pregnancy & 60+) (1 - 1-dose 60+ series) Never done    COVID-19 Vaccine (7 - 2023-24 season) 02/11/2024    ANNUAL REVIEW OF HM ORDERS  07/05/2024    LIPID  08/23/2024    INFLUENZA VACCINE (1) 09/01/2024    MEDICARE ANNUAL WELLNESS VISIT  08/19/2025    FALL RISK ASSESSMENT  08/19/2025    GLUCOSE  07/21/2027    ADVANCE CARE PLANNING  09/14/2028    DTAP/TDAP/TD IMMUNIZATION (3 - Td or Tdap) 02/09/2032    DEXA  10/13/2037    PHQ-2 (once per calendar year)  Completed    Pneumococcal Vaccine: 65+ Years  Completed    ZOSTER IMMUNIZATION  Completed    IPV IMMUNIZATION  Aged Out    HPV IMMUNIZATION  Aged Out    MENINGITIS IMMUNIZATION  Aged Out    RSV MONOCLONAL ANTIBODY  Aged Out    HEPATITIS C SCREENING  Discontinued    MAMMO SCREENING  Discontinued    COLORECTAL CANCER SCREENING  Discontinued         Review of Systems  Constitutional, HEENT, cardiovascular, pulmonary, gi and gu systems are negative, except as otherwise noted.    Feels \"lumps\" when she applies her vaginal estrogen cream     Objective    Exam  /80   Pulse 64   Temp 97.9  F (36.6  C) (Tympanic)   Resp 18   Ht 1.6 m (5' 3\")   Wt 61.8 kg (136 lb 4 oz)   LMP  (LMP Unknown)   SpO2 98%   BMI 24.14 kg/m     Estimated body mass index is 24.14 kg/m  as calculated from the following:    Height as of this encounter: 1.6 m (5' 3\").    Weight as of this encounter: 61.8 kg (136 lb 4 oz).    Physical Exam  GENERAL: alert and no distress  NECK: no adenopathy, no asymmetry, masses, or scars  RESP: lungs clear to auscultation - no rales, rhonchi or wheezes  CV: regular rate and rhythm, normal S1 S2, no S3 or S4, no murmur, click or rub, no " peripheral edema  ABDOMEN: soft, nontender, no hepatosplenomegaly, no masses and bowel sounds normal   (female): normal female external genitalia, normal vaginal mucosa, and small urethral caruncle  MS: no gross musculoskeletal defects noted, no edema         8/19/2024   Mini Cog   Clock Draw Score 2 Normal   3 Item Recall 2 objects recalled   Mini Cog Total Score 4                 Signed Electronically by: Eli Sommer MD

## 2024-08-19 NOTE — PROGRESS NOTES
Preventive Care Visit  [facility]  Eli Sommer MD, Family Medicine  Aug 19, 2024      Assessment & Plan     Encounter for Medicare annual wellness exam       Hyperlipidemia LDL goal <160     - Lipid panel reflex to direct LDL Non-fasting; Future    Gastroesophageal reflux disease without esophagitis     - omeprazole (PRILOSEC) 40 MG DR capsule; Take 1 capsule by mouth once daily    Epigastric pain  Nausea   H/o PUD and H Pylori  - Adult GI  Referral - Procedure Only; Future  \  Atrophic vaginitis + Urethral Caruncle  Continue use of this  - estradiol (ESTRACE) 0.1 MG/GM vaginal cream; Place vaginally twice a week 1 g  twice weekly at bedtime    Patient has been advised of split billing requirements and indicates understanding: Yes        Counseling  Appropriate preventive services were addressed with this patient via screening, questionnaire, or discussion as appropriate for fall prevention, nutrition, physical activity, Tobacco-use cessation, social engagement, weight loss and cognition.  Checklist reviewing preventive services available has been given to the patient.  Reviewed patient's diet, addressing concerns and/or questions.   Discussed possible causes of fatigue.         Nakia Lemus is a 78 year old, presenting for the following:  Medicare Visit        8/19/2024     8:48 AM   Additional Questions   Roomed by Vanessa souza CMA   Accompanied by Self         Health Care Directive  Patient has a Health Care Directive on file  Advance care planning document is on file and is current.    HPI    - Varicose veins.     - Continued intermittent nausea since 7/21 ED visit. She also notes some gas. Bloating has resolved. No stomach pain, emesis, constipation or diarrhea.  Takes Nauzeen which does help.  Needs to take it almost daily.  There is no correlation with eating.  Regular bowel movements, no blood in stool.  No melena.    Hyperlipidemia Follow-Up  Simvastatin 20mg  qhs  Are you regularly taking any medication or supplement to lower your cholesterol?   Yes- statin  Are you having muscle aches or other side effects that you think could be caused by your cholesterol lowering medication?  No        8/19/2024   General Health   How would you rate your overall physical health? Good   Feel stress (tense, anxious, or unable to sleep) Not at all            8/19/2024   Nutrition   Diet: Regular (no restrictions)            8/19/2024   Exercise   Days per week of moderate/strenous exercise 7 days   Average minutes spent exercising at this level 20 min            8/19/2024   Social Factors   Frequency of gathering with friends or relatives Twice a week   Worry food won't last until get money to buy more No   Food not last or not have enough money for food? No   Do you have housing? (Housing is defined as stable permanent housing and does not include staying ouside in a car, in a tent, in an abandoned building, in an overnight shelter, or couch-surfing.) Yes   Are you worried about losing your housing? No   Lack of transportation? No   Unable to get utilities (heat,electricity)? No            8/19/2024   Fall Risk   Fallen 2 or more times in the past year? No   Trouble with walking or balance? No             8/19/2024   Activities of Daily Living- Home Safety   Needs help with the following daily activites None of the above   Safety concerns in the home None of the above            8/19/2024   Dental   Dentist two times every year? Yes            8/19/2024   Hearing Screening   Hearing concerns? None of the above            8/19/2024   Driving Risk Screening   Patient/family members have concerns about driving No            8/19/2024   General Alertness/Fatigue Screening   Have you been more tired than usual lately? (!) YES            8/19/2024   Urinary Incontinence Screening   Bothered by leaking urine in past 6 months No            8/19/2024   TB Screening   Were you born outside of the  [place]? No            Today's PHQ-2 Score:       8/19/2024     8:53 AM   PHQ-2 ( 1999 Pfizer)   Q1: Little interest or pleasure in doing things 0   Q2: Feeling down, depressed or hopeless 0   PHQ-2 Score 0   Q1: Little interest or pleasure in doing things Not at all   Q2: Feeling down, depressed or hopeless Not at all   PHQ-2 Score 0           8/19/2024   Substance Use   Alcohol more than 3/day or more than 7/wk No   Do you have a current opioid prescription? No   How severe/bad is pain from 1 to 10? 0/10 (No Pain)   Do you use any other substances recreationally? No        Social History     Tobacco Use    Smoking status: Never     Passive exposure: Never    Smokeless tobacco: Never   Vaping Use    Vaping status: Never Used   Substance Use Topics    Alcohol use: No    Drug use: No           8/24/2023   LAST FHS-7 RESULTS   1st degree relative breast or ovarian cancer No   Any relative bilateral breast cancer No   Any male have breast cancer No   Any ONE woman have BOTH breast AND ovarian cancer No   Any woman with breast cancer before 50yrs No   2 or more relatives with breast AND/OR ovarian cancer No   2 or more relatives with breast AND/OR bowel cancer No           Mammogram Screening - After age 74- determine frequency with patient based on health status, life expectancy and patient goals    ASCVD Risk   The 10-year ASCVD risk score (Kelsy POWERS, et al., 2019) is: 24%    Values used to calculate the score:      Age: 78 years      Sex: Female      Is Non- : No      Diabetic: No      Tobacco smoker: No      Systolic Blood Pressure: 126 mmHg      Is BP treated: No      HDL Cholesterol: 61 mg/dL      Total Cholesterol: 179 mg/dL            Reviewed and updated as needed this visit by Provider                      Current providers sharing in care for this patient include:  Patient Care Team:  Eli Sommer MD as PCP - General (Family Medicine)  Eli Sommer MD as Assigned  "PCP  Chapito Franco MD as MD (Dermatology)  Chapito Franco MD as Assigned Surgical Provider  Alfredo Ferrell DO as Assigned Musculoskeletal Provider    The following health maintenance items are reviewed in Epic and correct as of today:  Health Maintenance   Topic Date Due    RSV VACCINE (Pregnancy & 60+) (1 - 1-dose 60+ series) Never done    COVID-19 Vaccine (7 - 2023-24 season) 02/11/2024    ANNUAL REVIEW OF HM ORDERS  07/05/2024    LIPID  08/23/2024    INFLUENZA VACCINE (1) 09/01/2024    MEDICARE ANNUAL WELLNESS VISIT  08/19/2025    FALL RISK ASSESSMENT  08/19/2025    GLUCOSE  07/21/2027    ADVANCE CARE PLANNING  09/14/2028    DTAP/TDAP/TD IMMUNIZATION (3 - Td or Tdap) 02/09/2032    DEXA  10/13/2037    PHQ-2 (once per calendar year)  Completed    Pneumococcal Vaccine: 65+ Years  Completed    ZOSTER IMMUNIZATION  Completed    IPV IMMUNIZATION  Aged Out    HPV IMMUNIZATION  Aged Out    MENINGITIS IMMUNIZATION  Aged Out    RSV MONOCLONAL ANTIBODY  Aged Out    HEPATITIS C SCREENING  Discontinued    MAMMO SCREENING  Discontinued    COLORECTAL CANCER SCREENING  Discontinued         Review of Systems  Constitutional, HEENT, cardiovascular, pulmonary, gi and gu systems are negative, except as otherwise noted.    Feels \"lumps\" when she applies her vaginal estrogen cream     Objective    Exam  /80   Pulse 64   Temp 97.9  F (36.6  C) (Tympanic)   Resp 18   Ht 1.6 m (5' 3\")   Wt 61.8 kg (136 lb 4 oz)   LMP  (LMP Unknown)   SpO2 98%   BMI 24.14 kg/m     Estimated body mass index is 24.14 kg/m  as calculated from the following:    Height as of this encounter: 1.6 m (5' 3\").    Weight as of this encounter: 61.8 kg (136 lb 4 oz).    Physical Exam  GENERAL: alert and no distress  NECK: no adenopathy, no asymmetry, masses, or scars  RESP: lungs clear to auscultation - no rales, rhonchi or wheezes  CV: regular rate and rhythm, normal S1 S2, no S3 or S4, no murmur, click or rub, no " peripheral edema  ABDOMEN: soft, nontender, no hepatosplenomegaly, no masses and bowel sounds normal   (female): normal female external genitalia, normal vaginal mucosa, and small urethral caruncle  MS: no gross musculoskeletal defects noted, no edema         8/19/2024   Mini Cog   Clock Draw Score 2 Normal   3 Item Recall 2 objects recalled   Mini Cog Total Score 4                 Signed Electronically by: Eli Sommer MD

## 2024-08-19 NOTE — LETTER
August 20, 2024      [address] [place]  [address] [address] [place] [address]        Dear ,    We are writing to inform you of your test results. Your labs were all normal/acceptable results.        Resulted Orders   Lipid panel reflex to direct LDL Non-fasting   Result Value Ref Range    Cholesterol 197 <200 mg/dL    Triglycerides 125 <150 mg/dL    Direct Measure HDL 55 >=50 mg/dL    LDL Cholesterol Calculated 117 (H) <=100 mg/dL    Non HDL Cholesterol 142 (H) <130 mg/dL    Patient Fasting > 8hrs? No     Narrative    Cholesterol  Desirable:  <200 mg/dL    Triglycerides  Normal:  Less than 150 mg/dL  Borderline High:  150-199 mg/dL  High:  200-499 mg/dL  Very High:  Greater than or equal to 500 mg/dL    Direct Measure HDL  Female:  Greater than or equal to 50 mg/dL   Male:  Greater than or equal to 40 mg/dL    LDL Cholesterol  Desirable:  <100mg/dL  Above Desirable:  100-129 mg/dL   Borderline High:  130-159 mg/dL   High:  160-189 mg/dL   Very High:  >= 190 mg/dL    Non HDL Cholesterol  Desirable:  130 mg/dL  Above Desirable:  130-159 mg/dL  Borderline High:  160-189 mg/dL  High:  190-219 mg/dL  Very High:  Greater than or equal to 220 mg/dL       If you have any questions or concerns, please call the clinic at the number listed above.       Sincerely,      Eli Sommer MD

## 2024-08-19 NOTE — PATIENT INSTRUCTIONS
Patient Education   Preventive Care Advice   This is general advice given by our system to help you stay healthy. However, your care team may have specific advice just for you. Please talk to your care team about your preventive care needs.  Nutrition  Eat 5 or more servings of fruits and vegetables each day.  Try wheat bread, brown rice and whole grain pasta (instead of white bread, rice, and pasta).  Get enough calcium and vitamin D. Check the label on foods and aim for 100% of the RDA (recommended daily allowance).  Lifestyle  Exercise at least 150 minutes each week  (30 minutes a day, 5 days a week).  Do muscle strengthening activities 2 days a week. These help control your weight and prevent disease.  No smoking.  Wear sunscreen to prevent skin cancer.  Have a dental exam and cleaning every 6 months.  Yearly exams  See your health care team every year to talk about:  Any changes in your health.  Any medicines your care team has prescribed.  Preventive care, family planning, and ways to prevent chronic diseases.  Shots (vaccines)   HPV shots (up to age 26), if you've never had them before.  Hepatitis B shots (up to age 59), if you've never had them before.  COVID-19 shot: Get this shot when it's due.  Flu shot: Get a flu shot every year.  Tetanus shot: Get a tetanus shot every 10 years.  Pneumococcal, hepatitis A, and RSV shots: Ask your care team if you need these based on your risk.  Shingles shot (for age 50 and up)  General health tests  Diabetes screening:  Starting at age 35, Get screened for diabetes at least every 3 years.  If you are younger than age 35, ask your care team if you should be screened for diabetes.  Cholesterol test: At age 39, start having a cholesterol test every 5 years, or more often if advised.  Bone density scan (DEXA): At age 50, ask your care team if you should have this scan for osteoporosis (brittle bones).  Hepatitis C: Get tested at least once in your life.  STIs (sexually  transmitted infections)  Before age 24: Ask your care team if you should be screened for STIs.  After age 24: Get screened for STIs if you're at risk. You are at risk for STIs (including HIV) if:  You are sexually active with more than one person.  You don't use condoms every time.  You or a partner was diagnosed with a sexually transmitted infection.  If you are at risk for HIV, ask about PrEP medicine to prevent HIV.  Get tested for HIV at least once in your life, whether you are at risk for HIV or not.  Cancer screening tests  Cervical cancer screening: If you have a cervix, begin getting regular cervical cancer screening tests starting at age 21.  Breast cancer scan (mammogram): If you've ever had breasts, begin having regular mammograms starting at age 40. This is a scan to check for breast cancer.  Colon cancer screening: It is important to start screening for colon cancer at age 45.  Have a colonoscopy test every 10 years (or more often if you're at risk) Or, ask your provider about stool tests like a FIT test every year or Cologuard test every 3 years.  To learn more about your testing options, visit:   .  For help making a decision, visit:   https://bit.ly/ai90577.  Prostate cancer screening test: If you have a prostate, ask your care team if a prostate cancer screening test (PSA) at age 55 is right for you.  Lung cancer screening: If you are a current or former smoker ages 50 to 80, ask your care team if ongoing lung cancer screenings are right for you.  For informational purposes only. Not to replace the advice of your health care provider. Copyright   2023 [place] [employer] ([employer]". All rights reserved. Clinically reviewed by the [facility]. [employer] [id number] - REV 01/24.

## 2024-08-20 ENCOUNTER — TELEPHONE (OUTPATIENT)
Dept: GASTROENTEROLOGY | Facility: CLINIC | Age: 79
End: 2024-08-20
Payer: COMMERCIAL

## 2024-08-20 NOTE — TELEPHONE ENCOUNTER
"Endoscopy Scheduling Screen      What insurance is in the chart?  Other:  [facility]    Ordering/Referring Provider: Eli Sommer     (If ordering provider performs procedure, schedule with ordering provider unless otherwise instructed. )    BMI: There is no height or weight on file to calculate BMI.     Sedation Ordered  general anesthesia.   BMI<= 45 45 < BMI <= 48 48 < BMI < = 50  BMI > 50   No Restrictions No [facility]  No [facility]  [facility] with exceptions Hospital Only OR Only       Do you have a history of malignant hyperthermia?  No    (Females) Are you currently pregnant?   No     Have you been diagnosed or told you have pulmonary hypertension?   No    Do you have any heart conditions?  No     Do you have an LVAD?  No    Have you been told you have moderate to severe sleep apnea?  No    Have you been told you have COPD, asthma, or any other lung disease?  No    Have you ever had or are you waiting for an organ transplant?  No. Continue scheduling, no site restrictions.    Have you had a stroke or transient ischemic attack (TIA aka \"mini stroke\" in the last 6 months?   No    Have you been diagnosed with or been told you have cirrhosis of the liver?   No    Are you currently on dialysis?   No    Do you need assistance transferring?   No    BMI: There is no height or weight on file to calculate BMI.     Is patients BMI > 50?  No    BMI > 40?  No    Do you take an injectable medication for weight loss or diabetes (excluding insulin)?  No    Do you take the medication Naltrexone?  No    Do you take blood thinners?  No     Prep   Are you currently on dialysis or do you have chronic kidney disease?  No    Do you have a diagnosis of diabetes?  No    Do you have a diagnosis of cystic fibrosis (CF)?  No    On a regular basis do you go 3 -5 days between bowel movements?  No    Preferred Pharmacy:  No Pharmacies Listed    Final Scheduling Details     Procedure scheduled  Upper endoscopy (EGD)    Surgeon:  Dany Dumas " of procedure:  8/22/24     Location  [place] - Per order.    What is your communication preference for Instructions and/or Bowel Prep?   Mail/USPS Picked up at [facility] by patient     Patient Reminders:    You will receive a call from a Nurse to review instructions and health history.  This assessment must be completed prior to your procedure.  Failure to complete the Nurse assessment may result in the procedure being cancelled.       On the day of your procedure, please designate an adult(s) who can drive you home stay with you for the next 24 hours. The medicines used in the exam will make you sleepy. You will not be able to drive.       You cannot take public transportation, ride share services, or non-medical taxi service without a responsible caregiver.  Medical transport services are allowed with the requirement that a responsible caregiver will receive you at your destination.  We require that drivers and caregivers are confirmed prior to your procedure.

## 2024-08-21 ENCOUNTER — ANESTHESIA EVENT (OUTPATIENT)
Dept: GASTROENTEROLOGY | Facility: CLINIC | Age: 79
End: 2024-08-21
Payer: COMMERCIAL

## 2024-08-21 RX ORDER — DEXAMETHASONE SODIUM PHOSPHATE 4 MG/ML
4 INJECTION, SOLUTION INTRA-ARTICULAR; INTRALESIONAL; INTRAMUSCULAR; INTRAVENOUS; SOFT TISSUE
Status: CANCELLED | OUTPATIENT
Start: 2024-08-21

## 2024-08-21 NOTE — ANESTHESIA PREPROCEDURE EVALUATION
Anesthesia Pre-Procedure Evaluation    Patient: Mariah Jacinto   MRN: [id number] : 1945        Procedure : Procedure(s):  Esophagoscopy, gastroscopy, duodenoscopy (EGD), combined          Past Medical History:   Diagnosis Date     Adhesive capsulitis of shoulder     Right shoulder tendonitis     Basal cell carcinoma      Chronic right shoulder pain 2023     Displacement of cervical intervertebral disc without myelopathy      Esophageal reflux      Major depression in complete remission (H24) 2009    celexa caused spinning side effect      MENOPAUSE --ERT      OSTEOPENIA      Squamous cell carcinoma       Past Surgical History:   Procedure Laterality Date     ABLATE VEIN VARICOSE RADIO FREQUENCY WITHOUT PHLEBECTOMY MULTIPLE STAB  3/28/2013    Procedure: ABLATE VEIN VARICOSE RADIO FREQUENCY WITHOUT PHLEBECTOMY MULTIPLE STAB;  Bilateral ablation of varicose veins legs-to ultrasound at 0930  ;  Surgeon: Noam Soliman MD;  Location: [place] [facility]     COLONOSCOPY  2013    Procedure: COLONOSCOPY;  Colonoscopy;  Surgeon: Yuliya Nathan MD;  Location: [facility]     ESOPHAGOSCOPY, GASTROSCOPY, DUODENOSCOPY (EGD), COMBINED N/A 2015    Procedure: COMBINED ESOPHAGOSCOPY, GASTROSCOPY, DUODENOSCOPY (EGD), BIOPSY SINGLE OR MULTIPLE;  Surgeon: Yuliya Nathan MD;  Location: [place]     ESOPHAGOSCOPY, GASTROSCOPY, DUODENOSCOPY (EGD), COMBINED N/A 2017    Procedure: COMBINED ESOPHAGOSCOPY, GASTROSCOPY, DUODENOSCOPY (EGD), BIOPSY SINGLE OR MULTIPLE;  Surgeon: Qasim Bowie MD;  Location: [facility]     EXCISE MASS FINGER Right 2019    Procedure: Excision Right Ring Finger Volar Middle Phalanx Mass;  Surgeon: Jake Viveros MD;  Location: [facility]     HYSTERECTOMY, PAP NO LONGER INDICATED      has both ovaries out also      HYSTERECTOMY, VAGINAL  1988    Hysterectomy, oophorectomy     PHACOEMULSIFICATION WITH STANDARD INTRAOCULAR LENS IMPLANT Left 3/18/2019    Procedure:  Cataract Removal with Implant;  Surgeon: Chapito Phillips MD;  Location: [facility]     PHACOEMULSIFICATION WITH STANDARD INTRAOCULAR LENS IMPLANT Right 4/10/2019    Procedure: Cataract Removal with Implant;  Surgeon: Chapito Phillips MD;  Location: [facility]     RELEASE TRIGGER FINGER Right 6/23/2017    Procedure: RELEASE TRIGGER FINGER;  Right Thumb A1 Pulley Release;  Surgeon: Jake Viveros MD;  Location: [facility]     REVERSE ARTHROPLASTY SHOULDER Right 8/25/2023    Procedure: Right Reverse total shoulder arthroplasty;  Surgeon: Srikanth Gonsales MD;  Location: [facility]     SURGICAL HISTORY OF -   2009    right retromastoid craniectomy and decompression trigeminal nerve     TONSILLECTOMY & ADENOIDECTOMY  child    T&A      ZZC ANESTH,LOWER ARM SURGERY  1999    ulnar nerve decompression - right      Allergies   Allergen Reactions     Biaxin [Clarithromycin]      per pt       Celebrex [Bob-2 Inhibitors (Sulfonamide)]      per pt     Cipro [Ciprofloxacin]      per pt     Darvocet [Propoxyphene N-Apap]      Fleet Phospho Soda [Sodium Phosphate/Biphosphate]      nausea per pt     Morphine      Neurontin [Gabapentin]      per pt     Nortriptyline      per pt     Percocet [Oxycodone-Acetaminophen]      Serzone [Nefazodone Hydrochloride]      per pt     Tegretol [Carbamazepine]      Vicodin [Acetaminophen]      per pt-dizziness     Vioxx      per pt     Vivactil [Protriptyline Hcl]      per pt     Wellbutrin [Bupropion Hcl]      Zithromax [Azithromycin Dihydrate]       Social History     Tobacco Use     Smoking status: Never     Passive exposure: Never     Smokeless tobacco: Never   Substance Use Topics     Alcohol use: No      Wt Readings from Last 1 Encounters:   08/19/24 61.8 kg (136 lb 4 oz)        Anesthesia Evaluation   Pt has had prior anesthetic. Type: General, MAC and Regional.        ROS/MED HX  ENT/Pulmonary:  - neg pulmonary ROS     Neurologic:  - neg neurologic ROS     Cardiovascular:     (+)  "Dyslipidemia - -   -  - -                                      METS/Exercise Tolerance:     Hematologic:  - neg hematologic  ROS     Musculoskeletal:  - neg musculoskeletal ROS     GI/Hepatic:     (+) GERD,                   Renal/Genitourinary:  - neg Renal ROS     Endo:  - neg endo ROS     Psychiatric/Substance Use:     (+) psychiatric history depression       Infectious Disease:  - neg infectious disease ROS     Malignancy:  - neg malignancy ROS     Other:            Physical Exam    Airway  airway exam normal      Mallampati: II   TM distance: > 3 FB   Neck ROM: full   Mouth opening: > 3 cm    Respiratory Devices and Support         Dental       (+) Minor Abnormalities - some fillings, tiny chips      Cardiovascular   cardiovascular exam normal          Pulmonary   pulmonary exam normal        breath sounds clear to auscultation       OUTSIDE LABS:  CBC:   Lab Results   Component Value Date    WBC 8.1 07/21/2024    WBC 7.6 08/23/2023    HGB 14.1 07/21/2024    HGB 12.1 08/26/2023    HCT 41.1 07/21/2024    HCT 42.1 08/23/2023     07/21/2024     08/23/2023     BMP:   Lab Results   Component Value Date     07/21/2024     08/23/2023    POTASSIUM 4.1 07/21/2024    POTASSIUM 4.8 08/23/2023    CHLORIDE 106 07/21/2024    CHLORIDE 104 08/23/2023    CO2 24 07/21/2024    CO2 25 08/23/2023    BUN 18.1 07/21/2024    BUN 17.2 08/23/2023    CR 0.73 07/21/2024    CR 0.78 08/23/2023     (H) 07/21/2024     (H) 08/26/2023     COAGS:   Lab Results   Component Value Date    PTT 27 08/03/2009    INR 0.98 08/03/2009     POC: No results found for: \"BGM\", \"HCG\", \"HCGS\"  HEPATIC:   Lab Results   Component Value Date    ALBUMIN 4.2 07/21/2024    PROTTOTAL 6.7 07/21/2024    ALT 25 07/21/2024    AST 32 07/21/2024    ALKPHOS 101 07/21/2024    BILITOTAL 0.3 07/21/2024     OTHER:   Lab Results   Component Value Date    A1C 5.7 04/04/2016    KARELY 9.3 07/21/2024    PHOS 3.7 01/11/2012    MAG 2.5 (H) " 03/09/2010    LIPASE 114 02/12/2012    AMYLASE 70 12/23/2011    TSH 0.87 05/16/2022    SED 8 05/02/2016       Anesthesia Plan    ASA Status:  2    NPO Status:  NPO Appropriate    Anesthesia Type: General.     - Airway: Native airway   Induction: Intravenous.   Maintenance: TIVA.        Consents    Anesthesia Plan(s) and associated risks, benefits, and realistic alternatives discussed. Questions answered and patient/representative(s) expressed understanding.     - Discussed: Risks, Benefits and Alternatives for BOTH SEDATION and the PROCEDURE were discussed     - Discussed with:  Patient            Postoperative Care            Comments:             Fitz Ramirez, APRN CRNA    I have reviewed the pertinent notes and labs in the chart from the past 30 days and (re)examined the patient.  Any updates or changes from those notes are reflected in this note.

## 2024-08-22 ENCOUNTER — ANESTHESIA (OUTPATIENT)
Dept: GASTROENTEROLOGY | Facility: CLINIC | Age: 79
End: 2024-08-22
Payer: COMMERCIAL

## 2024-08-22 ENCOUNTER — HOSPITAL ENCOUNTER (OUTPATIENT)
Facility: CLINIC | Age: 79
Discharge: HOME | End: 2024-08-22
Attending: STUDENT IN AN ORGANIZED HEALTH CARE EDUCATION/TRAINING PROGRAM | Admitting: STUDENT IN AN ORGANIZED HEALTH CARE EDUCATION/TRAINING PROGRAM
Payer: COMMERCIAL

## 2024-08-22 VITALS
RESPIRATION RATE: 16 BRPM | SYSTOLIC BLOOD PRESSURE: 153 MMHG | DIASTOLIC BLOOD PRESSURE: 81 MMHG | TEMPERATURE: 97.6 F | OXYGEN SATURATION: 98 % | HEART RATE: 78 BPM

## 2024-08-22 LAB — UPPER GI ENDOSCOPY: NORMAL

## 2024-08-22 PROCEDURE — 88305 TISSUE EXAM BY PATHOLOGIST: CPT | Mod: TC | Performed by: STUDENT IN AN ORGANIZED HEALTH CARE EDUCATION/TRAINING PROGRAM

## 2024-08-22 PROCEDURE — 370N000017 HC ANESTHESIA TECHNICAL FEE, PER MIN: Performed by: STUDENT IN AN ORGANIZED HEALTH CARE EDUCATION/TRAINING PROGRAM

## 2024-08-22 PROCEDURE — 250N000009 HC RX 250: Performed by: NURSE ANESTHETIST, CERTIFIED REGISTERED

## 2024-08-22 PROCEDURE — 250N000009 HC RX 250: Performed by: STUDENT IN AN ORGANIZED HEALTH CARE EDUCATION/TRAINING PROGRAM

## 2024-08-22 PROCEDURE — 258N000003 HC RX IP 258 OP 636: Performed by: NURSE ANESTHETIST, CERTIFIED REGISTERED

## 2024-08-22 PROCEDURE — 43239 EGD BIOPSY SINGLE/MULTIPLE: CPT | Performed by: STUDENT IN AN ORGANIZED HEALTH CARE EDUCATION/TRAINING PROGRAM

## 2024-08-22 PROCEDURE — 258N000003 HC RX IP 258 OP 636: Performed by: STUDENT IN AN ORGANIZED HEALTH CARE EDUCATION/TRAINING PROGRAM

## 2024-08-22 PROCEDURE — 250N000011 HC RX IP 250 OP 636: Performed by: NURSE ANESTHETIST, CERTIFIED REGISTERED

## 2024-08-22 RX ORDER — SODIUM CHLORIDE, SODIUM LACTATE, POTASSIUM CHLORIDE, CALCIUM CHLORIDE 600; 310; 30; 20 MG/100ML; MG/100ML; MG/100ML; MG/100ML
INJECTION, SOLUTION INTRAVENOUS CONTINUOUS
Status: DISCONTINUED | OUTPATIENT
Start: 2024-08-22 | End: 2024-08-22 | Stop reason: HOSPADM

## 2024-08-22 RX ORDER — SODIUM CHLORIDE, SODIUM LACTATE, POTASSIUM CHLORIDE, CALCIUM CHLORIDE 600; 310; 30; 20 MG/100ML; MG/100ML; MG/100ML; MG/100ML
INJECTION, SOLUTION INTRAVENOUS CONTINUOUS PRN
Status: DISCONTINUED | OUTPATIENT
Start: 2024-08-22 | End: 2024-08-22

## 2024-08-22 RX ORDER — NALOXONE HYDROCHLORIDE 0.4 MG/ML
0.4 INJECTION, SOLUTION INTRAMUSCULAR; INTRAVENOUS; SUBCUTANEOUS
Status: CANCELLED | OUTPATIENT
Start: 2024-08-22

## 2024-08-22 RX ORDER — PROPOFOL 10 MG/ML
INJECTION, EMULSION INTRAVENOUS CONTINUOUS PRN
Status: DISCONTINUED | OUTPATIENT
Start: 2024-08-22 | End: 2024-08-22

## 2024-08-22 RX ORDER — LIDOCAINE 40 MG/G
CREAM TOPICAL
Status: DISCONTINUED | OUTPATIENT
Start: 2024-08-22 | End: 2024-08-22 | Stop reason: HOSPADM

## 2024-08-22 RX ORDER — GLYCOPYRROLATE 0.2 MG/ML
INJECTION, SOLUTION INTRAMUSCULAR; INTRAVENOUS PRN
Status: DISCONTINUED | OUTPATIENT
Start: 2024-08-22 | End: 2024-08-22

## 2024-08-22 RX ORDER — NALOXONE HYDROCHLORIDE 0.4 MG/ML
0.2 INJECTION, SOLUTION INTRAMUSCULAR; INTRAVENOUS; SUBCUTANEOUS
Status: CANCELLED | OUTPATIENT
Start: 2024-08-22

## 2024-08-22 RX ORDER — FLUMAZENIL 0.1 MG/ML
0.2 INJECTION, SOLUTION INTRAVENOUS
Status: CANCELLED | OUTPATIENT
Start: 2024-08-22 | End: 2024-08-22

## 2024-08-22 RX ORDER — LIDOCAINE HYDROCHLORIDE 20 MG/ML
INJECTION, SOLUTION INFILTRATION; PERINEURAL PRN
Status: DISCONTINUED | OUTPATIENT
Start: 2024-08-22 | End: 2024-08-22

## 2024-08-22 RX ADMIN — PROPOFOL 100 MCG/KG/MIN: 10 INJECTION, EMULSION INTRAVENOUS at 11:31

## 2024-08-22 RX ADMIN — SODIUM CHLORIDE, POTASSIUM CHLORIDE, SODIUM LACTATE AND CALCIUM CHLORIDE: 600; 310; 30; 20 INJECTION, SOLUTION INTRAVENOUS at 11:36

## 2024-08-22 RX ADMIN — GLYCOPYRROLATE 0.2 MG: 0.2 INJECTION, SOLUTION INTRAMUSCULAR; INTRAVENOUS at 11:36

## 2024-08-22 RX ADMIN — LIDOCAINE HYDROCHLORIDE 100 MG: 20 INJECTION, SOLUTION INFILTRATION; PERINEURAL at 11:36

## 2024-08-22 RX ADMIN — LIDOCAINE HYDROCHLORIDE 0.1 ML: 10 INJECTION, SOLUTION EPIDURAL; INFILTRATION; INTRACAUDAL; PERINEURAL at 11:20

## 2024-08-22 RX ADMIN — SODIUM CHLORIDE, POTASSIUM CHLORIDE, SODIUM LACTATE AND CALCIUM CHLORIDE: 600; 310; 30; 20 INJECTION, SOLUTION INTRAVENOUS at 11:19

## 2024-08-22 ASSESSMENT — ACTIVITIES OF DAILY LIVING (ADL)
ADLS_ACUITY_SCORE: 36
ADLS_ACUITY_SCORE: 34

## 2024-08-22 NOTE — ANESTHESIA POSTPROCEDURE EVALUATION
Patient: Mariah Jacinto    Procedure: Procedure(s):  ESOPHAGOGASTRODUODENOSCOPY, WITH BIOPSY       Anesthesia Type:  General    Note:  Disposition: Outpatient   Postop Pain Control:             Sign Out: Well controlled pain   PONV:    Neuro/Psych:             Sign Out: Acceptable/Baseline neuro status   Airway/Respiratory:             Sign Out: Acceptable/Baseline resp. status   CV/Hemodynamics:             Sign Out: Acceptable CV status   Other NRE: NONE   DID A NON-ROUTINE EVENT OCCUR? No       Last vitals:  There were no vitals filed for this visit.    Electronically Signed By: VICKY Brown CRNA  August 22, 2024  11:48 AM

## 2024-08-22 NOTE — LETTER
[place] Jacinto  [address] [place] [address]  August 26, 2024    Dear Mariah,   This letter is to inform you of the results of your pathology report on your upper endoscopy (EGD).    Your pathology report was:  Showed findings consistent with a mildly inflamed stomach. If you continue to have symptoms please follow up with your primary care doctor or with myself.  There was also evidence of a small hiatal hernia. It is possible that this may be contributing to your symptoms. If your symptoms persist, then I recommend discussing with your primary care physician for possible referral to a surgical specialist to repair this hernia.     If you have any questions or concerns please do not hesitate to call my office at [phone].  Sincerely,     Mj Saenz MD  [facility]        Resulted Orders   Surgical Pathology Exam   Result Value Ref Range    Case Report       Surgical Pathology Report                         Case: [id number]                                  Authorizing Provider:  Mj Saenz MD       Collected:           08/22/2024 11:35 AM          Ordering Location:     [facility]   Received:            08/22/2024 12:16 PM                                 [place]                                                                      Pathologist:           Alfredo Sherman MD                                                            Specimen:    Stomach, Antrum                                                                            Final Diagnosis       Stomach, biopsies:  --Gastric mucosa with mild chronic inactive gastritis and focal intestinal metaplasia.  --Negative for Helicobacter pylori organisms or dysplasia.          Clinical Information       Procedure:  ESOPHAGOGASTRODUODENOSCOPY, WITH BIOPSY  Pre-op Diagnosis: Nausea [R11.0]  Epigastric pain [R10.13]  Post-op Diagnosis: R11.0 - Nausea [ICD-10-CM]  R10.13 - Epigastric pain [ICD-10-CM]      Gross  Description       A(1). Stomach, Antrum, :  The specimen is received in formalin, labeled with the patient's name, medical record number and other identifying information and designated  stomach, antrum . It consists of 4 tan soft tissue fragments ranging from 0.1-0.2 cm. Entirely submitted in one cassette.   (BILLIE Chahal) 8/23/2024 8:12 AM       Microscopic Description       Microscopic examination was performed.        Performing Labs       The technical component of this testing was completed at [facility].    Stain controls for all stains resulted within this report have been reviewed and show appropriate reactivity.       Case Images

## 2024-08-22 NOTE — ANESTHESIA CARE TRANSFER NOTE
Patient: Mariah Jacinto    Procedure: Procedure(s):  ESOPHAGOGASTRODUODENOSCOPY, WITH BIOPSY       Diagnosis: Nausea [R11.0]  Epigastric pain [R10.13]  Diagnosis Additional Information: No value filed.    Anesthesia Type:   General     Note:    Oropharynx: spontaneously breathing  Level of Consciousness: drowsy  Oxygen Supplementation: room air    Independent Airway: airway patency satisfactory and stable  Dentition: dentition unchanged  Vital Signs Stable: post-procedure vital signs reviewed and stable  Report to RN Given: handoff report given  Patient transferred to: Phase II    Handoff Report: Identifed the Patient, Identified the Reponsible Provider, Reviewed the pertinent medical history, Discussed the surgical course, Reviewed Intra-OP anesthesia mangement and issues during anesthesia, Set expectations for post-procedure period and Allowed opportunity for questions and acknowledgement of understanding  Vitals:  Vitals Value Taken Time   BP     Temp     Pulse     Resp     SpO2 99 % 08/22/24 1146   Vitals shown include unfiled device data.    Electronically Signed By: VICKY Brown CRNA  August 22, 2024  11:48 AM

## 2024-08-26 LAB
PATH REPORT.COMMENTS IMP SPEC: NORMAL
PATH REPORT.COMMENTS IMP SPEC: NORMAL
PATH REPORT.FINAL DX SPEC: NORMAL
PATH REPORT.GROSS SPEC: NORMAL
PATH REPORT.MICROSCOPIC SPEC OTHER STN: NORMAL
PATH REPORT.RELEVANT HX SPEC: NORMAL
PHOTO IMAGE: NORMAL

## 2024-08-26 PROCEDURE — 88305 TISSUE EXAM BY PATHOLOGIST: CPT | Mod: 26 | Performed by: PATHOLOGY

## 2024-08-26 NOTE — ADDENDUM NOTE
Addendum  created 08/26/24 1203 by Halley Bernard APRN CRNA    Intraprocedure Event edited, Intraprocedure Staff edited

## 2024-08-28 ENCOUNTER — TELEPHONE (OUTPATIENT)
Dept: FAMILY MEDICINE | Facility: CLINIC | Age: 79
End: 2024-08-28
Payer: COMMERCIAL

## 2024-08-28 NOTE — TELEPHONE ENCOUNTER
Reason for Call:  Appointment Request    Patient requesting this type of appt: Chronic Diease Management/Medication/Follow-Up    Requested provider: Eli Sommer    Reason patient unable to be scheduled: Not within requested timeframe    When does patient want to be seen/preferred time:  asap   Not available the week of 9/16  Comments: follow up for upper endoscopy appt scheduled for Oct 7th, but would like sooner appt if available     Okay to leave a detailed message?: Yes at Home number on file [phone] (home)    Call taken on 8/28/2024 at 11:34 AM by Sasha Meyer

## 2024-08-28 NOTE — TELEPHONE ENCOUNTER
Mariah is calling asking about the results of her upper endoscopy. Letter dated 08/26/24 by Dr Saenz reviewed with patient. Warm transferred to central scheduling to set up a follow up appointment with her PCP.  Lisbet PIERCE RN

## 2024-08-28 NOTE — TELEPHONE ENCOUNTER
Contacted patient and scheduled for 9/4  10/7 appointment cancelled per patient request    Lisbet Teran RN on 8/28/2024 at 12:46 PM

## 2024-09-04 ENCOUNTER — OFFICE VISIT (OUTPATIENT)
Dept: FAMILY MEDICINE | Facility: CLINIC | Age: 79
End: 2024-09-04
Payer: COMMERCIAL

## 2024-09-04 VITALS
OXYGEN SATURATION: 97 % | WEIGHT: 137 LBS | RESPIRATION RATE: 20 BRPM | HEIGHT: 63 IN | TEMPERATURE: 98.4 F | SYSTOLIC BLOOD PRESSURE: 132 MMHG | HEART RATE: 70 BPM | DIASTOLIC BLOOD PRESSURE: 70 MMHG | BODY MASS INDEX: 24.27 KG/M2

## 2024-09-04 DIAGNOSIS — R55 SYNCOPE, UNSPECIFIED SYNCOPE TYPE: Primary | ICD-10-CM

## 2024-09-04 DIAGNOSIS — R06.09 DOE (DYSPNEA ON EXERTION): ICD-10-CM

## 2024-09-04 DIAGNOSIS — R53.83 OTHER FATIGUE: ICD-10-CM

## 2024-09-04 DIAGNOSIS — R42 LIGHTHEADEDNESS: ICD-10-CM

## 2024-09-04 LAB
ANION GAP SERPL CALCULATED.3IONS-SCNC: 9 MMOL/L (ref 7–15)
BUN SERPL-MCNC: 12.7 MG/DL (ref 8–23)
CALCIUM SERPL-MCNC: 9.1 MG/DL (ref 8.8–10.4)
CHLORIDE SERPL-SCNC: 105 MMOL/L (ref 98–107)
CREAT SERPL-MCNC: 0.67 MG/DL (ref 0.51–0.95)
EGFRCR SERPLBLD CKD-EPI 2021: 88 ML/MIN/1.73M2
ERYTHROCYTE [DISTWIDTH] IN BLOOD BY AUTOMATED COUNT: 14 % (ref 10–15)
GLUCOSE SERPL-MCNC: 99 MG/DL (ref 70–99)
HCO3 SERPL-SCNC: 26 MMOL/L (ref 22–29)
HCT VFR BLD AUTO: 37.3 % (ref 35–47)
HGB BLD-MCNC: 12.2 G/DL (ref 11.7–15.7)
MCH RBC QN AUTO: 31.4 PG (ref 26.5–33)
MCHC RBC AUTO-ENTMCNC: 32.7 G/DL (ref 31.5–36.5)
MCV RBC AUTO: 96 FL (ref 78–100)
PLATELET # BLD AUTO: 222 10E3/UL (ref 150–450)
POTASSIUM SERPL-SCNC: 4.6 MMOL/L (ref 3.4–5.3)
RBC # BLD AUTO: 3.88 10E6/UL (ref 3.8–5.2)
SODIUM SERPL-SCNC: 140 MMOL/L (ref 135–145)
TSH SERPL DL<=0.005 MIU/L-ACNC: 0.98 UIU/ML (ref 0.3–4.2)
WBC # BLD AUTO: 7.3 10E3/UL (ref 4–11)

## 2024-09-04 PROCEDURE — 99214 OFFICE O/P EST MOD 30 MIN: CPT | Performed by: FAMILY MEDICINE

## 2024-09-04 PROCEDURE — 84443 ASSAY THYROID STIM HORMONE: CPT | Performed by: FAMILY MEDICINE

## 2024-09-04 PROCEDURE — 93000 ELECTROCARDIOGRAM COMPLETE: CPT | Performed by: FAMILY MEDICINE

## 2024-09-04 PROCEDURE — 85027 COMPLETE CBC AUTOMATED: CPT | Performed by: FAMILY MEDICINE

## 2024-09-04 PROCEDURE — 82607 VITAMIN B-12: CPT | Performed by: FAMILY MEDICINE

## 2024-09-04 PROCEDURE — 36415 COLL VENOUS BLD VENIPUNCTURE: CPT | Performed by: FAMILY MEDICINE

## 2024-09-04 PROCEDURE — 80048 BASIC METABOLIC PNL TOTAL CA: CPT | Performed by: FAMILY MEDICINE

## 2024-09-04 ASSESSMENT — PAIN SCALES - GENERAL: PAINLEVEL: NO PAIN (0)

## 2024-09-04 NOTE — LETTER
September 5, 2024      Mariah Jacinto  [address] [place] [address]        Dear ,    We are writing to inform you of your test results. All labs were normal/acceptable results.     Resulted Orders   Basic metabolic panel  (Ca, Cl, CO2, Creat, Gluc, K, Na, BUN)   Result Value Ref Range    Sodium 140 135 - 145 mmol/L    Potassium 4.6 3.4 - 5.3 mmol/L    Chloride 105 98 - 107 mmol/L    Carbon Dioxide (CO2) 26 22 - 29 mmol/L    Anion Gap 9 7 - 15 mmol/L    Urea Nitrogen 12.7 8.0 - 23.0 mg/dL    Creatinine 0.67 0.51 - 0.95 mg/dL    GFR Estimate 88 >60 mL/min/1.73m2      Comment:      eGFR calculated using 2021 CKD-EPI equation.    Calcium 9.1 8.8 - 10.4 mg/dL      Comment:      Reference intervals for this test were updated on 7/16/2024 to reflect our healthy population more accurately. There may be differences in the flagging of prior results with similar values performed with this method. Those prior results can be interpreted in the context of the updated reference intervals.    Glucose 99 70 - 99 mg/dL   CBC with platelets   Result Value Ref Range    WBC Count 7.3 4.0 - 11.0 10e3/uL    RBC Count 3.88 3.80 - 5.20 10e6/uL    Hemoglobin 12.2 11.7 - 15.7 g/dL    Hematocrit 37.3 35.0 - 47.0 %    MCV 96 78 - 100 fL    MCH 31.4 26.5 - 33.0 pg    MCHC 32.7 31.5 - 36.5 g/dL    RDW 14.0 10.0 - 15.0 %    Platelet Count 222 150 - 450 10e3/uL   TSH with free T4 reflex   Result Value Ref Range    TSH 0.98 0.30 - 4.20 uIU/mL       If you have any questions or concerns, please call the clinic at the number listed above.       Sincerely,      Eli Sommer MD

## 2024-09-04 NOTE — PROGRESS NOTES
Assessment & Plan     Syncope, unspecified syncope type  X 1, after last blood donation (several hours later).   EKG is normal.  Hemoglobin was 15.5 before blood donation.   Check echo  - EKG 12-lead complete w/read - Clinics  - Echocardiogram Complete; Future    Other fatigue  R/o reversible causes  - Vitamin B12; Future  - Basic metabolic panel  (Ca, Cl, CO2, Creat, Gluc, K, Na, BUN); Future  - CBC with platelets; Future  - TSH with free T4 reflex; Future  - Vitamin B12  - Basic metabolic panel  (Ca, Cl, CO2, Creat, Gluc, K, Na, BUN)  - CBC with platelets  - TSH with free T4 reflex    PEDERSEN (dyspnea on exertion)  With the nausea, PEDERSEN, poor exercise tolerance, etc, would like to r/o ischemia/CAD  - Echocardiogram Complete; Future  - NM Lexiscan stress test; Future    Lightheadedness  As above  - Echocardiogram Complete; Future                Subjective   Mariah is a 78 year old, presenting for the following health issues:  Gastrophageal Reflux        9/4/2024     2:12 PM   Additional Questions   Roomed by Vanessa souza CMA   Accompanied by Self     HPI       - Follow up after 8/22/2024 biopsy. Today she notes having constant nausea. No emesis, stomach aches, constipation or diarrhea. No fevers. She is taking omeprazole 40mg every day.      EGD 8/22/2024  Findings:       The examined portions of the nasopharynx, oropharynx and larynx were        normal.        The Z-line was regular and was found 34 cm from the incisors.        The gastroesophageal flap valve was visualized endoscopically and        classified as Hill Grade III (minimal fold, loose to endoscope, hiatal        hernia likely).        A non-bleeding diverticulum with a small opening and no stigmata of        recent bleeding was found in the lower third of the esophagus.        Striped mildly erythematous mucosa without bleeding was found in the        gastric antrum. Biopsies were taken with a cold forceps for Helicobacter        pylori testing. Estimated  blood loss was minimal.        No gross lesions were noted in the entire examined duodenum.        The cardia and gastric fundus were normal on retroflexion.        The exam was otherwise without abnormality.                                                                                    Impression:            - The examined portions of the nasopharynx, oropharynx                          and larynx were normal.                          - Z-line regular, 34 cm from the incisors.                          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                          - Erythematous mucosa in the antrum. Biopsied.                          - No gross lesions in the entire examined duodenum.                          - The examination was otherwise normal.   Recommendation:        - Discharge patient to home (ambulatory).                          - High fiber diet indefinitely.                          - Continue present medications.                          - Await pathology results.                          - Return to referring physician in 2 weeks.                                                                                      _________________   ZURI SALGADO,       Surgical pathology:  Final Diagnosis   Stomach, biopsies:  --Gastric mucosa with mild chronic inactive gastritis and focal intestinal metaplasia.  --Negative for Helicobacter pylori organisms or dysplasia.              Review of Systems  CONSTITUTIONAL: NEGATIVE for fever, chills, change in weight  INTEGUMENTARY/SKIN: NEGATIVE for worrisome rashes, moles or lesions  EYES: NEGATIVE for vision changes or irritation  ENT/MOUTH: NEGATIVE for ear, mouth and throat problems  RESP:POSITIVE for dyspnea on exertion  CV: NEGATIVE for chest pain, palpitations or peripheral edema  GI: POSITIVE for nausea  : NEGATIVE for frequency, dysuria, or hematuria  MUSCULOSKELETAL: NEGATIVE for significant arthralgias or myalgia  NEURO: POSITIVE for  "dizziness/lightheadedness  ENDOCRINE: NEGATIVE for temperature intolerance, skin/hair changes  HEME: NEGATIVE for bleeding problems  PSYCHIATRIC: NEGATIVE for changes in mood or affect      Objective    /70   Pulse 70   Temp 98.4  F (36.9  C) (Tympanic)   Resp 20   Ht 1.6 m (5' 3\")   Wt 62.1 kg (137 lb)   LMP  (LMP Unknown)   SpO2 97%   BMI 24.27 kg/m    Body mass index is 24.27 kg/m .  Physical Exam   GENERAL: alert and no distress  EYES: Eyes grossly normal to inspection, PERRL and conjunctivae and sclerae normal  HENT: ear canals and TM's normal, nose and mouth without ulcers or lesions  NECK: no adenopathy, no asymmetry, masses, or scars. No carotid bruits  RESP: lungs clear to auscultation - no rales, rhonchi or wheezes  CV: regular rate and rhythm, normal S1 S2, no S3 or S4, no murmur, click or rub, no peripheral edema  ABDOMEN: soft, nontender, no hepatosplenomegaly, no masses and bowel sounds normal  MS: no gross musculoskeletal defects noted, no edema    Results for orders placed or performed in visit on 09/04/24   CBC with platelets     Status: Normal   Result Value Ref Range    WBC Count 7.3 4.0 - 11.0 10e3/uL    RBC Count 3.88 3.80 - 5.20 10e6/uL    Hemoglobin 12.2 11.7 - 15.7 g/dL    Hematocrit 37.3 35.0 - 47.0 %    MCV 96 78 - 100 fL    MCH 31.4 26.5 - 33.0 pg    MCHC 32.7 31.5 - 36.5 g/dL    RDW 14.0 10.0 - 15.0 %    Platelet Count 222 150 - 450 10e3/uL             Signed Electronically by: Eli Sommer MD    "

## 2024-09-05 LAB — VIT B12 SERPL-MCNC: 3842 PG/ML (ref 232–1245)

## 2024-09-09 ENCOUNTER — HOSPITAL ENCOUNTER (OUTPATIENT)
Dept: CARDIOLOGY | Facility: CLINIC | Age: 79
Setting detail: NUCLEAR MEDICINE
Discharge: HOME | End: 2024-09-09
Attending: FAMILY MEDICINE
Payer: COMMERCIAL

## 2024-09-09 ENCOUNTER — HOSPITAL ENCOUNTER (OUTPATIENT)
Dept: NUCLEAR MEDICINE | Facility: CLINIC | Age: 79
Setting detail: NUCLEAR MEDICINE
Discharge: HOME | End: 2024-09-09
Attending: FAMILY MEDICINE
Payer: COMMERCIAL

## 2024-09-09 DIAGNOSIS — R06.09 DOE (DYSPNEA ON EXERTION): ICD-10-CM

## 2024-09-09 LAB
CV STRESS MAX HR HE: 125
NUC STRESS EJECTION FRACTION: 78 %
RATE PRESSURE PRODUCT: NORMAL
STRESS ECHO BASELINE DIASTOLIC HE: 70
STRESS ECHO BASELINE HR: 76 BPM
STRESS ECHO BASELINE SYSTOLIC BP: 142
STRESS ECHO CALCULATED PERCENT HR: 89 %
STRESS ECHO LAST STRESS DIASTOLIC BP: 66
STRESS ECHO LAST STRESS SYSTOLIC BP: 160
STRESS ECHO TARGET HR: 141

## 2024-09-09 PROCEDURE — 93016 CV STRESS TEST SUPVJ ONLY: CPT | Performed by: INTERNAL MEDICINE

## 2024-09-09 PROCEDURE — 93017 CV STRESS TEST TRACING ONLY: CPT

## 2024-09-09 PROCEDURE — 93018 CV STRESS TEST I&R ONLY: CPT | Performed by: INTERNAL MEDICINE

## 2024-09-09 PROCEDURE — A9502 TC99M TETROFOSMIN: HCPCS | Performed by: FAMILY MEDICINE

## 2024-09-09 PROCEDURE — 78452 HT MUSCLE IMAGE SPECT MULT: CPT

## 2024-09-09 PROCEDURE — 250N000011 HC RX IP 250 OP 636: Performed by: FAMILY MEDICINE

## 2024-09-09 PROCEDURE — 343N000001 HC RX 343: Performed by: FAMILY MEDICINE

## 2024-09-09 PROCEDURE — 78452 HT MUSCLE IMAGE SPECT MULT: CPT | Mod: 26 | Performed by: INTERNAL MEDICINE

## 2024-09-09 RX ORDER — REGADENOSON 0.08 MG/ML
0.4 INJECTION, SOLUTION INTRAVENOUS ONCE
Status: COMPLETED | OUTPATIENT
Start: 2024-09-09 | End: 2024-09-09

## 2024-09-09 RX ADMIN — TETROFOSMIN 8.7 MILLICURIE: 1.38 INJECTION, POWDER, LYOPHILIZED, FOR SOLUTION INTRAVENOUS at 12:20

## 2024-09-09 RX ADMIN — TETROFOSMIN 26.4 MILLICURIE: 1.38 INJECTION, POWDER, LYOPHILIZED, FOR SOLUTION INTRAVENOUS at 13:30

## 2024-09-09 RX ADMIN — REGADENOSON 0.4 MG: 0.08 INJECTION, SOLUTION INTRAVENOUS at 13:24

## 2024-09-10 ENCOUNTER — TELEPHONE (OUTPATIENT)
Dept: FAMILY MEDICINE | Facility: CLINIC | Age: 79
End: 2024-09-10
Payer: COMMERCIAL

## 2024-09-10 NOTE — TELEPHONE ENCOUNTER
Patient Contact     Attempt # 1     Was call answered?  No.  Left message on voicemail with information to call back.         Agnes Pelayo RN on 9/10/2024 at 2:55 PM

## 2024-09-10 NOTE — TELEPHONE ENCOUNTER
General Call      Reason for Call:  Patient calling stating she had her Lexiscan done yesterday and believes things look good.   She is wondering if she still needs to have an Echo done.   Wondering if Dr. Sommer has any input on these results.  Patient would like to know what the next steps are for nausea and lightheadedness.    Please advise.           Okay to leave a detailed message?: Yes at Home number on file [phone] (home)      .GALLO Narvaez

## 2024-09-10 NOTE — TELEPHONE ENCOUNTER
Yes, still needs the echo to look at heart valves due to recent syncope/passing out.    Eli Sommer M.D.

## 2024-09-11 ENCOUNTER — TELEPHONE (OUTPATIENT)
Dept: FAMILY MEDICINE | Facility: CLINIC | Age: 79
End: 2024-09-11
Payer: COMMERCIAL

## 2024-09-11 NOTE — TELEPHONE ENCOUNTER
A user error has taken place: encounter opened in error, closed for administrative reasons.   Shane Joseph RN

## 2024-09-11 NOTE — TELEPHONE ENCOUNTER
As much as I would like to give her an answer as to what is causing her current symptoms, all testing has been rather unremarkable.      Good hydration, rest, okay to continue the Nauzine, etc are what is recommended at this time.     We have ruled out cardiac ischemia, peptic ulcers, thyroid problems, anemia, kidney problems, etc.    She is on no medication that would affect her blood pressure.      Eli Sommer M.D.

## 2024-09-11 NOTE — TELEPHONE ENCOUNTER
Received call back from patient. Relayed Dr Sommer message from below.  Patient just asked then how come I feel so bad?  Recommendation from Dr Sommer discussed.  Verbalized understanding.  Shane Joseph RN

## 2024-09-11 NOTE — TELEPHONE ENCOUNTER
"Pt calls & states she had stress test done recently & results came back \"good\". Wanting to know the next steps. Pt going out of town 9/16-9/21.  9/9: pt was called re: stress test results & was told provider still waiting on the echo results.  Pt states she is not scheduled to have echo until 9/23.    States still feels \"crummy\". Feels like slight vertigo. Always has lightheadedness/dizziness & nausea but sometimes worse than others. Feels like she is in a 'fog'.   Pt has been taking OTC Nauzene & this helps some, \"keeps me upright\" but does not take sx away.    Pt will call echo scheduling to see if they have any cancellations this week.     Will route to provider for review/recommendation.    July Bansal RN      "

## 2024-09-11 NOTE — TELEPHONE ENCOUNTER
Patient Contact    Attempt # 2    Was call answered?  No.  Left message on voicemail with information to call  back.    Lisbet Teran RN on 9/11/2024 at 1:50 PM

## 2024-09-12 NOTE — TELEPHONE ENCOUNTER
Patient Contact    Attempt # 3    Yes, patient informed of message to have the echocardiogram   Has appointment 9/23  States she called to move the appointment up but could not    Advised to try salome tea for the nausea, push fluids. She is drinking Propel  Will call back if worsening.    Lisbet Teran RN on 9/12/2024 at 1:26 PM

## 2024-09-13 ENCOUNTER — TELEPHONE (OUTPATIENT)
Dept: FAMILY MEDICINE | Facility: CLINIC | Age: 79
End: 2024-09-13
Payer: COMMERCIAL

## 2024-09-13 NOTE — TELEPHONE ENCOUNTER
If this is orthostasis or cardiovascular then there really is not any medication that is going to help until the underlying cause get sorted out.  If it is vestibular she could try over-the-counter meclizine 25 mg every 8 hours as needed.  If she tries a couple of doses and it is not helpful then I would not recommend taking more.

## 2024-09-13 NOTE — TELEPHONE ENCOUNTER
S-(situation): Patient called the clinic today requesting a medication.     B-(background): She has been experiencing and being seen by Eli Sommer MD for dizziness. Has said in previous documentation it feels like slight vertigo. However there are still some cardiac test she still needs to complete.     A-(assessment): Patient asked if this is some kind of vertigo, is there any kind of medication? Patient is leaving for vacation on Monday.     R-(recommendations): RN explained to patient that there are some over the counter options, like Meclizine but medication is a BEERS list medication. RN would like a provider to review and advise.   If anything is sent to pharmacy, can use [facility].    Since Dr. Sommer is out of the office. RN will route to a provider who is still in clinic. Today that is Dr. Gonsales.  Also including the RN pool so know there is an urgent encounter pending. Secure message sent to the provider.     Bessie Kim RN on 9/13/2024 at 3:47 PM

## 2024-09-23 ENCOUNTER — TELEPHONE (OUTPATIENT)
Dept: FAMILY MEDICINE | Facility: CLINIC | Age: 79
End: 2024-09-23

## 2024-09-23 ENCOUNTER — HOSPITAL ENCOUNTER (OUTPATIENT)
Dept: CARDIOLOGY | Facility: CLINIC | Age: 79
Discharge: HOME | End: 2024-09-23
Attending: FAMILY MEDICINE | Admitting: FAMILY MEDICINE
Payer: COMMERCIAL

## 2024-09-23 DIAGNOSIS — R42 LIGHTHEADEDNESS: ICD-10-CM

## 2024-09-23 DIAGNOSIS — R55 SYNCOPE, UNSPECIFIED SYNCOPE TYPE: ICD-10-CM

## 2024-09-23 DIAGNOSIS — R06.09 DOE (DYSPNEA ON EXERTION): ICD-10-CM

## 2024-09-23 LAB — LVEF ECHO: NORMAL

## 2024-09-23 PROCEDURE — 93306 TTE W/DOPPLER COMPLETE: CPT | Mod: 26 | Performed by: INTERNAL MEDICINE

## 2024-09-23 PROCEDURE — 93306 TTE W/DOPPLER COMPLETE: CPT

## 2024-09-23 NOTE — TELEPHONE ENCOUNTER
Patient Returning Call    Reason for call:  Patient will like to schedule appt with provider soon than 10/24     Information relayed to patient:      Patient has additional questions:  Yes    What are your questions/concerns:  above    Who does the patient want to speak with:      Is an  needed?:  No      Okay to leave a detailed message?: Yes at Cell number on file:    [phone]

## 2024-09-23 NOTE — TELEPHONE ENCOUNTER
Rebekah RN called patient and scheduled appointment with Dr. Sommer 9/25/24.    Agnes Pelayo, RN on 9/23/2024 at 3:36 PM

## 2024-09-25 ENCOUNTER — OFFICE VISIT (OUTPATIENT)
Dept: FAMILY MEDICINE | Facility: CLINIC | Age: 79
End: 2024-09-25
Payer: COMMERCIAL

## 2024-09-25 VITALS
SYSTOLIC BLOOD PRESSURE: 130 MMHG | WEIGHT: 137 LBS | OXYGEN SATURATION: 96 % | HEIGHT: 63 IN | DIASTOLIC BLOOD PRESSURE: 66 MMHG | HEART RATE: 84 BPM | BODY MASS INDEX: 24.27 KG/M2 | TEMPERATURE: 99.3 F

## 2024-09-25 DIAGNOSIS — R11.0 NAUSEA: Primary | ICD-10-CM

## 2024-09-25 DIAGNOSIS — R42 DIZZINESS: ICD-10-CM

## 2024-09-25 DIAGNOSIS — R42 LIGHTHEADEDNESS: ICD-10-CM

## 2024-09-25 DIAGNOSIS — R41.3 MEMORY CHANGE: ICD-10-CM

## 2024-09-25 PROCEDURE — 99214 OFFICE O/P EST MOD 30 MIN: CPT | Mod: 25 | Performed by: FAMILY MEDICINE

## 2024-09-25 PROCEDURE — G0008 ADMIN INFLUENZA VIRUS VAC: HCPCS | Performed by: FAMILY MEDICINE

## 2024-09-25 PROCEDURE — 91320 SARSCV2 VAC 30MCG TRS-SUC IM: CPT | Performed by: FAMILY MEDICINE

## 2024-09-25 PROCEDURE — 90662 IIV NO PRSV INCREASED AG IM: CPT | Performed by: FAMILY MEDICINE

## 2024-09-25 PROCEDURE — 90480 ADMN SARSCOV2 VAC 1/ONLY CMP: CPT | Performed by: FAMILY MEDICINE

## 2024-09-25 ASSESSMENT — PAIN SCALES - GENERAL: PAINLEVEL: NO PAIN (0)

## 2024-09-25 NOTE — PROGRESS NOTES
"  Assessment & Plan       Nausea  Dizziness  Lightheadedness  Unfortunately despite the extensive w/up we've had so far we've been unsuccessful at determining the etiology of her symptoms. Not cardiac, no anemia, no hypothyroidism, electrolytes are normal.   Doubt stroke/mass/etc given lack of other neurologic symptoms.  Given she's also a bit concerned recently about some memory changes, I will do a brain MRI.   - MR Brain w/o & w Contrast; Future    Memory change  See above  Symptoms seem quite mild at this time.   - MR Brain w/o & w Contrast; Future            Subjective   Mariah is a 78 year old, presenting for the following health issues:  Dizziness        9/25/2024     2:34 PM   Additional Questions   Roomed by Vanessa souza CMA   Accompanied by Self     HPI       Dizziness  Onset: Ongoing since 8/2024. Follow up after 9/4/2024 visit due to dizziness occurring after she completed last blood donation. She did complete ECHO 9/23 and stress test on 9/9  Description:   Do you feel faint:  no   Does it feel like the surroundings (bed, room) are moving: no   Unsteady/off balance: no   Have you passed out or fallen: YES  Intensity: moderate  Progression of Symptoms:  constant  Accompanying Signs & Symptoms:  Heart palpitations: no   Nausea, vomiting: YES- nausea  Weakness in arms or legs: no   Fatigue: YES  Vision or speech changes: no   Ringing in ears (Tinnitus): YES  Hearing Loss: YES  History:   Head trauma/concussion hx: no   Previous similar symptoms: no   Recent bleeding history: no   Precipitating factors:   Worse with activity or head movement: no   Any new medications (BP?): no   Alcohol/drug abuse/withdrawal: no   Alleviating factors:   Does staying in a fixed position give relief:  no     Therapies Tried and outcome: meclizine 25mg      \"All day every day unless I'm sleeping\".  Very Lightheaded.  No vertigo.   No headaches  No vision change  Glasses are \"fine\".    Had significant nausea in September as " "well:  EGD done:      Impression:            - The examined portions of the nasopharynx, oropharynx                          and larynx were normal.                          - Z-line regular, 34 cm from the incisors.                          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                          - Erythematous mucosa in the antrum. Biopsied.                          - No gross lesions in the entire examined duodenum.                          - The examination was otherwise normal.   Recommendation:        - Discharge patient to home (ambulatory).                          - High fiber diet indefinitely.                          - Continue present medications.                          - Await pathology results.                          - Return to referring physician in 2 weeks.     Review of Systems  Constitutional, HEENT, cardiovascular, pulmonary, gi and gu systems are negative, except as otherwise noted.      Objective    /66   Pulse 84   Temp 99.3  F (37.4  C) (Tympanic)   Ht 1.6 m (5' 3\")   Wt 62.1 kg (137 lb)   LMP  (LMP Unknown)   SpO2 96%   BMI 24.27 kg/m    Body mass index is 24.27 kg/m .  Physical Exam   GENERAL: alert and no distress  NECK: no adenopathy, no asymmetry, masses, or scars  RESP: lungs clear to auscultation - no rales, rhonchi or wheezes  CV: regular rate and rhythm, normal S1 S2, no S3 or S4, no murmur, click or rub, no peripheral edema  ABDOMEN: soft, nontender, no hepatosplenomegaly, no masses and bowel sounds normal  MS: no gross musculoskeletal defects noted, no edema  NEURO: Normal strength and tone, mentation intact and speech normal. Negative rhombergs.  Negative anurag hallpike maneuver   PSYCH: mentation appears normal, affect normal/bright    EKG/stress testing/echo and labs all reviewed by myself and with the patient.         Signed Electronically by: Eli Sommer MD    "

## 2024-09-30 ENCOUNTER — TELEPHONE (OUTPATIENT)
Dept: FAMILY MEDICINE | Facility: CLINIC | Age: 79
End: 2024-09-30
Payer: COMMERCIAL

## 2024-09-30 NOTE — TELEPHONE ENCOUNTER
Symptoms    Describe your symptoms: Lightheaded, in a fog, nauseous, fatigue     Any pain: No    How long have you been having symptoms: weeks/months    Have you been seen for this:  Yes: She has seen Dr. Eli Sommer     Appointment offered?: No    Triage offered?: Yes: Pt didn't want to wait. She said this is an ongoing issue and she has questions about further testing she wants.       Preferred Pharmacy:   [facility] [address]  [address] [facility] [address]  Phone: [phone] Fax: [phone] Alternate Fax: [phone], [phone]    "Mobile Location, IP"[facility] [place] [id number] - [place] [id number] [place], [place] [address] [address] [place] [address]  Phone: [phone] Fax: [phone]      Okay to leave a detailed message?: Yes at Home number on file [phone] (home)    Mariana Bradford on 9/30/2024 at 3:31 PM

## 2024-10-02 NOTE — TELEPHONE ENCOUNTER
More information please- why is she questioning her parathyroid function?    I didn't specifically test her parathyroid hormone, but calcium levels are normal.    Eli Sommer M.D.

## 2024-10-02 NOTE — TELEPHONE ENCOUNTER
Patient reports ongoing symptoms that patient was evaluated for on 9/4/24.    Continued symptoms:  Light headed  Some dizziness  Feels head is foggy   Kind of nauseous   Fatigue/tiredness   Dysphemia with exertion     Patient has MRI scheduled 10/9/24.    Per 9/23/24 Echocardiogram recommendations from Dr. Sommer; patient is to follow up with provider.    Patient would like to schedule an appointment on 10/10/24 for follow up instead of waiting for the following Monday 10/14/24.    RN spoke with clinic team to see if the MRI results would be ready if patient is scheduled for a later afternoon appointment with Dr. Sommer on 10/10/24 with arrival time of 3:10 pm.    GALA Mendez will confirm with Dr. Sommer this appointment is appropriate.     Agnes Pelayo RN on 10/2/2024 at 2:41 PM

## 2024-10-02 NOTE — TELEPHONE ENCOUNTER
AFITH Sommer   I called the patient back and she is wondering if her Parathyroid Glands were checked.  I see calcium levels were normal.  Pt said she will wait to talk to Dr Sommer after her MRI of brain is done.

## 2024-10-03 ENCOUNTER — TELEPHONE (OUTPATIENT)
Dept: FAMILY MEDICINE | Facility: CLINIC | Age: 79
End: 2024-10-03

## 2024-10-03 NOTE — TELEPHONE ENCOUNTER
"Contacted patient   States she started to have a cough on Sunday night.   Had some negative Covid tests recently but yesterday did test positive  Denies shortness of breath or chest pain.   State \"I feel fine, I just don't know what to do\".   She is pushing fluids and has a \"good\" appetite.   Does not want Paxlovid, had this in the past and it \"made it worse\".   Questions if there is some other treatment.   Advised yes, and scheduled with PCP as requested.     Discussed with Dr. Sommer and treatment plan won't change and she will get billed as medicare does not cover phone visits, only video. Patient only has a land line.     Informed patient of above and cancelled telephone visit.     Advised ER if chest pain or shortness of breath.     Wondering if she can keep her appointment for 10/9 brain scan. Informed yes, unless symptomatic call back for advice if so.     Patient states understanding.     Lisbet Teran RN on 10/3/2024 at 1:10 PM            "

## 2024-10-03 NOTE — TELEPHONE ENCOUNTER
COVID Positive/Requesting COVID treatment    Patient is positive for COVID and requesting treatment options.    Date of positive COVID test (PCR or at home)? 10/2/24    Current COVID symptoms: cough, fatigue, and congestion or runny nose    Date COVID symptoms began: 9/29/24    Message should be routed to clinic RN pool. Best phone number to use for call back: [phone]

## 2024-10-09 ENCOUNTER — HOSPITAL ENCOUNTER (OUTPATIENT)
Dept: MRI IMAGING | Facility: CLINIC | Age: 79
Discharge: HOME | End: 2024-10-09
Attending: FAMILY MEDICINE | Admitting: FAMILY MEDICINE
Payer: COMMERCIAL

## 2024-10-09 DIAGNOSIS — R42 DIZZINESS: Primary | ICD-10-CM

## 2024-10-09 DIAGNOSIS — R42 LIGHTHEADEDNESS: ICD-10-CM

## 2024-10-09 DIAGNOSIS — R41.3 MEMORY CHANGE: ICD-10-CM

## 2024-10-09 DIAGNOSIS — R42 DIZZINESS: ICD-10-CM

## 2024-10-09 PROCEDURE — 70553 MRI BRAIN STEM W/O & W/DYE: CPT

## 2024-10-09 PROCEDURE — A9585 GADOBUTROL INJECTION: HCPCS | Performed by: FAMILY MEDICINE

## 2024-10-09 PROCEDURE — 255N000002 HC RX 255 OP 636: Performed by: FAMILY MEDICINE

## 2024-10-09 RX ORDER — GADOBUTROL 604.72 MG/ML
6 INJECTION INTRAVENOUS ONCE
Status: COMPLETED | OUTPATIENT
Start: 2024-10-09 | End: 2024-10-09

## 2024-10-09 RX ADMIN — GADOBUTROL 6 ML: 604.72 INJECTION INTRAVENOUS at 09:01

## 2024-10-30 ENCOUNTER — OFFICE VISIT (OUTPATIENT)
Dept: FAMILY MEDICINE | Facility: CLINIC | Age: 79
End: 2024-10-30
Payer: COMMERCIAL

## 2024-10-30 ENCOUNTER — ANCILLARY PROCEDURE (OUTPATIENT)
Dept: GENERAL RADIOLOGY | Facility: CLINIC | Age: 79
End: 2024-10-30
Attending: NURSE PRACTITIONER
Payer: COMMERCIAL

## 2024-10-30 VITALS
HEIGHT: 63 IN | HEART RATE: 63 BPM | BODY MASS INDEX: 24.88 KG/M2 | OXYGEN SATURATION: 98 % | SYSTOLIC BLOOD PRESSURE: 131 MMHG | RESPIRATION RATE: 28 BRPM | WEIGHT: 140.4 LBS | DIASTOLIC BLOOD PRESSURE: 75 MMHG | TEMPERATURE: 97.6 F

## 2024-10-30 DIAGNOSIS — S99.911A ANKLE INJURY, RIGHT, INITIAL ENCOUNTER: ICD-10-CM

## 2024-10-30 DIAGNOSIS — S99.911A ANKLE INJURY, RIGHT, INITIAL ENCOUNTER: Primary | ICD-10-CM

## 2024-10-30 PROCEDURE — 73610 X-RAY EXAM OF ANKLE: CPT | Mod: TC | Performed by: RADIOLOGY

## 2024-10-30 PROCEDURE — 99213 OFFICE O/P EST LOW 20 MIN: CPT | Performed by: NURSE PRACTITIONER

## 2024-10-30 ASSESSMENT — PAIN SCALES - GENERAL: PAINLEVEL_OUTOF10: SEVERE PAIN (6)

## 2024-10-30 NOTE — PATIENT INSTRUCTIONS
Use the brace when walking.  Ice 20 minutes 3 times daily.  Elevate at rest.   Follow up with podiatry.

## 2024-10-30 NOTE — PROGRESS NOTES
"  Assessment & Plan     Ankle injury, right, initial encounter  Persistent pain after several weeks. XR without fracture on my review. Recommend brace, icing, elevation and follow up with podiatry.  - XR Ankle Right G/E 3 Views; Future  - Ankle/Foot Bracing Supplies Order Air Ankle Stirrup Brace; Right  - Orthopedic  Referral; Future            See Patient Instructions    Nakia Lemus is a 79 year old, presenting for the following health issues:  Ankle Problem (Right ankle pain)      10/30/2024    10:07 AM   Additional Questions   Roomed by FREDIS Cantu CMA     History of Present Illness       Reason for visit:  Right ankle pain  Symptom onset:  1-2 weeks ago  Symptoms include:  Right ankle pain, swelling, hit heel on chopping block, pain started after that.  Symptom intensity:  Moderate  Symptom progression:  Worsening  Had these symptoms before:  No  What makes it worse:  Worse with use  What makes it better:  Nothing   She is taking medications regularly.     Above HPI reviewed. Additionally, struck heel, lateral ankle a little over 2 weeks ago. Is having continued pain, swelling of lateral and posterior ankle. Pain increases with ambulation. No paresthesias.         Review of Systems  Constitutional, neuro, ENT, endocrine, pulmonary, cardiac, gastrointestinal, genitourinary, musculoskeletal, integument and psychiatric systems are negative, except as otherwise noted.      Objective    /75   Pulse 63   Temp 97.6  F (36.4  C) (Oral)   Resp 28   Ht 1.6 m (5' 3\")   Wt 63.7 kg (140 lb 6.4 oz)   LMP  (LMP Unknown)   SpO2 98%   BMI 24.87 kg/m    Body mass index is 24.87 kg/m .  Physical Exam  Vitals and nursing note reviewed.   Constitutional:       General: She is not in acute distress.     Appearance: Normal appearance.   HENT:      Head: Normocephalic and atraumatic.      Mouth/Throat:      Mouth: Mucous membranes are moist.   Cardiovascular:      Rate and Rhythm: Normal rate.   Pulmonary: "      Effort: Pulmonary effort is normal.   Musculoskeletal:      Cervical back: Neck supple.      Comments: Right ankle - mild swelling over lateral ankle. Heeling wound without surrounding erythema to posterior heel. TTP of posterior lateral malleolus, distal fibula, achilles. No TTP medial malleolus, metatarsals. Normal ROM without significantly increase pain. Normal Cano test. CMS intact.    Skin:     General: Skin is warm and dry.   Neurological:      General: No focal deficit present.      Mental Status: She is alert.   Psychiatric:         Mood and Affect: Mood normal.         Behavior: Behavior normal.            Xray - Reviewed and interpreted by me.  Right ankle - no obvious fx, Pending radiology interpretation.          Signed Electronically by: VICKY Hay CNP

## 2024-11-13 ENCOUNTER — NURSE TRIAGE (OUTPATIENT)
Dept: FAMILY MEDICINE | Facility: CLINIC | Age: 79
End: 2024-11-13
Payer: COMMERCIAL

## 2024-11-13 NOTE — TELEPHONE ENCOUNTER
This patient was scheduled for next Monday for dependent edema.    Can someone please triage her and see if she needs to be seen sooner?    Zainab ISBELL LPN

## 2024-11-13 NOTE — TELEPHONE ENCOUNTER
RN called patient.     Patient has concerns about socks leaving ridges in her skin bilaterally since about this spring. Patient reports this happens any time she wear socks. Swelling in ankles is mild. Denies pain, no difficulty breathing, no chest pain.     Reason for Disposition   Swelling of calf or leg is main symptom   Mild swelling of both ankles and varicose veins    Additional Information   Negative: Chest pain   Negative: Followed an insect bite and has localized swelling (e.g., small area of puffy or swollen skin)   Negative: Followed a knee injury   Negative: Ankle or foot injury   Negative: Pregnant with leg swelling or edema   Negative: Sounds like a life-threatening emergency to the triager   Negative: Difficulty breathing at rest   Negative: Entire foot is cool or blue in comparison to other side   Negative: SEVERE swelling (e.g., swelling extends above knee, entire leg is swollen, weeping fluid)   Negative: Cast on leg or ankle and has increasing pain   Negative: Can't walk or can barely stand (new-onset)   Negative: Fever and red area (or area very tender to touch)   Negative: Patient sounds very sick or weak to the triager   Negative: Swelling of face, arm or hands  (Exception: Slight puffiness of fingers during hot weather.)   Negative: Pregnant 20 or more weeks and sudden weight gain (i.e., > 2 lbs, 1 kg in one week)   Negative: Thigh or calf pain and only 1 side and present > 1 hour   Negative: Thigh, calf, or ankle swelling in only one leg   Negative: Thigh, calf, or ankle swelling in both legs, but one side is definitely more swollen (Exception: Longstanding difference between legs.)   Negative: MODERATE swelling of both ankles (e.g., swelling extends up to the knees) AND new-onset or worsening   Negative: Difficulty breathing with exertion AND worsening or new-onset   Negative: Looks like a boil, infected sore, deep ulcer, or other infected rash (spreading redness, pus)   Negative: Patient  wants to be seen   Negative: MILD swelling of both ankles (i.e., pedal edema) AND new-onset or worsening   Negative: MILD swelling of both ankles (i.e., pedal edema) and caused by hot weather    Protocols used: Ankle Swelling-A-OH, Leg Swelling and Edema-A-OH    Patient will plan to come to appointment on 11/18/24 as scheduled.    Agnes Pelayo RN on 11/13/2024 at 5:07 PM

## 2024-11-18 ENCOUNTER — OFFICE VISIT (OUTPATIENT)
Dept: FAMILY MEDICINE | Facility: CLINIC | Age: 79
End: 2024-11-18
Payer: COMMERCIAL

## 2024-11-18 VITALS
WEIGHT: 138.38 LBS | OXYGEN SATURATION: 98 % | HEART RATE: 70 BPM | SYSTOLIC BLOOD PRESSURE: 118 MMHG | TEMPERATURE: 97.4 F | BODY MASS INDEX: 24.52 KG/M2 | DIASTOLIC BLOOD PRESSURE: 76 MMHG | HEIGHT: 63 IN

## 2024-11-18 DIAGNOSIS — I83.893 VARICOSE VEINS OF BOTH LEGS WITH EDEMA: Primary | ICD-10-CM

## 2024-11-18 DIAGNOSIS — R60.0 EDEMA LEG: ICD-10-CM

## 2024-11-18 PROCEDURE — 99213 OFFICE O/P EST LOW 20 MIN: CPT | Performed by: FAMILY MEDICINE

## 2024-11-18 ASSESSMENT — PAIN SCALES - GENERAL: PAINLEVEL_OUTOF10: MODERATE PAIN (4)

## 2024-11-18 NOTE — PROGRESS NOTES
"  Assessment & Plan     Varicose veins of both legs with edema     - Vascular Surgery Referral; Future    Edema leg  Likely multifactorial, but could be due to the varicose veins and venous stasis.     Unable to tolerate knee high compression stockings she has.  Does have thigh high stockings and she is encouraged to wear these.             Nakia Lemus is a 79 year old, presenting for the following health issues:  Edema        11/18/2024    11:05 AM   Additional Questions   Roomed by Vanessa SANCHEZ CMA   Accompanied by Self     HPI     - Swelling of bilateral ankles x a couple months. She notes numbness/tingling in feet that has been stable. There is pain in right foot. No falls or balance issues. She is elevating feet at home without improvement       Recent echo 9/23/2024:  Interpretation Summary     Left ventricular systolic function is normal.The visual ejection fraction is  60-65%.  The right ventricular systolic function is normal.  There is mild (1+) tricuspid regurgitation.      Review of Systems  Constitutional, HEENT, cardiovascular, pulmonary, gi and gu systems are negative, except as otherwise noted.      Objective    /76   Pulse 70   Temp 97.4  F (36.3  C) (Tympanic)   Ht 1.6 m (5' 3\")   Wt 62.8 kg (138 lb 6 oz)   LMP  (LMP Unknown)   SpO2 98%   BMI 24.51 kg/m    Body mass index is 24.51 kg/m .  Physical Exam   GENERAL: alert and no distress  Bilateral varicose veins of thighs.  Edema is 1+ bilaterally of feet/ankles, mild pitting  Spider veins of feet/ankles.             Signed Electronically by: Eli Sommer MD    "

## 2024-11-18 NOTE — PATIENT INSTRUCTIONS
"    When you are out of refills or the refills say \"zero\", it is time to schedule your next appointment in clinic!    Labs are released to you almost immediately and sometimes before I have had a chance to review them.  I review labs regularly and once they are all in, you will either be sent a letter with your results or if you are signed up for on-line services, you will be notified that results are available to you on [employer]. If there are serious findings, you typically will be called.    If you have any questions about your visit, your symptoms, your medication, your test results or it is not clear what your diagnosis or treatment plan is please contact me (via [facility]) or call the care team at [phone] and say \"Care Team\"          "

## 2025-01-15 ENCOUNTER — TELEPHONE (OUTPATIENT)
Dept: FAMILY MEDICINE | Facility: CLINIC | Age: 80
End: 2025-01-15
Payer: COMMERCIAL

## 2025-01-15 NOTE — TELEPHONE ENCOUNTER
Symptoms    Describe your symptoms: Pt has had a sore throat and cough X 5 days - No fever and no SOB.  Please call patient and advise.      Any pain: No    How long have you been having symptoms: 5  days    Have you been seen for this:  No    Appointment offered?: No    Triage offered?: Yes:     Home remedies tried:     Preferred Pharmacy:   [facility] [place]    Okay to leave a detailed message?: Yes at Home number on file [phone] (home)

## 2025-01-16 NOTE — TELEPHONE ENCOUNTER
Mariah calling back again this morning, wondering what she can take for her cough. She has no fever, or shortness of breath. She has been using Ibuprofen and this morning took some 12 hour Delsym. Advised to continue symptomatic treatment, call back if she gets a fever or develops wheezing or short of air.

## 2025-02-03 DIAGNOSIS — E78.5 HYPERLIPIDEMIA LDL GOAL <160: ICD-10-CM

## 2025-02-03 RX ORDER — SIMVASTATIN 20 MG
20 TABLET ORAL AT BEDTIME
Qty: 90 TABLET | Refills: 1 | Status: SHIPPED | OUTPATIENT
Start: 2025-02-03

## 2025-02-03 NOTE — TELEPHONE ENCOUNTER
LDL Cholesterol Calculated   Date Value Ref Range Status   08/19/2024 117 (H) <=100 mg/dL Final   09/23/2020 90 <100 mg/dL Final     Comment:     Desirable:       <100 mg/dl

## 2025-02-26 ENCOUNTER — OFFICE VISIT (OUTPATIENT)
Dept: FAMILY MEDICINE | Facility: CLINIC | Age: 80
End: 2025-02-26
Payer: COMMERCIAL

## 2025-02-26 VITALS
TEMPERATURE: 98.8 F | DIASTOLIC BLOOD PRESSURE: 76 MMHG | HEART RATE: 78 BPM | WEIGHT: 140 LBS | RESPIRATION RATE: 12 BRPM | SYSTOLIC BLOOD PRESSURE: 124 MMHG | OXYGEN SATURATION: 98 % | BODY MASS INDEX: 24.8 KG/M2 | HEIGHT: 63 IN

## 2025-02-26 DIAGNOSIS — I83.893 VARICOSE VEINS OF BOTH LEGS WITH EDEMA: Primary | ICD-10-CM

## 2025-02-26 PROCEDURE — 3078F DIAST BP <80 MM HG: CPT | Performed by: FAMILY MEDICINE

## 2025-02-26 PROCEDURE — 1125F AMNT PAIN NOTED PAIN PRSNT: CPT | Performed by: FAMILY MEDICINE

## 2025-02-26 PROCEDURE — 99213 OFFICE O/P EST LOW 20 MIN: CPT | Performed by: FAMILY MEDICINE

## 2025-02-26 PROCEDURE — 3074F SYST BP LT 130 MM HG: CPT | Performed by: FAMILY MEDICINE

## 2025-02-26 ASSESSMENT — PAIN SCALES - GENERAL: PAINLEVEL_OUTOF10: MILD PAIN (3)

## 2025-02-26 NOTE — PROGRESS NOTES
Assessment & Plan     Varicose veins of both legs with edema  Patient Instructions     You do still have swelling in the legs even with wearing the compression stockings so  Elevate legs when watching TV.  Wear the compression stockings daily (15-20mmHg), you could try to increase the pressure of the compression of the stockings and/or do the thigh high compression stockings.  You may need to get new stockings as their elasticity does wear out over time.  Do keep active with walking.          Subjective   Mariah is a 79 year old, presenting for the following health issues:  Musculoskeletal Problem (Leg tightness)        2/26/2025     3:43 PM   Additional Questions   Roomed by Barby Maravilla   Accompanied by self         2/26/2025     3:43 PM   Patient Reported Additional Medications   Patient reports taking the following new medications Vitamin B12 daily, Turmeric 1 daily.     History of Present Illness       Reason for visit:  Leg tightness/Cramping  Symptom onset:  3-7 days ago  Symptoms include:  Legs being tight and aching even when resting.  Symptom intensity:  Moderate  Symptom progression:  Staying the same  Had these symptoms before:  No  What makes it worse:  Sleeping on it sometimes makes it feel worse  What makes it better:  Nothing.   She is taking medications regularly.   After shoulder surgery August 2024 both legs got very swollen and retained water.  Legs are no longer swollen but feel very tight.  Does wear compression stockings has knee and thigh high, wears knee high most days, not everyday.  Doing certain exercises like knee lifts and bending knees backwards with moving they feel tight.   No tightness in the fingers/hands.  Able to walk shopping and walking around house without issues or pain.  Sleeping with pillow between the knees and legs can be when it is worst, just feeling tight or pain from the pressure.     No chest pain or shortness of breath. No orthopnea, no paroxysmal nocturnal  "dyspnea.           Objective    /76 (BP Location: Right arm, Patient Position: Sitting, Cuff Size: Adult Regular)   Pulse 78   Temp 98.8  F (37.1  C) (Tympanic)   Resp 12   Ht 1.6 m (5' 3\")   Wt 63.5 kg (140 lb)   LMP  (LMP Unknown)   SpO2 98%   BMI 24.80 kg/m    Body mass index is 24.8 kg/m .  Physical Exam   GENERAL: alert and no distress  NECK: no adenopathy, no asymmetry, masses, or scars  RESP: lungs clear to auscultation - no rales, rhonchi or wheezes  CV: regular rate and rhythm, normal S1 S2, no S3 or S4, no murmur, click or rub,   MS: no gross musculoskeletal defects noted, bilateral knee high compression stockings are worn, there is still pitting edema with both legs and large varicose veins bilaterally. No redness, not hot.            Signed Electronically by: Kumar Joshi MD    "

## 2025-02-26 NOTE — PATIENT INSTRUCTIONS
You do still have swelling in the legs even with wearing the compression stockings so  Elevate legs when watching TV.  Wear the compression stockings daily (15-20mmHg), you could try to increase the pressure of the compression of the stockings and/or do the thigh high compression stockings.  You may need to get new stockings as their elasticity does wear out over time.  Do keep active with walking.

## 2025-04-16 ENCOUNTER — TELEPHONE (OUTPATIENT)
Dept: FAMILY MEDICINE | Facility: CLINIC | Age: 80
End: 2025-04-16
Payer: COMMERCIAL

## 2025-04-16 NOTE — TELEPHONE ENCOUNTER
General Call      Reason for Call:  Pt wants to know from Dr. Joshi if it ok for her to keep donating blood. She is supposed to do so again in May. She is unsure if she told you when she donated blood last time upon coming home she passed out.     Okay to leave a detailed message?: Yes at Home number on file [phone] (home)    Mariana Bradford on 4/16/2025 at 12:51 PM

## 2025-04-16 NOTE — TELEPHONE ENCOUNTER
If your hemoglobin is too low they won't let you donate.  So if you are able to donate, stay there longer and drink and eat something and be sure you are well before you leave.  Do eat and drink something prior to going too preferable something with protein.  Thank you,  Kumar Joshi MD

## 2025-04-16 NOTE — TELEPHONE ENCOUNTER
Spoke w pt and relayed message. Pt in good understanding.    Mariana Bradford on 4/16/2025 at 2:15 PM

## 2025-05-27 ENCOUNTER — OFFICE VISIT (OUTPATIENT)
Dept: DERMATOLOGY | Facility: CLINIC | Age: 80
End: 2025-05-27
Payer: COMMERCIAL

## 2025-05-27 DIAGNOSIS — L81.4 LENTIGO: ICD-10-CM

## 2025-05-27 DIAGNOSIS — D23.9 DERMAL NEVUS: Primary | ICD-10-CM

## 2025-05-27 DIAGNOSIS — D18.01 ANGIOMA OF SKIN: ICD-10-CM

## 2025-05-27 DIAGNOSIS — L82.1 SEBORRHEIC KERATOSES: ICD-10-CM

## 2025-05-27 DIAGNOSIS — Z85.828 HISTORY OF SKIN CANCER: ICD-10-CM

## 2025-05-27 PROCEDURE — 99213 OFFICE O/P EST LOW 20 MIN: CPT | Performed by: DERMATOLOGY

## 2025-05-27 NOTE — PROGRESS NOTES
Mariah Jacinto is an extremely pleasant 79 year old year old female patient here today for hx of non-melanoma skin cancer.  .  Patient has no other skin complaints today.  Remainder of the HPI, Meds, PMH, Allergies, FH, and SH was reviewed in chart.      Past Medical History:   Diagnosis Date    Adhesive capsulitis of shoulder     Right shoulder tendonitis    Basal cell carcinoma     Chronic right shoulder pain 06/27/2023    Displacement of cervical intervertebral disc without myelopathy     Esophageal reflux     Major depression in complete remission 06/22/2009    celexa caused spinning side effect     MENOPAUSE --ERT     OSTEOPENIA     Squamous cell carcinoma        Past Surgical History:   Procedure Laterality Date    ABLATE VEIN VARICOSE RADIO FREQUENCY WITHOUT PHLEBECTOMY MULTIPLE STAB  3/28/2013    Procedure: ABLATE VEIN VARICOSE RADIO FREQUENCY WITHOUT PHLEBECTOMY MULTIPLE STAB;  Bilateral ablation of varicose veins legs-to ultrasound at 0930  ;  Surgeon: Noam Soliman MD;  [facility]    COLONOSCOPY  8/20/2013    Procedure: COLONOSCOPY;  Colonoscopy;  Surgeon: Yuliya Nathan MD;  [facility]    ESOPHAGOSCOPY, GASTROSCOPY, DUODENOSCOPY (EGD), COMBINED N/A 5/12/2015    Procedure: COMBINED ESOPHAGOSCOPY, GASTROSCOPY, DUODENOSCOPY (EGD), BIOPSY SINGLE OR MULTIPLE;  Surgeon: Yuliya Nathan MD;  [facility]    ESOPHAGOSCOPY, GASTROSCOPY, DUODENOSCOPY (EGD), COMBINED N/A 1/31/2017    Procedure: COMBINED ESOPHAGOSCOPY, GASTROSCOPY, DUODENOSCOPY (EGD), BIOPSY SINGLE OR MULTIPLE;  Surgeon: Qasim Bowie MD;  [facility]    ESOPHAGOSCOPY, GASTROSCOPY, DUODENOSCOPY (EGD), COMBINED N/A 8/22/2024    Procedure: ESOPHAGOGASTRODUODENOSCOPY, WITH BIOPSY;  Surgeon: Mj Saenz MD;  [facility]    EXCISE MASS FINGER Right 2/19/2019    Procedure: Excision Right Ring Finger Volar Middle Phalanx Mass;  Surgeon: Jake Viveros MD;  [facility]     HYSTERECTOMY, PAP NO LONGER INDICATED      has both ovaries out also     HYSTERECTOMY, VAGINAL  1988    Hysterectomy, oophorectomy    PHACOEMULSIFICATION WITH STANDARD INTRAOCULAR LENS IMPLANT Left 3/18/2019    Procedure: Cataract Removal with Implant;  Surgeon: Chapito Phillips MD;  [facility]    PHACOEMULSIFICATION WITH STANDARD INTRAOCULAR LENS IMPLANT Right 4/10/2019    Procedure: Cataract Removal with Implant;  Surgeon: Chapito Phillips MD;  [facility]    RELEASE TRIGGER FINGER Right 2017    Procedure: RELEASE TRIGGER FINGER;  Right Thumb A1 Pulley Release;  Surgeon: Jake Viveros MD;  [facility]    REVERSE ARTHROPLASTY SHOULDER Right 2023    Procedure: Right Reverse total shoulder arthroplasty;  Surgeon: Srikanth Gonsales MD;  [facility]    SURGICAL HISTORY OF -       right retromastoid craniectomy and decompression trigeminal nerve    TONSILLECTOMY & ADENOIDECTOMY  child    T&A     ZZC ANESTH,LOWER ARM SURGERY      ulnar nerve decompression - right        Family History   Problem Relation Age of Onset    Alzheimer Disease Mother          at age 85    Osteoporosis Mother     Arthritis Mother     Alcohol/Drug Father     Respiratory Father     Eye Disorder Maternal Grandfather         Macular degneration    C.A.D. Brother         Tripple bypass age 57    Cardiovascular Brother     Cancer Brother         leukemia (ALL)    Cardiovascular Brother     Cardiovascular Brother     Neurologic Disorder Son     Heart Disease Son     Melanoma No family hx of     Skin Cancer No family hx of        Social History     Socioeconomic History    Marital status:      Spouse name: Not on file    Number of children: Not on file    Years of education: Not on file    Highest education level: Not on file   Occupational History    Not on file   Tobacco Use    Smoking status: Never     Passive exposure: Never    Smokeless tobacco: Never   Vaping Use    Vaping  status: Never Used   Substance and Sexual Activity    Alcohol use: No    Drug use: No    Sexual activity: Yes     Birth control/protection: Surgical     Comment: complete hysterectomy 1988   Other Topics Concern    Parent/sibling w/ CABG, MI or angioplasty before 65F 55M? No   Social History Narrative    Not on file     Social Drivers of Health     Financial Resource Strain: Low Risk  (8/19/2024)    Financial Resource Strain     Within the past 12 months, have you or your family members you live with been unable to get utilities (heat, electricity) when it was really needed?: No   Food Insecurity: Low Risk  (8/19/2024)    Food Insecurity     Within the past 12 months, did you worry that your food would run out before you got money to buy more?: No     Within the past 12 months, did the food you bought just not last and you didn t have money to get more?: No   Transportation Needs: Low Risk  (8/19/2024)    Transportation Needs     Within the past 12 months, has lack of transportation kept you from medical appointments, getting your medicines, non-medical meetings or appointments, work, or from getting things that you need?: No   Physical Activity: Insufficiently Active (8/19/2024)    Exercise Vital Sign     Days of Exercise per Week: 7 days     Minutes of Exercise per Session: 20 min   Stress: No Stress Concern Present (8/19/2024)    [employer] [place] [place] [employer] - Occupational Stress Questionnaire     Feeling of Stress : Not at all   Social Connections: Unknown (8/19/2024)    Social Connection and Isolation Panel [NHANES]     Frequency of Communication with Friends and Family: Not on file     Frequency of Social Gatherings with Friends and Family: Twice a week     Attends Episcopal Services: Not on file     Active Member of Clubs or Organizations: Not on file     Attends Club or Organization Meetings: Not on file     Marital Status: Not on file   Interpersonal Safety: Low Risk  (2/26/2025)     Interpersonal Safety     Do you feel physically and emotionally safe where you currently live?: Yes     Within the past 12 months, have you been hit, slapped, kicked or otherwise physically hurt by someone?: No     Within the past 12 months, have you been humiliated or emotionally abused in other ways by your partner or ex-partner?: No   Housing Stability: Low Risk  (8/19/2024)    Housing Stability     Do you have housing? : Yes     Are you worried about losing your housing?: No       Outpatient Encounter Medications as of 5/27/2025   Medication Sig Dispense Refill    acetaminophen (TYLENOL) 325 MG tablet Take 325 mg by mouth once      estradiol (ESTRACE) 0.1 MG/GM vaginal cream Place vaginally twice a week 1 g  twice weekly at bedtime 43 g 4    melatonin 5 MG CAPS Take 5 mg by mouth At Bedtime 1 capsule 0    MULTI-VITAMIN OR TABS 1 TABLET DAILY      nitroGLYcerin (NITRODUR) 0.1 MG/HR 24 hr patch Place 1 patch onto the skin daily. (Patient not taking: Reported on 4/11/2025)      omeprazole (PRILOSEC) 40 MG DR capsule Take 1 capsule by mouth once daily 90 capsule 3    simvastatin (ZOCOR) 20 MG tablet TAKE ONE TABLET BY MOUTH EVERY DAY AT BEDTIME 90 tablet 1    VITAMIN D OR 1 DAILY       No facility-administered encounter medications on file as of 5/27/2025.             O:   NAD, WDWN, Alert & Oriented, Mood & Affect wnl, Vitals stable   General appearance normal   Vitals stable   Alert, oriented and in no acute distress        Stuck on papules and brown macules on trunk and ext   Red papules on trunk  Flesh colored papules on trunk     The remainder of the full exam was normal; the following areas were examined:  conjunctiva/lids, , neck, peripheral vascular system, abdomen, lymph nodes, digits/nails, eccrine and apocrine glands, scalp/hair, face, neck, chest, abdomen, buttocks, back, RUE, LUE, RLE, LLE       Eyes: Conjunctivae/lids:Normal     ENT: Lips, mucosa: normal    MSK:Normal    Cardiovascular: peripheral  edema none    Pulm: Breathing Normal    Lymph Nodes: No Head and Neck Lymphadenopathy     Neuro/Psych: Orientation:Alert and Orientedx3 ; Mood/Affect:normal       A/P:  1. Seborrheic keratosis, lentigo, angioma, dermal nevus, hx of non-melanoma skin cancer   It was a pleasure speaking to Mariah Jacinto today.  Previous clinic notes and pertinent laboratory tests were reviewed prior to Mariah Jacinto's visit.  Signs and Symptoms of skin cancer discussed with patient.  Patient encouraged to perform monthly skin exams.  UV precautions reviewed with patient.  Risks of non-melanoma skin cancer discussed with patient   Return to clinic 12 months

## 2025-05-27 NOTE — LETTER
5/27/2025      Mariah Jacinto  [address] [place] [address]      Dear Colleague,    Thank you for referring your patient, Mariah Jacinto, to the [facility]. Please see a copy of my visit note below.    Mariah Jacinto is an extremely pleasant 79 year old year old female patient here today for hx of non-melanoma skin cancer.  .  Patient has no other skin complaints today.  Remainder of the HPI, Meds, PMH, Allergies, FH, and SH was reviewed in chart.      Past Medical History:   Diagnosis Date     Adhesive capsulitis of shoulder     Right shoulder tendonitis     Basal cell carcinoma      Chronic right shoulder pain 06/27/2023     Displacement of cervical intervertebral disc without myelopathy      Esophageal reflux      Major depression in complete remission 06/22/2009    celexa caused spinning side effect      MENOPAUSE --ERT      OSTEOPENIA      Squamous cell carcinoma        Past Surgical History:   Procedure Laterality Date     ABLATE VEIN VARICOSE RADIO FREQUENCY WITHOUT PHLEBECTOMY MULTIPLE STAB  3/28/2013    Procedure: ABLATE VEIN VARICOSE RADIO FREQUENCY WITHOUT PHLEBECTOMY MULTIPLE STAB;  Bilateral ablation of varicose veins legs-to ultrasound at 0930  ;  Surgeon: Noam Soliman MD;  [facility]     COLONOSCOPY  8/20/2013    Procedure: COLONOSCOPY;  Colonoscopy;  Surgeon: Yuliya Nathan MD;  [facility]     ESOPHAGOSCOPY, GASTROSCOPY, DUODENOSCOPY (EGD), COMBINED N/A 5/12/2015    Procedure: COMBINED ESOPHAGOSCOPY, GASTROSCOPY, DUODENOSCOPY (EGD), BIOPSY SINGLE OR MULTIPLE;  Surgeon: Yuliya Nathan MD;  [facility]     ESOPHAGOSCOPY, GASTROSCOPY, DUODENOSCOPY (EGD), COMBINED N/A 1/31/2017    Procedure: COMBINED ESOPHAGOSCOPY, GASTROSCOPY, DUODENOSCOPY (EGD), BIOPSY SINGLE OR MULTIPLE;  Surgeon: Qasim Bowie MD;  [facility]     ESOPHAGOSCOPY, GASTROSCOPY, DUODENOSCOPY (EGD), COMBINED N/A 8/22/2024    Procedure:  ESOPHAGOGASTRODUODENOSCOPY, WITH BIOPSY;  Surgeon: Mj Saenz MD;  [facility]     EXCISE MASS FINGER Right 2019    Procedure: Excision Right Ring Finger Volar Middle Phalanx Mass;  Surgeon: Jake Viveros MD;  [facility]     HYSTERECTOMY, PAP NO LONGER INDICATED      has both ovaries out also      HYSTERECTOMY, VAGINAL  1988    Hysterectomy, oophorectomy     PHACOEMULSIFICATION WITH STANDARD INTRAOCULAR LENS IMPLANT Left 3/18/2019    Procedure: Cataract Removal with Implant;  Surgeon: Chapito Phillips MD;  [facility]     PHACOEMULSIFICATION WITH STANDARD INTRAOCULAR LENS IMPLANT Right 4/10/2019    Procedure: Cataract Removal with Implant;  Surgeon: Chapito Phillips MD;  [facility]     RELEASE TRIGGER FINGER Right 2017    Procedure: RELEASE TRIGGER FINGER;  Right Thumb A1 Pulley Release;  Surgeon: Jake Viveros MD;  [facility]     REVERSE ARTHROPLASTY SHOULDER Right 2023    Procedure: Right Reverse total shoulder arthroplasty;  Surgeon: Srikanth Gonsales MD;  [facility]     SURGICAL HISTORY OF -       right retromastoid craniectomy and decompression trigeminal nerve     TONSILLECTOMY & ADENOIDECTOMY  child    T&A      ZZC ANESTH,LOWER ARM SURGERY      ulnar nerve decompression - right        Family History   Problem Relation Age of Onset     Alzheimer Disease Mother          at age 85     Osteoporosis Mother      Arthritis Mother      Alcohol/Drug Father      Respiratory Father      Eye Disorder Maternal Grandfather         Macular degneration     C.A.D. Brother         Tripple bypass age 57     Cardiovascular Brother      Cancer Brother         leukemia (ALL)     Cardiovascular Brother      Cardiovascular Brother      Neurologic Disorder Son      Heart Disease Son      Melanoma No family hx of      Skin Cancer No family hx of        Social History     Socioeconomic History     Marital status:      Spouse name: Not  on file     Number of children: Not on file     Years of education: Not on file     Highest education level: Not on file   Occupational History     Not on file   Tobacco Use     Smoking status: Never     Passive exposure: Never     Smokeless tobacco: Never   Vaping Use     Vaping status: Never Used   Substance and Sexual Activity     Alcohol use: No     Drug use: No     Sexual activity: Yes     Birth control/protection: Surgical     Comment: complete hysterectomy 1988   Other Topics Concern     Parent/sibling w/ CABG, MI or angioplasty before 65F 55M? No   Social History Narrative     Not on file     Social Drivers of Health     Financial Resource Strain: Low Risk  (8/19/2024)    Financial Resource Strain      Within the past 12 months, have you or your family members you live with been unable to get utilities (heat, electricity) when it was really needed?: No   Food Insecurity: Low Risk  (8/19/2024)    Food Insecurity      Within the past 12 months, did you worry that your food would run out before you got money to buy more?: No      Within the past 12 months, did the food you bought just not last and you didn t have money to get more?: No   Transportation Needs: Low Risk  (8/19/2024)    Transportation Needs      Within the past 12 months, has lack of transportation kept you from medical appointments, getting your medicines, non-medical meetings or appointments, work, or from getting things that you need?: No   Physical Activity: Insufficiently Active (8/19/2024)    Exercise Vital Sign      Days of Exercise per Week: 7 days      Minutes of Exercise per Session: 20 min   Stress: No Stress Concern Present (8/19/2024)    [employer] [place] [employer] - Occupational Stress Questionnaire      Feeling of Stress : Not at all   Social Connections: Unknown (8/19/2024)    Social Connection and Isolation Panel [NHANES]      Frequency of Communication with Friends and Family: Not on file      Frequency of Social  Gatherings with Friends and Family: Twice a week      Attends Faith Services: Not on file      Active Member of Clubs or Organizations: Not on file      Attends Club or Organization Meetings: Not on file      Marital Status: Not on file   Interpersonal Safety: Low Risk  (2/26/2025)    Interpersonal Safety      Do you feel physically and emotionally safe where you currently live?: Yes      Within the past 12 months, have you been hit, slapped, kicked or otherwise physically hurt by someone?: No      Within the past 12 months, have you been humiliated or emotionally abused in other ways by your partner or ex-partner?: No   Housing Stability: Low Risk  (8/19/2024)    Housing Stability      Do you have housing? : Yes      Are you worried about losing your housing?: No       Outpatient Encounter Medications as of 5/27/2025   Medication Sig Dispense Refill     acetaminophen (TYLENOL) 325 MG tablet Take 325 mg by mouth once       estradiol (ESTRACE) 0.1 MG/GM vaginal cream Place vaginally twice a week 1 g  twice weekly at bedtime 43 g 4     melatonin 5 MG CAPS Take 5 mg by mouth At Bedtime 1 capsule 0     MULTI-VITAMIN OR TABS 1 TABLET DAILY       nitroGLYcerin (NITRODUR) 0.1 MG/HR 24 hr patch Place 1 patch onto the skin daily. (Patient not taking: Reported on 4/11/2025)       omeprazole (PRILOSEC) 40 MG DR capsule Take 1 capsule by mouth once daily 90 capsule 3     simvastatin (ZOCOR) 20 MG tablet TAKE ONE TABLET BY MOUTH EVERY DAY AT BEDTIME 90 tablet 1     VITAMIN D OR 1 DAILY       No facility-administered encounter medications on file as of 5/27/2025.             O:   NAD, WDWN, Alert & Oriented, Mood & Affect wnl, Vitals stable   General appearance normal   Vitals stable   Alert, oriented and in no acute distress        Stuck on papules and brown macules on trunk and ext   Red papules on trunk  Flesh colored papules on trunk     The remainder of the full exam was normal; the following areas were examined:   conjunctiva/lids, , neck, peripheral vascular system, abdomen, lymph nodes, digits/nails, eccrine and apocrine glands, scalp/hair, face, neck, chest, abdomen, buttocks, back, RUE, LUE, RLE, LLE       Eyes: Conjunctivae/lids:Normal     ENT: Lips, mucosa: normal    MSK:Normal    Cardiovascular: peripheral edema none    Pulm: Breathing Normal    Lymph Nodes: No Head and Neck Lymphadenopathy     Neuro/Psych: Orientation:Alert and Orientedx3 ; Mood/Affect:normal       A/P:  1. Seborrheic keratosis, lentigo, angioma, dermal nevus, hx of non-melanoma skin cancer   It was a pleasure speaking to Mariah Jacinto today.  Previous clinic notes and pertinent laboratory tests were reviewed prior to Mariah Jacinto's visit.  Signs and Symptoms of skin cancer discussed with patient.  Patient encouraged to perform monthly skin exams.  UV precautions reviewed with patient.  Risks of non-melanoma skin cancer discussed with patient   Return to clinic 12 months      Again, thank you for allowing me to participate in the care of your patient.        Sincerely,        Chapito Franco MD    Electronically signed

## 2025-06-23 DIAGNOSIS — K21.9 GASTROESOPHAGEAL REFLUX DISEASE WITHOUT ESOPHAGITIS: ICD-10-CM

## 2025-06-23 NOTE — TELEPHONE ENCOUNTER
Medication Question or Refill    Patient has not yet received her mail order and requesting at least 5 days of medication    What medication are you calling about (include dose and sig)?: Omeprazole 40 mg     Preferred Pharmacy: [facility]     Who prescribed the medication?: Kumar Joshi      Do you need a refill? Yes      Okay to leave a detailed message?: Yes at Home number on file [phone] (home)      GALLO Narvaez

## 2025-06-24 RX ORDER — OMEPRAZOLE 40 MG/1
CAPSULE, DELAYED RELEASE ORAL
Qty: 90 CAPSULE | Refills: 3 | Status: CANCELLED | OUTPATIENT
Start: 2025-06-24

## 2025-06-24 RX ORDER — OMEPRAZOLE 40 MG/1
40 CAPSULE, DELAYED RELEASE ORAL DAILY
Qty: 7 CAPSULE | Refills: 0 | Status: SHIPPED | OUTPATIENT
Start: 2025-06-24

## 2025-06-24 NOTE — TELEPHONE ENCOUNTER
FYI - Status Update    Who is Calling: patient    Update: pt calling for status update, informed pt this has not been addressed and PCP was out on sabbatical. Pt requesting consideration of a 5-7 day supply of omeprazole, awaiting [place] mail order to ship. Please advise and update pt.    Does caller want a call/response back: Yes     Okay to leave a detailed message?: Yes at Home number on file [phone] (home)

## 2025-07-08 ENCOUNTER — OFFICE VISIT (OUTPATIENT)
Dept: FAMILY MEDICINE | Facility: CLINIC | Age: 80
End: 2025-07-08
Payer: COMMERCIAL

## 2025-07-08 VITALS
RESPIRATION RATE: 16 BRPM | HEART RATE: 81 BPM | TEMPERATURE: 98.9 F | HEIGHT: 64 IN | BODY MASS INDEX: 23.31 KG/M2 | DIASTOLIC BLOOD PRESSURE: 68 MMHG | OXYGEN SATURATION: 97 % | SYSTOLIC BLOOD PRESSURE: 140 MMHG | WEIGHT: 136.5 LBS

## 2025-07-08 DIAGNOSIS — I83.811 VARICOSE VEINS OF LEG WITH PAIN, RIGHT: Primary | ICD-10-CM

## 2025-07-08 PROCEDURE — 99213 OFFICE O/P EST LOW 20 MIN: CPT | Performed by: NURSE PRACTITIONER

## 2025-07-08 PROCEDURE — 1125F AMNT PAIN NOTED PAIN PRSNT: CPT | Performed by: NURSE PRACTITIONER

## 2025-07-08 PROCEDURE — 3078F DIAST BP <80 MM HG: CPT | Performed by: NURSE PRACTITIONER

## 2025-07-08 PROCEDURE — 3077F SYST BP >= 140 MM HG: CPT | Performed by: NURSE PRACTITIONER

## 2025-07-08 ASSESSMENT — PAIN SCALES - GENERAL: PAINLEVEL_OUTOF10: MODERATE PAIN (4)

## 2025-07-08 NOTE — PROGRESS NOTES
Assessment & Plan     Varicose veins of leg with pain, right  Symptomatic large varicose veins.  Referral:  - Vascular Surgery Referral; Future      The risks, benefits and treatment options of prescribed medications or other treatments have been discussed with the patient. The patient verbalized their understanding and should call or follow up if no improvement or if they develop further problems.  Maria Victoria Spencer, ERICA                Nakia Lemus is a 79 year old, presenting for the following health issues:  Derm Problem (Skin pain on right knee.)        7/8/2025     1:16 PM   Additional Questions   Roomed by Martha Horton   Accompanied by self         7/8/2025     1:16 PM   Patient Reported Additional Medications   Patient reports taking the following new medications none     HPI        Pain History:  When did you first notice your pain? X 1- 2 months   Have you seen anyone else for your pain? No  How has your pain affected your ability to work? Not applicable  Where in your body do you have pain? Musculoskeletal problem/pain  Onset/Duration: couple months  Description  Location: knee - right. Just the skin.  No pain in the joint at all - no pain with going up and down stairs, can kneel without any pain  Joint Swelling: YES- little bit  Redness: No  Pain: YES- burning pain.   Warmth: YES  Intensity:  moderate  Progression of Symptoms:  worsening  Accompanying signs and symptoms:   Fevers: No  Numbness/tingling/weakness: YES- tingles   Leg fatigue with walking long distances.  Pain interferes with her sleep  History  Trauma to the area: No  Recent illness:  No  Previous similar problem: No  Previous evaluation:  No  Precipitating or alleviating factors:  Aggravating factors include: worse at night  Therapies tried and outcome: different creams (hemp cream, Emu oil, compression socks, lido roll on cream, and Diclofenac cream).            Review of Systems  Constitutional, HEENT, cardiovascular,  "pulmonary, gi and gu systems are negative, except as otherwise noted.      Objective    BP (!) 140/68 (BP Location: Right arm, Patient Position: Sitting, Cuff Size: Adult Regular)   Pulse 81   Temp 98.9  F (37.2  C) (Tympanic)   Resp 16   Ht 1.632 m (5' 4.25\")   Wt 61.9 kg (136 lb 8 oz)   LMP  (LMP Unknown)   SpO2 97%   BMI 23.25 kg/m    Body mass index is 23.25 kg/m .  Physical Exam   GENERAL: alert and no distress  MS: right leg - large varicose veins medial knee and thigh, tender to touch.            Signed Electronically by: VICKY Rojas CNP    "

## 2025-07-10 ENCOUNTER — TRANSFERRED RECORDS (OUTPATIENT)
Dept: HEALTH INFORMATION MANAGEMENT | Facility: CLINIC | Age: 80
End: 2025-07-10
Payer: COMMERCIAL

## 2025-07-23 ENCOUNTER — TELEPHONE (OUTPATIENT)
Dept: VASCULAR SURGERY | Facility: CLINIC | Age: 80
End: 2025-07-23

## 2025-07-23 ENCOUNTER — OFFICE VISIT (OUTPATIENT)
Dept: VASCULAR SURGERY | Facility: CLINIC | Age: 80
End: 2025-07-23
Attending: NURSE PRACTITIONER
Payer: COMMERCIAL

## 2025-07-23 VITALS
OXYGEN SATURATION: 99 % | DIASTOLIC BLOOD PRESSURE: 97 MMHG | HEART RATE: 80 BPM | SYSTOLIC BLOOD PRESSURE: 170 MMHG | WEIGHT: 137 LBS | BODY MASS INDEX: 23.39 KG/M2 | HEIGHT: 64 IN

## 2025-07-23 DIAGNOSIS — I83.893 SYMPTOMATIC VARICOSE VEINS OF BOTH LOWER EXTREMITIES: Primary | ICD-10-CM

## 2025-07-23 DIAGNOSIS — I83.811 VARICOSE VEINS OF LEG WITH PAIN, RIGHT: ICD-10-CM

## 2025-07-23 PROCEDURE — 3077F SYST BP >= 140 MM HG: CPT | Performed by: SPECIALIST

## 2025-07-23 PROCEDURE — 99204 OFFICE O/P NEW MOD 45 MIN: CPT | Performed by: SPECIALIST

## 2025-07-23 PROCEDURE — 1125F AMNT PAIN NOTED PAIN PRSNT: CPT | Performed by: SPECIALIST

## 2025-07-23 PROCEDURE — 3080F DIAST BP >= 90 MM HG: CPT | Performed by: SPECIALIST

## 2025-07-23 PROCEDURE — G0463 HOSPITAL OUTPT CLINIC VISIT: HCPCS | Performed by: SPECIALIST

## 2025-07-23 ASSESSMENT — PAIN SCALES - GENERAL: PAINLEVEL_OUTOF10: SEVERE PAIN (7)

## 2025-07-23 NOTE — PROGRESS NOTES
"[facility]        Patient is here for a consult to discuss Varicose vein(s). The patient has varicose veins that are problematic in bilateral legs. Patient states their varicose veins are bothersome when sleeping, standing, and sitting.     Patient has been using pain medication or anti-inflammatory's. US done at [facility] on 7/10/25 in [place]. Patient has had recent imaging on legs done. Patient has done conservative measures which include: compression stockings, elevation, and exercise. Treatment has been unsuccessful due to still experiencing symptoms.   Educated patient to continue to work on conservative treatments.      Pt reports she had vein ablation done at [facility] many years ago.    Pt is currently taking Statin.    BP (!) 170/97   Pulse 80   Ht 5' 4\" (1.626 m)   Wt 137 lb (62.1 kg)   LMP  (LMP Unknown)   SpO2 99%   BMI 23.52 kg/m      The provider has been notified that the patient has no concerns.     Questions patient would like addressed today are: N/A.    Refills are needed: N/A    Has homecare services and agency name:  No           "

## 2025-07-23 NOTE — TELEPHONE ENCOUNTER
Vein Prior Authorization Form    Vascular NPI: [id number] Tax ID: [id number]    Ordering/Performing Physician: Dr. Manny Patel NPI: [id number]  Payor: [facility]  Procedure Endovenous radiofrequency ablation of the right greater saphenous vein, bilateral short saphenous veins, right anterior accessory vein, and left posterior accessory vein  CPT: 36475 x2 and 36476 x3  Diagnosis Code: I83.893 Symptomatic varicose veins of bilateral lower extremities and I87.2 Venous insufficiency    Need 2 days for procedure?: Yes  Blood Thinners and plan for holding, continuing and/or bridging: none. If yes, please route to vascular triage pool once scheduled to get approval on hold.   Other instructions: Outside Ultrasound/ mailed AVS

## 2025-07-23 NOTE — PATIENT INSTRUCTIONS
Mariah    Thank you for entrusting your care with us at the [facility].      You should wear your compression stockings as much as you can. It is especially important to wear them with long periods of standing, sitting, long car rides or if you will be flying. Compression socks should get refilled every 4-6 months. They do not need to be worn at night while in bed. It is recommended to wear compression level of 20-30mmhg or higher from toes to knees.       Varicose Veins    Varicose veins are twisted, enlarged veins near the surface of the skin. They develop most often in the legs and ankles.    Some people may be more likely than others to get varicose veins because of several things. These include aging, pregnancy, being overweight, or because a parent has them. Standing or sitting for long periods of time can also increase risk of varicose veins.    Follow-up care is a key part of your treatment and safety. Be sure to make and go to all appointments, and call your doctor if you are having problems. It's also a good idea to know your test results and keep a list of the medicines you take.      Varicose veins are caused by weakened valves and veins in your legs. Normally, one-way valves in your veins keep blood flowing from your legs up toward your heart. When these valves don't work as they should, blood collects in your legs, and pressure builds up. The veins become weak, large, and twisted.    How can you care for yourself at home?  Wear compression stockings during the day to help relieve symptoms and improve blood flow. Talk to your doctor about which ones to get and where to get them.  Prop up your legs at or above the level of your heart when possible. Try to do this for about 30 minutes at a time, about 3 times a day. This helps keep the blood from pooling in your lower legs and improves blood flow to the rest of your body.  Avoid sitting and standing for long periods. This puts  added stress on your veins.  Stay at a healthy weight. Lose weight if you need to.  Try to take several short walks every day.  Get at least 30 minutes of exercise on most days of the week. Walking is a good choice. You also may want to do other activities, such as running, swimming, cycling, or playing tennis or team sports.  Do calf muscle exercises every day. When you are sitting down, rotate your feet at the ankles in both directions, making small circles. Extend your legs, and point and flex your feet.  Avoid crossing your legs at the knees when sitting.  Take good care of your skin. Treat cuts and scrapes on your legs right away. Keep your legs clean and moisturized to prevent drying and cracking. Prevent sunburns.  Do not smoke. Smoking can make varicose veins worse. If you need help quitting, talk to your doctor about stop-smoking programs and medicines. These can increase your chances of quitting for good.  If you bump your leg so hard that you know it is likely to bruise, prop up your leg and apply ice or cold packs right away. Apply the ice or cold pack for 10 to 20 minutes, 3 or more times a day. Put a thin cloth between the ice and your skin.  If you cut or scratch the skin over a vein, it may bleed a lot. Prop up your leg and apply firm pressure for a full 15 minutes.  If you have a blood clot in a varicose vein, you may have tenderness and swelling over the vein. The vein may feel firm. Be sure to call your doctor right away if you have these symptoms. If your doctor has told you how to care for the clot, follow the instructions.     Care may include the following:    Prop up your leg and apply a damp cloth that is warm or cool.  Ask your doctor if you can take an over-the-counter pain medicine, such as acetaminophen (Tylenol), ibuprofen (Advil, Motrin), or naproxen (Aleve). Be safe with medicines. Read and follow all instructions on the label.    When should you call for help?     Call 911 anytime  you think you may need emergency care. For example, call if:    You have sudden chest pain and shortness of breath, or you cough up blood.    Call your doctor now or seek immediate medical care if:    You have signs of a blood clot in your leg (called a deep vein thrombosis), such as:  Pain in your calf, back of the knee, thigh, or groin.  Swelling in the leg or groin.  A color change on the leg or groin. The skin may be reddish or purplish, depending on your usual skin color.  A varicose vein begins to bleed and you cannot stop it.  You have a tender lump in your leg.  You get an open sore.    Watch closely for changes in your health, and be sure to contact your doctor if:    Your varicose vein symptoms do not improve with home treatment.    Current as of: December 19, 2022  Author: Healthwise Staff  Medical Review:Barrie Draper MD - Family Medicine & AILYN Cano MD - Internal Medicine & Daryl Sheikh MD - Family Medicine & Vamsi Joseph MD - Family Medicine & Mark Moore MD - Diagnostic Radiology    Please bring your compression prescription to a home medical supply store. Here is a list of locations but not limited to.     [facility]  [facility] [place] [facility]  [address] [place] [address]  Phone: [phone]  Fax: [phone] [facility] Bld[facility]  [address] [place] [address]  Phone: [phone]  Fax: [phone]   [facility]  [address] [place] [address]  Phone: [phone]  Fax: [phone] [facility]  [address]  [address] [place] [address]  Phone: [phone]  Fax: [phone]   [facility]  [address] [place] [address]  Phone: [phone] [place] [address] ([facility]  [place] [address]  Phone: [phone]   [facility]  [address] [place] [address]      Phone: [phone]  Fax: [phone] [place]  [address] [place] [address]      Phone: [phone]  Fax: [phone]     [facility] [email or web address]  [address], [place] [address]  [phone]    [facility]  [email or web address]  [address] [place] [address]  Phone: [phone]     [address] [place] [address]  Phone: [phone]    [address] [place] [address]  Phone: [phone] [address], [place] [address]  Phone: [phone] [address], [place] [address]  Phone: [phone]    [facility] [email or web address]/  [address] [place] [address]  Phone: [phone] [address] [place] [address]  Phone: [phone] [address], [place] [address]  Phone: [phone]    [facility]  [email or web address]  [phone]      Pre-Procedure Instructions for Varicose Vein Ablation   We look forward to scheduling a varicose vein procedure for you. First, we will submit a prior authorization to your insurance company if required. This typically takes 10-14 days. We will contact you once we have gotten the approval to schedule the procedure.  The following is helpful information for you regarding your treatment:  **Important: A  will be needed post procedure.  (Unable to use Taxi or Uber).  Please allow 1-2 hours for your vein procedure appointment.  Take your routine medications as you normally would except for blood thinners. Aspirin is ok to continue.  If you take Warfarin, Xarelto, or Eliquis this will need to be HELD prior to procedure according to primary care provider or cardiology who prescribes this medication. Please notify us if you take this medication.  We have thigh high compression stocking  sizes medium to X-large that will be applied immediately after the procedure. You will need to provide your own if one of these sizes will not fit. Another option is to bring in knee high compression and biker compression shorts.   Please wear comfortable clothing.  We recommend that you bring a change of undergarment in case it gets stained by the cleansing solution.  Feel free to bring a personal music player or a CD to listen to during your procedure.  It is advised not to fly within 3 weeks after the procedure.  You do not have to fast prior to this procedure.  For any questions regarding your procedure please call [phone] to speak with the nurse.  If you would like a Good Zee Estimate for your upcoming procedure contact Cost of Care Estimates at [phone], advocates are available Monday through Friday 8am - 4:30pm.    Radiofrequency Ablation Codes:   - 86463 for first vein in either leg and 73446 for second vein  Varicose veins may be a sign of something more severe - venous reflux disease.  Healthy leg veins have valves that keep blood flowing to the heart. Venous reflux disease develops when the valves stop working properly and allow blood to flow backward (i.e., reflux) and pool in the lower leg veins.   If venous reflux disease is left untreated, symptoms can worsen over time. Your doctor can help you understand if you have this condition.     Superficial venous reflux disease may cause the following signs and symptoms in your legs:  Varicose veins  Aching  Swelling  Cramping  Heaviness or tiredness  Itching  Restlessness  Open skin sores    Superficial venous reflux disease treatment aims to reduce or stop the backward flow of blood. The following may be prescribed to treat your superficial venous reflux disease. Your doctor can help you decide which treatment is best for you:   Compression stockings   Removing diseased vein   Closing diseased vein (through thermal or non-thermal treatment)      Radiofrequency Ablation (RFA) Treatment for Varicose Veins  Radiofrequency ablation (RFA) is a thermal procedure to treat varicose veins. It uses heat created from radiofrequency (RF). During RFA treatment, RF heat is sent into your vein through a thin, flexible tube (catheter). This closes off blood flow in the main problem vein.           Getting ready for your treatment  Tell your provider if you:  Are pregnant or think you may be pregnant  Are breastfeeding  Smoke, use alcohol or street drugs on a regular basis  Have any allergies or intolerances to certain medicines. Explain what reaction you have had to these medicines in the past.      The day of your treatment  The treatment takes 45 to 60 minutes. The entire treatment (including time to prepare and recover) takes about 1 to 3 hours. You can go home the same day. For the treatment:   You'll lie down on a hospital bed.  An imaging method, such as ultrasound, is used to guide the procedure.  The leg to be treated is injected with numbing medicine.  Once your leg is numb, a needle makes a small hole (puncture) in the vein to be treated.  The catheter with the RF heat source is inserted into your vein.  More numbing medicine may be injected around your vein.  Once the catheter is in the right position, it is then slowly drawn backward. As the catheter sends out heat, the vein is closed off.  In some cases, other side branch varicose veins may be removed or tied off through a few small cuts (incisions).  When the treatment is done, the catheter is removed. Pressure is applied to the insertion site to stop any bleeding. An elastic compression stocking or a bandage may then be put on your leg.       Recovering at home  Once at home, follow all the instructions you've been given. Be sure to:  Check for signs of infection at the catheter insertion sites (see below)  Wear thigh high compressions as directed  Keep your legs raised (elevated) as directed  Walk a few  times a day  Avoid heavy exercise, lifting, and standing for long periods as advised. No lifting >20lbs for 2 weeks.   Avoid air travel, hot baths, saunas, or whirlpools as advised  Do not drive or operate heavy machinery for 24 hours after the procedure  Leakage from the numbing medications the first few hours is normal.          Call your healthcare provider if you have any of the following:  Fever of 100.4 F (38 C) or higher, or as directed by your provider  Chest pain or trouble breathing  Signs of infection at the catheter insertion site. These include increased redness or swelling (inflammation), warmth, increasing pain, bleeding, or bad-smelling discharge.  Severe numbness or tingling in the treated leg  Severe pain or swelling in the treated leg      Follow-up  You'll have an ultrasound within the same week as the procedure to check for problems, such as blood clots. You will follow up with the provider after 6 weeks.   Risks and possible complications   These include the following:  Bleeding, Infection, Blood clots, Damage to the nerves in the treated area, Irritation or burning of the skin over the treated vein. Treatment doesn't improve the look or the symptoms of the problem veins  Risks of any medicines used during the treatment

## 2025-07-23 NOTE — PROGRESS NOTES
[facility] Consult      Assessment:     1. varicose veins, bilateral   2. spider veins, bilateral   3. Insuffiencey of bilateral short saphenous vein and acessory saphenous vein,  right greater saphenous vein    Plan:     1. Treatment options of conservative therapy of stockings use, exercise, weight loss, elevating legs when possible.    2. Script for compression stockings 20-30 mm hg  3. Ultrasound to evaluate legs for incompetency of both deep and superficial system .   4. Surgical treatment   Endovenous closure ofbilateral, short saphenous vein, bilateral accessory saphenous veins, right greater saphenous vein.      Risks and benefits of surgical intervention including infection, burns, dvt, thrombophlebitis, not closing, recurrence, numbness and nerve injury and need for further intervention were all discused    5. Follow up: for procedure.  .   6. Call for any questions concerns or issues    Subjective:      Mariah Jacinto is a 79 year old female  who was referred by Kumar Joshi  for evaluation of varicose veins. Symptoms include pain, aching, fatigue, burning, edema, dermatitis, and episodes of superficial thrombophlebitis. Patient has history of leg swelling, pain and vein issues that have progressed. Pain and symptoms have affected daily living and work activities needing medications. Here for evaluation today.  He is worn compression for greater than 6 months time.    Allergies:Biaxin [clarithromycin], Celebrex [basurto-2 inhibitors (sulfonamide)], Cipro [ciprofloxacin], Darvocet [propoxyphene n-apap], Fleet phospho soda [sodium phosphate/biphosphate], Morphine, Neurontin [gabapentin], Nortriptyline, Percocet [oxycodone-acetaminophen], Serzone [nefazodone hydrochloride], Tegretol [carbamazepine], Vicodin [acetaminophen], Vioxx, Vivactil [protriptyline hcl], Wellbutrin [bupropion hcl], and Zithromax [azithromycin dihydrate]    Past Medical History:   Diagnosis Date    Adhesive  capsulitis of shoulder     Right shoulder tendonitis    Basal cell carcinoma     Chronic right shoulder pain 06/27/2023    Displacement of cervical intervertebral disc without myelopathy     Esophageal reflux     Major depression in complete remission 06/22/2009    celexa caused spinning side effect     MENOPAUSE --ERT     OSTEOPENIA     Squamous cell carcinoma        Past Surgical History:   Procedure Laterality Date    ABLATE VEIN VARICOSE RADIO FREQUENCY WITHOUT PHLEBECTOMY MULTIPLE STAB  3/28/2013    Procedure: ABLATE VEIN VARICOSE RADIO FREQUENCY WITHOUT PHLEBECTOMY MULTIPLE STAB;  Bilateral ablation of varicose veins legs-to ultrasound at 0930  ;  Surgeon: Noam Soliman MD;  Location: [facility]    COLONOSCOPY  8/20/2013    Procedure: COLONOSCOPY;  Colonoscopy;  Surgeon: Yuliya Nathan MD;  [facility]    ESOPHAGOSCOPY, GASTROSCOPY, DUODENOSCOPY (EGD), COMBINED N/A 5/12/2015    Procedure: COMBINED ESOPHAGOSCOPY, GASTROSCOPY, DUODENOSCOPY (EGD), BIOPSY SINGLE OR MULTIPLE;  Surgeon: Yuliya Nathan MD;  [facility]: [facility]    ESOPHAGOSCOPY, GASTROSCOPY, DUODENOSCOPY (EGD), COMBINED N/A 1/31/2017    Procedure: COMBINED ESOPHAGOSCOPY, GASTROSCOPY, DUODENOSCOPY (EGD), BIOPSY SINGLE OR MULTIPLE;  Surgeon: Qasim Bowie MD;  [facility]    ESOPHAGOSCOPY, GASTROSCOPY, DUODENOSCOPY (EGD), COMBINED N/A 8/22/2024    Procedure: ESOPHAGOGASTRODUODENOSCOPY, WITH BIOPSY;  Surgeon: Mj Saenz MD;  [facility]: [facility]    EXCISE MASS FINGER Right 2/19/2019    Procedure: Excision Right Ring Finger Volar Middle Phalanx Mass;  Surgeon: Jake Viveros MD;  Location: [facility]    HYSTERECTOMY, PAP NO LONGER INDICATED      has both ovaries out also     HYSTERECTOMY, VAGINAL  1988    Hysterectomy, oophorectomy    PHACOEMULSIFICATION WITH STANDARD INTRAOCULAR LENS IMPLANT Left 3/18/2019    Procedure: Cataract Removal with Implant;  Surgeon: Chapito Phillips MD;  Location: [facility]     PHACOEMULSIFICATION WITH STANDARD INTRAOCULAR LENS IMPLANT Right 4/10/2019    Procedure: Cataract Removal with Implant;  Surgeon: Chapito Phillips MD;  Location: [facility]    RELEASE TRIGGER FINGER Right 2017    Procedure: RELEASE TRIGGER FINGER;  Right Thumb A1 Pulley Release;  Surgeon: Jake Viveros MD;  Location: [facility]    REVERSE ARTHROPLASTY SHOULDER Right 2023    Procedure: Right Reverse total shoulder arthroplasty;  Surgeon: Srikanth Gonsales MD;  Location: [facility]    SURGICAL HISTORY OF -       right retromastoid craniectomy and decompression trigeminal nerve    TONSILLECTOMY & ADENOIDECTOMY  child    T&A     ZZC ANESTH,LOWER ARM SURGERY      ulnar nerve decompression - right         Current Outpatient Medications:     acetaminophen (TYLENOL) 325 MG tablet, Take 325 mg by mouth once, Disp: , Rfl:     estradiol (ESTRACE) 0.1 MG/GM vaginal cream, Place vaginally twice a week 1 g  twice weekly at bedtime, Disp: 43 g, Rfl: 4    melatonin 5 MG CAPS, Take 5 mg by mouth At Bedtime, Disp: 1 capsule, Rfl: 0    MULTI-VITAMIN OR TABS, 1 TABLET DAILY, Disp: , Rfl:     omeprazole (PRILOSEC) 40 MG DR capsule, Take 1 capsule (40 mg) by mouth daily., Disp: 7 capsule, Rfl: 0    simvastatin (ZOCOR) 20 MG tablet, TAKE ONE TABLET BY MOUTH EVERY DAY AT BEDTIME, Disp: 90 tablet, Rfl: 1    VITAMIN D OR, 1 DAILY, Disp: , Rfl:     nitroGLYcerin (NITRODUR) 0.1 MG/HR 24 hr patch, Place 1 patch onto the skin daily. (Patient not taking: Reported on 2025), Disp: , Rfl:     omeprazole (PRILOSEC) 40 MG DR capsule, Take 1 capsule by mouth once daily (Patient not taking: Reported on 2025), Disp: 90 capsule, Rfl: 3     Family History   Problem Relation Age of Onset    Alzheimer Disease Mother          at age 85    Osteoporosis Mother     Arthritis Mother     Alcohol/Drug Father     Respiratory Father     Eye Disorder Maternal Grandfather         Macular degneration    C.A.DChris Brother         Tripple  "bypass age 57    Cardiovascular Brother     Cancer Brother         leukemia (ALL)    Cardiovascular Brother     Cardiovascular Brother     Neurologic Disorder Son     Heart Disease Son     Melanoma No family hx of     Skin Cancer No family hx of         reports that she has never smoked. She has never been exposed to tobacco smoke. She has never used smokeless tobacco. She reports that she does not drink alcohol and does not use drugs.      Review of Systems:    Pertinent items are noted in HPI.  Patient has symptomatic veins and changes of bilateral legs. These have progressed to the point of causing symptoms on a daily basis. This causes issues with daily activities and chores such as washing dishes, vacuuming, mowing lawn, outdoor upkeep, and standing for long lengths of time       Objective:     Vitals:    07/23/25 1123   BP: (!) 170/97   Pulse: 80   SpO2: 99%   Weight: 62.1 kg (137 lb)   Height: 1.626 m (5' 4\")     Body mass index is 23.52 kg/m .    EXAM:  GENERAL: This is a well-developed 79 year old female who appears her stated age  HEAD: normocephalic  HEENT: Pupils equal and reactive bilaterally  MOUTH: mucus membranes intact. Normal dentation  CARDIAC: RRR without murmur  CHEST/LUNG:  Clear to auscultation bilaterally  ABDOMEN: Soft, nontender, nondistended, no masses noted   NEUROLOGIC: Focally intact, nonfocal, alert and oriented x 3  INTEGUMENT: No open lesions or ulcers  VASCULAR: Pulses intact, symmetrical upper and lower extremities. There areskin changes consistent with chronic venous insufficiency. Varicose veins present in bilateral greater saphenous distribution. Spider veins present bilateral.                                                            Patient presents with mild, bilateral  lower extremity secondary lymphedema.     Patient requires a standard-fit knee high compression stocking     Side:: Bilateral  VCSS  Venous Edema:: Mild: Evening ankle swelling only  Compression Therapy:: " Wears elastric stockings most days  Patient reported symptoms  Heaviness: a good bit of the time  Achiness: a good bit of the time  Swelling: most of the time  Throbbing: a good bit of the time  Itching: none of the time    Imaging:    Reviewed and my interpretation of the US is insuffiencey of right greater, small saphenous, and anterior accessory saphenous vein. Left small saphenous veina nd poseriror accesssory saphenous vein.     Ultrasound results were reviewed from an outside ultrasound done at our prior vein facility.  This is a recent ultrasound.     Manny Patel MD  General Surgery [phone]  Vascular Surgery [phone]

## 2025-07-24 NOTE — TELEPHONE ENCOUNTER
Venous Closure Prior Authorization Request- Sent     Dr. Manny Patel NPI: [id number]  Vascular NPI: [id number] Tax ID: [id number]    Payor:  [facility]  Insurance Member ID: [id number]  Procedure: Radiofrequency ablation (RFA) of CATHERINE SSV, R GSV, R AASV, L PASV  CPTs: 36475 x2 and 36476 x3  Case/Ref #: [id number]  Clinicals Provided: Face Sheet, Office notes, US venous competency, and PA form  Submitted via: [employer] Fax: [phone]     Medicare Exemption for 2025:    We are notifying you that you have demonstrated compliance with Medicare coverage, coding, and payment rules during the most recent semiannual assessment and will be exempt from submitting prior authorization requests (PARs) for the service date provided. Your exemption period begins on January 1, 2025, and extends through December 31, 2025. Any PARs submitted for a date of service within your exemption period will be rejected. Please do not submit PARs for record keeping purposes during your exempt period. Please contact the hospital outpatient department (OPD) for a copy of the most recent exemption notification. You may check the exemption status of any PTAN by accessing [email or web address] and searching for the Prior Authorization Exemption Status Inquiry Tool. Please check back on December 10, 2025, for updates on provider exemption status.

## 2025-08-04 ENCOUNTER — HOSPITAL ENCOUNTER (OUTPATIENT)
Dept: MAMMOGRAPHY | Facility: CLINIC | Age: 80
Discharge: HOME | End: 2025-08-04
Attending: FAMILY MEDICINE | Admitting: FAMILY MEDICINE
Payer: COMMERCIAL

## 2025-08-04 DIAGNOSIS — Z12.31 VISIT FOR SCREENING MAMMOGRAM: ICD-10-CM

## 2025-08-04 PROCEDURE — 77063 BREAST TOMOSYNTHESIS BI: CPT

## 2025-08-20 ENCOUNTER — OFFICE VISIT (OUTPATIENT)
Dept: FAMILY MEDICINE | Facility: CLINIC | Age: 80
End: 2025-08-20
Payer: COMMERCIAL

## 2025-08-20 VITALS
HEIGHT: 64 IN | SYSTOLIC BLOOD PRESSURE: 138 MMHG | DIASTOLIC BLOOD PRESSURE: 68 MMHG | OXYGEN SATURATION: 98 % | HEART RATE: 64 BPM | RESPIRATION RATE: 16 BRPM | WEIGHT: 135.1 LBS | BODY MASS INDEX: 23.06 KG/M2 | TEMPERATURE: 97.6 F

## 2025-08-20 DIAGNOSIS — M81.0 AGE-RELATED OSTEOPOROSIS WITHOUT CURRENT PATHOLOGICAL FRACTURE: ICD-10-CM

## 2025-08-20 DIAGNOSIS — Z13.6 SCREENING FOR CARDIOVASCULAR CONDITION: ICD-10-CM

## 2025-08-20 DIAGNOSIS — Z00.00 ENCOUNTER FOR MEDICARE ANNUAL WELLNESS EXAM: Primary | ICD-10-CM

## 2025-08-20 DIAGNOSIS — K21.9 GASTROESOPHAGEAL REFLUX DISEASE WITHOUT ESOPHAGITIS: ICD-10-CM

## 2025-08-20 DIAGNOSIS — N95.2 ATROPHIC VAGINITIS: ICD-10-CM

## 2025-08-20 DIAGNOSIS — M19.072 ARTHRITIS OF BOTH FEET: ICD-10-CM

## 2025-08-20 DIAGNOSIS — M19.071 ARTHRITIS OF BOTH FEET: ICD-10-CM

## 2025-08-20 DIAGNOSIS — E78.5 HYPERLIPIDEMIA LDL GOAL <160: ICD-10-CM

## 2025-08-20 LAB
ALBUMIN SERPL BCG-MCNC: 4.2 G/DL (ref 3.5–5.2)
ALP SERPL-CCNC: 86 U/L (ref 40–150)
ALT SERPL W P-5'-P-CCNC: 16 U/L (ref 0–50)
ANION GAP SERPL CALCULATED.3IONS-SCNC: 10 MMOL/L (ref 7–15)
AST SERPL W P-5'-P-CCNC: 23 U/L (ref 0–45)
BILIRUB SERPL-MCNC: 0.3 MG/DL
BUN SERPL-MCNC: 15.1 MG/DL (ref 8–23)
CALCIUM SERPL-MCNC: 9.6 MG/DL (ref 8.8–10.4)
CHLORIDE SERPL-SCNC: 104 MMOL/L (ref 98–107)
CHOLEST SERPL-MCNC: 199 MG/DL
CREAT SERPL-MCNC: 0.69 MG/DL (ref 0.51–0.95)
EGFRCR SERPLBLD CKD-EPI 2021: 87 ML/MIN/1.73M2
FASTING STATUS PATIENT QL REPORTED: YES
FASTING STATUS PATIENT QL REPORTED: YES
GLUCOSE SERPL-MCNC: 91 MG/DL (ref 70–99)
HCO3 SERPL-SCNC: 27 MMOL/L (ref 22–29)
HDLC SERPL-MCNC: 58 MG/DL
LDLC SERPL CALC-MCNC: 122 MG/DL
MAGNESIUM SERPL-MCNC: 2.3 MG/DL (ref 1.7–2.3)
NONHDLC SERPL-MCNC: 141 MG/DL
POTASSIUM SERPL-SCNC: 4.6 MMOL/L (ref 3.4–5.3)
PROT SERPL-MCNC: 6.7 G/DL (ref 6.4–8.3)
SODIUM SERPL-SCNC: 141 MMOL/L (ref 135–145)
TRIGL SERPL-MCNC: 95 MG/DL

## 2025-08-20 PROCEDURE — 36415 COLL VENOUS BLD VENIPUNCTURE: CPT | Performed by: FAMILY MEDICINE

## 2025-08-20 PROCEDURE — 83735 ASSAY OF MAGNESIUM: CPT | Performed by: FAMILY MEDICINE

## 2025-08-20 PROCEDURE — 80061 LIPID PANEL: CPT | Performed by: FAMILY MEDICINE

## 2025-08-20 PROCEDURE — 80053 COMPREHEN METABOLIC PANEL: CPT | Performed by: FAMILY MEDICINE

## 2025-08-20 RX ORDER — ACETAMINOPHEN 325 MG/1
650 TABLET ORAL
OUTPATIENT
Start: 2025-08-27

## 2025-08-20 RX ORDER — HEPARIN SODIUM,PORCINE 10 UNIT/ML
5-20 VIAL (ML) INTRAVENOUS DAILY PRN
OUTPATIENT
Start: 2025-08-27

## 2025-08-20 RX ORDER — ESTRADIOL 0.1 MG/G
CREAM VAGINAL
Qty: 43 G | Refills: 4 | Status: SHIPPED | OUTPATIENT
Start: 2025-08-21

## 2025-08-20 RX ORDER — HEPARIN SODIUM (PORCINE) LOCK FLUSH IV SOLN 100 UNIT/ML 100 UNIT/ML
5 SOLUTION INTRAVENOUS
OUTPATIENT
Start: 2025-08-27

## 2025-08-20 RX ORDER — ALBUTEROL SULFATE 0.83 MG/ML
2.5 SOLUTION RESPIRATORY (INHALATION)
OUTPATIENT
Start: 2025-08-27

## 2025-08-20 RX ORDER — EPINEPHRINE 1 MG/ML
0.3 INJECTION, SOLUTION, CONCENTRATE INTRAVENOUS EVERY 5 MIN PRN
OUTPATIENT
Start: 2025-08-27

## 2025-08-20 RX ORDER — DIPHENHYDRAMINE HYDROCHLORIDE 50 MG/ML
25 INJECTION, SOLUTION INTRAMUSCULAR; INTRAVENOUS
Start: 2025-08-27

## 2025-08-20 RX ORDER — SIMVASTATIN 20 MG
20 TABLET ORAL AT BEDTIME
Qty: 90 TABLET | Refills: 11 | Status: SHIPPED | OUTPATIENT
Start: 2025-08-20

## 2025-08-20 RX ORDER — MEPERIDINE HYDROCHLORIDE 25 MG/ML
25 INJECTION INTRAMUSCULAR; INTRAVENOUS; SUBCUTANEOUS
OUTPATIENT
Start: 2025-08-27

## 2025-08-20 RX ORDER — OMEPRAZOLE 20 MG/1
20 CAPSULE, DELAYED RELEASE ORAL DAILY
Qty: 90 CAPSULE | Refills: 3 | Status: SHIPPED | OUTPATIENT
Start: 2025-08-20

## 2025-08-20 RX ORDER — ALBUTEROL SULFATE 90 UG/1
1-2 INHALANT RESPIRATORY (INHALATION)
Start: 2025-08-27

## 2025-08-20 RX ORDER — OMEPRAZOLE 40 MG/1
40 CAPSULE, DELAYED RELEASE ORAL DAILY
Qty: 7 CAPSULE | Refills: 0 | Status: CANCELLED | OUTPATIENT
Start: 2025-08-20

## 2025-08-20 RX ORDER — ZOLEDRONIC ACID 0.05 MG/ML
5 INJECTION, SOLUTION INTRAVENOUS ONCE
Start: 2025-08-27

## 2025-08-20 RX ORDER — DIPHENHYDRAMINE HYDROCHLORIDE 50 MG/ML
50 INJECTION, SOLUTION INTRAMUSCULAR; INTRAVENOUS
Start: 2025-08-27

## 2025-08-20 RX ORDER — METHYLPREDNISOLONE SODIUM SUCCINATE 40 MG/ML
40 INJECTION INTRAMUSCULAR; INTRAVENOUS
Start: 2025-08-27

## 2025-08-20 SDOH — HEALTH STABILITY: PHYSICAL HEALTH: ON AVERAGE, HOW MANY DAYS PER WEEK DO YOU ENGAGE IN MODERATE TO STRENUOUS EXERCISE (LIKE A BRISK WALK)?: 2 DAYS

## 2025-08-20 SDOH — HEALTH STABILITY: PHYSICAL HEALTH: ON AVERAGE, HOW MANY MINUTES DO YOU ENGAGE IN EXERCISE AT THIS LEVEL?: 20 MIN

## 2025-08-20 ASSESSMENT — PAIN SCALES - GENERAL: PAINLEVEL_OUTOF10: NO PAIN (0)

## 2025-08-20 ASSESSMENT — SOCIAL DETERMINANTS OF HEALTH (SDOH): HOW OFTEN DO YOU GET TOGETHER WITH FRIENDS OR RELATIVES?: MORE THAN THREE TIMES A WEEK

## (undated) DEVICE — GLOVE PROTEXIS W/NEU-THERA 7.5  2D73TE75

## (undated) DEVICE — GLOVE PROTEXIS BLUE W/NEU-THERA 7.0  2D73EB70

## (undated) DEVICE — DRAIN PENROSE 1/4X12" LATEX

## (undated) DEVICE — DRAPE BACK TABLE  44X90" 8377

## (undated) DEVICE — SOL WATER IRRIG 1000ML BOTTLE 07139-09

## (undated) DEVICE — GLOVE BIOGEL PI MICRO SZ 8.0 48580

## (undated) DEVICE — DRSG AQUACEL AG 3.5X12" HYDROFIBER 420670

## (undated) DEVICE — SU STRATAFIX MONOCRYL 3-0 SPIRAL PS-2 30CM SXMP1B106

## (undated) DEVICE — SU MONOCRYL 2-0 CT-1 36" UND Y945H

## (undated) DEVICE — DRILL BIT PERIPHERAL SCREW 3.2MM MWJ126

## (undated) DEVICE — SUCTION IRR SYSTEM W/TIP INTERPULSE

## (undated) DEVICE — PREP CHLORAPREP 26ML TINTED ORANGE  260815

## (undated) DEVICE — IMM KIT SHOULDER TMAX MASK FACE 7210559

## (undated) DEVICE — GLOVE BIOGEL PI MICRO INDICATOR UNDERGLOVE SZ 6.5 48965

## (undated) DEVICE — SOL NACL 0.9% IRRIG 1000ML BOTTLE 07138-09

## (undated) DEVICE — GLOVE PROTEXIS W/NEU-THERA 7.0  2D73TE70

## (undated) DEVICE — DRAPE SHEET REV FOLD 3/4 9349

## (undated) DEVICE — PACK SHOULDER

## (undated) DEVICE — BLADE KNIFE BEAVER 376700

## (undated) DEVICE — PACK HAND

## (undated) DEVICE — SU ETHIBOND 5 V-37 4X30" MB66G

## (undated) DEVICE — GLOVE BIOGEL PI MICRO INDICATOR UNDERGLOVE SZ 8.0 48980

## (undated) DEVICE — DRAPE ARTHROSCOPY SHOULDER BEACHCHAIR 29369

## (undated) DEVICE — GOWN LG DISP 9515

## (undated) DEVICE — PAD FLOOR SURGISAFE

## (undated) DEVICE — PREP SKIN SCRUB TRAY 4461A

## (undated) DEVICE — DEVICE RETRIEVER HEWSON 71111579

## (undated) DEVICE — DRSG GAUZE 2X2" 8042

## (undated) DEVICE — BLADE SAW SAGITTAL STRK 18X90X1.37MM HD SYS 6 6118-137-090

## (undated) DEVICE — ESU PENCIL SMOKE EVAC W/ROCKER SWITCH 0703-047-000

## (undated) DEVICE — IMM ALUMI HAND XLG 761

## (undated) DEVICE — SUCTION MANIFOLD NEPTUNE 2 SYS 4 PORT 0702-020-000

## (undated) DEVICE — SU ETHILON 4-0 PS-2 18" 1667G

## (undated) DEVICE — PREP CHLORHEXIDINE 4% 4OZ (HIBICLENS) 57504

## (undated) DEVICE — GOWN IMPERVIOUS SPECIALTY XLG/XLONG 32474

## (undated) DEVICE — DECANTER VIAL 2006S

## (undated) DEVICE — ENDO FORCEP ENDOJAW BIOPSY 2.8MMX230CM FB-220U

## (undated) DEVICE — SLING ARM MED 79-99155

## (undated) DEVICE — BNDG ELASTIC 2"X5YDS UNSTERILE 6611-20

## (undated) DEVICE — BONE CLEANING TIP INTERPULSE  0210-010-000

## (undated) DEVICE — EYE PREP BETADINE 5% SOLUTION 30ML 0065-0411-30

## (undated) DEVICE — GUIDEWIRE TORNIER AEQUALIS PERFORM +  2.5X220MM DWD017

## (undated) DEVICE — GLOVE BIOGEL PI MICRO SZ 6.5 48565

## (undated) DEVICE — SUCTION TIP POOLE K770

## (undated) DEVICE — DRAPE IOBAN LG .375X23.5" 6648EZ

## (undated) DEVICE — SPONGE LAP 18X18" 1515

## (undated) DEVICE — STOCKING SLEEVE COMPRESSION CALF MED

## (undated) DEVICE — GOWN XLG DISP 9545

## (undated) DEVICE — BNDG SURGITUBE 5/8" TUBE GAUZE LF GL-219

## (undated) DEVICE — SU ETHIBOND 0 CT-2 30" X412H

## (undated) DEVICE — SOL NACL 0.9% IRRIG 3000ML BAG 07972-08

## (undated) RX ORDER — CYCLOPENTOLATE HYDROCHLORIDE 10 MG/ML
SOLUTION/ DROPS OPHTHALMIC
Status: DISPENSED
Start: 2019-03-18

## (undated) RX ORDER — GLYCOPYRROLATE 0.2 MG/ML
INJECTION, SOLUTION INTRAMUSCULAR; INTRAVENOUS
Status: DISPENSED
Start: 2023-08-25

## (undated) RX ORDER — LIDOCAINE HYDROCHLORIDE AND EPINEPHRINE 10; 10 MG/ML; UG/ML
INJECTION, SOLUTION INFILTRATION; PERINEURAL
Status: DISPENSED
Start: 2019-02-19

## (undated) RX ORDER — EPHEDRINE SULFATE 50 MG/ML
INJECTION, SOLUTION INTRAMUSCULAR; INTRAVENOUS; SUBCUTANEOUS
Status: DISPENSED
Start: 2023-08-25

## (undated) RX ORDER — LIDOCAINE HYDROCHLORIDE 10 MG/ML
INJECTION, SOLUTION EPIDURAL; INFILTRATION; INTRACAUDAL; PERINEURAL
Status: DISPENSED
Start: 2024-08-22

## (undated) RX ORDER — BUPIVACAINE HYDROCHLORIDE 5 MG/ML
INJECTION, SOLUTION EPIDURAL; INTRACAUDAL
Status: DISPENSED
Start: 2023-08-25

## (undated) RX ORDER — CYCLOPENTOLATE HYDROCHLORIDE 10 MG/ML
SOLUTION/ DROPS OPHTHALMIC
Status: DISPENSED
Start: 2019-04-10

## (undated) RX ORDER — GLYCOPYRROLATE 0.2 MG/ML
INJECTION, SOLUTION INTRAMUSCULAR; INTRAVENOUS
Status: DISPENSED
Start: 2024-08-22

## (undated) RX ORDER — LIDOCAINE HYDROCHLORIDE 10 MG/ML
INJECTION, SOLUTION EPIDURAL; INFILTRATION; INTRACAUDAL; PERINEURAL
Status: DISPENSED
Start: 2017-01-31

## (undated) RX ORDER — LIDOCAINE HYDROCHLORIDE AND EPINEPHRINE 10; 10 MG/ML; UG/ML
INJECTION, SOLUTION INFILTRATION; PERINEURAL
Status: DISPENSED
Start: 2017-06-23

## (undated) RX ORDER — CEPHALEXIN 500 MG/1
CAPSULE ORAL
Status: DISPENSED
Start: 2019-02-19

## (undated) RX ORDER — PROPOFOL 10 MG/ML
INJECTION, EMULSION INTRAVENOUS
Status: DISPENSED
Start: 2023-08-25

## (undated) RX ORDER — TRANEXAMIC ACID 650 MG/1
TABLET ORAL
Status: DISPENSED
Start: 2023-08-25

## (undated) RX ORDER — ACETAMINOPHEN 325 MG/1
TABLET ORAL
Status: DISPENSED
Start: 2023-08-25

## (undated) RX ORDER — CEFAZOLIN SODIUM 1 G/3ML
INJECTION, POWDER, FOR SOLUTION INTRAMUSCULAR; INTRAVENOUS
Status: DISPENSED
Start: 2023-08-25

## (undated) RX ORDER — REGADENOSON 0.08 MG/ML
INJECTION, SOLUTION INTRAVENOUS
Status: DISPENSED
Start: 2024-09-09

## (undated) RX ORDER — LIDOCAINE HYDROCHLORIDE 10 MG/ML
INJECTION, SOLUTION INFILTRATION; PERINEURAL
Status: DISPENSED
Start: 2019-02-19

## (undated) RX ORDER — LIDOCAINE HYDROCHLORIDE AND EPINEPHRINE 10; 10 MG/ML; UG/ML
INJECTION, SOLUTION INFILTRATION; PERINEURAL
Status: DISPENSED
Start: 2021-09-23

## (undated) RX ORDER — DEXAMETHASONE SODIUM PHOSPHATE 4 MG/ML
INJECTION, SOLUTION INTRA-ARTICULAR; INTRALESIONAL; INTRAMUSCULAR; INTRAVENOUS; SOFT TISSUE
Status: DISPENSED
Start: 2023-08-25

## (undated) RX ORDER — PHENYLEPHRINE HYDROCHLORIDE 25 MG/ML
SOLUTION/ DROPS OPHTHALMIC
Status: DISPENSED
Start: 2019-04-10

## (undated) RX ORDER — PHENYLEPHRINE HYDROCHLORIDE 25 MG/ML
SOLUTION/ DROPS OPHTHALMIC
Status: DISPENSED
Start: 2019-03-18

## (undated) RX ORDER — BUPIVACAINE HYDROCHLORIDE 5 MG/ML
INJECTION, SOLUTION PERINEURAL
Status: DISPENSED
Start: 2019-02-19

## (undated) RX ORDER — FENTANYL CITRATE 50 UG/ML
INJECTION, SOLUTION INTRAMUSCULAR; INTRAVENOUS
Status: DISPENSED
Start: 2023-08-25

## (undated) RX ORDER — LIDOCAINE HYDROCHLORIDE 20 MG/ML
JELLY TOPICAL
Status: DISPENSED
Start: 2023-08-25

## (undated) RX ORDER — ONDANSETRON 2 MG/ML
INJECTION INTRAMUSCULAR; INTRAVENOUS
Status: DISPENSED
Start: 2023-08-25

## (undated) RX ORDER — TROPICAMIDE 10 MG/ML
SOLUTION/ DROPS OPHTHALMIC
Status: DISPENSED
Start: 2019-03-18

## (undated) RX ORDER — TROPICAMIDE 10 MG/ML
SOLUTION/ DROPS OPHTHALMIC
Status: DISPENSED
Start: 2019-04-10

## (undated) RX ORDER — FERRIC SUBSULFATE 0.21 G/G
LIQUID TOPICAL
Status: DISPENSED
Start: 2021-09-23